# Patient Record
Sex: FEMALE | Race: BLACK OR AFRICAN AMERICAN | Employment: FULL TIME | URBAN - METROPOLITAN AREA
[De-identification: names, ages, dates, MRNs, and addresses within clinical notes are randomized per-mention and may not be internally consistent; named-entity substitution may affect disease eponyms.]

---

## 2021-08-31 ENCOUNTER — APPOINTMENT (OUTPATIENT)
Dept: CT IMAGING | Age: 66
End: 2021-08-31
Attending: EMERGENCY MEDICINE
Payer: COMMERCIAL

## 2021-08-31 ENCOUNTER — HOSPITAL ENCOUNTER (EMERGENCY)
Age: 66
Discharge: HOME | End: 2021-08-31
Payer: COMMERCIAL

## 2021-08-31 ENCOUNTER — APPOINTMENT (OUTPATIENT)
Dept: GENERAL RADIOLOGY | Age: 66
End: 2021-08-31
Attending: EMERGENCY MEDICINE
Payer: COMMERCIAL

## 2021-08-31 VITALS
SYSTOLIC BLOOD PRESSURE: 122 MMHG | WEIGHT: 183 LBS | RESPIRATION RATE: 16 BRPM | BODY MASS INDEX: 32.43 KG/M2 | TEMPERATURE: 98.4 F | HEART RATE: 69 BPM | HEIGHT: 63 IN | OXYGEN SATURATION: 98 % | DIASTOLIC BLOOD PRESSURE: 58 MMHG

## 2021-08-31 DIAGNOSIS — R11.0 NAUSEA WITHOUT VOMITING: ICD-10-CM

## 2021-08-31 DIAGNOSIS — R53.83 FATIGUE, UNSPECIFIED TYPE: Primary | ICD-10-CM

## 2021-08-31 LAB
ALBUMIN SERPL-MCNC: 3.6 G/DL (ref 3.5–5)
ALBUMIN/GLOB SERPL: 0.9 {RATIO} (ref 1.1–2.2)
ALP SERPL-CCNC: 69 U/L (ref 45–117)
ALT SERPL-CCNC: 18 U/L (ref 12–78)
ANION GAP SERPL CALC-SCNC: 7 MMOL/L (ref 5–15)
APPEARANCE UR: CLEAR
AST SERPL W P-5'-P-CCNC: 11 U/L (ref 15–37)
BACTERIA URNS QL MICRO: NEGATIVE /HPF
BASOPHILS # BLD: 0 K/UL (ref 0–0.1)
BASOPHILS NFR BLD: 0 % (ref 0–1)
BILIRUB SERPL-MCNC: 0.6 MG/DL (ref 0.2–1)
BILIRUB UR QL: NEGATIVE
BUN SERPL-MCNC: 16 MG/DL (ref 6–20)
BUN/CREAT SERPL: 18 (ref 12–20)
CA-I BLD-MCNC: 8.6 MG/DL (ref 8.5–10.1)
CHLORIDE SERPL-SCNC: 102 MMOL/L (ref 97–108)
CO2 SERPL-SCNC: 26 MMOL/L (ref 21–32)
COLOR UR: ABNORMAL
CREAT SERPL-MCNC: 0.87 MG/DL (ref 0.55–1.02)
DIFFERENTIAL METHOD BLD: ABNORMAL
EOSINOPHIL # BLD: 0 K/UL (ref 0–0.4)
EOSINOPHIL NFR BLD: 0 % (ref 0–7)
ERYTHROCYTE [DISTWIDTH] IN BLOOD BY AUTOMATED COUNT: 13.9 % (ref 11.5–14.5)
GLOBULIN SER CALC-MCNC: 4 G/DL (ref 2–4)
GLUCOSE SERPL-MCNC: 112 MG/DL (ref 65–100)
GLUCOSE UR STRIP.AUTO-MCNC: >300 MG/DL
HCT VFR BLD AUTO: 47.2 % (ref 35–47)
HGB BLD-MCNC: 15.2 G/DL (ref 11.5–16)
HGB UR QL STRIP: NEGATIVE
IMM GRANULOCYTES # BLD AUTO: 0.1 K/UL (ref 0–0.04)
IMM GRANULOCYTES NFR BLD AUTO: 0 % (ref 0–0.5)
KETONES UR QL STRIP.AUTO: 80 MG/DL
LEUKOCYTE ESTERASE UR QL STRIP.AUTO: NEGATIVE
LYMPHOCYTES # BLD: 2.3 K/UL (ref 0.8–3.5)
LYMPHOCYTES NFR BLD: 18 % (ref 12–49)
MCH RBC QN AUTO: 29.1 PG (ref 26–34)
MCHC RBC AUTO-ENTMCNC: 32.2 G/DL (ref 30–36.5)
MCV RBC AUTO: 90.2 FL (ref 80–99)
MONOCYTES # BLD: 1 K/UL (ref 0–1)
MONOCYTES NFR BLD: 7 % (ref 5–13)
MUCOUS THREADS URNS QL MICRO: ABNORMAL /LPF
NEUTS SEG # BLD: 9.5 K/UL (ref 1.8–8)
NEUTS SEG NFR BLD: 75 % (ref 32–75)
NITRITE UR QL STRIP.AUTO: NEGATIVE
NRBC # BLD: 0 K/UL (ref 0–0.01)
NRBC BLD-RTO: 0 PER 100 WBC
PH UR STRIP: 5 [PH] (ref 5–8)
PLATELET # BLD AUTO: 263 K/UL (ref 150–400)
PMV BLD AUTO: 9.7 FL (ref 8.9–12.9)
POTASSIUM SERPL-SCNC: 3.9 MMOL/L (ref 3.5–5.1)
PROT SERPL-MCNC: 7.6 G/DL (ref 6.4–8.2)
PROT UR STRIP-MCNC: NEGATIVE MG/DL
RBC # BLD AUTO: 5.23 M/UL (ref 3.8–5.2)
RBC #/AREA URNS HPF: ABNORMAL /HPF (ref 0–5)
SODIUM SERPL-SCNC: 135 MMOL/L (ref 136–145)
SP GR UR REFRACTOMETRY: >1.03 (ref 1–1.03)
TROPONIN I SERPL-MCNC: <0.05 NG/ML
UROBILINOGEN UR QL STRIP.AUTO: 0.1 EU/DL (ref 0.1–1)
WBC # BLD AUTO: 12.8 K/UL (ref 3.6–11)
WBC URNS QL MICRO: ABNORMAL /HPF (ref 0–4)

## 2021-08-31 PROCEDURE — 96374 THER/PROPH/DIAG INJ IV PUSH: CPT

## 2021-08-31 PROCEDURE — 84484 ASSAY OF TROPONIN QUANT: CPT

## 2021-08-31 PROCEDURE — 36415 COLL VENOUS BLD VENIPUNCTURE: CPT

## 2021-08-31 PROCEDURE — 71045 X-RAY EXAM CHEST 1 VIEW: CPT

## 2021-08-31 PROCEDURE — 81001 URINALYSIS AUTO W/SCOPE: CPT

## 2021-08-31 PROCEDURE — 70450 CT HEAD/BRAIN W/O DYE: CPT

## 2021-08-31 PROCEDURE — 93005 ELECTROCARDIOGRAM TRACING: CPT

## 2021-08-31 PROCEDURE — 80053 COMPREHEN METABOLIC PANEL: CPT

## 2021-08-31 PROCEDURE — 85025 COMPLETE CBC W/AUTO DIFF WBC: CPT

## 2021-08-31 PROCEDURE — 99283 EMERGENCY DEPT VISIT LOW MDM: CPT

## 2021-08-31 PROCEDURE — 74011250636 HC RX REV CODE- 250/636: Performed by: NURSE PRACTITIONER

## 2021-08-31 RX ORDER — FAMOTIDINE 20 MG/1
20 TABLET, FILM COATED ORAL 2 TIMES DAILY
Qty: 20 TABLET | Refills: 0 | Status: ON HOLD | OUTPATIENT
Start: 2021-08-31 | End: 2021-09-24

## 2021-08-31 RX ORDER — ONDANSETRON 2 MG/ML
4 INJECTION INTRAMUSCULAR; INTRAVENOUS
Status: COMPLETED | OUTPATIENT
Start: 2021-08-31 | End: 2021-08-31

## 2021-08-31 RX ADMIN — ONDANSETRON 4 MG: 2 INJECTION INTRAMUSCULAR; INTRAVENOUS at 21:26

## 2021-08-31 RX ADMIN — SODIUM CHLORIDE 1000 ML: 9 INJECTION, SOLUTION INTRAVENOUS at 21:27

## 2021-08-31 NOTE — ED TRIAGE NOTES
Patient reports being dizzy, weakness, and nauseated since Sunday. Patient reports that there are no new medications that she is taking. Patient reports that she was seen at [facility] earlier today as well with normal lab work.

## 2021-09-01 LAB
ATRIAL RATE: 67 BPM
CALCULATED P AXIS, ECG09: 47 DEGREES
CALCULATED R AXIS, ECG10: -16 DEGREES
CALCULATED T AXIS, ECG11: 16 DEGREES
DIAGNOSIS, 93000: NORMAL
P-R INTERVAL, ECG05: 136 MS
Q-T INTERVAL, ECG07: 398 MS
QRS DURATION, ECG06: 106 MS
QTC CALCULATION (BEZET), ECG08: 420 MS
VENTRICULAR RATE, ECG03: 67 BPM

## 2021-09-01 NOTE — DISCHARGE INSTRUCTIONS
Thank you! Thank you for allowing me to care for you in the emergency department. I sincerely hope that you are satisfied with your visit today. It is my goal to provide you with excellent care. Below you will find a list of your labs and imaging from your visit today. Should you have any questions regarding these results please do not hesitate to call the emergency department. Labs -     Recent Results (from the past 12 hour(s))   CBC WITH AUTOMATED DIFF    Collection Time: 08/31/21  7:08 PM   Result Value Ref Range    WBC 12.8 (H) 3.6 - 11.0 K/uL    RBC 5.23 (H) 3.80 - 5.20 M/uL    HGB 15.2 11.5 - 16.0 g/dL    HCT 47.2 (H) 35.0 - 47.0 %    MCV 90.2 80.0 - 99.0 FL    MCH 29.1 26.0 - 34.0 PG    MCHC 32.2 30.0 - 36.5 g/dL    RDW 13.9 11.5 - 14.5 %    PLATELET 147 811 - 962 K/uL    MPV 9.7 8.9 - 12.9 FL    NRBC 0.0 0.0  WBC    ABSOLUTE NRBC 0.00 0.00 - 0.01 K/uL    NEUTROPHILS 75 32 - 75 %    LYMPHOCYTES 18 12 - 49 %    MONOCYTES 7 5 - 13 %    EOSINOPHILS 0 0 - 7 %    BASOPHILS 0 0 - 1 %    IMMATURE GRANULOCYTES 0 0 - 0.5 %    ABS. NEUTROPHILS 9.5 (H) 1.8 - 8.0 K/UL    ABS. LYMPHOCYTES 2.3 0.8 - 3.5 K/UL    ABS. MONOCYTES 1.0 0.0 - 1.0 K/UL    ABS. EOSINOPHILS 0.0 0.0 - 0.4 K/UL    ABS. BASOPHILS 0.0 0.0 - 0.1 K/UL    ABS. IMM. GRANS. 0.1 (H) 0.00 - 0.04 K/UL    DF AUTOMATED     METABOLIC PANEL, COMPREHENSIVE    Collection Time: 08/31/21  7:08 PM   Result Value Ref Range    Sodium 135 (L) 136 - 145 mmol/L    Potassium 3.9 3.5 - 5.1 mmol/L    Chloride 102 97 - 108 mmol/L    CO2 26 21 - 32 mmol/L    Anion gap 7 5 - 15 mmol/L    Glucose 112 (H) 65 - 100 mg/dL    BUN 16 6 - 20 mg/dL    Creatinine 0.87 0.55 - 1.02 mg/dL    BUN/Creatinine ratio 18 12 - 20      GFR est AA >60 >60 ml/min/1.73m2    GFR est non-AA >60 >60 ml/min/1.73m2    Calcium 8.6 8.5 - 10.1 mg/dL    Bilirubin, total 0.6 0.2 - 1.0 mg/dL    AST (SGOT) 11 (L) 15 - 37 U/L    ALT (SGPT) 18 12 - 78 U/L    Alk.  phosphatase 69 45 - 117 U/L Protein, total 7.6 6.4 - 8.2 g/dL    Albumin 3.6 3.5 - 5.0 g/dL    Globulin 4.0 2.0 - 4.0 g/dL    A-G Ratio 0.9 (L) 1.1 - 2.2     TROPONIN I    Collection Time: 08/31/21  7:08 PM   Result Value Ref Range    Troponin-I, Qt. <0.05 <0.05 ng/mL   URINALYSIS W/MICROSCOPIC    Collection Time: 08/31/21  9:30 PM   Result Value Ref Range    Color Yellow/Straw      Appearance Clear Clear      Specific gravity >1.030 (H) 1.003 - 1.030    pH (UA) 5.0 5.0 - 8.0      Protein Negative Negative mg/dL    Glucose >300 (A) Negative mg/dL    Ketone 80 (A) Negative mg/dL    Bilirubin Negative Negative      Blood Negative Negative      Urobilinogen 0.1 0.1 - 1.0 EU/dL    Nitrites Negative Negative      Leukocyte Esterase Negative Negative      WBC 0-4 0 - 4 /hpf    RBC 0-5 0 - 5 /hpf    Bacteria Negative Negative /hpf    Mucus Trace /lpf       Radiologic Studies -   CT HEAD WO CONT   Final Result   Negative. XR CHEST PORT   Final Result   Negative. CT Results  (Last 48 hours)                 08/31/21 1921  CT HEAD WO CONT Final result    Impression:  Negative. Narrative:  Dose Reduction:        All CT scans at this facility are performed using dose reduction optimization   techniques as appropriate to a performed exam including the following: Automated   exposure control, adjustments of the mA and/or kV according to patient size, or   use of iterative reconstruction technique. CT of the head without contrast. Axial images of the head were obtained   unenhanced and sagittal and coronal reconstructions were created. No prior films   for comparison. Ventricles are normal in size shape and position. No shift of   the midline or mass is seen. No pathologic extra-axial fluid collection is   identified. There is no evidence of acute hemorrhage or infarction. No bony   abnormality is seen. The exam is otherwise unremarkable.                  CXR Results  (Last 48 hours)                 08/31/21 1854  XR CHEST PORT Final result    Impression:  Negative. Narrative:  Single frontal view of the chest. No prior films for comparison. Heart size is   normal. Lungs are clear of infiltrate. No pulmonary nodule or pleural effusion   is seen. No bony abnormalities are identified. If you feel that you have not received excellent quality care or timely care, please ask to speak to the nurse manager. Please choose us in the future for your continued health care needs. ------------------------------------------------------------------------------------------------------------  The exam and treatment you received in the Emergency Department were for an urgent problem and are not intended as complete care. It is important that you follow-up with a doctor, nurse practitioner, or physician assistant to:  (1) confirm your diagnosis,  (2) re-evaluation of changes in your illness and treatment, and  (3) for ongoing care. If your symptoms become worse or you do not improve as expected and you are unable to reach your usual health care provider, you should return to the Emergency Department. We are available 24 hours a day. Please take your discharge instructions with you when you go to your follow-up appointment. If you have any problem arranging a follow-up appointment, contact the Emergency Department immediately. If a prescription has been provided, please have it filled as soon as possible to prevent a delay in treatment. Read the entire medication instruction sheet provided to you by the pharmacy. If you have any questions or reservations about taking the medication due to side effects or interactions with other medications, please call your primary care physician or contact the ER to speak with the charge nurse. Make an appointment with your family doctor or the physician you were referred to for follow-up of this visit as instructed on your discharge paperwork, as this is a mandatory follow-up.  Return to the ER if you are unable to be seen or if you are unable to be seen in a timely manner. If you have any problem arranging the follow-up visit, contact the Emergency Department immediately.

## 2021-09-03 ENCOUNTER — APPOINTMENT (OUTPATIENT)
Dept: GENERAL RADIOLOGY | Age: 66
DRG: 871 | End: 2021-09-03
Attending: EMERGENCY MEDICINE
Payer: COMMERCIAL

## 2021-09-03 ENCOUNTER — HOSPITAL ENCOUNTER (INPATIENT)
Age: 66
LOS: 22 days | Discharge: NURSING, REHAB OR LONG-TERM CARE | DRG: 871 | End: 2021-09-25
Attending: FAMILY MEDICINE | Admitting: INTERNAL MEDICINE
Payer: COMMERCIAL

## 2021-09-03 ENCOUNTER — APPOINTMENT (OUTPATIENT)
Dept: CT IMAGING | Age: 66
DRG: 871 | End: 2021-09-03
Attending: FAMILY MEDICINE
Payer: COMMERCIAL

## 2021-09-03 ENCOUNTER — APPOINTMENT (OUTPATIENT)
Dept: MRI IMAGING | Age: 66
DRG: 871 | End: 2021-09-03
Attending: INTERNAL MEDICINE
Payer: COMMERCIAL

## 2021-09-03 DIAGNOSIS — R56.9 SEIZURE-LIKE ACTIVITY (HCC): ICD-10-CM

## 2021-09-03 DIAGNOSIS — A86 VIRAL ENCEPHALITIS: ICD-10-CM

## 2021-09-03 DIAGNOSIS — E44.1 MILD PROTEIN-CALORIE MALNUTRITION (HCC): ICD-10-CM

## 2021-09-03 DIAGNOSIS — C50.919 MALIGNANT NEOPLASM OF FEMALE BREAST, UNSPECIFIED ESTROGEN RECEPTOR STATUS, UNSPECIFIED LATERALITY, UNSPECIFIED SITE OF BREAST (HCC): ICD-10-CM

## 2021-09-03 DIAGNOSIS — A41.9 SEPSIS, DUE TO UNSPECIFIED ORGANISM, UNSPECIFIED WHETHER ACUTE ORGAN DYSFUNCTION PRESENT (HCC): Primary | ICD-10-CM

## 2021-09-03 DIAGNOSIS — R41.82 ALTERED MENTAL STATUS, UNSPECIFIED ALTERED MENTAL STATUS TYPE: ICD-10-CM

## 2021-09-03 DIAGNOSIS — D72.829 LEUKOCYTOSIS, UNSPECIFIED TYPE: ICD-10-CM

## 2021-09-03 DIAGNOSIS — R56.9 NEW ONSET SEIZURE (HCC): ICD-10-CM

## 2021-09-03 DIAGNOSIS — I15.9 SECONDARY HYPERTENSION: ICD-10-CM

## 2021-09-03 DIAGNOSIS — N17.9 AKI (ACUTE KIDNEY INJURY) (HCC): ICD-10-CM

## 2021-09-03 LAB
ALBUMIN SERPL-MCNC: 2.5 G/DL (ref 3.5–5)
ALBUMIN/GLOB SERPL: 1.1 {RATIO} (ref 1.1–2.2)
ALP SERPL-CCNC: 38 U/L (ref 45–117)
ALT SERPL-CCNC: 15 U/L (ref 12–78)
AMPHET UR QL SCN: NEGATIVE
ANION GAP SERPL CALC-SCNC: 8 MMOL/L (ref 5–15)
APPEARANCE UR: CLEAR
AST SERPL W P-5'-P-CCNC: 16 U/L (ref 15–37)
ATRIAL RATE: 103 BPM
BACTERIA URNS QL MICRO: NEGATIVE /HPF
BACTERIA URNS QL MICRO: NEGATIVE /HPF
BARBITURATES UR QL SCN: NEGATIVE
BASOPHILS # BLD: 0 K/UL (ref 0–0.1)
BASOPHILS NFR BLD: 0 % (ref 0–1)
BENZODIAZ UR QL: POSITIVE
BILIRUB SERPL-MCNC: 0.5 MG/DL (ref 0.2–1)
BILIRUB UR QL: NEGATIVE
BUN SERPL-MCNC: 10 MG/DL (ref 6–20)
BUN/CREAT SERPL: 14 (ref 12–20)
CA-I BLD-MCNC: 6.2 MG/DL (ref 8.5–10.1)
CALCULATED P AXIS, ECG09: 62 DEGREES
CALCULATED R AXIS, ECG10: -20 DEGREES
CALCULATED T AXIS, ECG11: 35 DEGREES
CANNABINOIDS UR QL SCN: NEGATIVE
CHLORIDE SERPL-SCNC: 108 MMOL/L (ref 97–108)
CO2 SERPL-SCNC: 22 MMOL/L (ref 21–32)
COCAINE UR QL SCN: NEGATIVE
COLOR UR: ABNORMAL
COVID-19 RAPID TEST, COVR: NOT DETECTED
CREAT SERPL-MCNC: 0.69 MG/DL (ref 0.55–1.02)
DIAGNOSIS, 93000: NORMAL
DIFFERENTIAL METHOD BLD: ABNORMAL
DRUG SCRN COMMENT,DRGCM: ABNORMAL
EOSINOPHIL # BLD: 0 K/UL (ref 0–0.4)
EOSINOPHIL NFR BLD: 0 % (ref 0–7)
ERYTHROCYTE [DISTWIDTH] IN BLOOD BY AUTOMATED COUNT: 13.7 % (ref 11.5–14.5)
GLOBULIN SER CALC-MCNC: 2.3 G/DL (ref 2–4)
GLUCOSE CSF-MCNC: 51 MG/DL (ref 40–70)
GLUCOSE SERPL-MCNC: 82 MG/DL (ref 65–100)
GLUCOSE UR STRIP.AUTO-MCNC: >300 MG/DL
HCT VFR BLD AUTO: 41.7 % (ref 35–47)
HGB BLD-MCNC: 13.6 G/DL (ref 11.5–16)
HGB UR QL STRIP: ABNORMAL
IMM GRANULOCYTES # BLD AUTO: 0.1 K/UL (ref 0–0.04)
IMM GRANULOCYTES NFR BLD AUTO: 1 % (ref 0–0.5)
KETONES UR QL STRIP.AUTO: 20 MG/DL
LACTATE SERPL-SCNC: 3.5 MMOL/L (ref 0.4–2)
LEUKOCYTE ESTERASE UR QL STRIP.AUTO: NEGATIVE
LYMPHOCYTES # BLD: 2.2 K/UL (ref 0.8–3.5)
LYMPHOCYTES NFR BLD: 18 % (ref 12–49)
MCH RBC QN AUTO: 29.2 PG (ref 26–34)
MCHC RBC AUTO-ENTMCNC: 32.6 G/DL (ref 30–36.5)
MCV RBC AUTO: 89.7 FL (ref 80–99)
METHADONE UR QL: NEGATIVE
MONOCYTES # BLD: 1.9 K/UL (ref 0–1)
MONOCYTES NFR BLD: 15 % (ref 5–13)
NEUTS SEG # BLD: 8.4 K/UL (ref 1.8–8)
NEUTS SEG NFR BLD: 66 % (ref 32–75)
NITRITE UR QL STRIP.AUTO: NEGATIVE
NRBC # BLD: 0 K/UL (ref 0–0.01)
NRBC BLD-RTO: 0 PER 100 WBC
OPIATES UR QL: NEGATIVE
P-R INTERVAL, ECG05: 152 MS
PCP UR QL: NEGATIVE
PH UR STRIP: 6 [PH] (ref 5–8)
PLATELET # BLD AUTO: 239 K/UL (ref 150–400)
PMV BLD AUTO: 11 FL (ref 8.9–12.9)
POTASSIUM SERPL-SCNC: 2.8 MMOL/L (ref 3.5–5.1)
PROT CSF-MCNC: 80 MG/DL (ref 15–45)
PROT SERPL-MCNC: 4.8 G/DL (ref 6.4–8.2)
PROT UR STRIP-MCNC: NEGATIVE MG/DL
Q-T INTERVAL, ECG07: 352 MS
QRS DURATION, ECG06: 100 MS
QTC CALCULATION (BEZET), ECG08: 461 MS
RBC # BLD AUTO: 4.65 M/UL (ref 3.8–5.2)
RBC #/AREA URNS HPF: ABNORMAL /HPF (ref 0–5)
RBC #/AREA URNS HPF: NORMAL /HPF (ref 0–5)
SARS-COV-2, COV2: NORMAL
SODIUM SERPL-SCNC: 138 MMOL/L (ref 136–145)
SP GR UR REFRACTOMETRY: 1.01 (ref 1–1.03)
SPECIMEN SOURCE: NORMAL
TROPONIN I SERPL-MCNC: <0.05 NG/ML
TUBE # CSF: 1
TUBE # CSF: 1
UROBILINOGEN UR QL STRIP.AUTO: 0.1 EU/DL (ref 0.1–1)
VENTRICULAR RATE, ECG03: 103 BPM
WBC # BLD AUTO: 12.7 K/UL (ref 3.6–11)
WBC URNS QL MICRO: ABNORMAL /HPF (ref 0–4)
WBC URNS QL MICRO: NORMAL /HPF (ref 0–4)

## 2021-09-03 PROCEDURE — 51702 INSERT TEMP BLADDER CATH: CPT

## 2021-09-03 PROCEDURE — 82945 GLUCOSE OTHER FLUID: CPT

## 2021-09-03 PROCEDURE — 99222 1ST HOSP IP/OBS MODERATE 55: CPT | Performed by: INTERNAL MEDICINE

## 2021-09-03 PROCEDURE — A9577 INJ MULTIHANCE: HCPCS | Performed by: INTERNAL MEDICINE

## 2021-09-03 PROCEDURE — 80307 DRUG TEST PRSMV CHEM ANLYZR: CPT

## 2021-09-03 PROCEDURE — 93005 ELECTROCARDIOGRAM TRACING: CPT

## 2021-09-03 PROCEDURE — 74011250636 HC RX REV CODE- 250/636: Performed by: INTERNAL MEDICINE

## 2021-09-03 PROCEDURE — 74011000258 HC RX REV CODE- 258: Performed by: FAMILY MEDICINE

## 2021-09-03 PROCEDURE — 96375 TX/PRO/DX INJ NEW DRUG ADDON: CPT

## 2021-09-03 PROCEDURE — 74011000258 HC RX REV CODE- 258: Performed by: INTERNAL MEDICINE

## 2021-09-03 PROCEDURE — 72125 CT NECK SPINE W/O DYE: CPT

## 2021-09-03 PROCEDURE — 84484 ASSAY OF TROPONIN QUANT: CPT

## 2021-09-03 PROCEDURE — 85025 COMPLETE CBC W/AUTO DIFF WBC: CPT

## 2021-09-03 PROCEDURE — 36415 COLL VENOUS BLD VENIPUNCTURE: CPT

## 2021-09-03 PROCEDURE — 81001 URINALYSIS AUTO W/SCOPE: CPT

## 2021-09-03 PROCEDURE — 83605 ASSAY OF LACTIC ACID: CPT

## 2021-09-03 PROCEDURE — 84157 ASSAY OF PROTEIN OTHER: CPT

## 2021-09-03 PROCEDURE — 009U3ZX DRAINAGE OF SPINAL CANAL, PERCUTANEOUS APPROACH, DIAGNOSTIC: ICD-10-PCS | Performed by: EMERGENCY MEDICINE

## 2021-09-03 PROCEDURE — 70450 CT HEAD/BRAIN W/O DYE: CPT

## 2021-09-03 PROCEDURE — 74011250636 HC RX REV CODE- 250/636: Performed by: FAMILY MEDICINE

## 2021-09-03 PROCEDURE — 99284 EMERGENCY DEPT VISIT MOD MDM: CPT

## 2021-09-03 PROCEDURE — 71045 X-RAY EXAM CHEST 1 VIEW: CPT

## 2021-09-03 PROCEDURE — 80053 COMPREHEN METABOLIC PANEL: CPT

## 2021-09-03 PROCEDURE — 96374 THER/PROPH/DIAG INJ IV PUSH: CPT

## 2021-09-03 PROCEDURE — 87040 BLOOD CULTURE FOR BACTERIA: CPT

## 2021-09-03 PROCEDURE — 87205 SMEAR GRAM STAIN: CPT

## 2021-09-03 PROCEDURE — 87529 HSV DNA AMP PROBE: CPT

## 2021-09-03 PROCEDURE — 65610000006 HC RM INTENSIVE CARE

## 2021-09-03 PROCEDURE — 87252 VIRUS INOCULATION TISSUE: CPT

## 2021-09-03 PROCEDURE — 74011250637 HC RX REV CODE- 250/637: Performed by: FAMILY MEDICINE

## 2021-09-03 PROCEDURE — 89050 BODY FLUID CELL COUNT: CPT

## 2021-09-03 PROCEDURE — 87635 SARS-COV-2 COVID-19 AMP PRB: CPT

## 2021-09-03 PROCEDURE — 70553 MRI BRAIN STEM W/O & W/DYE: CPT

## 2021-09-03 PROCEDURE — 74011000250 HC RX REV CODE- 250: Performed by: INTERNAL MEDICINE

## 2021-09-03 PROCEDURE — 51798 US URINE CAPACITY MEASURE: CPT

## 2021-09-03 RX ORDER — ENOXAPARIN SODIUM 100 MG/ML
30 INJECTION SUBCUTANEOUS DAILY
Status: DISCONTINUED | OUTPATIENT
Start: 2021-09-04 | End: 2021-09-09

## 2021-09-03 RX ORDER — INSULIN LISPRO 100 [IU]/ML
INJECTION, SOLUTION INTRAVENOUS; SUBCUTANEOUS EVERY 6 HOURS
Status: DISCONTINUED | OUTPATIENT
Start: 2021-09-04 | End: 2021-09-06

## 2021-09-03 RX ORDER — ACETAMINOPHEN 650 MG/1
975 SUPPOSITORY RECTAL
Status: COMPLETED | OUTPATIENT
Start: 2021-09-03 | End: 2021-09-03

## 2021-09-03 RX ORDER — AZITHROMYCIN 250 MG/1
TABLET, FILM COATED ORAL
COMMUNITY
End: 2021-09-25

## 2021-09-03 RX ORDER — MAGNESIUM SULFATE 100 %
4 CRYSTALS MISCELLANEOUS AS NEEDED
Status: DISCONTINUED | OUTPATIENT
Start: 2021-09-03 | End: 2021-09-25 | Stop reason: HOSPADM

## 2021-09-03 RX ORDER — PROMETHAZINE HYDROCHLORIDE 25 MG/1
12.5 TABLET ORAL
Status: DISCONTINUED | OUTPATIENT
Start: 2021-09-03 | End: 2021-09-25 | Stop reason: HOSPADM

## 2021-09-03 RX ORDER — ACETAMINOPHEN 160 MG/5ML
SUSPENSION, ORAL (FINAL DOSE FORM) ORAL
COMMUNITY

## 2021-09-03 RX ORDER — ACETAMINOPHEN 650 MG/1
650 SUPPOSITORY RECTAL
Status: DISCONTINUED | OUTPATIENT
Start: 2021-09-03 | End: 2021-09-25 | Stop reason: HOSPADM

## 2021-09-03 RX ORDER — ACETAMINOPHEN 120 MG/1
120 SUPPOSITORY RECTAL
Status: DISCONTINUED | OUTPATIENT
Start: 2021-09-03 | End: 2021-09-03

## 2021-09-03 RX ORDER — DEXTROSE 50 % IN WATER (D50W) INTRAVENOUS SYRINGE
25-50 AS NEEDED
Status: DISCONTINUED | OUTPATIENT
Start: 2021-09-03 | End: 2021-09-25 | Stop reason: HOSPADM

## 2021-09-03 RX ORDER — ORAL SEMAGLUTIDE 3 MG/1
TABLET ORAL
COMMUNITY
Start: 2021-08-19

## 2021-09-03 RX ORDER — ACETAMINOPHEN 325 MG/1
650 TABLET ORAL
Status: DISCONTINUED | OUTPATIENT
Start: 2021-09-03 | End: 2021-09-25 | Stop reason: HOSPADM

## 2021-09-03 RX ORDER — CITALOPRAM 40 MG/1
40 TABLET, FILM COATED ORAL EVERY EVENING
COMMUNITY
Start: 2021-08-19

## 2021-09-03 RX ORDER — TRIAMCINOLONE ACETONIDE 1 MG/G
CREAM TOPICAL
Status: ON HOLD | COMMUNITY
End: 2021-10-26

## 2021-09-03 RX ORDER — SODIUM CHLORIDE 0.9 % (FLUSH) 0.9 %
5-40 SYRINGE (ML) INJECTION EVERY 8 HOURS
Status: DISCONTINUED | OUTPATIENT
Start: 2021-09-03 | End: 2021-09-18

## 2021-09-03 RX ORDER — LEVETIRACETAM 10 MG/ML
1000 INJECTION INTRAVASCULAR EVERY 12 HOURS
Status: DISCONTINUED | OUTPATIENT
Start: 2021-09-03 | End: 2021-09-04

## 2021-09-03 RX ORDER — ASPIRIN 81 MG/1
TABLET ORAL
COMMUNITY

## 2021-09-03 RX ORDER — SODIUM CHLORIDE 9 MG/ML
75 INJECTION, SOLUTION INTRAVENOUS CONTINUOUS
Status: DISCONTINUED | OUTPATIENT
Start: 2021-09-03 | End: 2021-09-04

## 2021-09-03 RX ORDER — EMPAGLIFLOZIN 25 MG/1
TABLET, FILM COATED ORAL
COMMUNITY
Start: 2021-07-01

## 2021-09-03 RX ORDER — ACETAMINOPHEN 650 MG/1
975 SUPPOSITORY RECTAL
Status: DISCONTINUED | OUTPATIENT
Start: 2021-09-03 | End: 2021-09-03

## 2021-09-03 RX ORDER — FLUTICASONE PROPIONATE 50 MCG
SPRAY, SUSPENSION (ML) NASAL
Status: ON HOLD | COMMUNITY
End: 2021-10-26

## 2021-09-03 RX ORDER — DICLOFENAC SODIUM 10 MG/G
GEL TOPICAL
Status: ON HOLD | COMMUNITY
End: 2021-10-26

## 2021-09-03 RX ORDER — SODIUM CHLORIDE 9 MG/ML
1000 INJECTION, SOLUTION INTRAVENOUS CONTINUOUS
Status: DISCONTINUED | OUTPATIENT
Start: 2021-09-03 | End: 2021-09-07

## 2021-09-03 RX ORDER — SODIUM CHLORIDE 0.9 % (FLUSH) 0.9 %
5-40 SYRINGE (ML) INJECTION AS NEEDED
Status: DISCONTINUED | OUTPATIENT
Start: 2021-09-03 | End: 2021-09-17

## 2021-09-03 RX ORDER — ROSUVASTATIN CALCIUM 5 MG/1
5 TABLET, COATED ORAL
COMMUNITY
Start: 2021-08-19 | End: 2021-10-27

## 2021-09-03 RX ORDER — ONDANSETRON 4 MG/1
TABLET, ORALLY DISINTEGRATING ORAL
COMMUNITY
Start: 2021-08-31

## 2021-09-03 RX ORDER — ONDANSETRON 2 MG/ML
4 INJECTION INTRAMUSCULAR; INTRAVENOUS
Status: DISCONTINUED | OUTPATIENT
Start: 2021-09-03 | End: 2021-09-25 | Stop reason: HOSPADM

## 2021-09-03 RX ORDER — POTASSIUM CHLORIDE 7.45 MG/ML
10 INJECTION INTRAVENOUS
Status: COMPLETED | OUTPATIENT
Start: 2021-09-03 | End: 2021-09-03

## 2021-09-03 RX ORDER — NAPROXEN 500 MG/1
TABLET ORAL
COMMUNITY
End: 2021-09-25

## 2021-09-03 RX ORDER — AMPICILLIN SODIUM 1 G/1
1000 INJECTION, POWDER, FOR SOLUTION INTRAVENOUS EVERY 6 HOURS
Status: DISCONTINUED | OUTPATIENT
Start: 2021-09-03 | End: 2021-09-03

## 2021-09-03 RX ORDER — LORATADINE 10 MG/1
TABLET ORAL
Status: ON HOLD | COMMUNITY
End: 2021-10-26

## 2021-09-03 RX ORDER — TAMOXIFEN CITRATE 20 MG/1
TABLET ORAL
Status: ON HOLD | COMMUNITY
End: 2021-10-26

## 2021-09-03 RX ORDER — LORAZEPAM 2 MG/ML
0.25 INJECTION INTRAMUSCULAR ONCE
Status: COMPLETED | OUTPATIENT
Start: 2021-09-03 | End: 2021-09-03

## 2021-09-03 RX ORDER — LEVETIRACETAM 10 MG/ML
1000 INJECTION INTRAVASCULAR ONCE
Status: COMPLETED | OUTPATIENT
Start: 2021-09-03 | End: 2021-09-03

## 2021-09-03 RX ORDER — POLYETHYLENE GLYCOL 3350 17 G/17G
17 POWDER, FOR SOLUTION ORAL DAILY PRN
Status: DISCONTINUED | OUTPATIENT
Start: 2021-09-03 | End: 2021-09-25 | Stop reason: HOSPADM

## 2021-09-03 RX ORDER — AMOXICILLIN 500 MG/1
CAPSULE ORAL
COMMUNITY
End: 2021-09-25

## 2021-09-03 RX ORDER — LIDOCAINE 50 MG/G
PATCH TOPICAL
Status: ON HOLD | COMMUNITY
End: 2021-10-26

## 2021-09-03 RX ORDER — GLIPIZIDE 5 MG/1
TABLET ORAL
COMMUNITY
Start: 2021-06-22

## 2021-09-03 RX ORDER — DIAPER,BRIEF,INFANT-TODD,DISP
EACH MISCELLANEOUS
COMMUNITY

## 2021-09-03 RX ADMIN — SODIUM CHLORIDE 1000 ML/HR: 9 INJECTION, SOLUTION INTRAVENOUS at 06:35

## 2021-09-03 RX ADMIN — ACETAMINOPHEN 975 MG: 650 SUPPOSITORY RECTAL at 07:04

## 2021-09-03 RX ADMIN — LEVETIRACETAM 1000 MG: 10 INJECTION INTRAVENOUS at 20:53

## 2021-09-03 RX ADMIN — Medication 10 ML: at 22:58

## 2021-09-03 RX ADMIN — POTASSIUM CHLORIDE 10 MEQ: 7.46 INJECTION, SOLUTION INTRAVENOUS at 22:09

## 2021-09-03 RX ADMIN — SODIUM CHLORIDE 1000 ML/HR: 9 INJECTION, SOLUTION INTRAVENOUS at 06:39

## 2021-09-03 RX ADMIN — AMPICILLIN SODIUM 1 G: 1 INJECTION, POWDER, FOR SOLUTION INTRAMUSCULAR; INTRAVENOUS at 22:59

## 2021-09-03 RX ADMIN — ACYCLOVIR SODIUM 600 MG: 50 INJECTION, SOLUTION INTRAVENOUS at 22:59

## 2021-09-03 RX ADMIN — POTASSIUM CHLORIDE 10 MEQ: 7.46 INJECTION, SOLUTION INTRAVENOUS at 20:00

## 2021-09-03 RX ADMIN — CEFTRIAXONE SODIUM 2 G: 2 INJECTION, POWDER, FOR SOLUTION INTRAMUSCULAR; INTRAVENOUS at 20:31

## 2021-09-03 RX ADMIN — Medication 10 ML: at 10:04

## 2021-09-03 RX ADMIN — POTASSIUM CHLORIDE 10 MEQ: 7.46 INJECTION, SOLUTION INTRAVENOUS at 18:44

## 2021-09-03 RX ADMIN — GADOBENATE DIMEGLUMINE 18 ML: 529 INJECTION, SOLUTION INTRAVENOUS at 11:22

## 2021-09-03 RX ADMIN — AMPICILLIN SODIUM 1 G: 1 INJECTION, POWDER, FOR SOLUTION INTRAMUSCULAR; INTRAVENOUS at 16:24

## 2021-09-03 RX ADMIN — ACYCLOVIR SODIUM 600 MG: 50 INJECTION, SOLUTION INTRAVENOUS at 12:55

## 2021-09-03 RX ADMIN — PIPERACILLIN AND TAZOBACTAM 3.38 G: 3; .375 INJECTION, POWDER, LYOPHILIZED, FOR SOLUTION INTRAVENOUS at 06:39

## 2021-09-03 RX ADMIN — CEFTRIAXONE SODIUM 2 G: 2 INJECTION, POWDER, FOR SOLUTION INTRAMUSCULAR; INTRAVENOUS at 10:04

## 2021-09-03 RX ADMIN — Medication 10 ML: at 14:12

## 2021-09-03 RX ADMIN — LORAZEPAM 0.26 MG: 2 INJECTION INTRAMUSCULAR; INTRAVENOUS at 14:12

## 2021-09-03 RX ADMIN — SODIUM CHLORIDE 75 ML/HR: 9 INJECTION, SOLUTION INTRAVENOUS at 10:04

## 2021-09-03 RX ADMIN — POTASSIUM CHLORIDE 10 MEQ: 7.46 INJECTION, SOLUTION INTRAVENOUS at 20:29

## 2021-09-03 RX ADMIN — VANCOMYCIN HYDROCHLORIDE 1500 MG: 10 INJECTION, POWDER, LYOPHILIZED, FOR SOLUTION INTRAVENOUS at 07:33

## 2021-09-03 RX ADMIN — SODIUM CHLORIDE 500 ML: 9 INJECTION, SOLUTION INTRAVENOUS at 07:28

## 2021-09-03 RX ADMIN — LEVETIRACETAM 1000 MG: 10 INJECTION INTRAVENOUS at 07:28

## 2021-09-03 NOTE — ED NOTES
Pt rounded on and found on cardiac monitoring continued , spO2 Monitoring continued, IV reassed, call bell within reach, side rails up x2, resting comfortable but easily arousable, no signs of acute distress. , bed in lowest position, MAR reviewed, Labs reviewed, will continue to monitor     pt taken to MRI by MRI staff. No signs of acute distress.   10:23 AM

## 2021-09-03 NOTE — H&P
History & Physical    Primary Care Provider: Christianne Reardon MD  Source of Information: Patient/family     History of Presenting Illness:   Parag Steel is a 60 y.o. female who presents with like activity after being found on the floor by her family overnight. Upon arrival EMS she was having seizure activity and given Versed 5 mg which abated the convulsions. Seizure started again and she was given another 5 mg of Versed. On arrival to the ED she had a temperature of 102. CT of the head was negative. Labs were significant for a potassium of 2.8    Patient had been seen here in the ED on 8/31 with complaints of nausea and vomiting, dizziness and weakness. She was concerned she may have had food poisoning although this was not felt to be the case. When her daughter spoke with her on the phone yesterday her daughter noted she was confused and disoriented. Patient is fully vaccinated for COVID-19. Rapid Covid testing is negative       Review of Systems:  Review of systems not obtained due to patient factors. Past Medical History:   Diagnosis Date    [facility] ([facility] [address]     Depression     [facility]     High cholesterol         Past Surgical History:   Procedure Laterality Date    HX MASTECTOMY         Prior to Admission medications    Medication Sig Start Date End Date Taking? Authorizing Provider   Multivitamins with Fluoride (MULTI-VITAMIN PO) Multi Vitamin    Christianne Reardon MD   amoxicillin (AMOXIL) 500 mg capsule amoxicillin 500 mg capsule    Christianne Reardon MD   aspirin delayed-release 81 mg tablet aspirin 81 mg tablet,delayed release   Take 1 tablet every day by oral route for protection for 90 days. Christianne Reardon MD   azithromycin (ZITHROMAX) 250 mg tablet azithromycin 250 mg tablet    Christianne Reardon MD   biotin 10,000 mcg cap biotin 10,000 mcg capsule   Take 1 capsule every day by oral route as directed for 30 days.     Christianne Reardon MD   calcium carb/vitamin D3/vit K1 (VIACTIV PO) Viactiv   OTC    Other, MD Christianne   canagliflozin (Invokana) 300 mg tablet Invokana 300 mg tablet   Take 1 tablet every day by oral route for diabetes for 90 days. Other, MD Christianne   citalopram (CELEXA) 40 mg tablet  8/19/21   Christianne Reardon MD   diclofenac (Voltaren) 1 % gel Voltaren Arthritis Pain 1 % topical gel   APPLY 2 GRAMS TO THE AFFECTED AREA(S) BY TOPICAL ROUTE 4 TIMES PER DAY x 90 days    Caron, MD Christianne   Jardiance 25 mg tablet  7/1/21   Other, MD Christianne   fluticasone propionate (FLONASE) 50 mcg/actuation nasal spray fluticasone propionate 50 mcg/actuation nasal spray,suspension    Other, MD Christianne   glipiZIDE (GLUCOTROL) 5 mg tablet  6/22/21   Christianne Reardon MD   lidocaine (LIDODERM) 5 % lidocaine 5 % topical patch   APPLY 1 to 3 PATCHES BY TOPICAL ROUTE ONCE DAILY (MAY WEAR UP TO 12HOURS.)   for sciatica    Caron, MD Christianne   loratadine (CLARITIN) 10 mg tablet loratadine 10 mg tablet   Take 1 tablet every day by oral route at bedtime for allergies for 30 days. for allergies    Other, MD Christianne   naproxen (NAPROSYN) 500 mg tablet naproxen 500 mg tablet    Other, MD Christianne   ondansetron (ZOFRAN ODT) 4 mg disintegrating tablet  8/31/21   Christianne Reardon MD   rosuvastatin (CRESTOR) 5 mg tablet  8/19/21   OtherChristianne MD   Rybelsus 3 mg tablet  8/19/21   Christianne Reardon MD   tamoxifen (NOLVADEX) 20 mg tablet tamoxifen 20 mg tablet   Take 1 tablet every day by oral route. PER ONCOLOGY FOR CANCER TREATMENT    Caron, MD Christianne   triamcinolone acetonide (KENALOG) 0.1 % topical cream triamcinolone acetonide 0.1 % topical cream   APPLY A THIN LAYER TO THE AFFECTED AREA(S) BY TOPICAL ROUTE 2 TIMES PER DAY    Christianne Reardon MD   coenzyme Q-10 (CO Q-10) 200 mg capsule Co Q-10 200 mg capsule   Take 1 capsule every day by oral route as directed for 30 days. Other, MD Christianne   famotidine (Pepcid) 20 mg tablet Take 1 Tablet by mouth two (2) times a day for 10 days.  8/31/21 9/10/21  Mike Bucio, NP       No Known Allergies     History reviewed. No pertinent family history. Social History     Socioeconomic History    Marital status:      Spouse name: Not on file    Number of children: Not on file    Years of education: Not on file    Highest education level: Not on file   Tobacco Use    Smoking status: Never Smoker   Substance and Sexual Activity    Alcohol use: Not Currently   Other Topics Concern     Social Determinants of Health     Financial Resource Strain:     Difficulty of Paying Living Expenses:    Food Insecurity:     Worried About Running Out of Food in the Last Year:     [address] in the Last Year:    Transportation Needs:     Lack of Transportation (Medical):  Lack of Transportation (Non-Medical):    Physical Activity:     Days of Exercise per Week:     Minutes of Exercise per Session:    Stress:     Feeling of Stress :    Social Connections:     Frequency of Communication with Friends and Family:     Frequency of Social Gatherings with Friends and Family:     Attends [employer]oration[employer] Services:     Active Member of Clubs or Organizations:     Attends Club or Organization Meetings:     Marital Status:             CODE STATUS:  DNR    Full    Other      Objective:     Physical Exam:     Visit Vitals  /66   Pulse 79   Temp (!) 101.3 °F (38.5 °C)   Resp 28   Ht 5' 6\" (1.676 m)   Wt 83 kg (183 lb)   SpO2 99%   BMI 29.54 kg/m²    O2 Flow Rate (L/min): 3 l/min O2 Device: Nasal cannula    General:   Lethargic, does not follow commands, eyes open   Head:  Normocephalic, without obvious abnormality, atraumatic. Eyes:  Conjunctivae/corneas clear. PERRL, EOMs intact. Nose: Nares normal. Septum midline. Mucosa normal. No drainage or sinus tenderness. Throat: Lips, mucosa, and tongue normal. Teeth and gums normal.   Neck: Supple, symmetrical, trachea midline, no adenopathy, thyroid: no enlargement/tenderness/nodules, no carotid bruit and no JVD. Back:   Symmetric, no curvature. ROM normal. No CVA tenderness. Lungs:   Clear to auscultation bilaterally. Chest wall:  No tenderness or deformity. Heart:  Regular rate and rhythm, S1, S2 normal, no murmur, click, rub or gallop. Abdomen:   Soft, non-tender. Bowel sounds normal. No masses,  No organomegaly. Extremities: Extremities normal, atraumatic, no cyanosis or edema. Pulses: 2+ and symmetric all extremities. Skin: Skin color, texture, turgor normal. No rashes or lesions   Neurologic: CNII-XII intact. No motor or sensory deficits. Parkman coma scale 9       24 Hour Results:    Recent Results (from the past 24 hour(s))   CBC WITH AUTOMATED DIFF    Collection Time: 09/03/21  6:21 AM   Result Value Ref Range    WBC 12.7 (H) 3.6 - 11.0 K/uL    RBC 4.65 3.80 - 5.20 M/uL    HGB 13.6 11.5 - 16.0 g/dL    HCT 41.7 35.0 - 47.0 %    MCV 89.7 80.0 - 99.0 FL    MCH 29.2 26.0 - 34.0 PG    MCHC 32.6 30.0 - 36.5 g/dL    RDW 13.7 11.5 - 14.5 %    PLATELET 769 676 - 156 K/uL    MPV 11.0 8.9 - 12.9 FL    NRBC 0.0 0.0  WBC    ABSOLUTE NRBC 0.00 0.00 - 0.01 K/uL    NEUTROPHILS 66 32 - 75 %    LYMPHOCYTES 18 12 - 49 %    MONOCYTES 15 (H) 5 - 13 %    EOSINOPHILS 0 0 - 7 %    BASOPHILS 0 0 - 1 %    IMMATURE GRANULOCYTES 1 (H) 0 - 0.5 %    ABS. NEUTROPHILS 8.4 (H) 1.8 - 8.0 K/UL    ABS. LYMPHOCYTES 2.2 0.8 - 3.5 K/UL    ABS. MONOCYTES 1.9 (H) 0.0 - 1.0 K/UL    ABS. EOSINOPHILS 0.0 0.0 - 0.4 K/UL    ABS. BASOPHILS 0.0 0.0 - 0.1 K/UL    ABS. IMM.  GRANS. 0.1 (H) 0.00 - 0.04 K/UL    DF AUTOMATED     METABOLIC PANEL, COMPREHENSIVE    Collection Time: 09/03/21  6:21 AM   Result Value Ref Range    Sodium 138 136 - 145 mmol/L    Potassium 2.8 (L) 3.5 - 5.1 mmol/L    Chloride 108 97 - 108 mmol/L    CO2 22 21 - 32 mmol/L    Anion gap 8 5 - 15 mmol/L    Glucose 82 65 - 100 mg/dL    BUN 10 6 - 20 mg/dL    Creatinine 0.69 0.55 - 1.02 mg/dL    BUN/Creatinine ratio 14 12 - 20      GFR est AA >60 >60 ml/min/1.73m2    GFR est non-AA >60 >60 ml/min/1.73m2 Calcium 6.2 (LL) 8.5 - 10.1 mg/dL    Bilirubin, total 0.5 0.2 - 1.0 mg/dL    AST (SGOT) 16 15 - 37 U/L    ALT (SGPT) 15 12 - 78 U/L    Alk.  phosphatase 38 (L) 45 - 117 U/L    Protein, total 4.8 (L) 6.4 - 8.2 g/dL    Albumin 2.5 (L) 3.5 - 5.0 g/dL    Globulin 2.3 2.0 - 4.0 g/dL    A-G Ratio 1.1 1.1 - 2.2     LACTIC ACID    Collection Time: 09/03/21  6:21 AM   Result Value Ref Range    Lactic acid 3.5 (HH) 0.4 - 2.0 mmol/L   TROPONIN I    Collection Time: 09/03/21  6:21 AM   Result Value Ref Range    Troponin-I, Qt. <0.05 <0.05 ng/mL   SARS-COV-2    Collection Time: 09/03/21  6:36 AM   Result Value Ref Range    SARS-CoV-2 Please find results under separate order     COVID-19 RAPID TEST    Collection Time: 09/03/21  6:36 AM   Result Value Ref Range    Specimen source Nasopharyngeal      COVID-19 rapid test Not Detected Not Detected     URINALYSIS W/ RFLX MICROSCOPIC    Collection Time: 09/03/21  7:00 AM   Result Value Ref Range    Color Yellow/Straw      Appearance Clear Clear      Specific gravity 1.014 1.003 - 1.030      pH (UA) 6.0 5.0 - 8.0      Protein Negative Negative mg/dL    Glucose >300 (A) Negative mg/dL    Ketone 20 (A) Negative mg/dL    Bilirubin Negative Negative      Blood Small (A) Negative      Urobilinogen 0.1 0.1 - 1.0 EU/dL    Nitrites Negative Negative      Leukocyte Esterase Negative Negative      WBC 0-4 0 - 4 /hpf    RBC 0-5 0 - 5 /hpf    Bacteria Negative Negative /hpf   DRUG SCREEN, URINE    Collection Time: 09/03/21  7:00 AM   Result Value Ref Range    AMPHETAMINES Negative Negative      BARBITURATES Negative Negative      BENZODIAZEPINES Positive (A) Negative      COCAINE Negative Negative      METHADONE Negative Negative      OPIATES Negative Negative      PCP(PHENCYCLIDINE) Negative Negative      THC (TH-CANNABINOL) Negative Negative      Drug screen comment        This test is a screen for drugs of abuse in a medical setting only (i.e., they are unconfirmed results and as such must not be used for non-medical purposes, e.g.,employment testing, legal testing). Due to its inherent nature, false positive (FP) and false negative (FN) results may be obtained. Therefore, if necessary for medical care, recommend confirmation of positive findings by GC/MS. URINE MICROSCOPIC    Collection Time: 09/03/21  7:00 AM   Result Value Ref Range    WBC PENDING /hpf    RBC PENDING /hpf    Bacteria PENDING /hpf         Imaging:   XR CHEST PORT   Final Result      CT HEAD WO CONT   Final Result   No acute or active intracranial process. CT SPINE CERV WO CONT   Final Result   No specific acute or active process. Multilevel degenerative disc and degenerative joint disease. MRI BRAIN W WO CONT    (Results Pending)           Assessment:   New onset seizure activity with fever and altered mental status, highly concerning for CNS infection    Sepsis with fever, leukocytosis, elevated lactate and tachycardia metabolic encephalopathy, as above    Diabetes mellitus type 2    Remote history of breast cancer      Plan:   Patient will be admitted to the ICU  Discussed with neurologist.  Patient needs an emergent lumbar puncture to rule out CNS infection. Dr. Lawyer Beck recommended MRI with contrast to be done first which I have ordered  Patient has received vancomycin and Zosyn. We will give a dose of ceftriaxone 2 g now  She has been loaded with Keppra  Sliding scale insulin    Discussed with the ED physician Dr. Pippa Dolan who will attempt lumbar puncture      CODE STATUS is full    Plan of care discussed with her daughter at the bedside    Addendum: MRI reviewed with radiologist, shows classic high signal on T2 and FLAIR images right temporal lobe, essentially diagnostic for herpes encephalitis. Discussed with the ED physician again, will obtain lumbar puncture now, start acyclovir 10 mg/kg IV every 8 hours now. Updated daughter at the bedside    I personally spent  110    minutes of critical care time.   This is time spent at this critically ill patient's bedside actively involved in patient care as well as the coordination of care and discussions with the patient's family. This does not include any procedural time which has been billed separately.       Home medications were reviewed    Signed By: Marita Brar MD     September 3, 2021

## 2021-09-03 NOTE — ED NOTES
TRANSFER - OUT REPORT:    Verbal report given to Clara Veronica(name) on Christi  being transferred to ICU(unit) for routine progression of care       Report consisted of patients Situation, Background, Assessment and   Recommendations(SBAR). Information from the following report(s) SBAR, Kardex, ED Summary, Procedure Summary, Intake/Output, MAR, Accordion, Recent Results and Med Rec Status was reviewed with the receiving nurse. Lines:   Peripheral IV 09/03/21 Anterior;Left;Proximal Forearm (Active)   Site Assessment Clean, dry, & intact 09/03/21 0635   Phlebitis Assessment 0 09/03/21 0635   Infiltration Assessment 0 09/03/21 0635   Dressing Status Clean, dry, & intact 09/03/21 0635   Dressing Type Tape;Transparent 09/03/21 0635   Hub Color/Line Status Pink;Flushed;Patent 09/03/21 9902   Action Taken Blood drawn 09/03/21 0635       Peripheral IV 09/03/21 Posterior;Right Hand (Active)        Opportunity for questions and clarification was provided.       Patient transported with:   Monitor  Registered Nurse

## 2021-09-03 NOTE — PROGRESS NOTES
Spiritual Care Assessment/Progress Note  [facility]      NAME: Rosi Hernandez      MRN: [id number]  AGE: 60 y.o. SEX: female  Yarsani Affiliation: Adventist   Language: English     9/3/2021     Total Time (in minutes): 11     Spiritual Assessment begun in [address] [place] [address] DEPT through conversation with:         [x]Patient        [x] Family    [] Friend(s)        Reason for Consult: Request by staff     Spiritual beliefs: (Please include comment if needed)     [] Identifies with a shavon tradition:         [] Supported by a shavon community:            [] Claims no spiritual orientation:           [] Seeking spiritual identity:                [] Adheres to an individual form of spirituality:           [x] Not able to assess:                           Identified resources for coping:      [] Prayer                               [] Music                  [] Guided Imagery     [] Family/friends                 [] Pet visits     [] Devotional reading                         [x] Unknown     [] Other:                                               Interventions offered during this visit: (See comments for more details)    Patient Interventions: Initial visit, Other (comment) (Silent support and prayer)     Family/Friend(s):  Affirmation of emotions/emotional suffering, Coping skills reviewed/reinforced, Initial Assessment, Normalization of emotional/spiritual concerns, Prayer (assurance of)     Plan of Care:     [] Support spiritual and/or cultural needs    [] Support AMD and/or advance care planning process      [] Support grieving process   [] Coordinate Rites and/or Rituals    [] Coordination with community clergy   [] No spiritual needs identified at this time   [] Detailed Plan of Care below (See Comments)  [] Make referral to Music Therapy  [] Make referral to Pet Therapy     [] Make referral to Addiction services  [] Make referral to [facility]  [] Make referral to Spiritual Care Partner  [] No future visits requested        [x] Follow up upon further referrals     Comments:  Visited patient in the ED unit per staff request.  Patient and family members were present during the visit. Patient was under the sheet and was non-engaging. Daughter stated they are doing okay and seemed to want privacy. Provided chaplaincy education and listened with empathy and reflection while normalizing their difficulties and their decision. Assured of prayer and advised of  availability. Chaplains will follow up if further referrals are requested. Chaplain Jeffery Shetty M.Div.    can be reached by calling the  at [facility]  [phone]

## 2021-09-03 NOTE — ED TRIAGE NOTES
Pt came in unresponsive. Pt has had multiple seizures for EMS no hx of . Ems established 20 g in left ac, and 20 G in RAC and gave 10mg of versed.

## 2021-09-03 NOTE — ED PROVIDER NOTES
61-year-old with a past medical history of breast cancer who was brought to the emergency department [facility] for chief complaint of seizure-like activity. According to EMS she was found on the floor by her family having seizure-like activity. Unknown how long she was on the floor. When EMS arrived she continued having seizure-like activity and she was given 5 mg Versed which stopped the seizures. Once this started again she was given another 5 mg. Her blood sugar was 96 on route, and her temperature was 102 °F           No past medical history on file. No past surgical history on file. No family history on file. Social History     Socioeconomic History    Marital status:      Spouse name: Not on file    Number of children: Not on file    Years of education: Not on file    Highest education level: Not on file   Occupational History    Not on file   Tobacco Use    Smoking status: Not on file   Substance and Sexual Activity    Alcohol use: Not on file    Drug use: Not on file    Sexual activity: Not on file   Other Topics Concern    Not on file   Social History Narrative    Not on file     Social Determinants of Health     Financial Resource Strain:     Difficulty of Paying Living Expenses:    Food Insecurity:     Worried About Running Out of Food in the Last Year:     [address] in the Last Year:    Transportation Needs:     Lack of Transportation (Medical):      Lack of Transportation (Non-Medical):    Physical Activity:     Days of Exercise per Week:     Minutes of Exercise per Session:    Stress:     Feeling of Stress :    Social Connections:     Frequency of Communication with Friends and Family:     Frequency of Social Gatherings with Friends and Family:     Attends Buddhism Services:     Active Member of Clubs or Organizations:     Attends Club or Organization Meetings:     Marital Status:    Intimate Partner Violence:     Fear of Current or Ex-Partner:     Emotionally Abused:     Physically Abused:     Sexually Abused: ALLERGIES: Patient has no known allergies. Review of Systems   Unable to perform ROS: Mental status change       Vitals:    09/03/21 0608   BP: 125/67   Pulse: (!) 109   Resp: (!) 34   Temp: (!) 102 °F (38.9 °C)   SpO2: 96%   Weight: 83 kg (183 lb)   Height: 5' 6\" (1.676 m)            Physical Exam  Vitals and nursing note reviewed. Constitutional:       Comments: Unresponsive, sonorous respirations, left-sided gaze deviation   HENT:      Head: Normocephalic and atraumatic. Right Ear: External ear normal.      Left Ear: External ear normal.      Nose: Nose normal. No rhinorrhea. Mouth/Throat:      Mouth: Mucous membranes are moist.      Pharynx: Oropharynx is clear. Eyes:      Pupils: Pupils are equal, round, and reactive to light. Comments: Conjunctiva injected bilaterally   Cardiovascular:      Rate and Rhythm: Regular rhythm. Pulses: Normal pulses. Heart sounds: Normal heart sounds. Comments: mildly tachycardic  Pulmonary:      Comments: Sonorous respirations, lungs clear  Abdominal:      General: Abdomen is flat. Bowel sounds are normal.      Palpations: Abdomen is soft. Musculoskeletal:         General: No deformity or signs of injury. Cervical back: Normal range of motion and neck supple. Skin:     General: Skin is dry. Capillary Refill: Capillary refill takes less than 2 seconds. Comments: Hot To touch   Neurological:      Comments: Unresponsive, left-sided gaze deviation   Psychiatric:      Comments: Able to assess due to mental status      EKG 0612: Sinus tachycardia, heart rate 103, left axis, left ventricular hypertrophy, no ectopy. 0630: Code sepsis initiated. Unchanged. Signing out to oncoming physician at 07 100. ICD-10-CM ICD-9-CM   1.  Sepsis, due to unspecified organism, unspecified whether acute organ dysfunction present ([facility]  A41.9 038.9     995.91 2. Seizure-like activity ([facility]  R56.9 780.39   3. Malignant neoplasm of female breast, unspecified estrogen receptor status, unspecified laterality, unspecified site of breast ([facility]  C50.919 174.9   4.  Altered mental status, unspecified altered mental status type  R41.82 780.97   pneumonia

## 2021-09-03 NOTE — CONSULTS
Infectious Disease Consult Note    Reason for Consult: Encephalitis   Date of Consultation: September 3, 2021  Date of Admission: 9/3/2021  Referring Physician: Dr Mehnaz Huber     HPI: 64-y.o BF who presented to the ED today via EMS, after fmly found her on the floor with seizure-like activity, down time unknown. EMS administered Versed prior to presentation. Her med history is significant for breast CA, depression, DM, HTN and recurrent cold sores (per daughter), and hyperlipidemia. According to daughter pt has been feeling unwell for about a wk and she noted confusion last evening. She was seen in the ED on 8/31 with c/o dizziness, nausea and generalized weakness, work-up showed a normal NA, troponin, and elevated WBC of 12.8. She symptoms were attributed to food poisoning and she was discharged on an antiemetic. Fmly reports ongoing symptoms which culminated to today's admission. She is fully vaccinated for COVID-19, and her rapid Covid test is negative. There are no sick contacts, reports of recent travel, animal of insect bites. Pt was confused and aphasic during my assessment, obtained from ED staff and fmly at bedside    CT head was negative for acute abnormalities, however MRI head revealed high T2 signal in b/l temporal lobes concerning for herpes encephalitis. Pt has been intermittently febrile with a T-max of 102.0F, maintaining O2 sats on 3 L and hemodynamically stable off pressor support . Pt is s/p LP in the ED, fluid analysis is pending. Pt received a dose of Vanc and Zosyn in the ED and is currently on Acyclovir and Ceftriaxone. Pt was seen in the ED awaiting transfer to to the floors.      Review of Systems: Per HPI otherwise unobtainable due to confusion       Past Medical History:  Past Medical History:   Diagnosis Date    Breast cancer ([facility]     Depression     Diabetes ([facility]     High cholesterol        Past surgical history   Past Surgical History:   Procedure Laterality Date    HX MASTECTOMY Social History   Social History     Tobacco Use    Smoking status: Never Smoker   Substance Use Topics    Alcohol use: Not Currently    Drug use: Not on file        Family history   History reviewed. No pertinent family history. Allergies:  No Known Allergies      Medications:  No current facility-administered medications on file prior to encounter. Current Outpatient Medications on File Prior to Encounter   Medication Sig Dispense Refill    Multivitamins with Fluoride (MULTI-VITAMIN PO) Multi Vitamin      amoxicillin (AMOXIL) 500 mg capsule amoxicillin 500 mg capsule      aspirin delayed-release 81 mg tablet aspirin 81 mg tablet,delayed release   Take 1 tablet every day by oral route for protection for 90 days.  azithromycin (ZITHROMAX) 250 mg tablet azithromycin 250 mg tablet      biotin 10,000 mcg cap biotin 10,000 mcg capsule   Take 1 capsule every day by oral route as directed for 30 days.  calcium carb/vitamin D3/vit K1 (VIACTIV PO) Viactiv   OTC      canagliflozin (Invokana) 300 mg tablet Invokana 300 mg tablet   Take 1 tablet every day by oral route for diabetes for 90 days.  citalopram (CELEXA) 40 mg tablet       diclofenac (Voltaren) 1 % gel Voltaren Arthritis Pain 1 % topical gel   APPLY 2 GRAMS TO THE AFFECTED AREA(S) BY TOPICAL ROUTE 4 TIMES PER DAY x 90 days      Jardiance 25 mg tablet       fluticasone propionate (FLONASE) 50 mcg/actuation nasal spray fluticasone propionate 50 mcg/actuation nasal spray,suspension      glipiZIDE (GLUCOTROL) 5 mg tablet       lidocaine (LIDODERM) 5 % lidocaine 5 % topical patch   APPLY 1 to 3 PATCHES BY TOPICAL ROUTE ONCE DAILY (MAY WEAR UP TO 12HOURS.)   for sciatica      loratadine (CLARITIN) 10 mg tablet loratadine 10 mg tablet   Take 1 tablet every day by oral route at bedtime for allergies for 30 days.    for allergies      naproxen (NAPROSYN) 500 mg tablet naproxen 500 mg tablet      ondansetron (ZOFRAN ODT) 4 mg disintegrating tablet       rosuvastatin (CRESTOR) 5 mg tablet       Rybelsus 3 mg tablet       tamoxifen (NOLVADEX) 20 mg tablet tamoxifen 20 mg tablet   Take 1 tablet every day by oral route. PER ONCOLOGY FOR CANCER TREATMENT      triamcinolone acetonide (KENALOG) 0.1 % topical cream triamcinolone acetonide 0.1 % topical cream   APPLY A THIN LAYER TO THE AFFECTED AREA(S) BY TOPICAL ROUTE 2 TIMES PER DAY      coenzyme Q-10 (CO Q-10) 200 mg capsule Co Q-10 200 mg capsule   Take 1 capsule every day by oral route as directed for 30 days.  famotidine (Pepcid) 20 mg tablet Take 1 Tablet by mouth two (2) times a day for 10 days. 20 Tablet 0     Physical Exam:    Vitals:   Patient Vitals for the past 24 hrs:   Temp Pulse Resp BP SpO2   09/03/21 1300  (!) 6 26 (!) 110/48 99 %   09/03/21 1200  79 26 (!) 116/56 99 %   09/03/21 1100  80 26 129/66 99 %   09/03/21 1000  83 28 (!) 127/56 99 %   09/03/21 0900  94 28 131/64 99 %   09/03/21 0808 (!) 101.3 °F (38.5 °C)       09/03/21 0801  79 28 128/66 99 %   09/03/21 0608 (!) 102 °F (38.9 °C) (!) 109 (!) 34 125/67 96 %   ·   · GEN: NAD, confused, not following command   · HEENT: NCAT, PERRLA, hyperemic sclera  · HEART: S1, S2+, RRR, No murmur   · Lungs: CTA B/l, decreased to bases, no wheeze/rhonchi  · Abdomen: soft, ND, NT, +BS   · Genitourinary: no genital discharge, no modi  · Extremities: no edema  · Skin: no rash, ulcers      Labs:   Recent Labs     09/03/21 0621 08/31/21 1908   WBC 12.7* 12.8*   HGB 13.6 15.2   HCT 41.7 47.2*    263     Recent Labs     09/03/21 0621 08/31/21  1908   BUN 10 16   CREA 0.69 0.87       Microbiology Data:  - Blood Cx 09/03: Pending   - CSF Cx 09/03: Pending     Imaging:   CT head 09/03: No acute or active intracranial process. MRI head 09/03: Abnormal high T2 signal evident in the right temporal lobe and insular  cortex. Subtle increased T2 signal evident in the left temporal uncus.  The  appearances are concerning for herpes encephalitis. This could also represent  limbic encephalitis. Assessment/impression      1. 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 High grade fevers, moderate leukocytosis        Increased uptake in bilateral temporal lobes on MRI       Daughter reports a h/o recurrent cold sores       S/p LP in the ED, fluid analysis and Cx are pending        No rash, lesions or ulcers appreciated on exam, hyperemic sclera w/o facial swelling noted     2. New onset seizure: Due to # 1    3. Suspected sepsis: Due to # 1. High grade fevers, hemodynamically stable       UA unremarkable, no focal infiltrates on CXR, no chronic wounds     4. AMS: Due to # 1, will monitor for improvement           PLAN/Recs    1. Await CSF analysis/Cx and HSV PCR of CSF     2. [place] PCR, VDRL, lyme and VZV, added to work up     3. Agree w Acyclovir at 10 mg/kg and empiric Ceftriaxone     4. Will cover for listeria w Ampicillin until ruled out     5. Check HIV, HSV1/2 serology     6.  CBC, CRP, ESR, and Procal w AM labs       Domingo Marte MD     9/3/2021

## 2021-09-03 NOTE — PROGRESS NOTES
Daughter Veronica Noel  [phone]    -Above family member expressed concerns about patient care. Stated she wanted patient transferred to [facility]. Requested to speak to doctor immediately; she stated she has not received an update on her mother's condition since ~9 am and does not know what is going on . Spoke to nursing supervisor and suggested I call in-house hospitalist to meet with family. Spoke to Dr. Arianna Benz (sp?) and he stated he would review the chart and meet with the family member. Daughter did meet with the doctor. He reviewed VS and blood sugar. Daughter stated her mother has DM. She was provided the code number and unit phone number. She was able to see her mother from outside the window. I explained to her the precautions  protocols and that she could not have any visitors at this time. She understood.

## 2021-09-04 ENCOUNTER — APPOINTMENT (OUTPATIENT)
Dept: GENERAL RADIOLOGY | Age: 66
DRG: 871 | End: 2021-09-04
Attending: INTERNAL MEDICINE
Payer: COMMERCIAL

## 2021-09-04 LAB
ANION GAP SERPL CALC-SCNC: 6 MMOL/L (ref 5–15)
APPEARANCE CSF: CLEAR
BASOPHILS # BLD: 0.1 K/UL (ref 0–0.1)
BASOPHILS NFR BLD: 0 % (ref 0–1)
BUN SERPL-MCNC: 10 MG/DL (ref 6–20)
BUN/CREAT SERPL: 18 (ref 12–20)
CA-I BLD-MCNC: 7.7 MG/DL (ref 8.5–10.1)
CHLORIDE SERPL-SCNC: 104 MMOL/L (ref 97–108)
CO2 SERPL-SCNC: 26 MMOL/L (ref 21–32)
COLOR CSF: CLEAR
COLOR SPUN CSF: CLEAR
CREAT SERPL-MCNC: 0.55 MG/DL (ref 0.55–1.02)
CRP SERPL-MCNC: 1.05 MG/DL (ref 0–0.6)
DIFFERENTIAL METHOD BLD: ABNORMAL
EOSINOPHIL # BLD: 0 K/UL (ref 0–0.4)
EOSINOPHIL NFR BLD: 0 % (ref 0–7)
ERYTHROCYTE [DISTWIDTH] IN BLOOD BY AUTOMATED COUNT: 14 % (ref 11.5–14.5)
ERYTHROCYTE [SEDIMENTATION RATE] IN BLOOD: 4 MM/HR
GLUCOSE BLD STRIP.AUTO-MCNC: 166 MG/DL (ref 65–117)
GLUCOSE BLD STRIP.AUTO-MCNC: 58 MG/DL (ref 65–117)
GLUCOSE BLD STRIP.AUTO-MCNC: 63 MG/DL (ref 65–117)
GLUCOSE BLD STRIP.AUTO-MCNC: 80 MG/DL (ref 65–117)
GLUCOSE BLD STRIP.AUTO-MCNC: 84 MG/DL (ref 65–117)
GLUCOSE BLD STRIP.AUTO-MCNC: 86 MG/DL (ref 65–117)
GLUCOSE BLD STRIP.AUTO-MCNC: 93 MG/DL (ref 65–117)
GLUCOSE SERPL-MCNC: 67 MG/DL (ref 65–100)
HCT VFR BLD AUTO: 42.2 % (ref 35–47)
HGB BLD-MCNC: 13.7 G/DL (ref 11.5–16)
IMM GRANULOCYTES # BLD AUTO: 0.1 K/UL (ref 0–0.04)
IMM GRANULOCYTES NFR BLD AUTO: 1 % (ref 0–0.5)
LYMPHOCYTES # BLD: 2.6 K/UL (ref 0.8–3.5)
LYMPHOCYTES NFR BLD: 20 % (ref 12–49)
MCH RBC QN AUTO: 29.3 PG (ref 26–34)
MCHC RBC AUTO-ENTMCNC: 32.5 G/DL (ref 30–36.5)
MCV RBC AUTO: 90.2 FL (ref 80–99)
MONOCYTES # BLD: 1.7 K/UL (ref 0–1)
MONOCYTES NFR BLD: 14 % (ref 5–13)
NEUTS SEG # BLD: 8.2 K/UL (ref 1.8–8)
NEUTS SEG NFR BLD: 65 % (ref 32–75)
NRBC # BLD: 0 K/UL (ref 0–0.01)
NRBC BLD-RTO: 0 PER 100 WBC
PERFORMED BY, TECHID: ABNORMAL
PERFORMED BY, TECHID: NORMAL
PLATELET # BLD AUTO: 204 K/UL (ref 150–400)
PMV BLD AUTO: 9.9 FL (ref 8.9–12.9)
POTASSIUM SERPL-SCNC: 3.9 MMOL/L (ref 3.5–5.1)
RBC # BLD AUTO: 4.68 M/UL (ref 3.8–5.2)
RBC # CSF: 185 /CU MM
SODIUM SERPL-SCNC: 136 MMOL/L (ref 136–145)
TUBE # CSF: 3
WBC # BLD AUTO: 12.6 K/UL (ref 3.6–11)
WBC # CSF: 200 /CU MM (ref 0–5)

## 2021-09-04 PROCEDURE — 74011000258 HC RX REV CODE- 258: Performed by: INTERNAL MEDICINE

## 2021-09-04 PROCEDURE — 74011000258 HC RX REV CODE- 258: Performed by: HOSPITALIST

## 2021-09-04 PROCEDURE — 86140 C-REACTIVE PROTEIN: CPT

## 2021-09-04 PROCEDURE — 74011250636 HC RX REV CODE- 250/636: Performed by: INTERNAL MEDICINE

## 2021-09-04 PROCEDURE — 87389 HIV-1 AG W/HIV-1&-2 AB AG IA: CPT

## 2021-09-04 PROCEDURE — 85652 RBC SED RATE AUTOMATED: CPT

## 2021-09-04 PROCEDURE — 80048 BASIC METABOLIC PNL TOTAL CA: CPT

## 2021-09-04 PROCEDURE — 82962 GLUCOSE BLOOD TEST: CPT

## 2021-09-04 PROCEDURE — 74011000250 HC RX REV CODE- 250: Performed by: INTERNAL MEDICINE

## 2021-09-04 PROCEDURE — 85025 COMPLETE CBC W/AUTO DIFF WBC: CPT

## 2021-09-04 PROCEDURE — 71045 X-RAY EXAM CHEST 1 VIEW: CPT

## 2021-09-04 PROCEDURE — 74011250637 HC RX REV CODE- 250/637: Performed by: INTERNAL MEDICINE

## 2021-09-04 PROCEDURE — 86695 HERPES SIMPLEX TYPE 1 TEST: CPT

## 2021-09-04 PROCEDURE — 99233 SBSQ HOSP IP/OBS HIGH 50: CPT | Performed by: INTERNAL MEDICINE

## 2021-09-04 PROCEDURE — 36415 COLL VENOUS BLD VENIPUNCTURE: CPT

## 2021-09-04 PROCEDURE — 65610000006 HC RM INTENSIVE CARE

## 2021-09-04 PROCEDURE — 77010033678 HC OXYGEN DAILY

## 2021-09-04 RX ORDER — AMLODIPINE BESYLATE 5 MG/1
2.5 TABLET ORAL DAILY
Status: DISCONTINUED | OUTPATIENT
Start: 2021-09-04 | End: 2021-09-07

## 2021-09-04 RX ORDER — DEXTROSE MONOHYDRATE AND SODIUM CHLORIDE 5; .9 G/100ML; G/100ML
125 INJECTION, SOLUTION INTRAVENOUS CONTINUOUS
Status: DISCONTINUED | OUTPATIENT
Start: 2021-09-04 | End: 2021-09-09

## 2021-09-04 RX ADMIN — ACETAMINOPHEN 650 MG: 325 TABLET ORAL at 17:02

## 2021-09-04 RX ADMIN — CEFTRIAXONE SODIUM 2 G: 2 INJECTION, POWDER, FOR SOLUTION INTRAMUSCULAR; INTRAVENOUS at 09:32

## 2021-09-04 RX ADMIN — AMPICILLIN SODIUM 1 G: 1 INJECTION, POWDER, FOR SOLUTION INTRAMUSCULAR; INTRAVENOUS at 17:06

## 2021-09-04 RX ADMIN — ACYCLOVIR SODIUM 600 MG: 50 INJECTION, SOLUTION INTRAVENOUS at 22:16

## 2021-09-04 RX ADMIN — Medication 10 ML: at 15:46

## 2021-09-04 RX ADMIN — Medication 10 ML: at 06:04

## 2021-09-04 RX ADMIN — Medication 10 ML: at 21:19

## 2021-09-04 RX ADMIN — LEVETIRACETAM 1000 MG: 10 INJECTION INTRAVENOUS at 12:20

## 2021-09-04 RX ADMIN — ACYCLOVIR SODIUM 600 MG: 50 INJECTION, SOLUTION INTRAVENOUS at 15:46

## 2021-09-04 RX ADMIN — ACYCLOVIR SODIUM 600 MG: 50 INJECTION, SOLUTION INTRAVENOUS at 06:05

## 2021-09-04 RX ADMIN — DEXTROSE AND SODIUM CHLORIDE 75 ML/HR: 5; 900 INJECTION, SOLUTION INTRAVENOUS at 19:54

## 2021-09-04 RX ADMIN — AMPICILLIN SODIUM 1 G: 1 INJECTION, POWDER, FOR SOLUTION INTRAMUSCULAR; INTRAVENOUS at 05:49

## 2021-09-04 RX ADMIN — AMPICILLIN SODIUM 1 G: 1 INJECTION, POWDER, FOR SOLUTION INTRAMUSCULAR; INTRAVENOUS at 09:32

## 2021-09-04 RX ADMIN — AMLODIPINE BESYLATE 2.5 MG: 5 TABLET ORAL at 17:02

## 2021-09-04 RX ADMIN — DEXTROSE MONOHYDRATE 25 G: 25 INJECTION, SOLUTION INTRAVENOUS at 04:09

## 2021-09-04 RX ADMIN — ENOXAPARIN SODIUM 30 MG: 40 INJECTION SUBCUTANEOUS at 09:32

## 2021-09-04 RX ADMIN — DEXTROSE MONOHYDRATE 25 G: 25 INJECTION, SOLUTION INTRAVENOUS at 00:26

## 2021-09-04 RX ADMIN — DEXTROSE AND SODIUM CHLORIDE 75 ML/HR: 5; 900 INJECTION, SOLUTION INTRAVENOUS at 06:04

## 2021-09-04 RX ADMIN — CEFTRIAXONE SODIUM 2 G: 2 INJECTION, POWDER, FOR SOLUTION INTRAMUSCULAR; INTRAVENOUS at 21:01

## 2021-09-04 RX ADMIN — AMPICILLIN SODIUM 2 G: 2 INJECTION, POWDER, FOR SOLUTION INTRAMUSCULAR; INTRAVENOUS at 21:01

## 2021-09-04 RX ADMIN — ACETAMINOPHEN 650 MG: 325 TABLET ORAL at 11:04

## 2021-09-04 NOTE — MED STUDENT NOTES
Hospitalist Progress Note               Daily Progress Note: 9/4/2021      Subjective: The patient is seen for follow up. Carolyn Aguila is a 60 y.o. female who presents with seizure-like activity after being found on the floor by her family overnight. Upon arrival EMS she was having seizure activity and given Versed 5 mg which abated the convulsions. Seizure started again and she was given another 5 mg of Versed. On arrival to the ED she had a temperature of 102. CT of the head was negative. Labs were significant for a potassium of 2.8     Patient had been seen here in the ED on 8/31 with complaints of nausea and vomiting, dizziness and weakness. She was concerned she may have had food poisoning although this was not felt to be the case. When her daughter spoke with her on the phone on 9/2/21 her daughter noted she was confused and disoriented.     Patient is fully vaccinated for COVID-19. Rapid Covid testing is negative.    -------  The patient is resting in the bed today. MRI reviewed with radiologist, shows classic high signal on T2 and FLAIR images of the right temporal lobe, essentially diagnostic for herpes encephalitis. The patient was started on IV acyclovir. CSF studies revealed an increase in CSF protein with a normal glucose, cell count is pending.      She remains nonverbal, left gaze deviation, does not respond in any meaningful way although eyes are open    Problem List:  Problem List as of 9/4/2021 Never Reviewed        Codes Class Noted - Resolved    Sepsis [facility]) ICD-10-CM: A41.9  ICD-9-CM: 038.9, 995.91  9/3/2021 - Present        New onset seizure [facility]) ICD-10-CM: R56.9  ICD-9-CM: 780.39  9/3/2021 - Present              Medications reviewed  Current Facility-Administered Medications   Medication Dose Route Frequency    dextrose 5% and 0.9% NaCl infusion  75 mL/hr IntraVENous CONTINUOUS    0.9% sodium chloride infusion  1,000 mL/hr IntraVENous CONTINUOUS    0.9% sodium chloride infusion  1,000 mL/hr IntraVENous CONTINUOUS    cefTRIAXone (ROCEPHIN) 2 g in sterile water (preservative free) 20 mL IV syringe  2 g IntraVENous Q12H    sodium chloride (NS) flush 5-40 mL  5-40 mL IntraVENous Q8H    sodium chloride (NS) flush 5-40 mL  5-40 mL IntraVENous PRN    acetaminophen (TYLENOL) tablet 650 mg  650 mg Oral Q6H PRN    Or    acetaminophen (TYLENOL) suppository 650 mg  650 mg Rectal Q6H PRN    polyethylene glycol (MIRALAX) packet 17 g  17 g Oral DAILY PRN    promethazine (PHENERGAN) tablet 12.5 mg  12.5 mg Oral Q6H PRN    Or    ondansetron (ZOFRAN) injection 4 mg  4 mg IntraVENous Q6H PRN    enoxaparin (LOVENOX) injection 30 mg  30 mg SubCUTAneous DAILY    levETIRAcetam in saline (iso-os) (KEPPRA) infusion 1,000 mg  1,000 mg IntraVENous Q12H    acyclovir (ZOVIRAX) 600 mg in 0.9% sodium chloride 100 mL IVPB  10 mg/kg (Ideal) IntraVENous Q8H    ampicillin (OMNIPEN) 1 g in 0.9% sodium chloride (MBP/ADV) 50 mL MBP  1 g IntraVENous Q6H    insulin lispro (HUMALOG) injection   SubCUTAneous Q6H    glucose chewable tablet 16 g  4 Tablet Oral PRN    dextrose (D50W) injection syrg 12.5-25 g  25-50 mL IntraVENous PRN    glucagon (GLUCAGEN) injection 1 mg  1 mg IntraMUSCular PRN       Review of Systems:   Review of systems not obtained due to patient factors. Objective:   Physical Exam:     Visit Vitals  BP (!) 149/74   Pulse 80   Temp 99.1 °F (37.3 °C)   Resp 23   Ht 5' 6\" (1.676 m)   Wt 83 kg (183 lb)   SpO2 97%   BMI 29.54 kg/m²    O2 Flow Rate (L/min): 2 l/min O2 Device: Nasal cannula    Temp (24hrs), Av.2 °F (37.9 °C), Min:99.1 °F (37.3 °C), Max:102.3 °F (39.1 °C)    No intake/output data recorded.  1901 -  0700  In: 670 [I.V.:670]  Out: 1650 [Urine:1650]    General:   Awake and altered, patient does not respond to verbal cues, but does respond to pain   Lungs:   Clear to auscultation bilaterally. Chest wall:  No tenderness or deformity.    Heart:  Regular rate and rhythm, S1, S2 normal, no murmur, click, rub or gallop. Abdomen:   Soft, non-tender. Bowel sounds normal. No masses,  No organomegaly. Extremities: Extremities normal, atraumatic, no cyanosis or edema. Pulses: 2+ and symmetric all extremities. Skin: Skin color, texture, turgor normal. No rashes or lesions   Neurologic: Limited due to altered mental status. Fixed gaze to the left. Data Review:       Recent Days:  Recent Labs     09/04/21 0320 09/03/21 0621   WBC 12.6* 12.7*   HGB 13.7 13.6   HCT 42.2 41.7    239     Recent Labs     09/04/21 0320 09/03/21 0621    138   K 3.9 2.8*    108   CO2 26 22   GLU 67 82   BUN 10 10   CREA 0.55 0.69   CA 7.7* 6.2*   ALB  --  2.5*   TBILI  --  0.5   ALT  --  15     No results for input(s): PH, PCO2, PO2, HCO3, FIO2 in the last 72 hours.     24 Hour Results:  Recent Results (from the past 24 hour(s))   CULTURE, CSF W GRAM STAIN    Collection Time: 09/03/21 12:00 PM    Specimen: Cerebrospinal Fluid   Result Value Ref Range    Special Requests: No Special Requests      Culture result: GRAM STAIN NO ORGANISMS SEEN     PROTEIN, CSF    Collection Time: 09/03/21  1:00 PM   Result Value Ref Range    Tube No. 1      Protein,CSF 80.0 (H) 15 - 45 mg/dL   GLUCOSE, CSF    Collection Time: 09/03/21  1:00 PM   Result Value Ref Range    Tube No. 1      Glucose,CSF 51.0 40 - 70 mg/dL   GLUCOSE, POC    Collection Time: 09/04/21 12:20 AM   Result Value Ref Range    Glucose (POC) 58 (L) 65 - 117 mg/dL    Performed by [address] [place] [address], POC    Collection Time: 09/04/21 12:50 AM   Result Value Ref Range    Glucose (POC) 166 (H) 65 - 117 mg/dL    Performed by London Lopez    GLUCOSE, POC    Collection Time: 09/04/21  2:46 AM   Result Value Ref Range    Glucose (POC) 80 65 - 117 mg/dL    Performed by Balaji Riggs, BASIC    Collection Time: 09/04/21  3:20 AM   Result Value Ref Range    Sodium 136 136 - 145 mmol/L    Potassium 3.9 3.5 - 5.1 mmol/L    Chloride 104 97 - 108 mmol/L    CO2 26 21 - 32 mmol/L    Anion gap 6 5 - 15 mmol/L    Glucose 67 65 - 100 mg/dL    BUN 10 6 - 20 mg/dL    Creatinine 0.55 0.55 - 1.02 mg/dL    BUN/Creatinine ratio 18 12 - 20      GFR est AA >60 >60 ml/min/1.73m2    GFR est non-AA >60 >60 ml/min/1.73m2    Calcium 7.7 (L) 8.5 - 10.1 mg/dL   CBC WITH AUTOMATED DIFF    Collection Time: 09/04/21  3:20 AM   Result Value Ref Range    WBC 12.6 (H) 3.6 - 11.0 K/uL    RBC 4.68 3.80 - 5.20 M/uL    HGB 13.7 11.5 - 16.0 g/dL    HCT 42.2 35.0 - 47.0 %    MCV 90.2 80.0 - 99.0 FL    MCH 29.3 26.0 - 34.0 PG    MCHC 32.5 30.0 - 36.5 g/dL    RDW 14.0 11.5 - 14.5 %    PLATELET 620 991 - 263 K/uL    MPV 9.9 8.9 - 12.9 FL    NRBC 0.0 0.0  WBC    ABSOLUTE NRBC 0.00 0.00 - 0.01 K/uL    NEUTROPHILS 65 32 - 75 %    LYMPHOCYTES 20 12 - 49 %    MONOCYTES 14 (H) 5 - 13 %    EOSINOPHILS 0 0 - 7 %    BASOPHILS 0 0 - 1 %    IMMATURE GRANULOCYTES 1 (H) 0 - 0.5 %    ABS. NEUTROPHILS 8.2 (H) 1.8 - 8.0 K/UL    ABS. LYMPHOCYTES 2.6 0.8 - 3.5 K/UL    ABS. MONOCYTES 1.7 (H) 0.0 - 1.0 K/UL    ABS. EOSINOPHILS 0.0 0.0 - 0.4 K/UL    ABS. BASOPHILS 0.1 0.0 - 0.1 K/UL    ABS. IMM. GRANS. 0.1 (H) 0.00 - 0.04 K/UL    DF AUTOMATED     C REACTIVE PROTEIN, QT    Collection Time: 09/04/21  3:20 AM   Result Value Ref Range    C-Reactive protein 1.05 (H) 0.00 - 0.60 mg/dL   SED RATE (ESR)    Collection Time: 09/04/21  3:20 AM   Result Value Ref Range    Sed rate, automated 4 mm/hr   GLUCOSE, POC    Collection Time: 09/04/21  4:04 AM   Result Value Ref Range    Glucose (POC) 63 (L) 65 - 117 mg/dL    Performed by Estrellita Lanza    GLUCOSE, POC    Collection Time: 09/04/21  5:46 AM   Result Value Ref Range    Glucose (POC) 86 65 - 117 mg/dL    Performed by Estrellita Lanza        MRI BRAIN W WO CONT   Final Result   1. Abnormal high T2 signal evident in the right temporal lobe and insular   cortex. Subtle increased T2 signal evident in the left temporal uncus.  The appearances are concerning for herpes encephalitis. This could also represent   limbic encephalitis. A report was call to Dr. Virgen Yen at 9/3/2021 11:49 AM      XR CHEST PORT   Final Result      CT HEAD WO CONT   Final Result   No acute or active intracranial process. CT SPINE CERV WO CONT   Final Result   No specific acute or active process. Multilevel degenerative disc and degenerative joint disease. 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, due to temporal lobe flare imaging on MRI imaging, along with clinical presentation    New onset seizure activity with fever and altered mental status, highly concerning for CNS infection     Sepsis with fever, leukocytosis, elevated lactate and tachycardia metabolic encephalopathy, as above     Diabetes mellitus type 2     Remote history of breast cancer      Plan:  Lumbar puncture CSF cell count pending  [place], VDRL, lyme, HSV and VZV PCR from CSF are pending  HSV and HIV serology pending  Repeat CBC, ESR, Procalcitonin are scheduled for today  Patient admitted to the ICU  Patient is receiving IV acyclovir  Patient is receiving ampicillin for listeria coverage  Patient has received  ceftriaxone for bacterial coverage  She has been loaded with Keppra  Sliding scale insulin      Care Plan discussed with: Patient/Family    Disposition: Continue ICU care due to encephalitis    Total time spent with patient: 30 minutes.     Rene Ann MD

## 2021-09-04 NOTE — PROGRESS NOTES
Reason for Admission:  Sepsis                     RUR Score:          13%           Plan for utilizing home health:      Agreeable if needed    PCP: First and Last name:  Other, MD Christianne     Name of Practice:    Are you a current patient: Yes/No:    Approximate date of last visit: August 2021   Can you participate in a virtual visit with your PCP:                     Current Advanced Directive/Advance Care Plan: Full Code      Healthcare Decision Maker:   Click here to complete [address] including selection of the Healthcare Decision Maker Relationship (ie \"Primary\")    DaughterMagaly, [phone]                         Transition of Care Plan:                    Patient currently lives at home alone in [place], [place]. Patient was in the [place] area visiting her daughter and became sick. Daughter did not feel she was safe to travel home and brought her to the hospital.  Patient has other family in the [facility]. Patient's family will be her ride home at discharge. Patient normally drives herself to follow-up appointments when at home in [place]. Patient currently has a CPAP at home for sleep apnea. Patient's daughter states that she is agreeable to [facility] or Rehab for patient based on recommendations from therapy and her mother's wishes at time of discharge. Currently patient is unresponsive and cannot voice wishes. Daughter states that if placement is needed they would most likely want the patient to be in the [facility], but would also like to talk to the patient's primary physician in [place] before making this decision.   Current Dispo: Home/Self

## 2021-09-04 NOTE — PROGRESS NOTES
Progress Note    Patient: Elvia Lindsay MRN: [id number]  SSN: [id number]    YOB: 1955  Age: 60 y.o. Sex: female      Admit Date: 9/3/2021    LOS: 1 day     Subjective:   Patient followed by Dr. 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 CSF showed neutrophilic pleocytosis. Protein was elevated but glucose normal. CRP mildly elevated. CSF culture and HSV PCR pending. Patient was started on Acyclovir, Ampicillin and Ceftriaxone. She is somnolent and unresponsive at this time. Objective:     Vitals:    09/04/21 0800 09/04/21 0857 09/04/21 0900 09/04/21 1000   BP: 132/65  (!) 142/70 138/61   Pulse: 79  81 83   Resp: (!) 32  28 23   Temp:       SpO2: 100% 96% 100% 100%   Weight:       Height:            Intake and Output:  Current Shift: No intake/output data recorded. Last three shifts: 09/02 1901 - 09/04 0700  In: 670 [I.V.:670]  Out: 1650 [Urine:1650]    Physical Exam:   General: Seriously ill-appearing, unresponsive  HEENT:  Eyes covered with cloth  Neck: supple  Lungs: clear bilaterally  Heart: RRR, Gr 1/6 REANNA  Abdomen: obese, soft, nontender, +BS  Ext:  No edema; mitts on both hands  : Indwelling Vzáquez  Skin: no rashes or petechiae  Neuro: somnolent    Lab/Data Review:     WBC 12,600 with monocytosis  Lactic acid 3.5    CRP 1.05    Blood cultures (9/3) No growth 1 day  CSF culture (9/3) Pending  ? Viral culture (9/3) Pending  Assessment:     Active Problems:    Sepsis ([address] (9/3/2021)      New onset seizure ([address] (9/3/2021)      1. Rule out Encephalitis with seizure activity and CSF neutrophilic pleocytosis  2. Altered mental status  3. SIRS/Sepsis with fever, leukocytosis, elevated lactic acid and CRP  4. New onset seizure    Comment: CSF neutrophilic pleocytosis favors bacterial infection but unusual for meningitis to present as altered mental status and seizures. That being said early in viral CNS infections, neutrophil predominance can be seen. Plan:   1.  Continue IV Acyclovir  2. Continue IV Ceftriaxone  3. Continue IV Ampicillin but increase to 2 gm q4 hours  4. Follow-up blood cultures, CSF cultures, HSV CSF PCR and HSV serology, check RPR  5.  In am, repeat CBC and CRP    Signed By: Dawood Murillo MD     September 4, 2021

## 2021-09-04 NOTE — CONSULTS
Consult Date: 9/4/2021    IP CONSULT TO NEUROLOGY  Consult performed by: Sonali Amaral MD  Consult ordered by: Milena Britton MD        Ms. Fady Kong is a 60year old woman with history of breast cancer who was brought in 09/03 because she was found down on the floor unresposnive. Per EMS she was having seizures. She was given a total of 10 mg versed. She has fevers of 102. MRI showed bitemporal hyper intensities suspicious for hsv encephalitis. She was started on acyclovir 10 mg /kg q8h and LP done/ LP results consistent with viral encephalitis. She is also on keppra 1000 mg q12hrs. Subjective     Past Medical History:   Diagnosis Date    [facility] ([facility]     Depression     Diabetes ([facility]     High cholesterol       Past Surgical History:   Procedure Laterality Date    HX MASTECTOMY       History reviewed. No pertinent family history.    Social History     Tobacco Use    Smoking status: Never Smoker   Substance Use Topics    Alcohol use: Not Currently       Current Facility-Administered Medications   Medication Dose Route Frequency Provider Last Rate Last Admin    dextrose 5% and 0.9% NaCl infusion  75 mL/hr IntraVENous CONTINUOUS Cindra Holstein, MD 75 mL/hr at 09/04/21 0604 75 mL/hr at 09/04/21 0604    0.9% sodium chloride infusion  1,000 mL/hr IntraVENous CONTINUOUS Domi Pisano MD 1,000 mL/hr at 09/03/21 0635 1,000 mL/hr at 09/03/21 0635    0.9% sodium chloride infusion  1,000 mL/hr IntraVENous CONTINUOUS Donnie Pisano MD 1,000 mL/hr at 09/03/21 0639 1,000 mL/hr at 09/03/21 0639    cefTRIAXone (ROCEPHIN) 2 g in sterile water (preservative free) 20 mL IV syringe  2 g IntraVENous Q12H Milena Britton MD   2 g at 09/04/21 0932    sodium chloride (NS) flush 5-40 mL  5-40 mL IntraVENous Q8H Milena Britton MD   10 mL at 09/04/21 0604    sodium chloride (NS) flush 5-40 mL  5-40 mL IntraVENous PRN Milena Britton MD        acetaminophen (TYLENOL) tablet 650 mg  650 mg Oral Q6H PRN Odis Bumpers, MD   650 mg at 09/04/21 1104    Or    acetaminophen (TYLENOL) suppository 650 mg  650 mg Rectal Q6H PRN Odis Bumpers, MD        polyethylene glycol (MIRALAX) packet 17 g  17 g Oral DAILY PRN Odis Bumpers, MD        promethazine (PHENERGAN) tablet 12.5 mg  12.5 mg Oral Q6H PRN Odis Bumpers, MD        Or    [facility]ron [facility]) injection 4 mg  4 mg IntraVENous Q6H PRN Odis Bumpers, MD        enoxaparin (LOVENOX) injection 30 mg  30 mg SubCUTAneous DAILY Odis Bumpers, MD   30 mg at 09/04/21 0932    levETIRAcetam in saline (iso-os) (KEPPRA) infusion 1,000 mg  1,000 mg IntraVENous Q12H Odis Bumpers, MD   1,000 mg at 09/04/21 1220    acyclovir (ZOVIRAX) 600 mg in 0.9% sodium chloride 100 mL IVPB  10 mg/kg (Ideal) IntraVENous Q8H Odis Bumpers,  mL/hr at 09/04/21 0605 600 mg at 09/04/21 0605    ampicillin (OMNIPEN) 1 g in 0.9% sodium chloride (MBP/ADV) 50 mL MBP  1 g IntraVENous Q6H Debra Serrato  mL/hr at 09/04/21 0932 1 g at 09/04/21 0932    insulin lispro (HUMALOG) injection   SubCUTAneous Q6H Kana Baker MD        glucose chewable tablet 16 g  4 Tablet Oral PRN Nelson Alston MD        dextrose (D50W) injection syrg 12.5-25 g  25-50 mL IntraVENous PRN Nelson Alston MD   25 g at 09/04/21 0409    glucagon (GLUCAGEN) injection 1 mg  1 mg IntraMUSCular PRN Nelson Alston MD            Review of Systems   Unable to perform ROS: Patient nonverbal       Objective     Vital signs for last 24 hours:  Visit Vitals  BP (!) 162/74   Pulse 82   Temp (!) 102.2 °F (39 °C) Comment: ice packs applied, tylenol givwnn   Resp 30   Ht 5' 6\" (1.676 m)   Wt 83 kg (183 lb)   SpO2 98%   BMI 29.54 kg/m²       Intake/Output this shift:  Current Shift: No intake/output data recorded.   Last 3 Shifts: 09/02 1901 - 09/04 0700  In: 670 [I.V.:670]  Out: 1650 [Urine:1650]    Data Review:   Recent Results (from the past 24 hour(s))   CELL COUNT, CSF Collection Time: 09/03/21  3:00 PM   Result Value Ref Range    CSF TUBE NO. 3      CSF COLOR Clear (A) Colorless      SPUN COLOR Clear (A) Colorless      CSF APPEARANCE Clear Clear      CSF RBCs 185 (H) 0 /cu mm    CSF WBCs 200 (H) 0 - 5 /cu mm   GLUCOSE, POC    Collection Time: 09/04/21 12:20 AM   Result Value Ref Range    Glucose (POC) 58 (L) 65 - 117 mg/dL    Performed by [facility], POC    Collection Time: 09/04/21 12:50 AM   Result Value Ref Range    Glucose (POC) 166 (H) 65 - 117 mg/dL    Performed by [facility], POC    Collection Time: 09/04/21  2:46 AM   Result Value Ref Range    Glucose (POC) 80 65 - 117 mg/dL    Performed by [facility] Teressa Riggs, BASIC    Collection Time: 09/04/21  3:20 AM   Result Value Ref Range    Sodium 136 136 - 145 mmol/L    Potassium 3.9 3.5 - 5.1 mmol/L    Chloride 104 97 - 108 mmol/L    CO2 26 21 - 32 mmol/L    Anion gap 6 5 - 15 mmol/L    Glucose 67 65 - 100 mg/dL    BUN 10 6 - 20 mg/dL    Creatinine 0.55 0.55 - 1.02 mg/dL    BUN/Creatinine ratio 18 12 - 20      GFR est AA >60 >60 ml/min/1.73m2    GFR est non-AA >60 >60 ml/min/1.73m2    Calcium 7.7 (L) 8.5 - 10.1 mg/dL   CBC WITH AUTOMATED DIFF    Collection Time: 09/04/21  3:20 AM   Result Value Ref Range    WBC 12.6 (H) 3.6 - 11.0 K/uL    RBC 4.68 3.80 - 5.20 M/uL    HGB 13.7 11.5 - 16.0 g/dL    HCT 42.2 35.0 - 47.0 %    MCV 90.2 80.0 - 99.0 FL    MCH 29.3 26.0 - 34.0 PG    MCHC 32.5 30.0 - 36.5 g/dL    RDW 14.0 11.5 - 14.5 %    PLATELET 056 751 - 142 K/uL    MPV 9.9 8.9 - 12.9 FL    NRBC 0.0 0.0  WBC    ABSOLUTE NRBC 0.00 0.00 - 0.01 K/uL    NEUTROPHILS 65 32 - 75 %    LYMPHOCYTES 20 12 - 49 %    MONOCYTES 14 (H) 5 - 13 %    EOSINOPHILS 0 0 - 7 %    BASOPHILS 0 0 - 1 %    IMMATURE GRANULOCYTES 1 (H) 0 - 0.5 %    ABS. NEUTROPHILS 8.2 (H) 1.8 - 8.0 K/UL    ABS. LYMPHOCYTES 2.6 0.8 - 3.5 K/UL    ABS. MONOCYTES 1.7 (H) 0.0 - 1.0 K/UL    ABS. EOSINOPHILS 0.0 0.0 - 0.4 K/UL    ABS. BASOPHILS 0.1 0.0 - 0.1 K/UL    ABS. IMM. GRANS. 0.1 (H) 0.00 - 0.04 K/UL    DF AUTOMATED     C REACTIVE PROTEIN, QT    Collection Time: 09/04/21  3:20 AM   Result Value Ref Range    C-Reactive protein 1.05 (H) 0.00 - 0.60 mg/dL   SED RATE (ESR)    Collection Time: 09/04/21  3:20 AM   Result Value Ref Range    Sed rate, automated 4 mm/hr   GLUCOSE, POC    Collection Time: 09/04/21  4:04 AM   Result Value Ref Range    Glucose (POC) 63 (L) 65 - 117 mg/dL    Performed by Jacob Champagne    GLUCOSE, POC    Collection Time: 09/04/21  5:46 AM   Result Value Ref Range    Glucose (POC) 86 65 - 117 mg/dL    Performed by [address] [place] [address] Street, POC    Collection Time: 09/04/21 12:18 PM   Result Value Ref Range    Glucose (POC) 93 65 - 117 mg/dL    Performed by ZAKIA RIOS        Physical Exam    Neuro Physical Exam      General: Well developed, well nourished. Patient in no apparent distress. Neurological Exam:  Mental Status: Eyes open, not talking and not following commands    Cranial Nerves:   Intact visual fields. PERRL, EOMI, no nystagmus, no ptosis. Facial sensation is normal. Mild left facial droop. Palate is midline. Neck turned to left  Tongue is midline. Hearing is intact bilaterally. Motor:  decreased tone on right arm and increased on left, wriggles toes spontaneously    Reflexes:   Deep tendon reflexes 2+/4 and symmetrical.   Sensory:   Cannot assess    Gait:  Cannot assess   Tremor:   No tremor noted. Cerebellar:  No cerebellar signs present. Babinski:       Down b/l     Assessment and Plan: Ms. 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 - cont acyclovir 10mg/kg q8h  -awaiting hsv pcr.  Csf differential consistent with viral encephalitis  - increase keppra to 2 g bid given patient's mental status  - EEG on Tue (long weekend)

## 2021-09-04 NOTE — PROGRESS NOTES
Arrival to  at 1700. Patient nonverbal with fixed gaze to the left, not following commands, favoring left side. See flowsheets for further assessment. VSS, hooked up to bedside monitor. Patient placed on airborne precautions for rule out varicella zoster.

## 2021-09-04 NOTE — MED STUDENT NOTES
Hospitalist Progress Note               Daily Progress Note: 9/4/2021      Subjective: The patient is seen for follow up. Christina Correa is a 60 y.o. female who presents with seizure-like activity after being found on the floor by her family overnight. Upon arrival EMS she was having seizure activity and given Versed 5 mg which abated the convulsions. Seizure started again and she was given another 5 mg of Versed. On arrival to the ED she had a temperature of 102. CT of the head was negative. Labs were significant for a potassium of 2.8     Patient had been seen here in the ED on 8/31 with complaints of nausea and vomiting, dizziness and weakness. She was concerned she may have had food poisoning although this was not felt to be the case. When her daughter spoke with her on the phone on 9/2/21 her daughter noted she was confused and disoriented.     Patient is fully vaccinated for COVID-19. Rapid Covid testing is negative.    -------  The patient is resting in the bed today. MRI reviewed with radiologist, shows classic high signal on T2 and FLAIR images of the right temporal lobe, essentially diagnostic for herpes encephalitis. The patient was started on IV acyclovir. CSF studies revealed an increase in CSF protein with a normal glucose, cell count is pending.    Problem List:  Problem List as of 9/4/2021 Never Reviewed        Codes Class Noted - Resolved    Sepsis [facility]) ICD-10-CM: A41.9  ICD-9-CM: 038.9, 995.91  9/3/2021 - Present        New onset seizure [facility]) ICD-10-CM: R56.9  ICD-9-CM: 780.39  9/3/2021 - Present              Medications reviewed  Current Facility-Administered Medications   Medication Dose Route Frequency    dextrose 5% and 0.9% NaCl infusion  75 mL/hr IntraVENous CONTINUOUS    0.9% sodium chloride infusion  1,000 mL/hr IntraVENous CONTINUOUS    0.9% sodium chloride infusion  1,000 mL/hr IntraVENous CONTINUOUS    cefTRIAXone (ROCEPHIN) 2 g in sterile water (preservative free) 20 mL IV syringe  2 g IntraVENous Q12H    sodium chloride (NS) flush 5-40 mL  5-40 mL IntraVENous Q8H    sodium chloride (NS) flush 5-40 mL  5-40 mL IntraVENous PRN    acetaminophen (TYLENOL) tablet 650 mg  650 mg Oral Q6H PRN    Or    acetaminophen (TYLENOL) suppository 650 mg  650 mg Rectal Q6H PRN    polyethylene glycol (MIRALAX) packet 17 g  17 g Oral DAILY PRN    promethazine (PHENERGAN) tablet 12.5 mg  12.5 mg Oral Q6H PRN    Or    ondansetron (ZOFRAN) injection 4 mg  4 mg IntraVENous Q6H PRN    enoxaparin (LOVENOX) injection 30 mg  30 mg SubCUTAneous DAILY    levETIRAcetam in saline (iso-os) (KEPPRA) infusion 1,000 mg  1,000 mg IntraVENous Q12H    acyclovir (ZOVIRAX) 600 mg in 0.9% sodium chloride 100 mL IVPB  10 mg/kg (Ideal) IntraVENous Q8H    ampicillin (OMNIPEN) 1 g in 0.9% sodium chloride (MBP/ADV) 50 mL MBP  1 g IntraVENous Q6H    insulin lispro (HUMALOG) injection   SubCUTAneous Q6H    glucose chewable tablet 16 g  4 Tablet Oral PRN    dextrose (D50W) injection syrg 12.5-25 g  25-50 mL IntraVENous PRN    glucagon (GLUCAGEN) injection 1 mg  1 mg IntraMUSCular PRN       Review of Systems:   Review of systems not obtained due to patient factors. Objective:   Physical Exam:     Visit Vitals  BP (!) 149/74   Pulse 80   Temp 99.1 °F (37.3 °C)   Resp 23   Ht 5' 6\" (1.676 m)   Wt 183 lb (83 kg)   SpO2 97%   BMI 29.54 kg/m²    O2 Flow Rate (L/min): 2 l/min O2 Device: Nasal cannula    Temp (24hrs), Av.4 °F (38 °C), Min:99.1 °F (37.3 °C), Max:102.3 °F (39.1 °C)    No intake/output data recorded.  1901 -  0700  In: 670 [I.V.:670]  Out: 1650 [Urine:1650]    General:   Awake and altered, patient does not respond to verbal cues, but does respond to pain   Lungs:   Clear to auscultation bilaterally. Chest wall:  No tenderness or deformity. Heart:  Regular rate and rhythm, S1, S2 normal, no murmur, click, rub or gallop. Abdomen:   Soft, non-tender.  Bowel sounds normal. No masses,  No organomegaly. Extremities: Extremities normal, atraumatic, no cyanosis or edema. Pulses: 2+ and symmetric all extremities. Skin: Skin color, texture, turgor normal. No rashes or lesions   Neurologic: Limited due to altered mental status. Fixed gaze to the left. Data Review:       Recent Days:  Recent Labs     09/04/21 0320 09/03/21 0621   WBC 12.6* 12.7*   HGB 13.7 13.6   HCT 42.2 41.7    239     Recent Labs     09/04/21 0320 09/03/21 0621    138   K 3.9 2.8*    108   CO2 26 22   GLU 67 82   BUN 10 10   CREA 0.55 0.69   CA 7.7* 6.2*   ALB  --  2.5*   TBILI  --  0.5   ALT  --  15     No results for input(s): PH, PCO2, PO2, HCO3, FIO2 in the last 72 hours.     24 Hour Results:  Recent Results (from the past 24 hour(s))   CULTURE, CSF W GRAM STAIN    Collection Time: 09/03/21 12:00 PM    Specimen: Cerebrospinal Fluid   Result Value Ref Range    Special Requests: No Special Requests      Culture result: GRAM STAIN NO ORGANISMS SEEN     PROTEIN, CSF    Collection Time: 09/03/21  1:00 PM   Result Value Ref Range    Tube No. 1      Protein,CSF 80.0 (H) 15 - 45 mg/dL   GLUCOSE, CSF    Collection Time: 09/03/21  1:00 PM   Result Value Ref Range    Tube No. 1      Glucose,CSF 51.0 40 - 70 mg/dL   GLUCOSE, POC    Collection Time: 09/04/21 12:20 AM   Result Value Ref Range    Glucose (POC) 58 (L) 65 - 117 mg/dL    Performed by Dioni Favre    GLUCOSE, POC    Collection Time: 09/04/21 12:50 AM   Result Value Ref Range    Glucose (POC) 166 (H) 65 - 117 mg/dL    Performed by Dioni Favre    GLUCOSE, POC    Collection Time: 09/04/21  2:46 AM   Result Value Ref Range    Glucose (POC) 80 65 - 117 mg/dL    Performed by Balaji Riggs, BASIC    Collection Time: 09/04/21  3:20 AM   Result Value Ref Range    Sodium 136 136 - 145 mmol/L    Potassium 3.9 3.5 - 5.1 mmol/L    Chloride 104 97 - 108 mmol/L    CO2 26 21 - 32 mmol/L    Anion gap 6 5 - 15 mmol/L    Glucose 67 65 - 100 mg/dL    BUN 10 6 - 20 mg/dL    Creatinine 0.55 0.55 - 1.02 mg/dL    BUN/Creatinine ratio 18 12 - 20      GFR est AA >60 >60 ml/min/1.73m2    GFR est non-AA >60 >60 ml/min/1.73m2    Calcium 7.7 (L) 8.5 - 10.1 mg/dL   CBC WITH AUTOMATED DIFF    Collection Time: 09/04/21  3:20 AM   Result Value Ref Range    WBC 12.6 (H) 3.6 - 11.0 K/uL    RBC 4.68 3.80 - 5.20 M/uL    HGB 13.7 11.5 - 16.0 g/dL    HCT 42.2 35.0 - 47.0 %    MCV 90.2 80.0 - 99.0 FL    MCH 29.3 26.0 - 34.0 PG    MCHC 32.5 30.0 - 36.5 g/dL    RDW 14.0 11.5 - 14.5 %    PLATELET 008 986 - 293 K/uL    MPV 9.9 8.9 - 12.9 FL    NRBC 0.0 0.0  WBC    ABSOLUTE NRBC 0.00 0.00 - 0.01 K/uL    NEUTROPHILS 65 32 - 75 %    LYMPHOCYTES 20 12 - 49 %    MONOCYTES 14 (H) 5 - 13 %    EOSINOPHILS 0 0 - 7 %    BASOPHILS 0 0 - 1 %    IMMATURE GRANULOCYTES 1 (H) 0 - 0.5 %    ABS. NEUTROPHILS 8.2 (H) 1.8 - 8.0 K/UL    ABS. LYMPHOCYTES 2.6 0.8 - 3.5 K/UL    ABS. MONOCYTES 1.7 (H) 0.0 - 1.0 K/UL    ABS. EOSINOPHILS 0.0 0.0 - 0.4 K/UL    ABS. BASOPHILS 0.1 0.0 - 0.1 K/UL    ABS. IMM. GRANS. 0.1 (H) 0.00 - 0.04 K/UL    DF AUTOMATED     C REACTIVE PROTEIN, QT    Collection Time: 09/04/21  3:20 AM   Result Value Ref Range    C-Reactive protein 1.05 (H) 0.00 - 0.60 mg/dL   SED RATE (ESR)    Collection Time: 09/04/21  3:20 AM   Result Value Ref Range    Sed rate, automated 4 mm/hr   GLUCOSE, POC    Collection Time: 09/04/21  4:04 AM   Result Value Ref Range    Glucose (POC) 63 (L) 65 - 117 mg/dL    Performed by Eunice Bui    GLUCOSE, POC    Collection Time: 09/04/21  5:46 AM   Result Value Ref Range    Glucose (POC) 86 65 - 117 mg/dL    Performed by Eunice Bui        MRI BRAIN W WO CONT   Final Result   1. Abnormal high T2 signal evident in the right temporal lobe and insular   cortex. Subtle increased T2 signal evident in the left temporal uncus. The   appearances are concerning for herpes encephalitis. This could also represent   limbic encephalitis.       A report was call to Dr. Cat Méndez at 9/3/2021 11:49 AM      XR CHEST PORT   Final Result      CT HEAD WO CONT   Final Result   No acute or active intracranial process. CT SPINE CERV WO CONT   Final Result   No specific acute or active process. Multilevel degenerative disc and degenerative joint disease. Assessment:  Possible herpes encephalitis, due to temporal lobe flare imaging on MRI imaging    New onset seizure activity with fever and altered mental status, highly concerning for CNS infection     Sepsis with fever, leukocytosis, elevated lactate and tachycardia metabolic encephalopathy, as above     Diabetes mellitus type 2     Remote history of breast cancer      Plan:  Lumbar puncture CSF cell count pending  West Nile, VDRL, lyme, HSV and VZV PCR from CSF are pending  HSV and HIV serology pending  Repeat CBC, ESR, Procalcitonin are scheduled for today  Patient admitted to the ICU  Patient is receiving IV acyclovir  Patient is receiving ampicillin for listeria coverage  Patient has received vancomycin, Zosyn and ceftriaxone for bacterial coverage  She has been loaded with Keppra  Sliding scale insulin      Care Plan discussed with: Patient/Family    Disposition: Continue ICU care due to encephalitis    Total time spent with patient: 30 minutes. Irina Quesada  *ATTENTION:  This note has been created by a medical student for educational purposes only. Please do not refer to the content of this note for clinical decision-making, billing, or other purposes. Please see attending physicians note to obtain clinical information on this patient. *

## 2021-09-05 LAB
ALBUMIN SERPL-MCNC: 2.5 G/DL (ref 3.5–5)
ANION GAP SERPL CALC-SCNC: 6 MMOL/L (ref 5–15)
BASOPHILS # BLD: 0 K/UL (ref 0–0.1)
BASOPHILS NFR BLD: 1 % (ref 0–1)
BUN SERPL-MCNC: 7 MG/DL (ref 6–20)
BUN/CREAT SERPL: 20 (ref 12–20)
CA-I BLD-MCNC: 7.7 MG/DL (ref 8.5–10.1)
CHLORIDE SERPL-SCNC: 106 MMOL/L (ref 97–108)
CO2 SERPL-SCNC: 26 MMOL/L (ref 21–32)
CREAT SERPL-MCNC: 0.35 MG/DL (ref 0.55–1.02)
CRP SERPL-MCNC: 1.52 MG/DL (ref 0–0.6)
DIFFERENTIAL METHOD BLD: NORMAL
EOSINOPHIL # BLD: 0.1 K/UL (ref 0–0.4)
EOSINOPHIL NFR BLD: 1 % (ref 0–7)
ERYTHROCYTE [DISTWIDTH] IN BLOOD BY AUTOMATED COUNT: 13.9 % (ref 11.5–14.5)
GLUCOSE BLD STRIP.AUTO-MCNC: 104 MG/DL (ref 65–117)
GLUCOSE BLD STRIP.AUTO-MCNC: 131 MG/DL (ref 65–117)
GLUCOSE BLD STRIP.AUTO-MCNC: 164 MG/DL (ref 65–117)
GLUCOSE BLD STRIP.AUTO-MCNC: 93 MG/DL (ref 65–117)
GLUCOSE SERPL-MCNC: 130 MG/DL (ref 65–100)
HCT VFR BLD AUTO: 39.9 % (ref 35–47)
HGB BLD-MCNC: 13.5 G/DL (ref 11.5–16)
HIV 1+2 AB+HIV1 P24 AG SERPL QL IA: NONREACTIVE
HIV12 RESULT COMMENT, HHIVC: NORMAL
IMM GRANULOCYTES # BLD AUTO: 0 K/UL (ref 0–0.04)
IMM GRANULOCYTES NFR BLD AUTO: 0 % (ref 0–0.5)
LYMPHOCYTES # BLD: 1.9 K/UL (ref 0.8–3.5)
LYMPHOCYTES NFR BLD: 25 % (ref 12–49)
MCH RBC QN AUTO: 29.9 PG (ref 26–34)
MCHC RBC AUTO-ENTMCNC: 33.8 G/DL (ref 30–36.5)
MCV RBC AUTO: 88.3 FL (ref 80–99)
MONOCYTES # BLD: 0.8 K/UL (ref 0–1)
MONOCYTES NFR BLD: 10 % (ref 5–13)
NEUTS SEG # BLD: 5 K/UL (ref 1.8–8)
NEUTS SEG NFR BLD: 63 % (ref 32–75)
NRBC # BLD: 0 K/UL (ref 0–0.01)
NRBC BLD-RTO: 0 PER 100 WBC
PERFORMED BY, TECHID: ABNORMAL
PERFORMED BY, TECHID: ABNORMAL
PERFORMED BY, TECHID: NORMAL
PERFORMED BY, TECHID: NORMAL
PHOSPHATE SERPL-MCNC: 2.2 MG/DL (ref 2.6–4.7)
PLATELET # BLD AUTO: 203 K/UL (ref 150–400)
PMV BLD AUTO: 10.8 FL (ref 8.9–12.9)
POTASSIUM SERPL-SCNC: 3 MMOL/L (ref 3.5–5.1)
PROCALCITONIN SERPL-MCNC: <0.05 NG/ML
RBC # BLD AUTO: 4.52 M/UL (ref 3.8–5.2)
RPR SER QL: NONREACTIVE
SODIUM SERPL-SCNC: 138 MMOL/L (ref 136–145)
WBC # BLD AUTO: 7.9 K/UL (ref 3.6–11)

## 2021-09-05 PROCEDURE — 74011250636 HC RX REV CODE- 250/636: Performed by: PSYCHIATRY & NEUROLOGY

## 2021-09-05 PROCEDURE — 74011250637 HC RX REV CODE- 250/637: Performed by: INTERNAL MEDICINE

## 2021-09-05 PROCEDURE — 82962 GLUCOSE BLOOD TEST: CPT

## 2021-09-05 PROCEDURE — 74011250636 HC RX REV CODE- 250/636: Performed by: INTERNAL MEDICINE

## 2021-09-05 PROCEDURE — 74011000258 HC RX REV CODE- 258: Performed by: HOSPITALIST

## 2021-09-05 PROCEDURE — 74011000250 HC RX REV CODE- 250: Performed by: INTERNAL MEDICINE

## 2021-09-05 PROCEDURE — 84145 PROCALCITONIN (PCT): CPT

## 2021-09-05 PROCEDURE — 36415 COLL VENOUS BLD VENIPUNCTURE: CPT

## 2021-09-05 PROCEDURE — 36569 INSJ PICC 5 YR+ W/O IMAGING: CPT

## 2021-09-05 PROCEDURE — 74011636637 HC RX REV CODE- 636/637: Performed by: INTERNAL MEDICINE

## 2021-09-05 PROCEDURE — 86592 SYPHILIS TEST NON-TREP QUAL: CPT

## 2021-09-05 PROCEDURE — 36573 INSJ PICC RS&I 5 YR+: CPT | Performed by: INTERNAL MEDICINE

## 2021-09-05 PROCEDURE — 74011000258 HC RX REV CODE- 258: Performed by: INTERNAL MEDICINE

## 2021-09-05 PROCEDURE — 80069 RENAL FUNCTION PANEL: CPT

## 2021-09-05 PROCEDURE — 99233 SBSQ HOSP IP/OBS HIGH 50: CPT | Performed by: INTERNAL MEDICINE

## 2021-09-05 PROCEDURE — 85025 COMPLETE CBC W/AUTO DIFF WBC: CPT

## 2021-09-05 PROCEDURE — 65610000006 HC RM INTENSIVE CARE

## 2021-09-05 PROCEDURE — 86140 C-REACTIVE PROTEIN: CPT

## 2021-09-05 RX ORDER — POTASSIUM CHLORIDE 1.5 G/1.77G
20 POWDER, FOR SOLUTION ORAL 2 TIMES DAILY WITH MEALS
Status: DISCONTINUED | OUTPATIENT
Start: 2021-09-05 | End: 2021-09-20

## 2021-09-05 RX ADMIN — DEXTROSE AND SODIUM CHLORIDE 75 ML/HR: 5; 900 INJECTION, SOLUTION INTRAVENOUS at 22:56

## 2021-09-05 RX ADMIN — AMLODIPINE BESYLATE 2.5 MG: 5 TABLET ORAL at 08:11

## 2021-09-05 RX ADMIN — ENOXAPARIN SODIUM 30 MG: 40 INJECTION SUBCUTANEOUS at 08:12

## 2021-09-05 RX ADMIN — ACETAMINOPHEN 650 MG: 325 TABLET ORAL at 11:01

## 2021-09-05 RX ADMIN — POTASSIUM CHLORIDE 20 MEQ: 1.5 POWDER, FOR SOLUTION ORAL at 17:05

## 2021-09-05 RX ADMIN — ACYCLOVIR SODIUM 600 MG: 50 INJECTION, SOLUTION INTRAVENOUS at 13:57

## 2021-09-05 RX ADMIN — AMPICILLIN SODIUM 2 G: 2 INJECTION, POWDER, FOR SOLUTION INTRAMUSCULAR; INTRAVENOUS at 17:05

## 2021-09-05 RX ADMIN — AMPICILLIN SODIUM 2 G: 2 INJECTION, POWDER, FOR SOLUTION INTRAMUSCULAR; INTRAVENOUS at 22:55

## 2021-09-05 RX ADMIN — CEFTRIAXONE SODIUM 2 G: 2 INJECTION, POWDER, FOR SOLUTION INTRAMUSCULAR; INTRAVENOUS at 08:12

## 2021-09-05 RX ADMIN — CEFTRIAXONE SODIUM 2 G: 2 INJECTION, POWDER, FOR SOLUTION INTRAMUSCULAR; INTRAVENOUS at 20:19

## 2021-09-05 RX ADMIN — AMPICILLIN SODIUM 2 G: 2 INJECTION, POWDER, FOR SOLUTION INTRAMUSCULAR; INTRAVENOUS at 05:37

## 2021-09-05 RX ADMIN — LEVETIRACETAM 2000 MG: 100 INJECTION, SOLUTION INTRAVENOUS at 11:01

## 2021-09-05 RX ADMIN — DEXTROSE AND SODIUM CHLORIDE 75 ML/HR: 5; 900 INJECTION, SOLUTION INTRAVENOUS at 09:38

## 2021-09-05 RX ADMIN — AMPICILLIN SODIUM 2 G: 2 INJECTION, POWDER, FOR SOLUTION INTRAMUSCULAR; INTRAVENOUS at 13:57

## 2021-09-05 RX ADMIN — ACETAMINOPHEN 650 MG: 325 TABLET ORAL at 18:33

## 2021-09-05 RX ADMIN — Medication 10 ML: at 13:57

## 2021-09-05 RX ADMIN — ACYCLOVIR SODIUM 600 MG: 50 INJECTION, SOLUTION INTRAVENOUS at 22:55

## 2021-09-05 RX ADMIN — INSULIN LISPRO 2 UNITS: 100 INJECTION, SOLUTION INTRAVENOUS; SUBCUTANEOUS at 17:15

## 2021-09-05 RX ADMIN — AMPICILLIN SODIUM 2 G: 2 INJECTION, POWDER, FOR SOLUTION INTRAMUSCULAR; INTRAVENOUS at 01:37

## 2021-09-05 RX ADMIN — ACETAMINOPHEN 650 MG: 325 TABLET ORAL at 02:40

## 2021-09-05 RX ADMIN — AMPICILLIN SODIUM 2 G: 2 INJECTION, POWDER, FOR SOLUTION INTRAMUSCULAR; INTRAVENOUS at 09:38

## 2021-09-05 RX ADMIN — ACYCLOVIR SODIUM 600 MG: 50 INJECTION, SOLUTION INTRAVENOUS at 06:17

## 2021-09-05 RX ADMIN — LEVETIRACETAM 2000 MG: 100 INJECTION, SOLUTION INTRAVENOUS at 01:37

## 2021-09-05 NOTE — MED STUDENT NOTES
Hospitalist Progress Note               Daily Progress Note: 9/5/2021      Subjective: The patient is seen for follow up. Neha Oreilly is a 60 y.o. female who presents with seizure-like activity after being found on the floor by her family overnight. Upon arrival EMS she was having seizure activity and given Versed 5 mg which abated the convulsions. Seizure started again and she was given another 5 mg of Versed. On arrival to the ED she had a temperature of 102. CT of the head was negative. Labs were significant for a potassium of 2.8     Patient had been seen here in the ED on 8/31 with complaints of nausea and vomiting, dizziness and weakness. She was concerned she may have had food poisoning although this was not felt to be the case. When her daughter spoke with her on the phone on 9/2/21 her daughter noted she was confused and disoriented.     Patient is fully vaccinated for COVID-19. Rapid Covid testing is negative. The patient is resting in the bed today. MRI reviewed with radiologist, shows classic high signal on T2 and FLAIR images of the right temporal lobe, essentially diagnostic for herpes encephalitis. The patient was started on IV acyclovir. CSF studies revealed an increase in CSF protein with a normal glucose, cell count is pending.     -----    She remains nonverbal, left gaze deviation with preference for laying on the left side of her body. She does not respond in any meaningful way, although eyes are open. CSF differential revealed neutrophils pleocytosis, elevated protein and normal glucose levels. This can be consistent with an early viral encephalitis, per Dr. Ruby Loo, Infectious Disease.     Problem List:  Problem List as of 9/5/2021 Never Reviewed        Codes Class Noted - Resolved    Sepsis [facility]) ICD-10-CM: A41.9  ICD-9-CM: 038.9, 995.91  9/3/2021 - Present        New onset seizure [facility]) ICD-10-CM: R56.9  ICD-9-CM: 806.59  9/3/2021 - Present Medications reviewed  Current Facility-Administered Medications   Medication Dose Route Frequency    dextrose 5% and 0.9% NaCl infusion  75 mL/hr IntraVENous CONTINUOUS    amLODIPine (NORVASC) tablet 2.5 mg  2.5 mg Oral DAILY    levETIRAcetam (KEPPRA) 2,000 mg in 0.9% sodium chloride 250 mL IVPB  2,000 mg IntraVENous Q12H    ampicillin (OMNIPEN) 2 g in 0.9% sodium chloride (MBP/ADV) 100 mL MBP  2 g IntraVENous Q4H    0.9% sodium chloride infusion  1,000 mL/hr IntraVENous CONTINUOUS    0.9% sodium chloride infusion  1,000 mL/hr IntraVENous CONTINUOUS    cefTRIAXone (ROCEPHIN) 2 g in sterile water (preservative free) 20 mL IV syringe  2 g IntraVENous Q12H    sodium chloride (NS) flush 5-40 mL  5-40 mL IntraVENous Q8H    sodium chloride (NS) flush 5-40 mL  5-40 mL IntraVENous PRN    acetaminophen (TYLENOL) tablet 650 mg  650 mg Oral Q6H PRN    Or    acetaminophen (TYLENOL) suppository 650 mg  650 mg Rectal Q6H PRN    polyethylene glycol (MIRALAX) packet 17 g  17 g Oral DAILY PRN    promethazine (PHENERGAN) tablet 12.5 mg  12.5 mg Oral Q6H PRN    Or    ondansetron (ZOFRAN) injection 4 mg  4 mg IntraVENous Q6H PRN    enoxaparin (LOVENOX) injection 30 mg  30 mg SubCUTAneous DAILY    acyclovir (ZOVIRAX) 600 mg in 0.9% sodium chloride 100 mL IVPB  10 mg/kg (Ideal) IntraVENous Q8H    insulin lispro (HUMALOG) injection   SubCUTAneous Q6H    glucose chewable tablet 16 g  4 Tablet Oral PRN    dextrose (D50W) injection syrg 12.5-25 g  25-50 mL IntraVENous PRN    glucagon (GLUCAGEN) injection 1 mg  1 mg IntraMUSCular PRN       Review of Systems:   Review of systems not obtained due to patient factors.     Objective:   Physical Exam:     Visit Vitals  BP (!) 168/65   Pulse (!) 57   Temp 99.1 °F (37.3 °C)   Resp 19   Ht 5' 6\" (1.676 m)   Wt 183 lb (83 kg)   SpO2 97%   BMI 29.54 kg/m²    O2 Flow Rate (L/min): 2 l/min O2 Device: None (Room air)    Temp (24hrs), Av.3 °F (37.9 °C), Min:99.1 °F (37.3 °C), Max:102.2 °F (39 °C)    No intake/output data recorded. 09/03 1901 - 09/05 0700  In: 820 [I.V.:670]  Out: 3475 [Urine:3475]    General:   Awake and altered, patient does not respond to verbal cues, but does respond to pain   Lungs:   Clear to auscultation bilaterally. Chest wall:  No tenderness or deformity. Heart:  Regular rate and rhythm, S1, S2 normal, no murmur, click, rub or gallop. Abdomen:   Soft, non-tender. Bowel sounds normal. No masses,  No organomegaly. Extremities: Extremities normal, atraumatic, no cyanosis or edema. Pulses: 2+ and symmetric all extremities. Skin: Skin color, texture, turgor normal. No rashes or lesions   Neurologic: Limited due to altered mental status. Fixed gaze to the left. Data Review:       Recent Days:  Recent Labs     09/05/21  0500 09/04/21  0320 09/03/21  0621   WBC 7.9 12.6* 12.7*   HGB 13.5 13.7 13.6   HCT 39.9 42.2 41.7    204 239     Recent Labs     09/05/21  0500 09/04/21  0320 09/03/21  0621    136 138   K 3.0* 3.9 2.8*    104 108   CO2 26 26 22   * 67 82   BUN 7 10 10   CREA 0.35* 0.55 0.69   CA 7.7* 7.7* 6.2*   PHOS 2.2*  --   --    ALB 2.5*  --  2.5*   TBILI  --   --  0.5   ALT  --   --  15     No results for input(s): PH, PCO2, PO2, HCO3, FIO2 in the last 72 hours.     24 Hour Results:  Recent Results (from the past 24 hour(s))   GLUCOSE, POC    Collection Time: 09/04/21 12:18 PM   Result Value Ref Range    Glucose (POC) 93 65 - 117 mg/dL    Performed by ZAKIA RIOS    GLUCOSE, POC    Collection Time: 09/04/21  6:04 PM   Result Value Ref Range    Glucose (POC) 84 65 - 117 mg/dL    Performed by [id number] [place] [address] Avenue, POC    Collection Time: 09/05/21 12:35 AM   Result Value Ref Range    Glucose (POC) 93 65 - 117 mg/dL    Performed by Neeru Jorgensen    CBC WITH AUTOMATED DIFF    Collection Time: 09/05/21  5:00 AM   Result Value Ref Range    WBC 7.9 3.6 - 11.0 K/uL    RBC 4.52 3.80 - 5.20 M/uL    HGB 13.5 11.5 - 16.0 g/dL    HCT 39.9 35.0 - 47.0 %    MCV 88.3 80.0 - 99.0 FL    MCH 29.9 26.0 - 34.0 PG    MCHC 33.8 30.0 - 36.5 g/dL    RDW 13.9 11.5 - 14.5 %    PLATELET 095 184 - 091 K/uL    MPV 10.8 8.9 - 12.9 FL    NRBC 0.0 0.0  WBC    ABSOLUTE NRBC 0.00 0.00 - 0.01 K/uL    NEUTROPHILS 63 32 - 75 %    LYMPHOCYTES 25 12 - 49 %    MONOCYTES 10 5 - 13 %    EOSINOPHILS 1 0 - 7 %    BASOPHILS 1 0 - 1 %    IMMATURE GRANULOCYTES 0 0 - 0.5 %    ABS. NEUTROPHILS 5.0 1.8 - 8.0 K/UL    ABS. LYMPHOCYTES 1.9 0.8 - 3.5 K/UL    ABS. MONOCYTES 0.8 0.0 - 1.0 K/UL    ABS. EOSINOPHILS 0.1 0.0 - 0.4 K/UL    ABS. BASOPHILS 0.0 0.0 - 0.1 K/UL    ABS. IMM. GRANS. 0.0 0.00 - 0.04 K/UL    DF AUTOMATED     RENAL FUNCTION PANEL    Collection Time: 09/05/21  5:00 AM   Result Value Ref Range    Sodium 138 136 - 145 mmol/L    Potassium 3.0 (L) 3.5 - 5.1 mmol/L    Chloride 106 97 - 108 mmol/L    CO2 26 21 - 32 mmol/L    Anion gap 6 5 - 15 mmol/L    Glucose 130 (H) 65 - 100 mg/dL    BUN 7 6 - 20 mg/dL    Creatinine 0.35 (L) 0.55 - 1.02 mg/dL    BUN/Creatinine ratio 20 12 - 20      GFR est AA >60 >60 ml/min/1.73m2    GFR est non-AA >60 >60 ml/min/1.73m2    Calcium 7.7 (L) 8.5 - 10.1 mg/dL    Phosphorus 2.2 (L) 2.6 - 4.7 mg/dL    Albumin 2.5 (L) 3.5 - 5.0 g/dL   C REACTIVE PROTEIN, QT    Collection Time: 09/05/21  5:00 AM   Result Value Ref Range    C-Reactive protein 1.52 (H) 0.00 - 0.60 mg/dL   PROCALCITONIN    Collection Time: 09/05/21  5:00 AM   Result Value Ref Range    Procalcitonin <0.05 (H) 0 ng/mL   GLUCOSE, POC    Collection Time: 09/05/21  6:15 AM   Result Value Ref Range    Glucose (POC) 131 (H) 65 - 117 mg/dL    Performed by Eladio Jorgensen        XR CHEST PORT   Final Result   Stable mild elevation of right hemidiaphragm. Right lateral chest   wall clips. Stable enlargement of cardiopericardial silhouette. Improved   aeration of the lungs bilaterally. No evidence of focal airspace consolidation.    Gastric tube placed with tip overlying the right upper abdomen at the level of   the distal stomach or proximal duodenum. MRI BRAIN W WO CONT   Final Result   1. Abnormal high T2 signal evident in the right temporal lobe and insular   cortex. Subtle increased T2 signal evident in the left temporal uncus. The   appearances are concerning for herpes encephalitis. This could also represent   limbic encephalitis. A report was call to Dr. Mary Ann Stout at 9/3/2021 11:49 AM      XR CHEST PORT   Final Result      CT HEAD WO CONT   Final Result   No acute or active intracranial process. CT SPINE CERV WO CONT   Final Result   No specific acute or active process. Multilevel degenerative disc and degenerative joint disease. 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 Can be consistent with early viral encephalitis  NMDA auto-antibody titer ordered for detection of autoimmune encephalitis  West Nile, VDRL, lyme, HSV and VZV PCR from CSF are pending  HSV and HIV serology pending  Repeat CBC, ESR, Procalcitonin are scheduled for today  Patient admitted to the ICU  Patient is receiving IV acyclovir  Patient is receiving ampicillin for listeria coverage  Patient has received  ceftriaxone for bacterial coverage  She has been loaded with Keppra  Sliding scale insulin      Care Plan discussed with: Patient/Family    Disposition: Continue ICU care due to encephalitis    Total time spent with patient: 30 minutes.     Luz Nagy

## 2021-09-05 NOTE — PROGRESS NOTES
Problem: Falls - Risk of  Goal: *Absence of Falls  Description: Document [place] Fall Risk and appropriate interventions in the flowsheet.   Outcome: Progressing Towards Goal  Note: Fall Risk Interventions:       Mentation Interventions: Adequate sleep, hydration, pain control, Bed/chair exit alarm    Medication Interventions: Bed/chair exit alarm    Elimination Interventions: Bed/chair exit alarm, Call light in reach

## 2021-09-05 NOTE — CONSULTS
Consult Date: 9/5/2021    Consults  Ms. Nigel Horton is a 60year old woman with history of breast cancer who was brought in 09/03 because she was found down on the floor unresposnive. Per EMS she was having seizures. She was given a total of 10 mg versed. She has fevers of 102. MRI showed bitemporal hyper intensities suspicious for hsv encephalitis. She was started on acyclovir 10 mg /kg q8h and LP done/ LP results consistent with viral encephalitis. She is also on keppra 1000 mg q12hrs. Subjective  opens eyes, tells me her name    Past Medical History:   Diagnosis Date    [facility] ([facility] [address]     Depression     Diabetes ([facility] [address]     High cholesterol       Past Surgical History:   Procedure Laterality Date    HX MASTECTOMY       History reviewed. No pertinent family history.    Social History     Tobacco Use    Smoking status: Never Smoker   Substance Use Topics    Alcohol use: Not Currently       Current Facility-Administered Medications   Medication Dose Route Frequency Provider Last Rate Last Admin    potassium chloride (KLOR-CON) packet for solution 20 mEq  20 mEq Oral BID WITH MEALS Uma Mendoza MD        dextrose 5% and 0.9% NaCl infusion  75 mL/hr IntraVENous CONTINUOUS Felix Springer MD 75 mL/hr at 09/05/21 0938 75 mL/hr at 09/05/21 0938    amLODIPine (NORVASC) tablet 2.5 mg  2.5 mg Oral DAILY Lamar Ortega MD   2.5 mg at 09/05/21 0811    levETIRAcetam (KEPPRA) 2,000 mg in 0.9% sodium chloride 250 mL IVPB  2,000 mg IntraVENous Q12H Elvi Lynn MD   2,000 mg at 09/05/21 1101    ampicillin (OMNIPEN) 2 g in 0.9% sodium chloride (MBP/ADV) 100 mL MBP  2 g IntraVENous Q4H Mahnaz Espinoza  mL/hr at 09/05/21 0938 2 g at 09/05/21 1625    0.9% sodium chloride infusion  1,000 mL/hr IntraVENous CONTINUOUS Elizabeth Pisano MD 1,000 mL/hr at 09/03/21 0635 1,000 mL/hr at 09/03/21 0635    0.9% sodium chloride infusion  1,000 mL/hr IntraVENous CONTINUOUS Elizabeth Pisano MD 1,000 mL/hr at 09/03/21 0639 1,000 mL/hr at 09/03/21 8658    cefTRIAXone (ROCEPHIN) 2 g in sterile water (preservative free) 20 mL IV syringe  2 g IntraVENous Q12H Denisse Cardozo MD   2 g at 09/05/21 1710    sodium chloride (NS) flush 5-40 mL  5-40 mL IntraVENous Q8H Denisse Cardozo MD   10 mL at 09/04/21 2119    sodium chloride (NS) flush 5-40 mL  5-40 mL IntraVENous PRN Denisse Cardozo MD        acetaminophen (TYLENOL) tablet 650 mg  650 mg Oral Q6H PRN Denisse Cardozo MD   650 mg at 09/05/21 1101    Or    acetaminophen (TYLENOL) suppository 650 mg  650 mg Rectal Q6H PRN Denisse Cardozo MD        polyethylene glycol (MIRALAX) packet 17 g  17 g Oral DAILY PRN Denisse Cardozo MD        promethazine (PHENERGAN) tablet 12.5 mg  12.5 mg Oral Q6H PRN Denisse Cardozo MD        Or    ondansetron [facility]) injection 4 mg  4 mg IntraVENous Q6H PRN Denisse Cardozo MD        enoxaparin (LOVENOX) injection 30 mg  30 mg SubCUTAneous DAILY Denisse Cardozo MD   30 mg at 09/05/21 8433    acyclovir (ZOVIRAX) 600 mg in 0.9% sodium chloride 100 mL IVPB  10 mg/kg (Ideal) IntraVENous Q8H Denisse Cardozo  mL/hr at 09/05/21 0617 600 mg at 09/05/21 0617    insulin lispro (HUMALOG) injection   SubCUTAneous Q6H Kana Baker MD        glucose chewable tablet 16 g  4 Tablet Oral PRN Mariposa Angulo MD        dextrose (D50W) injection syrg 12.5-25 g  25-50 mL IntraVENous PRN Mariposa Angulo MD   25 g at 09/04/21 0409    glucagon (GLUCAGEN) injection 1 mg  1 mg IntraMUSCular PRN Mariposa Angulo MD            Review of Systems   Unable to perform ROS: Patient nonverbal       Objective     Vital signs for last 24 hours:  Visit Vitals  /73 (BP 1 Location: Right upper arm, BP Patient Position: At rest)   Pulse 78   Temp (!) 100.6 °F (38.1 °C)   Resp 26   Ht 5' 6\" (1.676 m)   Wt 83 kg (183 lb)   SpO2 95%   BMI 29.54 kg/m²       Intake/Output this shift:  Current Shift: 09/05 0701 - 09/05 1900  In: - Out: 1000 [Urine:1000]  Last 3 Shifts: 09/03 1901 - 09/05 0700  In: 820 [I.V.:670]  Out: 8670 [Urine:3475]    Data Review:   Recent Results (from the past 24 hour(s))   GLUCOSE, POC    Collection Time: 09/04/21  6:04 PM   Result Value Ref Range    Glucose (POC) 84 65 - 117 mg/dL    Performed by Daisy Gomez, POC    Collection Time: 09/05/21 12:35 AM   Result Value Ref Range    Glucose (POC) 93 65 - 117 mg/dL    Performed by Odalis Jorgensen    CBC WITH AUTOMATED DIFF    Collection Time: 09/05/21  5:00 AM   Result Value Ref Range    WBC 7.9 3.6 - 11.0 K/uL    RBC 4.52 3.80 - 5.20 M/uL    HGB 13.5 11.5 - 16.0 g/dL    HCT 39.9 35.0 - 47.0 %    MCV 88.3 80.0 - 99.0 FL    MCH 29.9 26.0 - 34.0 PG    MCHC 33.8 30.0 - 36.5 g/dL    RDW 13.9 11.5 - 14.5 %    PLATELET 826 832 - 359 K/uL    MPV 10.8 8.9 - 12.9 FL    NRBC 0.0 0.0  WBC    ABSOLUTE NRBC 0.00 0.00 - 0.01 K/uL    NEUTROPHILS 63 32 - 75 %    LYMPHOCYTES 25 12 - 49 %    MONOCYTES 10 5 - 13 %    EOSINOPHILS 1 0 - 7 %    BASOPHILS 1 0 - 1 %    IMMATURE GRANULOCYTES 0 0 - 0.5 %    ABS. NEUTROPHILS 5.0 1.8 - 8.0 K/UL    ABS. LYMPHOCYTES 1.9 0.8 - 3.5 K/UL    ABS. MONOCYTES 0.8 0.0 - 1.0 K/UL    ABS. EOSINOPHILS 0.1 0.0 - 0.4 K/UL    ABS. BASOPHILS 0.0 0.0 - 0.1 K/UL    ABS. IMM.  GRANS. 0.0 0.00 - 0.04 K/UL    DF AUTOMATED     RENAL FUNCTION PANEL    Collection Time: 09/05/21  5:00 AM   Result Value Ref Range    Sodium 138 136 - 145 mmol/L    Potassium 3.0 (L) 3.5 - 5.1 mmol/L    Chloride 106 97 - 108 mmol/L    CO2 26 21 - 32 mmol/L    Anion gap 6 5 - 15 mmol/L    Glucose 130 (H) 65 - 100 mg/dL    BUN 7 6 - 20 mg/dL    Creatinine 0.35 (L) 0.55 - 1.02 mg/dL    BUN/Creatinine ratio 20 12 - 20      GFR est AA >60 >60 ml/min/1.73m2    GFR est non-AA >60 >60 ml/min/1.73m2    Calcium 7.7 (L) 8.5 - 10.1 mg/dL    Phosphorus 2.2 (L) 2.6 - 4.7 mg/dL    Albumin 2.5 (L) 3.5 - 5.0 g/dL   C REACTIVE PROTEIN, QT    Collection Time: 09/05/21  5:00 AM   Result Value Ref Range    C-Reactive protein 1.52 (H) 0.00 - 0.60 mg/dL   PROCALCITONIN    Collection Time: 09/05/21  5:00 AM   Result Value Ref Range    Procalcitonin <0.05 (H) 0 ng/mL   GLUCOSE, POC    Collection Time: 09/05/21  6:15 AM   Result Value Ref Range    Glucose (POC) 131 (H) 65 - 117 mg/dL    Performed by Marina. Jey Trin[address], POC    Collection Time: 09/05/21 11:03 AM   Result Value Ref Range    Glucose (POC) 104 65 - 117 mg/dL    Performed by Hanh Spivey        Physical Exam    Neuro Physical Exam      General: Well developed, well nourished. Patient in no apparent distress. Neurological Exam:  Mental Status: Eyes open, tells me her full name, not following commands, turns her head to look at daughter when she called her name   Cranial Nerves:   Intact visual fields. PERRL, EOMI, no nystagmus, no ptosis. Facial sensation is normal. Mild left facial droop. Palate is midline. Neck turned to left  Tongue is midline. Hearing is intact bilaterally. Motor:  decreased tone on right arm and increased on left, wriggles toes spontaneously    Reflexes:   Deep tendon reflexes 2+/4 and symmetrical.   Sensory:   Cannot assess    Gait:  Cannot assess   Tremor:   No tremor noted. Cerebellar:  No cerebellar signs present. Babinski:       Down b/l     Assessment and Plan: Ms. Lisa Heller is a 60year old woman with likely HSV encephalitis. - cont acyclovir 10mg/kg q8h  -awaiting hsv pcr.  Csf differential consistent with viral encephalitis  - increase keppra to 2 g bid given patient's mental status  - EEG on Tue (long weekend)

## 2021-09-05 NOTE — MED STUDENT NOTES
Hospitalist Progress Note               Daily Progress Note: 9/5/2021      Subjective: The patient is seen for follow up. João Carmona is a 60 y.o. female who presents with seizure-like activity after being found on the floor by her family overnight. Upon arrival EMS she was having seizure activity and given Versed 5 mg which abated the convulsions. Seizure started again and she was given another 5 mg of Versed. On arrival to the ED she had a temperature of 102. CT of the head was negative. Labs were significant for a potassium of 2.8     Patient had been seen here in the ED on 8/31 with complaints of nausea and vomiting, dizziness and weakness. She was concerned she may have had food poisoning although this was not felt to be the case. When her daughter spoke with her on the phone on 9/2/21 her daughter noted she was confused and disoriented.     MRI showed classic high signal on T2 and flair images right temporal lobe. Patient was started on IV acyclovir as well as IV antibiotics    CSF white count was 200 with 65% PMNs and 35% lymphocytes    -------  The patient is resting in the bed today. She is slightly more awake today, has started speaking but not really answering questions and repeating the same phrase over and over. Still has a left gaze preference.   Tube feeds were started yesterday    Problem List:  Problem List as of 9/5/2021 Never Reviewed        Codes Class Noted - Resolved    Sepsis [facility]) ICD-10-CM: A41.9  ICD-9-CM: 038.9, 995.91  9/3/2021 - Present        New onset seizure [facility]) ICD-10-CM: R56.9  ICD-9-CM: 780.39  9/3/2021 - Present              Medications reviewed  Current Facility-Administered Medications   Medication Dose Route Frequency    dextrose 5% and 0.9% NaCl infusion  75 mL/hr IntraVENous CONTINUOUS    amLODIPine (NORVASC) tablet 2.5 mg  2.5 mg Oral DAILY    levETIRAcetam (KEPPRA) 2,000 mg in 0.9% sodium chloride 250 mL IVPB  2,000 mg IntraVENous Q12H    ampicillin (OMNIPEN) 2 g in 0.9% sodium chloride (MBP/ADV) 100 mL MBP  2 g IntraVENous Q4H    0.9% sodium chloride infusion  1,000 mL/hr IntraVENous CONTINUOUS    0.9% sodium chloride infusion  1,000 mL/hr IntraVENous CONTINUOUS    cefTRIAXone (ROCEPHIN) 2 g in sterile water (preservative free) 20 mL IV syringe  2 g IntraVENous Q12H    sodium chloride (NS) flush 5-40 mL  5-40 mL IntraVENous Q8H    sodium chloride (NS) flush 5-40 mL  5-40 mL IntraVENous PRN    acetaminophen (TYLENOL) tablet 650 mg  650 mg Oral Q6H PRN    Or    acetaminophen (TYLENOL) suppository 650 mg  650 mg Rectal Q6H PRN    polyethylene glycol (MIRALAX) packet 17 g  17 g Oral DAILY PRN    promethazine (PHENERGAN) tablet 12.5 mg  12.5 mg Oral Q6H PRN    Or    ondansetron (ZOFRAN) injection 4 mg  4 mg IntraVENous Q6H PRN    enoxaparin (LOVENOX) injection 30 mg  30 mg SubCUTAneous DAILY    acyclovir (ZOVIRAX) 600 mg in 0.9% sodium chloride 100 mL IVPB  10 mg/kg (Ideal) IntraVENous Q8H    insulin lispro (HUMALOG) injection   SubCUTAneous Q6H    glucose chewable tablet 16 g  4 Tablet Oral PRN    dextrose (D50W) injection syrg 12.5-25 g  25-50 mL IntraVENous PRN    glucagon (GLUCAGEN) injection 1 mg  1 mg IntraMUSCular PRN       Review of Systems:   Review of systems not obtained due to patient factors. Objective:   Physical Exam:     Visit Vitals  BP (!) 152/59 (BP 1 Location: Right upper arm, BP Patient Position: At rest)   Pulse 67   Temp 99.1 °F (37.3 °C)   Resp 29   Ht 5' 6\" (1.676 m)   Wt 83 kg (183 lb)   SpO2 96%   BMI 29.54 kg/m²    O2 Flow Rate (L/min): 2 l/min O2 Device: None (Room air)    Temp (24hrs), Av.2 °F (37.9 °C), Min:99.1 °F (37.3 °C), Max:102.2 °F (39 °C)    No intake/output data recorded.  1901 -  0700  In: 820 [I.V.:670]  Out: 3475 [Urine:3475]    General:   Awake and altered, patient does not respond to verbal cues, but does respond to pain   Lungs:   Clear to auscultation bilaterally.    Chest wall:  No tenderness or deformity. Heart:  Regular rate and rhythm, S1, S2 normal, no murmur, click, rub or gallop. Abdomen:   Soft, non-tender. Bowel sounds normal. No masses,  No organomegaly. Extremities: Extremities normal, atraumatic, no cyanosis or edema. Pulses: 2+ and symmetric all extremities. Skin: Skin color, texture, turgor normal. No rashes or lesions   Neurologic: Limited due to altered mental status. Fixed gaze to the left. Data Review:       Recent Days:  Recent Labs     09/05/21 0500 09/04/21 0320 09/03/21 0621   WBC 7.9 12.6* 12.7*   HGB 13.5 13.7 13.6   HCT 39.9 42.2 41.7    204 239     Recent Labs     09/05/21 0500 09/04/21 0320 09/03/21 0621    136 138   K 3.0* 3.9 2.8*    104 108   CO2 26 26 22   * 67 82   BUN 7 10 10   CREA 0.35* 0.55 0.69   CA 7.7* 7.7* 6.2*   PHOS 2.2*  --   --    ALB 2.5*  --  2.5*   TBILI  --   --  0.5   ALT  --   --  15     No results for input(s): PH, PCO2, PO2, HCO3, FIO2 in the last 72 hours.     24 Hour Results:  Recent Results (from the past 24 hour(s))   GLUCOSE, POC    Collection Time: 09/04/21 12:18 PM   Result Value Ref Range    Glucose (POC) 93 65 - 117 mg/dL    Performed by ZAKIA RIOS    GLUCOSE, POC    Collection Time: 09/04/21  6:04 PM   Result Value Ref Range    Glucose (POC) 84 65 - 117 mg/dL    Performed by [id number] [place] [address] Avenue, POC    Collection Time: 09/05/21 12:35 AM   Result Value Ref Range    Glucose (POC) 93 65 - 117 mg/dL    Performed by Kathrine Jorgensen    CBC WITH AUTOMATED DIFF    Collection Time: 09/05/21  5:00 AM   Result Value Ref Range    WBC 7.9 3.6 - 11.0 K/uL    RBC 4.52 3.80 - 5.20 M/uL    HGB 13.5 11.5 - 16.0 g/dL    HCT 39.9 35.0 - 47.0 %    MCV 88.3 80.0 - 99.0 FL    MCH 29.9 26.0 - 34.0 PG    MCHC 33.8 30.0 - 36.5 g/dL    RDW 13.9 11.5 - 14.5 %    PLATELET 574 341 - 235 K/uL    MPV 10.8 8.9 - 12.9 FL    NRBC 0.0 0.0  WBC    ABSOLUTE NRBC 0.00 0.00 - 0.01 K/uL    NEUTROPHILS 63 32 - 75 %    LYMPHOCYTES 25 12 - 49 %    MONOCYTES 10 5 - 13 %    EOSINOPHILS 1 0 - 7 %    BASOPHILS 1 0 - 1 %    IMMATURE GRANULOCYTES 0 0 - 0.5 %    ABS. NEUTROPHILS 5.0 1.8 - 8.0 K/UL    ABS. LYMPHOCYTES 1.9 0.8 - 3.5 K/UL    ABS. MONOCYTES 0.8 0.0 - 1.0 K/UL    ABS. EOSINOPHILS 0.1 0.0 - 0.4 K/UL    ABS. BASOPHILS 0.0 0.0 - 0.1 K/UL    ABS. IMM. GRANS. 0.0 0.00 - 0.04 K/UL    DF AUTOMATED     RENAL FUNCTION PANEL    Collection Time: 09/05/21  5:00 AM   Result Value Ref Range    Sodium 138 136 - 145 mmol/L    Potassium 3.0 (L) 3.5 - 5.1 mmol/L    Chloride 106 97 - 108 mmol/L    CO2 26 21 - 32 mmol/L    Anion gap 6 5 - 15 mmol/L    Glucose 130 (H) 65 - 100 mg/dL    BUN 7 6 - 20 mg/dL    Creatinine 0.35 (L) 0.55 - 1.02 mg/dL    BUN/Creatinine ratio 20 12 - 20      GFR est AA >60 >60 ml/min/1.73m2    GFR est non-AA >60 >60 ml/min/1.73m2    Calcium 7.7 (L) 8.5 - 10.1 mg/dL    Phosphorus 2.2 (L) 2.6 - 4.7 mg/dL    Albumin 2.5 (L) 3.5 - 5.0 g/dL   C REACTIVE PROTEIN, QT    Collection Time: 09/05/21  5:00 AM   Result Value Ref Range    C-Reactive protein 1.52 (H) 0.00 - 0.60 mg/dL   PROCALCITONIN    Collection Time: 09/05/21  5:00 AM   Result Value Ref Range    Procalcitonin <0.05 (H) 0 ng/mL   GLUCOSE, POC    Collection Time: 09/05/21  6:15 AM   Result Value Ref Range    Glucose (POC) 131 (H) 65 - 117 mg/dL    Performed by Lindsey Jorgensen        XR CHEST PORT   Final Result   Stable mild elevation of right hemidiaphragm. Right lateral chest   wall clips. Stable enlargement of cardiopericardial silhouette. Improved   aeration of the lungs bilaterally. No evidence of focal airspace consolidation. Gastric tube placed with tip overlying the right upper abdomen at the level of   the distal stomach or proximal duodenum. MRI BRAIN W WO CONT   Final Result   1. Abnormal high T2 signal evident in the right temporal lobe and insular   cortex. Subtle increased T2 signal evident in the left temporal uncus.  The appearances are concerning for herpes encephalitis. This could also represent   limbic encephalitis. A report was call to Dr. Aure Ortiz at 9/3/2021 11:49 AM      XR CHEST PORT   Final Result      CT HEAD WO CONT   Final Result   No acute or active intracranial process. CT SPINE CERV WO CONT   Final Result   No specific acute or active process. Multilevel degenerative disc and degenerative joint disease. 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     Gordon Maynard MD

## 2021-09-05 NOTE — PROGRESS NOTES
Progress Note    Patient: Leslie Ward MRN: [id number]  SSN: [id number]    YOB: 1955  Age: 60 y.o. Sex: female      Admit Date: 9/3/2021    LOS: 2 days     Subjective:   Patient followed by Dr. Jose Martin Hudson for suspected HSV encephalitis (MRI). CSF showed neutrophilic pleocytosis. CSF culture and HSV PCR pending. Patient remains on Acyclovir, Ampicillin and Ceftriaxone. She is somnolent and unresponsive at this time. Objective:     Vitals:    09/05/21 1050 09/05/21 1200 09/05/21 1300 09/05/21 1400   BP: (!) 179/78 (!) 140/64 129/73 126/69   Pulse: 65 76 78 74   Resp: 24 24 26 22   Temp: (!) 100.6 °F (38.1 °C) 100 °F (37.8 °C) 99.9 °F (37.7 °C) 99.9 °F (37.7 °C)   SpO2: 95% 96% 95% 96%   Weight:       Height:            Intake and Output:  Current Shift: 09/05 0701 - 09/05 1900  In: -   Out: 1000 [Urine:1000]  Last three shifts: 09/03 1901 - 09/05 0700  In: 820 [I.V.:670]  Out: 3475 [Urine:3475]    Physical Exam:   General: Seriously ill-appearing, unresponsive  HEENT:  Eyes covered with cloth  Neck: supple  Lungs: clear bilaterally  Heart: RRR, Gr 1/6 REANNA  Abdomen: obese, soft, nontender, +BS  Ext:  No edema; mitts on both hands  : Indwelling Vázquez  Skin: no rashes or petechiae  Neuro: somnolent    Lab/Data Review:     WBC 7,900  Lactic acid 3.5    CRP 1.52 <1.05  Procal <0.05    HIV 1/2 AG/AB Nonreactive    Blood cultures (9/3) No growth 2 days  CSF culture (9/3) No growth thus far   ? Viral culture (9/3) Pending  Assessment:     Active Problems:    Sepsis ([address] (9/3/2021)      New onset seizure ([address] (9/3/2021)      1. Rule out Encephalitis with seizure activity and CSF neutrophilic pleocytosis  2. Altered mental status  3. SIRS/Sepsis with fever, leukocytosis, elevated lactic acid and CRP  4. New onset seizure    Comment:  CSF studies still pending. WBC normal but CRP increased today. Plan:   1. Continue IV Acyclovir  2. Continue IV Ceftriaxone  3.  Continue IV Ampicillin but increase to 2 gm q4 hours  4. Follow-up blood cultures, CSF cultures, HSV CSF PCR and HSV serology, RPR  5.  In am, repeat CBC and CRP    Signed By: Lissette Retana MD     September 5, 2021

## 2021-09-05 NOTE — PROGRESS NOTES
PICC Placement Note    PRE-PROCEDURE VERIFICATION  Correct Procedure: yes  Correct Site:  yes  Temperature: Temp: 100.2 °F (37.9 °C), Temperature Source: Temp Source: Bladder  Recent Labs     09/05/21  0500   BUN 7   CREA 0.35*      WBC 7.9     Allergies: Patient has no known allergies. Education materials for PICC Care given to family: yes. See Patient Education activity for further details. PICC Booklet placed on chart: yes    PROCEDURE DETAIL  A double lumen PICC line was started for vascular access. The following documentation is in addition to the PICC properties in the lines/airways flowsheet :  Lot #: [id number]  xylocaine used: yes  Mid-Arm Circumference: 36 (cm)  External Catheter Length: 0.5 (cm)  Internal Catheter Total Length: 42 (cm)  Vein Selection for PICC:left basilic  Central Line Bundle followed yes  Complication Related to Insertion: none    The placement was verified by ECG: yes. The ECG indicates the tip location is on the right side and the tip overlies the lower superior vena cava.  CAJ    Line is okay to use: yes    Eugenio Jansen RN

## 2021-09-06 LAB
ALBUMIN SERPL-MCNC: 2.4 G/DL (ref 3.5–5)
ANION GAP SERPL CALC-SCNC: 4 MMOL/L (ref 5–15)
BASOPHILS # BLD: 0 K/UL (ref 0–0.1)
BASOPHILS NFR BLD: 1 % (ref 0–1)
BUN SERPL-MCNC: 6 MG/DL (ref 6–20)
BUN/CREAT SERPL: 13 (ref 12–20)
CA-I BLD-MCNC: 8.2 MG/DL (ref 8.5–10.1)
CHLORIDE SERPL-SCNC: 110 MMOL/L (ref 97–108)
CO2 SERPL-SCNC: 29 MMOL/L (ref 21–32)
CREAT SERPL-MCNC: 0.47 MG/DL (ref 0.55–1.02)
DIFFERENTIAL METHOD BLD: NORMAL
EOSINOPHIL # BLD: 0.3 K/UL (ref 0–0.4)
EOSINOPHIL NFR BLD: 3 % (ref 0–7)
ERYTHROCYTE [DISTWIDTH] IN BLOOD BY AUTOMATED COUNT: 14.2 % (ref 11.5–14.5)
GLUCOSE BLD STRIP.AUTO-MCNC: 117 MG/DL (ref 65–117)
GLUCOSE BLD STRIP.AUTO-MCNC: 133 MG/DL (ref 65–117)
GLUCOSE BLD STRIP.AUTO-MCNC: 136 MG/DL (ref 65–117)
GLUCOSE BLD STRIP.AUTO-MCNC: 144 MG/DL (ref 65–117)
GLUCOSE BLD STRIP.AUTO-MCNC: 152 MG/DL (ref 65–117)
GLUCOSE SERPL-MCNC: 146 MG/DL (ref 65–100)
HCT VFR BLD AUTO: 40.9 % (ref 35–47)
HGB BLD-MCNC: 13.3 G/DL (ref 11.5–16)
IMM GRANULOCYTES # BLD AUTO: 0 K/UL (ref 0–0.04)
IMM GRANULOCYTES NFR BLD AUTO: 0 % (ref 0–0.5)
LYMPHOCYTES # BLD: 2.2 K/UL (ref 0.8–3.5)
LYMPHOCYTES NFR BLD: 27 % (ref 12–49)
MAGNESIUM SERPL-MCNC: 2.4 MG/DL (ref 1.6–2.4)
MCH RBC QN AUTO: 29.2 PG (ref 26–34)
MCHC RBC AUTO-ENTMCNC: 32.5 G/DL (ref 30–36.5)
MCV RBC AUTO: 89.9 FL (ref 80–99)
MONOCYTES # BLD: 0.7 K/UL (ref 0–1)
MONOCYTES NFR BLD: 8 % (ref 5–13)
NEUTS SEG # BLD: 5 K/UL (ref 1.8–8)
NEUTS SEG NFR BLD: 61 % (ref 32–75)
NRBC # BLD: 0 K/UL (ref 0–0.01)
NRBC BLD-RTO: 0 PER 100 WBC
PERFORMED BY, TECHID: ABNORMAL
PERFORMED BY, TECHID: NORMAL
PHOSPHATE SERPL-MCNC: 2.7 MG/DL (ref 2.6–4.7)
PLATELET # BLD AUTO: 182 K/UL (ref 150–400)
PMV BLD AUTO: 10.6 FL (ref 8.9–12.9)
POTASSIUM SERPL-SCNC: 3.7 MMOL/L (ref 3.5–5.1)
RBC # BLD AUTO: 4.55 M/UL (ref 3.8–5.2)
SODIUM SERPL-SCNC: 143 MMOL/L (ref 136–145)
WBC # BLD AUTO: 8.1 K/UL (ref 3.6–11)

## 2021-09-06 PROCEDURE — 92526 ORAL FUNCTION THERAPY: CPT

## 2021-09-06 PROCEDURE — 82962 GLUCOSE BLOOD TEST: CPT

## 2021-09-06 PROCEDURE — 74011636637 HC RX REV CODE- 636/637: Performed by: INTERNAL MEDICINE

## 2021-09-06 PROCEDURE — 74011250636 HC RX REV CODE- 250/636: Performed by: INTERNAL MEDICINE

## 2021-09-06 PROCEDURE — 74011250636 HC RX REV CODE- 250/636: Performed by: PSYCHIATRY & NEUROLOGY

## 2021-09-06 PROCEDURE — 65610000006 HC RM INTENSIVE CARE

## 2021-09-06 PROCEDURE — 92610 EVALUATE SWALLOWING FUNCTION: CPT

## 2021-09-06 PROCEDURE — 80069 RENAL FUNCTION PANEL: CPT

## 2021-09-06 PROCEDURE — 99232 SBSQ HOSP IP/OBS MODERATE 35: CPT | Performed by: INTERNAL MEDICINE

## 2021-09-06 PROCEDURE — 74011000258 HC RX REV CODE- 258: Performed by: INTERNAL MEDICINE

## 2021-09-06 PROCEDURE — 36415 COLL VENOUS BLD VENIPUNCTURE: CPT

## 2021-09-06 PROCEDURE — 74011250637 HC RX REV CODE- 250/637: Performed by: INTERNAL MEDICINE

## 2021-09-06 PROCEDURE — 85025 COMPLETE CBC W/AUTO DIFF WBC: CPT

## 2021-09-06 PROCEDURE — 83735 ASSAY OF MAGNESIUM: CPT

## 2021-09-06 PROCEDURE — 74011000258 HC RX REV CODE- 258: Performed by: HOSPITALIST

## 2021-09-06 PROCEDURE — 74011000250 HC RX REV CODE- 250: Performed by: INTERNAL MEDICINE

## 2021-09-06 RX ORDER — POTASSIUM CHLORIDE 20 MEQ/1
40 TABLET, EXTENDED RELEASE ORAL
Status: DISCONTINUED | OUTPATIENT
Start: 2021-09-06 | End: 2021-09-06 | Stop reason: ALTCHOICE

## 2021-09-06 RX ORDER — POTASSIUM CHLORIDE 1.5 G/1.77G
40 POWDER, FOR SOLUTION ORAL
Status: COMPLETED | OUTPATIENT
Start: 2021-09-06 | End: 2021-09-06

## 2021-09-06 RX ORDER — INSULIN LISPRO 100 [IU]/ML
INJECTION, SOLUTION INTRAVENOUS; SUBCUTANEOUS
Status: DISCONTINUED | OUTPATIENT
Start: 2021-09-06 | End: 2021-09-25 | Stop reason: HOSPADM

## 2021-09-06 RX ADMIN — CEFTRIAXONE SODIUM 2 G: 2 INJECTION, POWDER, FOR SOLUTION INTRAMUSCULAR; INTRAVENOUS at 08:22

## 2021-09-06 RX ADMIN — AMLODIPINE BESYLATE 2.5 MG: 5 TABLET ORAL at 08:23

## 2021-09-06 RX ADMIN — ACYCLOVIR SODIUM 600 MG: 50 INJECTION, SOLUTION INTRAVENOUS at 21:20

## 2021-09-06 RX ADMIN — LEVETIRACETAM 2000 MG: 10 INJECTION INTRAVENOUS at 12:43

## 2021-09-06 RX ADMIN — POTASSIUM CHLORIDE 20 MEQ: 1.5 POWDER, FOR SOLUTION ORAL at 16:31

## 2021-09-06 RX ADMIN — LEVETIRACETAM 2000 MG: 10 INJECTION INTRAVENOUS at 23:31

## 2021-09-06 RX ADMIN — Medication 10 ML: at 00:56

## 2021-09-06 RX ADMIN — ACYCLOVIR SODIUM 600 MG: 50 INJECTION, SOLUTION INTRAVENOUS at 13:50

## 2021-09-06 RX ADMIN — ENOXAPARIN SODIUM 30 MG: 40 INJECTION SUBCUTANEOUS at 08:22

## 2021-09-06 RX ADMIN — POTASSIUM CHLORIDE 40 MEQ: 1.5 FOR SOLUTION ORAL at 11:15

## 2021-09-06 RX ADMIN — LEVETIRACETAM 2000 MG: 10 INJECTION INTRAVENOUS at 00:00

## 2021-09-06 RX ADMIN — DEXTROSE AND SODIUM CHLORIDE 75 ML/HR: 5; 900 INJECTION, SOLUTION INTRAVENOUS at 12:36

## 2021-09-06 RX ADMIN — INSULIN LISPRO 2 UNITS: 100 INJECTION, SOLUTION INTRAVENOUS; SUBCUTANEOUS at 06:29

## 2021-09-06 RX ADMIN — ACETAMINOPHEN 650 MG: 325 TABLET ORAL at 12:36

## 2021-09-06 RX ADMIN — ACYCLOVIR SODIUM 600 MG: 50 INJECTION, SOLUTION INTRAVENOUS at 06:10

## 2021-09-06 RX ADMIN — Medication 10 ML: at 21:20

## 2021-09-06 RX ADMIN — Medication 10 ML: at 06:15

## 2021-09-06 RX ADMIN — POTASSIUM CHLORIDE 20 MEQ: 1.5 POWDER, FOR SOLUTION ORAL at 08:23

## 2021-09-06 RX ADMIN — AMPICILLIN SODIUM 2 G: 2 INJECTION, POWDER, FOR SOLUTION INTRAMUSCULAR; INTRAVENOUS at 06:13

## 2021-09-06 RX ADMIN — AMPICILLIN SODIUM 2 G: 2 INJECTION, POWDER, FOR SOLUTION INTRAMUSCULAR; INTRAVENOUS at 09:06

## 2021-09-06 RX ADMIN — ACETAMINOPHEN 650 MG: 325 TABLET ORAL at 04:13

## 2021-09-06 RX ADMIN — AMPICILLIN SODIUM 2 G: 2 INJECTION, POWDER, FOR SOLUTION INTRAMUSCULAR; INTRAVENOUS at 04:12

## 2021-09-06 RX ADMIN — INSULIN LISPRO 2 UNITS: 100 INJECTION, SOLUTION INTRAVENOUS; SUBCUTANEOUS at 00:54

## 2021-09-06 RX ADMIN — Medication 10 ML: at 13:50

## 2021-09-06 NOTE — PROGRESS NOTES
Infectious Disease Progress Note         Subjective:   Pt seen and examined at bedside. More alert and following commands today. Intermittently febrile but hemodynamically stable   No acute events since last seen.  Passed speech and is moving all 4 exts voluntarily    Objective:   Physical Exam:     Visit Vitals  /84 (BP 1 Location: Left leg, BP Patient Position: At rest)   Pulse 79   Temp 100 °F (37.8 °C)   Resp 28   Ht 5' 6\" (1.676 m)   Wt 189 lb 6 oz (85.9 kg)   SpO2 98%   BMI 30.57 kg/m²    O2 Flow Rate (L/min): 2 l/min O2 Device: None (Room air)    Temp (24hrs), Av.1 °F (37.8 °C), Min:99 °F (37.2 °C), Max:101.1 °F (38.4 °C)    701 - 1900  In: 20 [I.V.:20]  Out: -    1901 -  0700  In: 1185 [I.V.:825]  Out: 5925 [Urine:5925]    General: NAD, alert, AAO x 4  HEENT: LAQUITA, Moist mucosa, NGT in place   Lungs: CTA b/l, no wheeze/rhonchi   Heart: S1S2+, RRR, no murmur  Abdo: Soft, NT, ND, +BS   : + modi cath   Exts: No edema, + pulses b/l   Skin: No wounds, No rashes or lesions    Data Review:       Recent Days:  Recent Labs     21  0835 21  0500 21  0320   WBC 8.1 7.9 12.6*   HGB 13.3 13.5 13.7   HCT 40.9 39.9 42.2    203 204     Recent Labs     21  0835 21  0500 21  0320   BUN 6 7 10   CREA 0.47* 0.35* 0.55       Lab Results   Component Value Date/Time    C-Reactive protein 1.52 (H) 2021 05:00 AM        Microbiology     Results     Procedure Component Value Units Date/Time    HSV 1 AND 2 BY PCR [[id number]] Collected: 21 1315    Order Status: Canceled Specimen: Other from Miscellaneous sample     HSV 1 AND 2 BY PCR [[id number]] Collected: 21 1300    Order Status: Completed Specimen: Other from Miscellaneous sample Updated: 21 1306    CULTURE, VIRAL [[id number]] Collected: 21 1300    Order Status: Completed Specimen: Other from Miscellaneous sample Updated: 21 1935     Source Cerebrospinal fluid        Viral Culture, General Comment        Comment: Preliminary Report:  No virus isolated at 24 hours. Next report to follow after 4 days. Performed At: [address] [id number]  Isaac Crain MD QU:[id number]         CULTURE, BODY Ian Merlin [[id number]] Collected: 09/03/21 1300    Order Status: Canceled Specimen: Body Fluid     CULTURE, CSF Campbell Schmidt [[id number]] Collected: 09/03/21 1200    Order Status: Completed Specimen: Cerebrospinal Fluid Updated: 09/05/21 1038     Special Requests: No Special Requests        GRAM STAIN Occasional WBCs seen         No organisms seen        Culture result:       No growth thus far holding 7 days          CULTURE, BLOOD, PAIRED [[id number]] Collected: 09/03/21 0700    Order Status: Completed Specimen: Blood Updated: 09/06/21 1140     Special Requests: No Special Requests        Culture result: No growth 3 days       COVID-19 RAPID TEST [[id number]] Collected: 09/03/21 0636    Order Status: Completed Specimen: Nasopharyngeal Updated: 09/03/21 0741     Specimen source Nasopharyngeal        COVID-19 rapid test Not Detected        Comment: Rapid Abbott ID Now   Rapid NAAT:  The specimen is NEGATIVE for SARS-CoV-2, the novel coronavirus associated with COVID-19. Negative results should be treated as presumptive and, if inconsistent with clinical signs and symptoms or necessary for patient management, should be tested with an alternative molecular assay. Negative results do not preclude SARS-CoV-2 infection and should not be used as the sole basis for patient management decisions. This test has been authorized by the FDA under   an Emergency Use Authorization (EUA) for use by authorized laboratories.  Fact sheet for Healthcare Providers: ConventionUpdate.co.nz Fact sheet for Patients: ConventionUpdate.co.nz   Methodology: Isothermal Nucleic Acid Amplification Diagnostics   CXR Results  (Last 48 hours)    None             Assessment/Plan     1. 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 High grade fevers, moderate leukocytosis        Remains intermittently febrile, wbc normalized on routine labs. CRP 1.52 and procal <0.05       CSF analysis showed 200 WBCs, diff not done and 80 Protein and 51 glucose        No organisms noted on CSF Gram stain, Cx neg x 3 days        Will d/c Ceftriaxone and Ampicillin, currenty a low suspicion for pyogenic meningitis w only mildly elevated inflammatory markers        Continue on Acyclovir pending CSF HSV PCR and other other work up       No obvious reason to explain fevers, ? From antibiotics     2. New onset seizure: Due to # 1 on Keppra, no witnessed seizures since admission      3. Suspected sepsis: No other source of infection except for # 1     4. AMS: Due to # 1, Improved mentation, following commands appropriately.        Passed swallowing eval, NGT to be discontinued, PT/OT to seen     Fay Cross MD    9/6/2021

## 2021-09-06 NOTE — MED STUDENT NOTES
Hospitalist Progress Note               Daily Progress Note: 9/6/2021      Subjective: The patient is seen for follow up. Everette Cowden is a 60 y.o. female who presents with seizure-like activity after being found on the floor by her family overnight. Upon arrival EMS she was having seizure activity and given Versed 5 mg which abated the convulsions. Seizure started again and she was given another 5 mg of Versed. On arrival to the ED she had a temperature of 102. CT of the head was negative. Labs were significant for a potassium of 2.8     Patient had been seen here in the ED on 8/31 with complaints of nausea and vomiting, dizziness and weakness. She was concerned she may have had food poisoning although this was not felt to be the case. When her daughter spoke with her on the phone on 9/2/21 her daughter noted she was confused and disoriented.     MRI showed classic high signal on T2 and flair images right temporal lobe. Patient was started on IV acyclovir as well as IV antibiotics    CSF white count was 200 with 65% PMNs and 35% lymphocytes    CSF culture negative to date. HIV was nonreactive.    -------  Patient is markedly improved over the past 24 hours. She is oriented to place, answering questions appropriately. Wants the NG tube out     Patient had PICC line placement yesterday for vascular access.     HSV PCR serology is still pending    Problem List:  Problem List as of 9/6/2021 Never Reviewed        Codes Class Noted - Resolved    Sepsis [facility]) ICD-10-CM: A41.9  ICD-9-CM: 038.9, 995.91  9/3/2021 - Present        New onset seizure [facility]) ICD-10-CM: R56.9  ICD-9-CM: 780.39  9/3/2021 - Present              Medications reviewed  Current Facility-Administered Medications   Medication Dose Route Frequency    potassium chloride (KLOR-CON) packet for solution 20 mEq  20 mEq Oral BID WITH MEALS    levETIRAcetam in saline (iso-os) (KEPPRA) IVPB (10 mg/mL) 2,000 mg  2,000 mg IntraVENous Q12H  dextrose 5% and 0.9% NaCl infusion  75 mL/hr IntraVENous CONTINUOUS    amLODIPine (NORVASC) tablet 2.5 mg  2.5 mg Oral DAILY    ampicillin (OMNIPEN) 2 g in 0.9% sodium chloride (MBP/ADV) 100 mL MBP  2 g IntraVENous Q4H    0.9% sodium chloride infusion  1,000 mL/hr IntraVENous CONTINUOUS    0.9% sodium chloride infusion  1,000 mL/hr IntraVENous CONTINUOUS    cefTRIAXone (ROCEPHIN) 2 g in sterile water (preservative free) 20 mL IV syringe  2 g IntraVENous Q12H    sodium chloride (NS) flush 5-40 mL  5-40 mL IntraVENous Q8H    sodium chloride (NS) flush 5-40 mL  5-40 mL IntraVENous PRN    acetaminophen (TYLENOL) tablet 650 mg  650 mg Oral Q6H PRN    Or    acetaminophen (TYLENOL) suppository 650 mg  650 mg Rectal Q6H PRN    polyethylene glycol (MIRALAX) packet 17 g  17 g Oral DAILY PRN    promethazine (PHENERGAN) tablet 12.5 mg  12.5 mg Oral Q6H PRN    Or    ondansetron (ZOFRAN) injection 4 mg  4 mg IntraVENous Q6H PRN    enoxaparin (LOVENOX) injection 30 mg  30 mg SubCUTAneous DAILY    acyclovir (ZOVIRAX) 600 mg in 0.9% sodium chloride 100 mL IVPB  10 mg/kg (Ideal) IntraVENous Q8H    insulin lispro (HUMALOG) injection   SubCUTAneous Q6H    glucose chewable tablet 16 g  4 Tablet Oral PRN    dextrose (D50W) injection syrg 12.5-25 g  25-50 mL IntraVENous PRN    glucagon (GLUCAGEN) injection 1 mg  1 mg IntraMUSCular PRN       Review of Systems:   Review of systems not obtained due to patient factors. Objective:   Physical Exam:     Visit Vitals  BP (!) 151/70   Pulse 77   Temp 99.9 °F (37.7 °C)   Resp 27   Ht 5' 6\" (1.676 m)   Wt 85.9 kg (189 lb 6 oz)   SpO2 97%   BMI 30.57 kg/m²    O2 Flow Rate (L/min): 2 l/min O2 Device: None (Room air)    Temp (24hrs), Av.2 °F (37.9 °C), Min:99 °F (37.2 °C), Max:101.1 °F (38.4 °C)    No intake/output data recorded.     1901 -  0700  In: 150   Out: 5925 [Urine:5925]    General:   Awake and alert, answering questions   Lungs:   Clear to auscultation bilaterally. Chest wall:  No tenderness or deformity. Heart:  Regular rate and rhythm, S1, S2 normal, no murmur, click, rub or gallop. Abdomen:   Soft, non-tender. Bowel sounds normal. No masses,  No organomegaly. Extremities: Extremities normal, atraumatic, no cyanosis or edema. PICC line present on the left arm. The site is without erythema and discharge. Pulses: 2+ and symmetric all extremities. Skin: Skin color, texture, turgor normal. No rashes or lesions   Neurologic: Limited due to altered mental status. Fixed gaze to the left. Data Review:       Recent Days:  Recent Labs     09/05/21  0500 09/04/21  0320   WBC 7.9 12.6*   HGB 13.5 13.7   HCT 39.9 42.2    204     Recent Labs     09/05/21  0500 09/04/21  0320    136   K 3.0* 3.9    104   CO2 26 26   * 67   BUN 7 10   CREA 0.35* 0.55   CA 7.7* 7.7*   PHOS 2.2*  --    ALB 2.5*  --      No results for input(s): PH, PCO2, PO2, HCO3, FIO2 in the last 72 hours. 24 Hour Results:  Recent Results (from the past 24 hour(s))   GLUCOSE, POC    Collection Time: 09/05/21 11:03 AM   Result Value Ref Range    Glucose (POC) 104 65 - 117 mg/dL    Performed by [address], POC    Collection Time: 09/05/21  5:06 PM   Result Value Ref Range    Glucose (POC) 164 (H) 65 - 117 mg/dL    Performed by [address], POC    Collection Time: 09/06/21 12:38 AM   Result Value Ref Range    Glucose (POC) 144 (H) 65 - 117 mg/dL    Performed by Latisha Aguiar, POC    Collection Time: 09/06/21  6:04 AM   Result Value Ref Range    Glucose (POC) 152 (H) 65 - 117 mg/dL    Performed by Tomer Florian        XR CHEST PORT   Final Result   Stable mild elevation of right hemidiaphragm. Right lateral chest   wall clips. Stable enlargement of cardiopericardial silhouette. Improved   aeration of the lungs bilaterally. No evidence of focal airspace consolidation.    Gastric tube placed with tip overlying the right upper abdomen at the level of   the distal stomach or proximal duodenum. MRI BRAIN W WO CONT   Final Result   1. Abnormal high T2 signal evident in the right temporal lobe and insular   cortex. Subtle increased T2 signal evident in the left temporal uncus. The   appearances are concerning for herpes encephalitis. This could also represent   limbic encephalitis. A report was call to Dr. Oswaldo Veliz at 9/3/2021 11:49 AM      XR CHEST PORT   Final Result      CT HEAD WO CONT   Final Result   No acute or active intracranial process. CT SPINE CERV WO CONT   Final Result   No specific acute or active process. Multilevel degenerative disc and degenerative joint disease. 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 Stable, no further seizures     Sepsis with fever, leukocytosis, elevated lactate and tachycardia metabolic encephalopathy, improved     Diabetes mellitus type 2     Remote history of breast cancer    Hypokalemia      Plan:  Continue IV acyclovir, ampicillin and ceftriaxone. Can probably DC the latter two I will defer to ID  Continue Keppra  Speech evaluation for initiation of diet and removal of NG tube  Await HSV PCR  Replete potassium    Transfer to floor later today if still doing well    PT/OT    I updated her daughter Clinton Whyte. We will discontinue airborne precautions and let her visit      Care Plan discussed with: Patient/Family    Disposition: Likely transfer later today    Total time spent with patient: 30 minutes.     Jennifer Kemp MD

## 2021-09-06 NOTE — MED STUDENT NOTES
Hospitalist Progress Note               Daily Progress Note: 9/6/2021      Subjective: The patient is seen for follow up. Ezequiel Zheng is a 60 y.o. female who presents with seizure-like activity after being found on the floor by her family overnight. Upon arrival EMS she was having seizure activity and given Versed 5 mg which abated the convulsions. Seizure started again and she was given another 5 mg of Versed. On arrival to the ED she had a temperature of 102. CT of the head was negative. Labs were significant for a potassium of 2.8     Patient had been seen here in the ED on 8/31 with complaints of nausea and vomiting, dizziness and weakness. She was concerned she may have had food poisoning although this was not felt to be the case. When her daughter spoke with her on the phone on 9/2/21 her daughter noted she was confused and disoriented.     MRI showed classic high signal on T2 and flair images right temporal lobe. Patient was started on IV acyclovir as well as IV antibiotics    CSF white count was 200 with 65% PMNs and 35% lymphocytes    -------  Patient is speaking, but repeating phrases and not answering questions. Patient had PICC line placement yesterday for vascular access.     Problem List:  Problem List as of 9/6/2021 Never Reviewed        Codes Class Noted - Resolved    Sepsis [facility]) ICD-10-CM: A41.9  ICD-9-CM: 038.9, 995.91  9/3/2021 - Present        New onset seizure [facility]) ICD-10-CM: R56.9  ICD-9-CM: 780.39  9/3/2021 - Present              Medications reviewed  Current Facility-Administered Medications   Medication Dose Route Frequency    potassium chloride (KLOR-CON) packet for solution 20 mEq  20 mEq Oral BID WITH MEALS    levETIRAcetam in saline (iso-os) (KEPPRA) IVPB (10 mg/mL) 2,000 mg  2,000 mg IntraVENous Q12H    dextrose 5% and 0.9% NaCl infusion  75 mL/hr IntraVENous CONTINUOUS    amLODIPine (NORVASC) tablet 2.5 mg  2.5 mg Oral DAILY    ampicillin (OMNIPEN) 2 g in 0.9% sodium chloride (MBP/ADV) 100 mL MBP  2 g IntraVENous Q4H    0.9% sodium chloride infusion  1,000 mL/hr IntraVENous CONTINUOUS    0.9% sodium chloride infusion  1,000 mL/hr IntraVENous CONTINUOUS    cefTRIAXone (ROCEPHIN) 2 g in sterile water (preservative free) 20 mL IV syringe  2 g IntraVENous Q12H    sodium chloride (NS) flush 5-40 mL  5-40 mL IntraVENous Q8H    sodium chloride (NS) flush 5-40 mL  5-40 mL IntraVENous PRN    acetaminophen (TYLENOL) tablet 650 mg  650 mg Oral Q6H PRN    Or    acetaminophen (TYLENOL) suppository 650 mg  650 mg Rectal Q6H PRN    polyethylene glycol (MIRALAX) packet 17 g  17 g Oral DAILY PRN    promethazine (PHENERGAN) tablet 12.5 mg  12.5 mg Oral Q6H PRN    Or    ondansetron (ZOFRAN) injection 4 mg  4 mg IntraVENous Q6H PRN    enoxaparin (LOVENOX) injection 30 mg  30 mg SubCUTAneous DAILY    acyclovir (ZOVIRAX) 600 mg in 0.9% sodium chloride 100 mL IVPB  10 mg/kg (Ideal) IntraVENous Q8H    insulin lispro (HUMALOG) injection   SubCUTAneous Q6H    glucose chewable tablet 16 g  4 Tablet Oral PRN    dextrose (D50W) injection syrg 12.5-25 g  25-50 mL IntraVENous PRN    glucagon (GLUCAGEN) injection 1 mg  1 mg IntraMUSCular PRN       Review of Systems:   Review of systems not obtained due to patient factors. Objective:   Physical Exam:     Visit Vitals  BP (!) 151/70   Pulse 77   Temp 99.9 °F (37.7 °C)   Resp 27   Ht 5' 6\" (1.676 m)   Wt 189 lb 6 oz (85.9 kg)   SpO2 97%   BMI 30.57 kg/m²    O2 Flow Rate (L/min): 2 l/min O2 Device: None (Room air)    Temp (24hrs), Av.2 °F (37.9 °C), Min:99 °F (37.2 °C), Max:101.1 °F (38.4 °C)    No intake/output data recorded.  1901 -  0700  In: 150   Out: 5925 [Urine:5925]    General:   Awake and altered, patient does not respond to verbal cues, but does respond to pain   Lungs:   Clear to auscultation bilaterally. Chest wall:  No tenderness or deformity.    Heart:  Regular rate and rhythm, S1, S2 normal, no murmur, click, rub or gallop. Abdomen:   Soft, non-tender. Bowel sounds normal. No masses,  No organomegaly. Extremities: Extremities normal, atraumatic, no cyanosis or edema. PICC line present on the left arm. The site is without erythema and discharge. Pulses: 2+ and symmetric all extremities. Skin: Skin color, texture, turgor normal. No rashes or lesions   Neurologic: Limited due to altered mental status. Fixed gaze to the left. Data Review:       Recent Days:  Recent Labs     09/05/21  0500 09/04/21  0320   WBC 7.9 12.6*   HGB 13.5 13.7   HCT 39.9 42.2    204     Recent Labs     09/05/21  0500 09/04/21  0320    136   K 3.0* 3.9    104   CO2 26 26   * 67   BUN 7 10   CREA 0.35* 0.55   CA 7.7* 7.7*   PHOS 2.2*  --    ALB 2.5*  --      No results for input(s): PH, PCO2, PO2, HCO3, FIO2 in the last 72 hours. 24 Hour Results:  Recent Results (from the past 24 hour(s))   GLUCOSE, POC    Collection Time: 09/05/21 11:03 AM   Result Value Ref Range    Glucose (POC) 104 65 - 117 mg/dL    Performed by [address], POC    Collection Time: 09/05/21  5:06 PM   Result Value Ref Range    Glucose (POC) 164 (H) 65 - 117 mg/dL    Performed by [address], POC    Collection Time: 09/06/21 12:38 AM   Result Value Ref Range    Glucose (POC) 144 (H) 65 - 117 mg/dL    Performed by Josh Duarte, POC    Collection Time: 09/06/21  6:04 AM   Result Value Ref Range    Glucose (POC) 152 (H) 65 - 117 mg/dL    Performed by [facility] CHEST PORT   Final Result   Stable mild elevation of right hemidiaphragm. Right lateral chest   wall clips. Stable enlargement of cardiopericardial silhouette. Improved   aeration of the lungs bilaterally. No evidence of focal airspace consolidation. Gastric tube placed with tip overlying the right upper abdomen at the level of   the distal stomach or proximal duodenum.       MRI BRAIN W WO CONT   Final Result   1. Abnormal high T2 signal evident in the right temporal lobe and insular   cortex. Subtle increased T2 signal evident in the left temporal uncus. The   appearances are concerning for herpes encephalitis. This could also represent   limbic encephalitis. A report was call to Dr. Richar Morataya at 9/3/2021 11:49 AM      XR CHEST PORT   Final Result      CT HEAD WO CONT   Final Result   No acute or active intracranial process. CT SPINE CERV WO CONT   Final Result   No specific acute or active process. Multilevel degenerative disc and degenerative joint disease. 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     New onset seizure activity with fever and altered mental status, due to above     Sepsis with fever, leukocytosis, elevated lactate and tachycardia metabolic encephalopathy, as above     Diabetes mellitus type 2     Remote history of breast cancer    Hypokalemia      Plan:  Continue IV acyclovir, ampicillin and ceftriaxone  Keppra increased to 2g BID per Neurology  Neurology has a planned EEG, Darlene Jain 9/7/21  Await HSV PCR studies and CSF culture  Continue potassium replacement with 20mEq in solution BID  Continue tube feeds and supportive care    I will update her daughter this morning      Care Plan discussed with: Patient/Family    Disposition: Continue ICU care due to encephalitis    Total time spent with patient: 30 minutes.     Jana Salgado

## 2021-09-06 NOTE — PROGRESS NOTES
Problem: Dysphagia (Adult)  Goal: *Speech Goal: (INSERT TEXT)  Description: Speech Therapy Goals  Initiated 9/6/2021  -Patient will tolerate easy to chew diet with thin liquids without signs/symptoms of aspiration given no cues within 5 day(s). [X] Not met  [ ]  MET   [ ] Progressing  [ ] Discontinue  -Patient will demonstrate understanding of swallow safety precautions and aspiration precautions, diet recs with no cues within 5 day(s). [X] Not met  [ ]  MET   [ ] Zbigniew Bush  [ ] Citlalia Alar  9/6/2021 1536 by Derrick Camacho 20  Outcome: Progressing Towards Goal    SPEECH [address] TREATMENT  Patient: Cash Anthony (19 y.o. female)  Date: 9/6/2021  Diagnosis: New onset seizure ([address] [R56.9]  Sepsis ([address] [A41.9] <principal problem not specified>       Precautions: Aspiration      ASSESSMENT:  Patient asleep but easily aroused. SLP saw lunch tray at bedside and offered meal to the patient. Patient positioned upright, alert, and agreeable to eating lunch. SLP set-up her tray and assisted with the first few bites. Afterwards the client was able to feed herself. Noted slow mastication, but overall functional oral phase. Good HLE via digital palpation and dry vocal quality throughout session. No overt s/sx aspiration. SLP informed nurse that patient was eating lunch and to check on her for any assistance. PLAN:  Recommendations and Planned Interventions:  Continue easy to chew diet with thin liquids. Aspiration precautions and staff to provide meal set-up and assistance with meals as needed. Patient continues to benefit from skilled intervention to address the above impairments. Continue treatment per established plan of care. Discharge Recommendations: To Be Determined     SUBJECTIVE:   Patient stated I don't need sugar.     OBJECTIVE:   Cognitive and Communication Status:  Neurologic State: Lethargic, Eyes open to voice  Orientation Level: Oriented X4  Cognition: Follows commands               Pain:  Pain Scale 1: Numeric (0 - 10)  Pain Intensity 1: 0       After treatment:   Patient left in no apparent distress in bed, Call bell within reach, and Nursing notified    COMMUNICATION/EDUCATION:   Patient was educated regarding her deficit(s) of dysphagia. She demonstrated Good understanding as evidenced by verbal responses and head nods. The patient's plan of care including recommendations, planned interventions, and recommended diet changes were discussed with: Registered nurse.      Luana Sutton  Time Calculation: 25 mins

## 2021-09-06 NOTE — CONSULTS
Consult Date: 9/6/2021    Consults  Ms. Carmela Graham is a 60year old woman with history of breast cancer who was brought in 09/03 because she was found down on the floor unresposnive. Per EMS she was having seizures. She was given a total of 10 mg versed. She has fevers of 102. MRI showed bitemporal hyper intensities suspicious for hsv encephalitis. She was started on acyclovir 10 mg /kg q8h and LP done/ LP results consistent with viral encephalitis. She is also on keppra 1000 mg q12hrs. Subjective  opens eyes, tells me her name    Past Medical History:   Diagnosis Date    [facility]     Depression     Diabetes [facility]     High cholesterol       Past Surgical History:   Procedure Laterality Date    HX MASTECTOMY       History reviewed. No pertinent family history.    Social History     Tobacco Use    Smoking status: Never Smoker   Substance Use Topics    Alcohol use: Not Currently       Current Facility-Administered Medications   Medication Dose Route Frequency Provider Last Rate Last Admin    potassium chloride (KLOR-CON) packet for solution 20 mEq  20 mEq Oral BID WITH MEALS Elvis Galindo MD   20 mEq at 09/06/21 0823    levETIRAcetam in saline (iso-os) (KEPPRA) IVPB (10 mg/mL) 2,000 mg  2,000 mg IntraVENous Q12H Debbie Fontaine MD   2,000 mg at 09/06/21 1243    dextrose 5% and 0.9% NaCl infusion  75 mL/hr IntraVENous CONTINUOUS Deangelo Schmidt MD 75 mL/hr at 09/06/21 1236 75 mL/hr at 09/06/21 1236    amLODIPine (NORVASC) tablet 2.5 mg  2.5 mg Oral DAILY Elvis Galindo MD   2.5 mg at 09/06/21 8036    0.9% sodium chloride infusion  1,000 mL/hr IntraVENous CONTINUOUS Yvonne Pisano MD 1,000 mL/hr at 09/03/21 0635 1,000 mL/hr at 09/03/21 0635    0.9% sodium chloride infusion  1,000 mL/hr IntraVENous CONTINUOUS Yvonne Pisano MD 1,000 mL/hr at 09/03/21 0639 1,000 mL/hr at 09/03/21 0639    sodium chloride (NS) flush 5-40 mL  5-40 mL IntraVENous Q8H Elvis Galindo MD   10 mL at 09/06/21 1350    sodium chloride (NS) flush 5-40 mL  5-40 mL IntraVENous PRN Hugo Nunez MD        acetaminophen (TYLENOL) tablet 650 mg  650 mg Oral Q6H PRN Hugo Nunez MD   650 mg at 09/06/21 1236    Or    acetaminophen (TYLENOL) suppository 650 mg  650 mg Rectal Q6H PRN Hugo Nunez MD        polyethylene glycol (MIRALAX) packet 17 g  17 g Oral DAILY PRN Hugo Nunez MD        promethazine (PHENERGAN) tablet 12.5 mg  12.5 mg Oral Q6H PRN Hugo Nunez MD        Or    [facility]dansetron [facility]) injection 4 mg  4 mg IntraVENous Q6H PRN Hugo Nunez MD        enoxaparin (LOVENOX) injection 30 mg  30 mg SubCUTAneous DAILY Hugo Nunez MD   30 mg at 09/06/21 7563    acyclovir (ZOVIRAX) 600 mg in 0.9% sodium chloride 100 mL IVPB  10 mg/kg (Ideal) IntraVENous Q8H Hugo Nunez  mL/hr at 09/06/21 1350 600 mg at 09/06/21 1350    insulin lispro (HUMALOG) injection   SubCUTAneous Q6H Kana Baker MD   2 Units at 09/06/21 2251    glucose chewable tablet 16 g  4 Tablet Oral PRN Rosita Mott MD        dextrose (D50W) injection syrg 12.5-25 g  25-50 mL IntraVENous PRN Rosita Mott MD   25 g at 09/04/21 0409    glucagon (GLUCAGEN) injection 1 mg  1 mg IntraMUSCular PRN Rosita Mott MD            Review of Systems   Unable to perform ROS: Other   Neurological: Positive for weakness. All other systems reviewed and are negative.       Objective     Vital signs for last 24 hours:  Visit Vitals  /84 (BP 1 Location: Left leg, BP Patient Position: At rest)   Pulse 79   Temp 100 °F (37.8 °C)   Resp 28   Ht 5' 6\" (1.676 m)   Wt 85.9 kg (189 lb 6 oz)   SpO2 98%   BMI 30.57 kg/m²       Intake/Output this shift:  Current Shift: 09/06 0701 - 09/06 1900  In: 20 [I.V.:20]  Out: -   Last 3 Shifts: 09/04 1901 - 09/06 0700  In: 0245 [I.V.:825]  Out: 5925 [Urine:5925]    Data Review:   Recent Results (from the past 24 hour(s))   GLUCOSE, POC    Collection Time: 09/05/21 5:06 PM   Result Value Ref Range    Glucose (POC) 164 (H) 65 - 117 mg/dL    Performed by [address], POC    Collection Time: 09/06/21 12:38 AM   Result Value Ref Range    Glucose (POC) 144 (H) 65 - 117 mg/dL    Performed by Marvin Zhao, POC    Collection Time: 09/06/21  6:04 AM   Result Value Ref Range    Glucose (POC) 152 (H) 65 - 117 mg/dL    Performed by [employer]    CBC WITH AUTOMATED DIFF    Collection Time: 09/06/21  8:35 AM   Result Value Ref Range    WBC 8.1 3.6 - 11.0 K/uL    RBC 4.55 3.80 - 5.20 M/uL    HGB 13.3 11.5 - 16.0 g/dL    HCT 40.9 35.0 - 47.0 %    MCV 89.9 80.0 - 99.0 FL    MCH 29.2 26.0 - 34.0 PG    MCHC 32.5 30.0 - 36.5 g/dL    RDW 14.2 11.5 - 14.5 %    PLATELET 614 433 - 693 K/uL    MPV 10.6 8.9 - 12.9 FL    NRBC 0.0 0.0  WBC    ABSOLUTE NRBC 0.00 0.00 - 0.01 K/uL    NEUTROPHILS 61 32 - 75 %    LYMPHOCYTES 27 12 - 49 %    MONOCYTES 8 5 - 13 %    EOSINOPHILS 3 0 - 7 %    BASOPHILS 1 0 - 1 %    IMMATURE GRANULOCYTES 0 0 - 0.5 %    ABS. NEUTROPHILS 5.0 1.8 - 8.0 K/UL    ABS. LYMPHOCYTES 2.2 0.8 - 3.5 K/UL    ABS. MONOCYTES 0.7 0.0 - 1.0 K/UL    ABS. EOSINOPHILS 0.3 0.0 - 0.4 K/UL    ABS. BASOPHILS 0.0 0.0 - 0.1 K/UL    ABS. IMM.  GRANS. 0.0 0.00 - 0.04 K/UL    DF AUTOMATED     RENAL FUNCTION PANEL    Collection Time: 09/06/21  8:35 AM   Result Value Ref Range    Sodium 143 136 - 145 mmol/L    Potassium 3.7 3.5 - 5.1 mmol/L    Chloride 110 (H) 97 - 108 mmol/L    CO2 29 21 - 32 mmol/L    Anion gap 4 (L) 5 - 15 mmol/L    Glucose 146 (H) 65 - 100 mg/dL    BUN 6 6 - 20 mg/dL    Creatinine 0.47 (L) 0.55 - 1.02 mg/dL    BUN/Creatinine ratio 13 12 - 20      GFR est AA >60 >60 ml/min/1.73m2    GFR est non-AA >60 >60 ml/min/1.73m2    Calcium 8.2 (L) 8.5 - 10.1 mg/dL    Phosphorus 2.7 2.6 - 4.7 mg/dL    Albumin 2.4 (L) 3.5 - 5.0 g/dL   MAGNESIUM    Collection Time: 09/06/21  8:35 AM   Result Value Ref Range    Magnesium 2.4 1.6 - 2.4 mg/dL   GLUCOSE, POC Collection Time: 09/06/21 12:25 PM   Result Value Ref Range    Glucose (POC) 136 (H) 65 - 117 mg/dL    Performed by Ish Elias        Physical Exam    Neuro Physical Exam      General: Well developed, well nourished. Patient in no apparent distress. Neurological Exam:  Mental Status: Eyes open, oriented x3, normal speech   Cranial Nerves:   Intact visual fields. PERRL, EOMI, no nystagmus, no ptosis. Facial sensation is normal. Mild left facial droop. Palate is midline. Neck turned to left  Tongue is midline. Hearing is intact bilaterally. Motor:  Moves all 4 extremities spontaneously    Reflexes:   Deep tendon reflexes 2+/4 and symmetrical.   Sensory:   Normal to light touch in all 4 ext    Gait:  Cannot assess   Tremor:   No tremor noted. Cerebellar:  No cerebellar signs present. Babinski:       Down b/l     Assessment and Plan: Ms. Dario Garza is a 60year old woman with likely HSV encephalitis. - cont acyclovir 10mg/kg q8h  -awaiting hsv pcr. Csf differential consistent with viral encephalitis  - cont keppra to 2 g bid   - patient awake and alert. No concern for subclinical seizures. Cancel EEG.

## 2021-09-06 NOTE — PROGRESS NOTES
Problem: Dysphagia (Adult)  Goal: *Speech Goal: (INSERT TEXT)  Description: Speech Therapy Goals  Initiated 9/6/2021  -Patient will tolerate easy to chew diet with thin liquids without signs/symptoms of aspiration given no cues within 5 day(s). [X] Not met  [ ]  MET   [ ] Progressing  [ ] Discontinue  -Patient will demonstrate understanding of swallow safety precautions and aspiration precautions, diet recs with no cues within 5 day(s). [X] Not met  [ ]  MET   [ ] Progressing  [ ] Discontinue  Outcome: Not Progressing Towards Goal     SPEECH [address] EVALUATION  Patient: Paulina Griffin (62 y.o. female)  Date: 9/6/2021  Primary Diagnosis: New onset seizure ([facility] [address] [R56.9]  Sepsis ([facility] [address] [A41.9]        Precautions: Aspiration       ASSESSMENT :  Based on the objective data described below, the patient presents with mild oral dysphagia. Patient was slow to respond to questions, but was able to answer then with accuracy. She agreed to a swallow evaluation. Evaluating clinician positioned her upright, she was alert, and cooperative. She accepted ice chips, ice water via straw, and a cookie. She tolerated all without overt s/sx aspiration. Slow mastication of bolus was noted, but she was able to clear without oral residue. Timely swallows, good HLE via digital palpation, and maintained dry vocal quality. Patient will benefit from skilled intervention to address the above impairments. Patients rehabilitation potential is considered to be Good     PLAN :  Recommendations and Planned Interventions:  Easy to chew diet with thin liquids. Staff to provide meal set-up and may need to provide patient assistance during meals. Frequency/Duration: Patient will be followed by speech-language pathology 5 times a week to address goals. Discharge Recommendations: To Be Determined     SUBJECTIVE:   Patient Mabel tejeda.     OBJECTIVE:     CXR Results  (Last 48 hours)      None           CT Results  (Last 48 hours)      None             Past Medical History:   Diagnosis Date    Breast cancer ([facility] [address]     Depression     [facility]     High cholesterol      Past Surgical History:   Procedure Laterality Date    HX MASTECTOMY       Prior Level of Function/Home Situation: Unknown     Diet prior to admission: Regular  Current Diet:  Easy to chew   Cognitive and Communication Status:  Neurologic State: Lethargic, Eyes open to voice  Orientation Level: Oriented X4  Cognition: Follows commands              Pain:  Pain Scale 1: Numeric (0 - 10)  Pain Intensity 1: 0       After treatment:   Patient left in no apparent distress in bed, Call bell within reach, and Nursing notified    COMMUNICATION/EDUCATION:   Patient was educated regarding purpose of SLP assessment, POC, diet recs and sw safety precautions. Patient demonstrated Good understanding as evidenced by verbal responses and head nods. The patient's plan of care including recommendations, planned interventions, and recommended diet changes were discussed with: Registered nurse. Patient/family agree to work toward stated goals and plan of care.     Thank you for this referral.  Alva Padgett M.S. CCC-SLP

## 2021-09-07 LAB
GLUCOSE BLD STRIP.AUTO-MCNC: 123 MG/DL (ref 65–117)
GLUCOSE BLD STRIP.AUTO-MCNC: 124 MG/DL (ref 65–117)
GLUCOSE BLD STRIP.AUTO-MCNC: 124 MG/DL (ref 65–117)
GLUCOSE BLD STRIP.AUTO-MCNC: 129 MG/DL (ref 65–117)
GLUCOSE BLD STRIP.AUTO-MCNC: 147 MG/DL (ref 65–117)
HSV1 IGG SER IA-ACNC: 41.4 INDEX (ref 0–0.9)
HSV2 IGG SER IA-ACNC: <0.91 INDEX (ref 0–0.9)
PERFORMED BY, TECHID: ABNORMAL

## 2021-09-07 PROCEDURE — 82962 GLUCOSE BLOOD TEST: CPT

## 2021-09-07 PROCEDURE — 74011250636 HC RX REV CODE- 250/636: Performed by: INTERNAL MEDICINE

## 2021-09-07 PROCEDURE — 74011250636 HC RX REV CODE- 250/636: Performed by: PSYCHIATRY & NEUROLOGY

## 2021-09-07 PROCEDURE — 74011250637 HC RX REV CODE- 250/637: Performed by: INTERNAL MEDICINE

## 2021-09-07 PROCEDURE — 99232 SBSQ HOSP IP/OBS MODERATE 35: CPT | Performed by: INTERNAL MEDICINE

## 2021-09-07 PROCEDURE — 74011000258 HC RX REV CODE- 258: Performed by: HOSPITALIST

## 2021-09-07 PROCEDURE — 65270000029 HC RM PRIVATE

## 2021-09-07 PROCEDURE — 74011000258 HC RX REV CODE- 258: Performed by: INTERNAL MEDICINE

## 2021-09-07 PROCEDURE — 94660 CPAP INITIATION&MGMT: CPT

## 2021-09-07 RX ORDER — AMLODIPINE BESYLATE 5 MG/1
5 TABLET ORAL DAILY
Status: DISCONTINUED | OUTPATIENT
Start: 2021-09-08 | End: 2021-09-25 | Stop reason: HOSPADM

## 2021-09-07 RX ORDER — HYDRALAZINE HYDROCHLORIDE 20 MG/ML
10 INJECTION INTRAMUSCULAR; INTRAVENOUS
Status: DISCONTINUED | OUTPATIENT
Start: 2021-09-07 | End: 2021-09-25 | Stop reason: HOSPADM

## 2021-09-07 RX ADMIN — LEVETIRACETAM 2000 MG: 10 INJECTION INTRAVENOUS at 15:41

## 2021-09-07 RX ADMIN — POTASSIUM CHLORIDE 20 MEQ: 1.5 POWDER, FOR SOLUTION ORAL at 08:02

## 2021-09-07 RX ADMIN — ENOXAPARIN SODIUM 30 MG: 40 INJECTION SUBCUTANEOUS at 08:02

## 2021-09-07 RX ADMIN — ACYCLOVIR SODIUM 600 MG: 50 INJECTION, SOLUTION INTRAVENOUS at 22:17

## 2021-09-07 RX ADMIN — AMLODIPINE BESYLATE 2.5 MG: 5 TABLET ORAL at 08:02

## 2021-09-07 RX ADMIN — Medication 10 ML: at 06:04

## 2021-09-07 RX ADMIN — DEXTROSE AND SODIUM CHLORIDE 75 ML/HR: 5; 900 INJECTION, SOLUTION INTRAVENOUS at 00:38

## 2021-09-07 RX ADMIN — ACYCLOVIR SODIUM 600 MG: 50 INJECTION, SOLUTION INTRAVENOUS at 15:53

## 2021-09-07 RX ADMIN — Medication 10 ML: at 15:53

## 2021-09-07 RX ADMIN — Medication 10 ML: at 20:12

## 2021-09-07 RX ADMIN — ACYCLOVIR SODIUM 600 MG: 50 INJECTION, SOLUTION INTRAVENOUS at 06:03

## 2021-09-07 RX ADMIN — LEVETIRACETAM 2000 MG: 10 INJECTION INTRAVENOUS at 23:32

## 2021-09-07 NOTE — PROGRESS NOTES
Infectious Disease Progress Note         Subjective:   Doing well, following commands appropriately, transferred out of the ICU to [facility] floor  Objective:   Physical Exam:     Visit Vitals  BP (!) 141/65 (BP 1 Location: Left leg, BP Patient Position: At rest)   Pulse 79   Temp 98.5 °F (36.9 °C)   Resp 28   Ht 5' 6\" (1.676 m)   Wt 176 lb 5.9 oz (80 kg)   SpO2 98%   BMI 28.47 kg/m²    O2 Flow Rate (L/min): 2 l/min O2 Device: None (Room air)    Temp (24hrs), Av.3 °F (37.4 °C), Min:98.5 °F (36.9 °C), Max:100 °F (37.8 °C)    No intake/output data recorded.  1901 -  0700  In: 3423.8 [I.V.:3213.8]  Out: 1756 [Urine:8750]    General: NAD, alert, AAO x 4  HEENT: LAQUITA, Moist mucosa, NGT in place   Lungs: CTA b/l, no wheeze/rhonchi   Heart: S1S2+, RRR, no murmur  Abdo: Soft, NT, ND, +BS   : + modi cath   Exts: No edema, + pulses b/l   Skin: No wounds, No rashes or lesions    Data Review:       Recent Days:  Recent Labs     21  0835 21  0500   WBC 8.1 7.9   HGB 13.3 13.5   HCT 40.9 39.9    203     Recent Labs     21  0835 21  0500   BUN 6 7   CREA 0.47* 0.35*       Lab Results   Component Value Date/Time    C-Reactive protein 1.52 (H) 2021 05:00 AM        Microbiology     Results     Procedure Component Value Units Date/Time    HSV 1 AND 2 BY PCR [[id number]] Collected: 21 1315    Order Status: Canceled Specimen: Other from Miscellaneous sample     HSV 1 AND 2 BY PCR [[id number]]  (Abnormal) Collected: 21 1300    Order Status: Completed Specimen: Other from Miscellaneous sample Updated: 21 1735     Source Blood        HSV-1 DNA by PCR Positive        HSV-2 DNA by PCR Negative        Comment: This test was developed and its performance characteristics  determined by [employer]. It has not been cleared or  approved by the U.S. Food and Drug Administration.  The FDA has  determined that such clearance or approval is not necessary. This  test is used for clinical purposes. It should not be regarded as  investigational or research. Performed At: [facility]  [address] [id number]  Kajal Singh MD [id number]         Domingo Joseph [[id number]] Collected: 09/03/21 1300    Order Status: Completed Specimen: Other from Miscellaneous sample Updated: 09/05/21 1935     Source Cerebrospinal fluid        Viral Culture, General Comment        Comment: Preliminary Report:  No virus isolated at 24 hours. Next report to follow after 4 days. Performed At: [facility]  [address], [address] [facility] [id number]  Kajal Singh MD QS:[id number]         CULTURE, BODY Jaret Prime [[id number]] Collected: 09/03/21 1300    Order Status: Canceled Specimen: Body Fluid     CULTURE, CSF Debbie Clay [[id number]] Collected: 09/03/21 1200    Order Status: Completed Specimen: Cerebrospinal Fluid Updated: 09/05/21 1038     Special Requests: No Special Requests        GRAM STAIN Occasional WBCs seen         No organisms seen        Culture result:       No growth thus far holding 7 days          CULTURE, BLOOD, PAIRED [[id number]] Collected: 09/03/21 0700    Order Status: Completed Specimen: Blood Updated: 09/07/21 0722     Special Requests: No Special Requests        Culture result: No growth 4 days       COVID-19 RAPID TEST [[id number]] Collected: 09/03/21 0636    Order Status: Completed Specimen: Nasopharyngeal Updated: 09/03/21 0741     Specimen source Nasopharyngeal        COVID-19 rapid test Not Detected        Comment: Rapid [employer] ID Now   Rapid NAAT:  The specimen is NEGATIVE for SARS-CoV-2, the novel coronavirus associated with COVID-19. Negative results should be treated as presumptive and, if inconsistent with clinical signs and symptoms or necessary for patient management, should be tested with an alternative molecular assay.  Negative results do not preclude SARS-CoV-2 infection and should not be used as the sole basis for patient management decisions. This test has been authorized by the FDA under   an Emergency Use Authorization (EUA) for use by authorized laboratories. Fact sheet for Healthcare Providers: ConventionUpdate.co.nz Fact sheet for Patients: ConventionUpdate.co.nz   Methodology: Isothermal Nucleic Acid Amplification                  Diagnostics   CXR Results  (Last 48 hours)    None             Assessment/Plan     1. 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 Ceftriaxone and ampicillin discontinued on 9/06       Discussed w Dr Aure Ortiz and she plans on repeating MRI in the ensuing days     2. New onset seizure: Due to # 1 controlled on Keppra      3. AMS: Due to # 1, Improved mentation, following commands appropriately    4. HTN, uncontrolled, mgt per primary, Norvasc dose increased     Daughter up dated on care from ID stand point.  Her questions and concerns were addressed         Ann Gallegos MD    9/7/2021

## 2021-09-07 NOTE — PROGRESS NOTES
Hospitalist Progress Note         Kelin Dotson MD          Daily Progress Note: 9/7/2021      Subjective: The patient is seen for follow  up. The patient is seen for follow up. Ana Villalobos is a 60 y. o. female who presents with seizure-like activity after being found on the floor by her family overnight. [address] arrival EMS she was having seizure activity and given Versed 5 mg which abated the convulsions. Kelin Mauri started again and she was given another 5 mg of Versed.  On arrival to the ED she had a temperature of 102.  CT of the head was negative.  Labs were significant for a potassium of 2.8     Patient had been seen here in the ED on 8/31 with complaints of nausea and vomiting, dizziness and weakness. Clair Ramirez was concerned she may have had food poisoning although this was not felt to be the case. Benny Avilez her daughter spoke with her on the phone on 9/2/21 her daughter noted she was confused and disoriented.     MRI showed classic high signal on T2 and flair images right temporal lobe. Patient was started on IV acyclovir as well as IV antibiotics     CSF white count was 200 with 65% PMNs and 35% lymphocytes     CSF culture negative to date. HIV was nonreactive. Patient seen in follow-up. Awake alert and oriented x4. Denies headaches, visual disturbance chest pain or shortness of breath.      Problem List:  Problem List as of 9/7/2021 Never Reviewed        Codes Class Noted - Resolved    Sepsis [facility]) ICD-10-CM: A41.9  ICD-9-CM: 038.9, 995.91  9/3/2021 - Present        New onset seizure ([facility] [address] ICD-10-CM: R56.9  ICD-9-CM: 780.39  9/3/2021 - Present              Medications reviewed  Current Facility-Administered Medications   Medication Dose Route Frequency    [START ON 9/8/2021] amLODIPine (NORVASC) tablet 5 mg  5 mg Oral DAILY    hydrALAZINE (APRESOLINE) 20 mg/mL injection 10 mg  10 mg IntraVENous Q6H PRN    insulin lispro (HUMALOG) injection   SubCUTAneous AC&HS    potassium chloride (KLOR-CON) packet for solution 20 mEq  20 mEq Oral BID WITH MEALS    levETIRAcetam in saline (iso-os) (KEPPRA) IVPB (10 mg/mL) 2,000 mg  2,000 mg IntraVENous Q12H    dextrose 5% and 0.9% NaCl infusion  75 mL/hr IntraVENous CONTINUOUS    0.9% sodium chloride infusion  1,000 mL/hr IntraVENous CONTINUOUS    0.9% sodium chloride infusion  1,000 mL/hr IntraVENous CONTINUOUS    sodium chloride (NS) flush 5-40 mL  5-40 mL IntraVENous Q8H    sodium chloride (NS) flush 5-40 mL  5-40 mL IntraVENous PRN    acetaminophen (TYLENOL) tablet 650 mg  650 mg Oral Q6H PRN    Or    acetaminophen (TYLENOL) suppository 650 mg  650 mg Rectal Q6H PRN    polyethylene glycol (MIRALAX) packet 17 g  17 g Oral DAILY PRN    promethazine (PHENERGAN) tablet 12.5 mg  12.5 mg Oral Q6H PRN    Or    ondansetron (ZOFRAN) injection 4 mg  4 mg IntraVENous Q6H PRN    enoxaparin (LOVENOX) injection 30 mg  30 mg SubCUTAneous DAILY    acyclovir (ZOVIRAX) 600 mg in 0.9% sodium chloride 100 mL IVPB  10 mg/kg (Ideal) IntraVENous Q8H    glucose chewable tablet 16 g  4 Tablet Oral PRN    dextrose (D50W) injection syrg 12.5-25 g  25-50 mL IntraVENous PRN    glucagon (GLUCAGEN) injection 1 mg  1 mg IntraMUSCular PRN       Review of Systems:   A comprehensive review of systems was negative except for that written in the HPI. Objective:   Physical Exam:     Visit Vitals  BP (!) 169/69 (BP 1 Location: Left leg, BP Patient Position: At rest)   Pulse 75   Temp 98.5 °F (36.9 °C)   Resp 24   Ht 5' 6\" (1.676 m)   Wt 80 kg (176 lb 5.9 oz)   SpO2 98%   BMI 28.47 kg/m²    O2 Flow Rate (L/min): 2 l/min O2 Device: None (Room air)    Temp (24hrs), Av.4 °F (37.4 °C), Min:98.5 °F (36.9 °C), Max:100 °F (37.8 °C)    No intake/output data recorded.  1901 -  0700  In: 3423.8 [I.V.:3213.8]  Out: 2262 [Urine:8750]    General:  Alert, cooperative, no distress, appears stated age. Lungs:   Clear to auscultation bilaterally.    Chest wall:  No tenderness or deformity. Heart:  Regular rate and rhythm, S1, S2 normal, no murmur, click, rub or gallop. Abdomen:   Soft, non-tender. Bowel sounds normal. No masses,  No organomegaly. Extremities: Extremities normal, atraumatic, no cyanosis or edema. Pulses: 2+ and symmetric all extremities. Skin: Skin color, texture, turgor normal. No rashes or lesions   Neurologic: CNII-XII intact. No gross sensory or motor deficits     Data Review:       Recent Days:  Recent Labs     09/06/21  0835 09/05/21  0500   WBC 8.1 7.9   HGB 13.3 13.5   HCT 40.9 39.9    203     Recent Labs     09/06/21  0835 09/05/21  0500    138   K 3.7 3.0*   * 106   CO2 29 26   * 130*   BUN 6 7   CREA 0.47* 0.35*   CA 8.2* 7.7*   MG 2.4  --    PHOS 2.7 2.2*   ALB 2.4* 2.5*     No results for input(s): PH, PCO2, PO2, HCO3, FIO2 in the last 72 hours. 24 Hour Results:  Recent Results (from the past 24 hour(s))   GLUCOSE, POC    Collection Time: 09/06/21 12:25 PM   Result Value Ref Range    Glucose (POC) 136 (H) 65 - 117 mg/dL    Performed by Cydcor Real    GLUCOSE, POC    Collection Time: 09/06/21  4:54 PM   Result Value Ref Range    Glucose (POC) 117 65 - 117 mg/dL    Performed by Cydcor Real    GLUCOSE, POC    Collection Time: 09/06/21  9:09 PM   Result Value Ref Range    Glucose (POC) 133 (H) 65 - 117 mg/dL    Performed by [address], POC    Collection Time: 09/07/21 12:21 AM   Result Value Ref Range    Glucose (POC) 147 (H) 65 - 117 mg/dL    Performed by [address], POC    Collection Time: 09/07/21  7:47 AM   Result Value Ref Range    Glucose (POC) 124 (H) 65 - 117 mg/dL    Performed by Nayeli Chu            Assessment/     Probably herpes encephalitis, clinically improving. HSV-1 PCR positive     New onset seizure activity with fever and altered mental status, due to above.   Stable.     Sepsis with fever, leukocytosis, elevated lactate and tachycardia metabolic encephalopathy, improved     Diabetes mellitus type 2     Remote history of breast cancer     Hypokalemia    Benign essential hypertension      Plan:  Continue supportive care including IV acyclovir  Continue IV Keppra  Increase amlodipine to 5 mg oral daily  PT OT evaluation  Transfer to medical telemetry floor    530Pm: Updated daughter,Angy at bedside. All questions answered. Disposition: Probably home with home health in 24 to 48 hours  Care Plan discussed with: Nurse    Total time spent with patient: 30 minutes.     Og Mathur MD

## 2021-09-07 NOTE — PROGRESS NOTES
Patient daughter requested cpap at night. Respiratory notified. Daughter also request doctor phone call each day. Patient moved closer to nurse's station.

## 2021-09-08 ENCOUNTER — APPOINTMENT (OUTPATIENT)
Dept: MRI IMAGING | Age: 66
DRG: 871 | End: 2021-09-08
Attending: INTERNAL MEDICINE
Payer: COMMERCIAL

## 2021-09-08 ENCOUNTER — APPOINTMENT (OUTPATIENT)
Dept: MRI IMAGING | Age: 66
DRG: 871 | End: 2021-09-08
Attending: HOSPITALIST
Payer: COMMERCIAL

## 2021-09-08 ENCOUNTER — APPOINTMENT (OUTPATIENT)
Dept: CT IMAGING | Age: 66
DRG: 871 | End: 2021-09-08
Attending: INTERNAL MEDICINE
Payer: COMMERCIAL

## 2021-09-08 LAB
GLUCOSE BLD STRIP.AUTO-MCNC: 123 MG/DL (ref 65–117)
GLUCOSE BLD STRIP.AUTO-MCNC: 130 MG/DL (ref 65–117)
GLUCOSE BLD STRIP.AUTO-MCNC: 141 MG/DL (ref 65–117)
GLUCOSE BLD STRIP.AUTO-MCNC: 156 MG/DL (ref 65–117)
HSV1 DNA SPEC QL NAA+PROBE: POSITIVE
HSV2 DNA SPEC QL NAA+PROBE: NEGATIVE
PERFORMED BY, TECHID: ABNORMAL
SPECIMEN SOURCE: ABNORMAL

## 2021-09-08 PROCEDURE — 74011250637 HC RX REV CODE- 250/637: Performed by: PSYCHIATRY & NEUROLOGY

## 2021-09-08 PROCEDURE — 74011250637 HC RX REV CODE- 250/637: Performed by: INTERNAL MEDICINE

## 2021-09-08 PROCEDURE — 65610000006 HC RM INTENSIVE CARE

## 2021-09-08 PROCEDURE — 70450 CT HEAD/BRAIN W/O DYE: CPT

## 2021-09-08 PROCEDURE — A9577 INJ MULTIHANCE: HCPCS | Performed by: HOSPITALIST

## 2021-09-08 PROCEDURE — 74011250636 HC RX REV CODE- 250/636: Performed by: HOSPITALIST

## 2021-09-08 PROCEDURE — 70553 MRI BRAIN STEM W/O & W/DYE: CPT

## 2021-09-08 PROCEDURE — 74011000258 HC RX REV CODE- 258: Performed by: INTERNAL MEDICINE

## 2021-09-08 PROCEDURE — 74011000258 HC RX REV CODE- 258: Performed by: HOSPITALIST

## 2021-09-08 PROCEDURE — 82962 GLUCOSE BLOOD TEST: CPT

## 2021-09-08 PROCEDURE — 74011636637 HC RX REV CODE- 636/637: Performed by: INTERNAL MEDICINE

## 2021-09-08 PROCEDURE — 74011250636 HC RX REV CODE- 250/636: Performed by: INTERNAL MEDICINE

## 2021-09-08 PROCEDURE — 36573 INSJ PICC RS&I 5 YR+: CPT | Performed by: INTERNAL MEDICINE

## 2021-09-08 PROCEDURE — 74011250636 HC RX REV CODE- 250/636: Performed by: PSYCHIATRY & NEUROLOGY

## 2021-09-08 PROCEDURE — 95816 EEG AWAKE AND DROWSY: CPT | Performed by: PSYCHIATRY & NEUROLOGY

## 2021-09-08 RX ORDER — DEXAMETHASONE 4 MG/1
10 TABLET ORAL ONCE
Status: COMPLETED | OUTPATIENT
Start: 2021-09-08 | End: 2021-09-08

## 2021-09-08 RX ORDER — VALPROIC ACID 250 MG/5ML
250 SOLUTION ORAL EVERY 8 HOURS
Status: DISCONTINUED | OUTPATIENT
Start: 2021-09-08 | End: 2021-09-08 | Stop reason: ALTCHOICE

## 2021-09-08 RX ORDER — LEVETIRACETAM 500 MG/1
2000 TABLET ORAL 2 TIMES DAILY
Status: DISCONTINUED | OUTPATIENT
Start: 2021-09-08 | End: 2021-09-08 | Stop reason: ALTCHOICE

## 2021-09-08 RX ADMIN — DEXAMETHASONE 10 MG: 4 TABLET ORAL at 18:32

## 2021-09-08 RX ADMIN — LEVETIRACETAM 2000 MG: 500 TABLET ORAL at 11:16

## 2021-09-08 RX ADMIN — ENOXAPARIN SODIUM 30 MG: 40 INJECTION SUBCUTANEOUS at 09:29

## 2021-09-08 RX ADMIN — SODIUM CHLORIDE 900 MG: 9 INJECTION, SOLUTION INTRAVENOUS at 21:27

## 2021-09-08 RX ADMIN — INSULIN LISPRO 2 UNITS: 100 INJECTION, SOLUTION INTRAVENOUS; SUBCUTANEOUS at 11:30

## 2021-09-08 RX ADMIN — DEXTROSE AND SODIUM CHLORIDE 75 ML/HR: 5; 900 INJECTION, SOLUTION INTRAVENOUS at 18:19

## 2021-09-08 RX ADMIN — POTASSIUM CHLORIDE 20 MEQ: 1.5 POWDER, FOR SOLUTION ORAL at 09:29

## 2021-09-08 RX ADMIN — GADOBENATE DIMEGLUMINE 15 ML: 529 INJECTION, SOLUTION INTRAVENOUS at 17:31

## 2021-09-08 RX ADMIN — AMLODIPINE BESYLATE 5 MG: 5 TABLET ORAL at 09:29

## 2021-09-08 RX ADMIN — Medication 10 ML: at 05:27

## 2021-09-08 RX ADMIN — VALPROIC ACID 250 MG: 250 SOLUTION ORAL at 18:31

## 2021-09-08 RX ADMIN — ACYCLOVIR SODIUM 600 MG: 50 INJECTION, SOLUTION INTRAVENOUS at 05:26

## 2021-09-08 RX ADMIN — Medication 10 ML: at 07:36

## 2021-09-08 RX ADMIN — Medication 10 ML: at 21:27

## 2021-09-08 RX ADMIN — ACYCLOVIR SODIUM 600 MG: 50 INJECTION, SOLUTION INTRAVENOUS at 12:41

## 2021-09-08 RX ADMIN — LEVETIRACETAM 2000 MG: 100 INJECTION, SOLUTION INTRAVENOUS at 22:47

## 2021-09-08 NOTE — PROGRESS NOTES
TRANSFER - IN REPORT:    Verbal report received from [place], RN on [employer]  being received from [facility](unit) for change in patient condition(encephalopathy)      Report consisted of patients Situation, Background, Assessment and   Recommendations(SBAR). Information from the following report(s) SBAR, Kardex, ED Summary and MAR was reviewed with the receiving nurse. Opportunity for questions and clarification was provided. Assessment completed upon patients arrival to unit and care assumed.

## 2021-09-08 NOTE — CONSULTS
Patient was code neuro today for worsening lethargy. MRI brain reviewed by me showed right temporal hyperintensity on T2 FLAIR worsened with mild shift. I put in for decadron 10 mg iv once and added valproic acid 250 mg tid for suspicion of subclinical seizures.  EEG christopher am.

## 2021-09-08 NOTE — PROGRESS NOTES
While performing Infection prevention rounds, Rounded on [employer], Daughter Malinda at bedside as well as Dr. Army Aguayo . As I was speaking to patient, daughter voiced concern over left sided facial drooping. At this point I completed a [facility] stroke assessment and noted the patient had left sided facial drooping as well as weakness to left upper extremity as well as lethargy and slurred speech, according to the daughter this was a new finding. STROKE alert called at this time. Report of findings above given to Primary nurse Ivory Terry.

## 2021-09-08 NOTE — PROGRESS NOTES
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 Unfortunately, CSF cx cannot be processed due to inadequate sample    Changed keppra to PO, on Day 6 of acyclovir. Will need MRI in 3 months. Plan for PT OT today and dispo as per PT recommendations and ID input. Having a conversation, but drifts apart and stops talking in the middle. Discussed with daughter about PT OT and plan for dc Friday or so. 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 3:45 PM stroke alert. Daughter on nursing station screaming, and wanting her to go to ICU. She stated at first place she didn't want to transfer her to floor and it happened without her informing - she stated that I must document this. The pICC line fell off and no one informed the daughter were her major complaints. The daughter has shown pure disrespect towards staff and providers      I did explained her the sequelae of viral encephilitis that's includes seizure, stroke and recovery does not take a linear progression. We are getting CT head. After discussion we are getting MRI STAT and transfering her to ICU. Quality nursing DYLON carlson and cata jeronimo are aware     CT and MRI showed right temporal hypersensitivity on T2 flair. Received decadron once  Neuro started valproic acid    [facility] tried [facility] and [facility], but they are full and waiting might take atleast 4-5 days. I will try [facility] Wilfredo is a 60 y. o. female who presents with seizure-like activity after being found on the floor by her family overnight. [address] arrival EMS she was having seizure activity and given Versed 5 mg which abated the convulsions. Og Grippe started again and she was given another 5 mg of Versed.  On arrival to the ED she had a temperature of 102.  CT of the head was negative.  Labs were significant for a potassium of 2.8     Patient had been seen here in the ED on 8/31 with complaints of nausea and vomiting, dizziness and weakness. Lucille Hi was concerned she may have had food poisoning although this was not felt to be the case. Rachell Marcial her daughter spoke with her on the phone on 9/2/21 her daughter noted she was confused and disoriented.     MRI showed classic high signal on T2 and flair images right temporal lobe. Patient was started on IV acyclovir as well as IV antibiotics     CSF white count was 200 with 65% PMNs and 35% lymphocytes     CSF culture negative to date. HIV was nonreactive. Patient seen in follow-up. Awake alert and oriented x4. Denies headaches, visual disturbance chest pain or shortness of breath.      Problem List:  Problem List as of 9/8/2021 Never Reviewed        Codes Class Noted - Resolved    Sepsis [facility]) ICD-10-CM: A41.9  ICD-9-CM: 038.9, 995.91  9/3/2021 - Present        New onset seizure ([facility] [address] ICD-10-CM: R56.9  ICD-9-CM: 780.39  9/3/2021 - Present              Medications reviewed  Current Facility-Administered Medications   Medication Dose Route Frequency    levETIRAcetam (KEPPRA) tablet 2,000 mg  2,000 mg Oral BID    amLODIPine (NORVASC) tablet 5 mg  5 mg Oral DAILY    hydrALAZINE (APRESOLINE) 20 mg/mL injection 10 mg  10 mg IntraVENous Q6H PRN    insulin lispro (HUMALOG) injection   SubCUTAneous AC&HS    potassium chloride (KLOR-CON) packet for solution 20 mEq  20 mEq Oral BID WITH MEALS    dextrose 5% and 0.9% NaCl infusion  75 mL/hr IntraVENous CONTINUOUS    sodium chloride (NS) flush 5-40 mL  5-40 mL IntraVENous Q8H    sodium chloride (NS) flush 5-40 mL  5-40 mL IntraVENous PRN    acetaminophen (TYLENOL) tablet 650 mg  650 mg Oral Q6H PRN    Or    acetaminophen (TYLENOL) suppository 650 mg  650 mg Rectal Q6H PRN    polyethylene glycol (MIRALAX) packet 17 g  17 g Oral DAILY PRN    promethazine (PHENERGAN) tablet 12.5 mg  12.5 mg Oral Q6H PRN    Or    ondansetron (ZOFRAN) injection 4 mg  4 mg IntraVENous Q6H PRN    enoxaparin (LOVENOX) injection 30 mg  30 mg SubCUTAneous DAILY    acyclovir (ZOVIRAX) 600 mg in 0.9% sodium chloride 100 mL IVPB  10 mg/kg (Ideal) IntraVENous Q8H    glucose chewable tablet 16 g  4 Tablet Oral PRN    dextrose (D50W) injection syrg 12.5-25 g  25-50 mL IntraVENous PRN    glucagon (GLUCAGEN) injection 1 mg  1 mg IntraMUSCular PRN       Review of Systems:   A comprehensive review of systems was negative except for that written in the HPI. Objective:   Physical Exam:     Visit Vitals  /62 (BP 1 Location: Right upper arm, BP Patient Position: At rest)   Pulse 88   Temp 98.2 °F (36.8 °C)   Resp 20   Ht 5' 6\" (1.676 m)   Wt 80 kg (176 lb 5.9 oz)   SpO2 98%   BMI 28.47 kg/m²    O2 Flow Rate (L/min): 2 l/min O2 Device: None (Room air)    Temp (24hrs), Av.9 °F (36.6 °C), Min:97.4 °F (36.3 °C), Max:98.2 °F (36.8 °C)    No intake/output data recorded.  1901 -  0700  In: 2368.8 [I.V.:2368.8]  Out: 5300 [Urine:5300]    General:  Alert, cooperative, no distress, appears stated age. Lungs:   Clear to auscultation bilaterally. Chest wall:  No tenderness or deformity. Heart:  Regular rate and rhythm, S1, S2 normal, no murmur, click, rub or gallop. Abdomen:   Soft, non-tender. Bowel sounds normal. No masses,  No organomegaly. Extremities: Extremities normal, atraumatic, no cyanosis or edema. Pulses: 2+ and symmetric all extremities. Skin: Skin color, texture, turgor normal. No rashes or lesions   Neurologic: CNII-XII intact.  No gross sensory or motor deficits     Data Review:       Recent Days:  Recent Labs     21  0835   WBC 8.1   HGB 13.3   HCT 40.9        Recent Labs     21  0835      K 3.7   *   CO2 29   GLU 146*   BUN 6   CREA 0.47*   CA 8.2*   MG 2.4   PHOS 2.7   ALB 2.4*     No results for input(s): PH, PCO2, PO2, HCO3, FIO2 in the last 72 hours. 24 Hour Results:  Recent Results (from the past 24 hour(s))   GLUCOSE, POC    Collection Time: 09/07/21 12:17 PM   Result Value Ref Range    Glucose (POC) 124 (H) 65 - 117 mg/dL    Performed by Ulisses Ayala, POC    Collection Time: 09/07/21  3:54 PM   Result Value Ref Range    Glucose (POC) 123 (H) 65 - 117 mg/dL    Performed by Lili Underwood    GLUCOSE, POC    Collection Time: 09/07/21  8:04 PM   Result Value Ref Range    Glucose (POC) 129 (H) 65 - 117 mg/dL    Performed by Giovany Byoer, POC    Collection Time: 09/08/21  7:33 AM   Result Value Ref Range    Glucose (POC) 123 (H) 65 - 117 mg/dL    Performed by Rogelio [address], POC    Collection Time: 09/08/21 11:03 AM   Result Value Ref Range    Glucose (POC) 156 (H) 65 - 117 mg/dL    Performed by Sudhir Hobson            Assessment/     (1) 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 ID for duration.     (2) seixure. S/t #1. PO keppra. reeat MRI in 3 months.     (3)Sepsis: s/t #1. Resolved. DC other abx.     (4) Diabetes mellitus type 2: SSI    Remote history of breast cancer     Hypokalemia    Benign essential hypertension      Plan:  keppra PO  IV acaylovir D6  MRI brain in 3 months  PT today and plan for dispo Friday or so    Discussed with daughter about PT OT and plan for dc Friday or so. 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 DISPO: PT for dispo, likely Friday    ADDENDUM  3:45 PM stroke alert. Daughter on nursing station screaming, and wanting her to go to ICU. She stated at first place she didn't want to transfer her to floor and it happened without her informing - she stated that I must document this. The pICC line fell off and no one informed the daughter were her major complaints.      The daughter has shown pure disrespect towards staff and providers      I did explained her the sequelae of viral encephilitis that's includes seizure, stroke and recovery does not take a linear progression. We are getting CT head. After discussion we are getting MRI STAT and transfering her to ICU. Quality nursing DYLON carlson and cata neuro are aware     CT and MRI showed right temporal hypersensitivity on T2 flair. Received decadron once  Neuro started valproic acid    [facility] and [facility], but they are full and waiting might take atleast 4-5 days. I will try [facility],      Care Plan discussed with: Nurse    Total time spent with patient: 30 minutes.     Tavon Priest MD

## 2021-09-08 NOTE — CONSULTS
Consult Date: 9/8/2021    Consults  Ms. Ezekiel Fox is a 60year old woman with history of breast cancer who was brought in 09/03 because she was found down on the floor unresposnive. Per EMS she was having seizures. She was given a total of 10 mg versed. She has fevers of 102. MRI showed bitemporal hyper intensities suspicious for hsv encephalitis. She was started on acyclovir 10 mg /kg q8h and LP done/ LP results consistent with viral encephalitis. She is also on keppra 1000 mg q12hrs. Subjective  opens eyes, tells me her name    Past Medical History:   Diagnosis Date    [facility] [address]     Depression     Diabetes ([facility] [address]     High cholesterol       Past Surgical History:   Procedure Laterality Date    HX MASTECTOMY       History reviewed. No pertinent family history.    Social History     Tobacco Use    Smoking status: Never Smoker   Substance Use Topics    Alcohol use: Not Currently       Current Facility-Administered Medications   Medication Dose Route Frequency Provider Last Rate Last Admin    levETIRAcetam (KEPPRA) tablet 2,000 mg  2,000 mg Oral BID Rosa Soler MD        amLODIPine (NORVASC) tablet 5 mg  5 mg Oral DAILY Sammie Nicole MD   5 mg at 09/08/21 3001    hydrALAZINE (APRESOLINE) 20 mg/mL injection 10 mg  10 mg IntraVENous Q6H PRN Sammie Nicole MD        insulin lispro (HUMALOG) injection   SubCUTAneous AC&HS Tiffany Steward MD        potassium chloride (KLOR-CON) packet for solution 20 mEq  20 mEq Oral BID WITH MEALS Tiffany Steward MD   20 mEq at 09/08/21 0929    dextrose 5% and 0.9% NaCl infusion  75 mL/hr IntraVENous CONTINUOUS Gracia Gonzalez MD 75 mL/hr at 09/07/21 0038 75 mL/hr at 09/07/21 0038    sodium chloride (NS) flush 5-40 mL  5-40 mL IntraVENous Q8H Tiffany Steward MD   10 mL at 09/08/21 0736    sodium chloride (NS) flush 5-40 mL  5-40 mL IntraVENous PRN Tiffany Steward MD        acetaminophen (TYLENOL) tablet 650 mg  650 mg Oral Q6H PRN Maud Harada, MD   650 mg at 09/06/21 1236    Or    acetaminophen (TYLENOL) suppository 650 mg  650 mg Rectal Q6H PRN Maud Harada, MD        polyethylene glycol (MIRALAX) packet 17 g  17 g Oral DAILY PRN Maud Harada, MD        promethazine (PHENERGAN) tablet 12.5 mg  12.5 mg Oral Q6H PRN Maud Harada, MD        Or    ondansetron [facility]) injection 4 mg  4 mg IntraVENous Q6H PRN Maud Harada, MD        enoxaparin (LOVENOX) injection 30 mg  30 mg SubCUTAneous DAILY Maud Harada, MD   30 mg at 09/08/21 0929    acyclovir (ZOVIRAX) 600 mg in 0.9% sodium chloride 100 mL IVPB  10 mg/kg (Ideal) IntraVENous Q8H Maud Harada,  mL/hr at 09/08/21 0526 600 mg at 09/08/21 0526    glucose chewable tablet 16 g  4 Tablet Oral PRN Dioni Manrique MD        dextrose (D50W) injection syrg 12.5-25 g  25-50 mL IntraVENous PRN Dioni Manrique MD   25 g at 09/04/21 0409    glucagon (GLUCAGEN) injection 1 mg  1 mg IntraMUSCular PRN Dioni Manrique MD            Review of Systems   Unable to perform ROS: Other   Neurological: Positive for weakness. All other systems reviewed and are negative. Objective     Vital signs for last 24 hours:  Visit Vitals  /62 (BP 1 Location: Right upper arm, BP Patient Position: At rest)   Pulse 88   Temp 98.2 °F (36.8 °C)   Resp 20   Ht 5' 6\" (1.676 m)   Wt 80 kg (176 lb 5.9 oz)   SpO2 98%   BMI 28.47 kg/m²       Intake/Output this shift:  Current Shift: No intake/output data recorded.   Last 3 Shifts: 09/06 1901 - 09/08 0700  In: 2368.8 [I.V.:2368.8]  Out: 5300 [Urine:5300]    Data Review:   Recent Results (from the past 24 hour(s))   GLUCOSE, POC    Collection Time: 09/07/21 12:17 PM   Result Value Ref Range    Glucose (POC) 124 (H) 65 - 117 mg/dL    Performed by Jarocho Porter POC    Collection Time: 09/07/21  3:54 PM   Result Value Ref Range    Glucose (POC) 123 (H) 65 - 117 mg/dL    Performed by Angie [facility], POC Collection Time: 09/07/21  8:04 PM   Result Value Ref Range    Glucose (POC) 129 (H) 65 - 117 mg/dL    Performed by RAYMOND Tucker    Collection Time: 09/08/21  7:33 AM   Result Value Ref Range    Glucose (POC) 123 (H) 65 - 117 mg/dL    Performed by Aly Moore        Physical Exam    Neuro Physical Exam      General: Well developed, well nourished. Patient in no apparent distress. Neurological Exam:  Mental Status: Eyes open, oriented x2, normal speech, lethargic    Cranial Nerves:   Intact visual fields. PERRL, EOMI, no nystagmus, no ptosis. Facial sensation is normal. Mild left facial droop. Palate is midline. Neck turned to left  Tongue is midline. Hearing is intact bilaterally. Motor:  Moves all 4 extremities spontaneously    Reflexes:   Deep tendon reflexes 2+/4 and symmetrical.   Sensory:   Normal to light touch in all 4 ext    Gait:  Cannot assess   Tremor:   No tremor noted. Cerebellar:  No cerebellar signs present. Babinski:       Down b/l     Assessment and Plan: Ms. Fady Kong is a 60year old woman with likely HSV encephalitis. - cont acyclovir 10mg/kg q8h. Follow up ID recs. Unfortunately HSV PCR on CSF was NOT done because of not enough csf   - cont keppra to 2 g bid. Switched to PO  - follow up in clinic in 3 weeks.  Plan to repeat mri brain in 3 months

## 2021-09-08 NOTE — PROGRESS NOTES
Infectious Disease Progress Note         Subjective:   Pt seen and examined at bedside, new left sided weakness noted, CT revealed right temporal lose edema. Moved back to the ICU   Objective:   Physical Exam:     Visit Vitals  BP (!) 148/63 (BP 1 Location: Right upper arm, BP Patient Position: At rest)   Pulse 74   Temp (!) 100.7 °F (38.2 °C)   Resp 23   Ht 5' 6\" (1.676 m)   Wt 176 lb 5.9 oz (80 kg)   SpO2 96%   BMI 28.47 kg/m²    O2 Flow Rate (L/min): 2 l/min O2 Device: None (Room air)    Temp (24hrs), Av.5 °F (36.9 °C), Min:97.4 °F (36.3 °C), Max:100.7 °F (38.2 °C)    No intake/output data recorded. No intake/output data recorded. General: NAD, decreased responsiveness, not following commands   HEENT: LAQUITA, Moist mucosa  Lungs: CTA b/l, no wheeze/rhonchi   Heart: S1S2+, RRR, no murmur  Abdo: Soft, NT, ND, +BS   Exts: left side weakness   Skin: No chronic wounds or ulcers     Data Review:       Recent Days:  Recent Labs     21  0835   WBC 8.1   HGB 13.3   HCT 40.9        Recent Labs     21  0835   BUN 6   CREA 0.47*       Lab Results   Component Value Date/Time    C-Reactive protein 1.52 (H) 2021 05:00 AM        Microbiology     Results     Procedure Component Value Units Date/Time    HSV 1 AND 2 BY PCR [[id number]] Collected: 21 1315    Order Status: Canceled Specimen: Other from Miscellaneous sample     HSV 1 AND 2 BY PCR [[id number]]  (Abnormal) Collected: 21 1300    Order Status: Completed Specimen: Other from Miscellaneous sample Updated: 21 1037     Source Cerebrospinal fluid        Comment: Corrected on  AT 1037: Previously reported as Blood        HSV-1 DNA by PCR Positive        HSV-2 DNA by PCR Negative        Comment: This test was developed and its performance characteristics  determined by [employer]. It has not been cleared or  approved by the U.S. Food and Drug Administration.  The FDA has  determined that such clearance or approval is not necessary. This  test is used for clinical purposes. It should not be regarded as  investigational or research. Performed At: [address] [id number]  Kira Sung MD L         Rashawn Blanchard [[id number]] Collected: 21 1300    Order Status: Completed Specimen: Other from Miscellaneous sample Updated: 21 1338     Source Cerebrospinal fluid        Viral Culture, General Comment        Comment: Preliminary Report:  No virus isolated at 4 days. Next report to follow after 7 days. Performed At: [address] [id number]  Kira Sung MD          CULTURE, BODY [facility] [[id number]] Collected: 21 1300    Order Status: Canceled Specimen: Body Fluid     CULTURE, CSF Mardene Hole [[id number]] Collected: 21 1200    Order Status: Completed Specimen: Cerebrospinal Fluid Updated: 21 1038     Special Requests: No Special Requests        GRAM STAIN Occasional WBCs seen         No organisms seen        Culture result:       No growth thus far holding 7 days          CULTURE, BLOOD, PAIRED [[id number]] Collected: 21 0700    Order Status: Completed Specimen: Blood Updated: 21 0931     Special Requests: No Special Requests        Culture result: No growth 5 days       COVID-19 RAPID TEST [[id number]] Collected: 21 0636    Order Status: Completed Specimen: Nasopharyngeal Updated: 21 0741     Specimen source Nasopharyngeal        COVID-19 rapid test Not Detected        Comment: Rapid [employer] ID Now   Rapid NAAT:  The specimen is NEGATIVE for SARS-CoV-2, the novel coronavirus associated with COVID-19. Negative results should be treated as presumptive and, if inconsistent with clinical signs and symptoms or necessary for patient management, should be tested with an alternative molecular assay.  Negative results do not preclude SARS-CoV-2 infection and should not be used as the sole basis for patient management decisions. This test has been authorized by the FDA under   an Emergency Use Authorization (EUA) for use by authorized laboratories. Fact sheet for Healthcare Providers: ConventionUpdate.co.nz Fact sheet for Patients: ConventionUpdate[employer].nz   Methodology: Isothermal Nucleic Acid Amplification                  Diagnostics   CXR Results  (Last 48 hours)    None         Assessment/Plan     1. 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 PICC line to be replaced in AM     2. New onset seizure: Due to # 1. On Keppra, Valproic added      3. AMS: Due to # 1, worsening mentation today     4. New onset left sided weakness: Progressive changes on MRI     Dispo: May need a higher level of care, discussed w attending     Above plan discussed in details w daughter at bedside.           Sivan Scott MD    9/8/2021

## 2021-09-09 ENCOUNTER — APPOINTMENT (OUTPATIENT)
Dept: CT IMAGING | Age: 66
DRG: 871 | End: 2021-09-09
Attending: PSYCHIATRY & NEUROLOGY
Payer: COMMERCIAL

## 2021-09-09 ENCOUNTER — APPOINTMENT (OUTPATIENT)
Dept: INTERVENTIONAL RADIOLOGY/VASCULAR | Age: 66
DRG: 871 | End: 2021-09-09
Attending: INTERNAL MEDICINE
Payer: COMMERCIAL

## 2021-09-09 LAB
ALBUMIN SERPL-MCNC: 2.7 G/DL (ref 3.5–5)
ANION GAP SERPL CALC-SCNC: 7 MMOL/L (ref 5–15)
BACTERIA SPEC CULT: NORMAL
BASOPHILS # BLD: 0 K/UL (ref 0–0.1)
BASOPHILS NFR BLD: 0 % (ref 0–1)
BUN SERPL-MCNC: 17 MG/DL (ref 6–20)
BUN/CREAT SERPL: 11 (ref 12–20)
CA-I BLD-MCNC: 8.8 MG/DL (ref 8.5–10.1)
CHLORIDE SERPL-SCNC: 114 MMOL/L (ref 97–108)
CO2 SERPL-SCNC: 22 MMOL/L (ref 21–32)
CREAT SERPL-MCNC: 1.6 MG/DL (ref 0.55–1.02)
CRP SERPL-MCNC: 4.7 MG/DL (ref 0–0.6)
DIFFERENTIAL METHOD BLD: ABNORMAL
EOSINOPHIL # BLD: 0 K/UL (ref 0–0.4)
EOSINOPHIL NFR BLD: 0 % (ref 0–7)
ERYTHROCYTE [DISTWIDTH] IN BLOOD BY AUTOMATED COUNT: 14.6 % (ref 11.5–14.5)
ERYTHROCYTE [SEDIMENTATION RATE] IN BLOOD: 11 MM/HR
GLUCOSE BLD STRIP.AUTO-MCNC: 101 MG/DL (ref 65–117)
GLUCOSE BLD STRIP.AUTO-MCNC: 137 MG/DL (ref 65–117)
GLUCOSE BLD STRIP.AUTO-MCNC: 152 MG/DL (ref 65–117)
GLUCOSE BLD STRIP.AUTO-MCNC: 158 MG/DL (ref 65–117)
GLUCOSE SERPL-MCNC: 217 MG/DL (ref 65–100)
HCT VFR BLD AUTO: 44.5 % (ref 35–47)
HGB BLD-MCNC: 14.7 G/DL (ref 11.5–16)
IMM GRANULOCYTES # BLD AUTO: 0 K/UL (ref 0–0.04)
IMM GRANULOCYTES NFR BLD AUTO: 1 % (ref 0–0.5)
LYMPHOCYTES # BLD: 0.9 K/UL (ref 0.8–3.5)
LYMPHOCYTES NFR BLD: 12 % (ref 12–49)
MCH RBC QN AUTO: 29.7 PG (ref 26–34)
MCHC RBC AUTO-ENTMCNC: 33 G/DL (ref 30–36.5)
MCV RBC AUTO: 89.9 FL (ref 80–99)
MONOCYTES # BLD: 0.2 K/UL (ref 0–1)
MONOCYTES NFR BLD: 3 % (ref 5–13)
NEUTS SEG # BLD: 6.4 K/UL (ref 1.8–8)
NEUTS SEG NFR BLD: 84 % (ref 32–75)
NRBC # BLD: 0 K/UL (ref 0–0.01)
NRBC BLD-RTO: 0 PER 100 WBC
PERFORMED BY, TECHID: ABNORMAL
PERFORMED BY, TECHID: NORMAL
PHOSPHATE SERPL-MCNC: 4.3 MG/DL (ref 2.6–4.7)
PLATELET # BLD AUTO: 224 K/UL (ref 150–400)
PMV BLD AUTO: 10.6 FL (ref 8.9–12.9)
POTASSIUM SERPL-SCNC: 4.2 MMOL/L (ref 3.5–5.1)
RBC # BLD AUTO: 4.95 M/UL (ref 3.8–5.2)
SODIUM SERPL-SCNC: 143 MMOL/L (ref 136–145)
SPECIAL REQUESTS,SREQ: NORMAL
WBC # BLD AUTO: 7.6 K/UL (ref 3.6–11)

## 2021-09-09 PROCEDURE — 0JH63XZ INSERTION OF TUNNELED VASCULAR ACCESS DEVICE INTO CHEST SUBCUTANEOUS TISSUE AND FASCIA, PERCUTANEOUS APPROACH: ICD-10-PCS | Performed by: RADIOLOGY

## 2021-09-09 PROCEDURE — C1769 GUIDE WIRE: HCPCS

## 2021-09-09 PROCEDURE — 74011250636 HC RX REV CODE- 250/636: Performed by: INTERNAL MEDICINE

## 2021-09-09 PROCEDURE — 80069 RENAL FUNCTION PANEL: CPT

## 2021-09-09 PROCEDURE — 70450 CT HEAD/BRAIN W/O DYE: CPT

## 2021-09-09 PROCEDURE — 74011250636 HC RX REV CODE- 250/636: Performed by: PSYCHIATRY & NEUROLOGY

## 2021-09-09 PROCEDURE — 74011250637 HC RX REV CODE- 250/637: Performed by: INTERNAL MEDICINE

## 2021-09-09 PROCEDURE — 99232 SBSQ HOSP IP/OBS MODERATE 35: CPT | Performed by: INTERNAL MEDICINE

## 2021-09-09 PROCEDURE — 76937 US GUIDE VASCULAR ACCESS: CPT

## 2021-09-09 PROCEDURE — 82962 GLUCOSE BLOOD TEST: CPT

## 2021-09-09 PROCEDURE — 74011000250 HC RX REV CODE- 250: Performed by: HOSPITALIST

## 2021-09-09 PROCEDURE — 86140 C-REACTIVE PROTEIN: CPT

## 2021-09-09 PROCEDURE — 65610000006 HC RM INTENSIVE CARE

## 2021-09-09 PROCEDURE — 36415 COLL VENOUS BLD VENIPUNCTURE: CPT

## 2021-09-09 PROCEDURE — 02HV33Z INSERTION OF INFUSION DEVICE INTO SUPERIOR VENA CAVA, PERCUTANEOUS APPROACH: ICD-10-PCS | Performed by: RADIOLOGY

## 2021-09-09 PROCEDURE — 74011250636 HC RX REV CODE- 250/636: Performed by: HOSPITALIST

## 2021-09-09 PROCEDURE — 85025 COMPLETE CBC W/AUTO DIFF WBC: CPT

## 2021-09-09 PROCEDURE — 93971 EXTREMITY STUDY: CPT

## 2021-09-09 PROCEDURE — 77001 FLUOROGUIDE FOR VEIN DEVICE: CPT

## 2021-09-09 PROCEDURE — 85652 RBC SED RATE AUTOMATED: CPT

## 2021-09-09 PROCEDURE — 74011000258 HC RX REV CODE- 258: Performed by: HOSPITALIST

## 2021-09-09 RX ORDER — DEXAMETHASONE SODIUM PHOSPHATE 4 MG/ML
4 INJECTION, SOLUTION INTRA-ARTICULAR; INTRALESIONAL; INTRAMUSCULAR; INTRAVENOUS; SOFT TISSUE EVERY 6 HOURS
Status: DISCONTINUED | OUTPATIENT
Start: 2021-09-09 | End: 2021-09-12

## 2021-09-09 RX ORDER — ENOXAPARIN SODIUM 100 MG/ML
30 INJECTION SUBCUTANEOUS EVERY 24 HOURS
Status: DISCONTINUED | OUTPATIENT
Start: 2021-09-09 | End: 2021-09-09

## 2021-09-09 RX ORDER — NYSTATIN 100000 [USP'U]/ML
500000 SUSPENSION ORAL 4 TIMES DAILY
Status: DISCONTINUED | OUTPATIENT
Start: 2021-09-09 | End: 2021-09-25 | Stop reason: HOSPADM

## 2021-09-09 RX ORDER — SODIUM CHLORIDE 9 MG/ML
125 INJECTION, SOLUTION INTRAVENOUS CONTINUOUS
Status: DISCONTINUED | OUTPATIENT
Start: 2021-09-09 | End: 2021-09-10

## 2021-09-09 RX ORDER — CITALOPRAM 20 MG/1
20 TABLET, FILM COATED ORAL DAILY
Status: DISCONTINUED | OUTPATIENT
Start: 2021-09-09 | End: 2021-09-25 | Stop reason: HOSPADM

## 2021-09-09 RX ADMIN — Medication 10 ML: at 21:31

## 2021-09-09 RX ADMIN — DEXAMETHASONE SODIUM PHOSPHATE 4 MG: 4 INJECTION, SOLUTION INTRA-ARTICULAR; INTRALESIONAL; INTRAMUSCULAR; INTRAVENOUS; SOFT TISSUE at 17:32

## 2021-09-09 RX ADMIN — VALPROATE SODIUM 200 MG: 100 INJECTION, SOLUTION INTRAVENOUS at 19:33

## 2021-09-09 RX ADMIN — SODIUM CHLORIDE 900 MG: 9 INJECTION, SOLUTION INTRAVENOUS at 05:19

## 2021-09-09 RX ADMIN — APIXABAN 5 MG: 5 TABLET, FILM COATED ORAL at 20:32

## 2021-09-09 RX ADMIN — LEVETIRACETAM 2000 MG: 100 INJECTION, SOLUTION INTRAVENOUS at 13:16

## 2021-09-09 RX ADMIN — DEXAMETHASONE SODIUM PHOSPHATE 4 MG: 4 INJECTION, SOLUTION INTRA-ARTICULAR; INTRALESIONAL; INTRAMUSCULAR; INTRAVENOUS; SOFT TISSUE at 13:16

## 2021-09-09 RX ADMIN — LEVETIRACETAM 2000 MG: 100 INJECTION, SOLUTION INTRAVENOUS at 21:31

## 2021-09-09 RX ADMIN — VALPROATE SODIUM 200 MG: 100 INJECTION, SOLUTION INTRAVENOUS at 15:07

## 2021-09-09 RX ADMIN — NYSTATIN 500000 UNITS: 100000 SUSPENSION ORAL at 21:30

## 2021-09-09 RX ADMIN — VALPROATE SODIUM 200 MG: 100 INJECTION, SOLUTION INTRAVENOUS at 09:31

## 2021-09-09 RX ADMIN — Medication 10 ML: at 15:07

## 2021-09-09 RX ADMIN — NYSTATIN 500000 UNITS: 100000 SUSPENSION ORAL at 09:31

## 2021-09-09 RX ADMIN — Medication 10 ML: at 05:22

## 2021-09-09 RX ADMIN — SODIUM CHLORIDE 125 ML/HR: 9 INJECTION, SOLUTION INTRAVENOUS at 20:50

## 2021-09-09 RX ADMIN — SODIUM CHLORIDE 125 ML/HR: 9 INJECTION, SOLUTION INTRAVENOUS at 09:29

## 2021-09-09 RX ADMIN — SODIUM CHLORIDE 900 MG: 9 INJECTION, SOLUTION INTRAVENOUS at 21:31

## 2021-09-09 RX ADMIN — NYSTATIN 500000 UNITS: 100000 SUSPENSION ORAL at 15:07

## 2021-09-09 RX ADMIN — SODIUM CHLORIDE 900 MG: 9 INJECTION, SOLUTION INTRAVENOUS at 13:16

## 2021-09-09 RX ADMIN — NYSTATIN 500000 UNITS: 100000 SUSPENSION ORAL at 17:12

## 2021-09-09 NOTE — PROGRESS NOTES
Pt transferred to ICU due to decline in status, new facial droop is reported. MRI/CT head results noted. Attempted to see pt x 2, pt off the floor initially, then getting EEG. Will attempt follow-up later this date as time allows or am of 9-10-21. If unable to follow-up with pt this date, recommend nsg complete new bedside screen.

## 2021-09-09 NOTE — PROGRESS NOTES
PT eval order received and acknowledged, however, patient transferred to ICU from [facility] due to medical decline prior to completion of evaluation. PT eval orders will be discontinued and will need new PT eval order once pt medically stable for evaluation. Thank you.

## 2021-09-09 NOTE — PROGRESS NOTES
Hospitalist Progress Note               Daily Progress Note: 9/9/2021      Subjective:   Hospital course to date: Cisco Jorgensen a 60 y. o. female who presents with seizure-like activity after being found on the floor by her family overnight. Dayan Olivera arrival EMS she was having seizure activity and given Versed 5 mg which abated the convulsions. Aurelia Augustine started again and she was given another 5 mg of Versed.  On arrival to the ED she had a temperature of 102.  CT of the head was negative.  Labs were significant for a potassium of 2.8     Patient had been seen here in the ED on 8/31 with complaints of nausea and vomiting, dizziness and weakness. Alexa Fleischer was concerned she may have had food poisoning although this was not felt to be the case. Farrah Cox her daughter spoke with her on the phone on 9/2/21 her daughter noted she was confused and disoriented.     MRI showed classic high signal on T2 and flair images right temporal lobe. Patient was started on IV acyclovir as well as IV antibiotics     CSF white count was 200 with 65% PMNs and 35% lymphocytes     CSF culture negative to date. HIV was nonreactive. HSV PCR did come back positive, although it was initially erroneously reported as blood not CSF  --------    Patient seen today for follow-up. Stroke alert was called yesterday evening after patient became more lethargic and exhibited left-sided weakness and facial droop. CT and MRI were repeated and showed evidence of worsening encephalitis as seen on T2/FLAIR images, extending into the right hippocampus, medial thalamus, right operculum, insula and inferior bilateral frontal lobes. Patient was reevaluated by neurology and ID.   Acyclovir was increased to 15 mg/kg and Depakote was added to her regimen as well    Attempts were made to transfer to neuro ICU  [place], [facility] and [facility] but   all are on diversion    Patient's PICC line was lost yesterday which had her daughter very upset    Today patient is quite less responsive than when I last saw her 48 hours ago. She answers simple questions but is clearly confused.   No clear left facial droop    Her creatinine today has bumped up to 1.6, CRP up to 4.7      Problem List:  Problem List as of 9/9/2021 Never Reviewed        Codes Class Noted - Resolved    Sepsis [facility]) ICD-10-CM: A41.9  ICD-9-CM: 038.9, 995.91  9/3/2021 - Present        New onset seizure ([facility] [address] ICD-10-CM: R56.9  ICD-9-CM: 780.39  9/3/2021 - Present              Medications reviewed  Current Facility-Administered Medications   Medication Dose Route Frequency    acyclovir (ZOVIRAX) 900 mg in 0.9% sodium chloride 250 mL IVPB  15 mg/kg (Ideal) IntraVENous Q8H    valproate (DEPACON) 200 mg in 0.9% sodium chloride 50 mL IVPB  200 mg IntraVENous Q6H    levETIRAcetam (KEPPRA) 2,000 mg in 0.9% sodium chloride 250 mL IVPB  2,000 mg IntraVENous Q12H    amLODIPine (NORVASC) tablet 5 mg  5 mg Oral DAILY    hydrALAZINE (APRESOLINE) 20 mg/mL injection 10 mg  10 mg IntraVENous Q6H PRN    insulin lispro (HUMALOG) injection   SubCUTAneous AC&HS    potassium chloride (KLOR-CON) packet for solution 20 mEq  20 mEq Oral BID WITH MEALS    dextrose 5% and 0.9% NaCl infusion  75 mL/hr IntraVENous CONTINUOUS    sodium chloride (NS) flush 5-40 mL  5-40 mL IntraVENous Q8H    sodium chloride (NS) flush 5-40 mL  5-40 mL IntraVENous PRN    acetaminophen (TYLENOL) tablet 650 mg  650 mg Oral Q6H PRN    Or    acetaminophen (TYLENOL) suppository 650 mg  650 mg Rectal Q6H PRN    polyethylene glycol (MIRALAX) packet 17 g  17 g Oral DAILY PRN    promethazine (PHENERGAN) tablet 12.5 mg  12.5 mg Oral Q6H PRN    Or    ondansetron (ZOFRAN) injection 4 mg  4 mg IntraVENous Q6H PRN    enoxaparin (LOVENOX) injection 30 mg  30 mg SubCUTAneous DAILY    glucose chewable tablet 16 g  4 Tablet Oral PRN    dextrose (D50W) injection syrg 12.5-25 g  25-50 mL IntraVENous PRN    glucagon (GLUCAGEN) injection 1 mg  1 mg IntraMUSCular PRN       Review of Systems:   A comprehensive review of systems was negative except for that written in the HPI. Objective:   Physical Exam:     Visit Vitals  BP (!) 154/72   Pulse 78   Temp 97.8 °F (36.6 °C)   Resp 30   Ht 5' 6\" (1.676 m)   Wt 80 kg (176 lb 5.9 oz)   SpO2 93%   BMI 28.47 kg/m²    O2 Flow Rate (L/min): 2 l/min O2 Device: None (Room air)    Temp (24hrs), Av.3 °F (37.4 °C), Min:97.8 °F (36.6 °C), Max:101 °F (38.3 °C)    No intake/output data recorded. 1901 -  07  In: 1900 [I.V.:1900]  Out: 600 [Urine:600]    General:   Awake and alert, slow to answer simple questions, confused   Lungs:   Clear to auscultation bilaterally. Chest wall:  No tenderness or deformity. Heart:  Regular rate and rhythm, S1, S2 normal, no murmur, click, rub or gallop. Abdomen:   Soft, non-tender. Bowel sounds normal. No masses,  No organomegaly. Extremities: Extremities normal, atraumatic, no cyanosis or edema. Pulses: 2+ and symmetric all extremities. Skin: Skin color, texture, turgor normal. No rashes or lesions   Neurologic: CNII-XII intact. Left-sided strength appears grossly intact. No facial droop noted         Data Review:       Recent Days:  Recent Labs     21  0330 21  0835   WBC 7.6 8.1   HGB 14.7 13.3   HCT 44.5 40.9    182     Recent Labs     21  0330 21  0835    143   K 4.2 3.7   * 110*   CO2 22 29   * 146*   BUN 17 6   CREA 1.60* 0.47*   CA 8.8 8.2*   MG  --  2.4   PHOS 4.3 2.7   ALB 2.7* 2.4*     No results for input(s): PH, PCO2, PO2, HCO3, FIO2 in the last 72 hours.     24 Hour Results:  Recent Results (from the past 24 hour(s))   GLUCOSE, POC    Collection Time: 21 11:03 AM   Result Value Ref Range    Glucose (POC) 156 (H) 65 - 117 mg/dL    Performed by [address], POC    Collection Time: 21  3:30 PM   Result Value Ref Range    Glucose (POC) 130 (H) 65 - 117 mg/dL    Performed by HALEY GARCÍA    GLUCOSE, POC    Collection Time: 09/08/21  9:57 PM   Result Value Ref Range    Glucose (POC) 141 (H) 65 - 117 mg/dL    Performed by Tiera Ahumada    RENAL FUNCTION PANEL    Collection Time: 09/09/21  3:30 AM   Result Value Ref Range    Sodium 143 136 - 145 mmol/L    Potassium 4.2 3.5 - 5.1 mmol/L    Chloride 114 (H) 97 - 108 mmol/L    CO2 22 21 - 32 mmol/L    Anion gap 7 5 - 15 mmol/L    Glucose 217 (H) 65 - 100 mg/dL    BUN 17 6 - 20 mg/dL    Creatinine 1.60 (H) 0.55 - 1.02 mg/dL    BUN/Creatinine ratio 11 (L) 12 - 20      GFR est AA 39 (L) >60 ml/min/1.73m2    GFR est non-AA 32 (L) >60 ml/min/1.73m2    Calcium 8.8 8.5 - 10.1 mg/dL    Phosphorus 4.3 2.6 - 4.7 mg/dL    Albumin 2.7 (L) 3.5 - 5.0 g/dL   CBC WITH AUTOMATED DIFF    Collection Time: 09/09/21  3:30 AM   Result Value Ref Range    WBC 7.6 3.6 - 11.0 K/uL    RBC 4.95 3.80 - 5.20 M/uL    HGB 14.7 11.5 - 16.0 g/dL    HCT 44.5 35.0 - 47.0 %    MCV 89.9 80.0 - 99.0 FL    MCH 29.7 26.0 - 34.0 PG    MCHC 33.0 30.0 - 36.5 g/dL    RDW 14.6 (H) 11.5 - 14.5 %    PLATELET 525 337 - 618 K/uL    MPV 10.6 8.9 - 12.9 FL    NRBC 0.0 0.0  WBC    ABSOLUTE NRBC 0.00 0.00 - 0.01 K/uL    NEUTROPHILS 84 (H) 32 - 75 %    LYMPHOCYTES 12 12 - 49 %    MONOCYTES 3 (L) 5 - 13 %    EOSINOPHILS 0 0 - 7 %    BASOPHILS 0 0 - 1 %    IMMATURE GRANULOCYTES 1 (H) 0 - 0.5 %    ABS. NEUTROPHILS 6.4 1.8 - 8.0 K/UL    ABS. LYMPHOCYTES 0.9 0.8 - 3.5 K/UL    ABS. MONOCYTES 0.2 0.0 - 1.0 K/UL    ABS. EOSINOPHILS 0.0 0.0 - 0.4 K/UL    ABS. BASOPHILS 0.0 0.0 - 0.1 K/UL    ABS. IMM. GRANS. 0.0 0.00 - 0.04 K/UL    DF AUTOMATED     C REACTIVE PROTEIN, QT    Collection Time: 09/09/21  3:30 AM   Result Value Ref Range    C-Reactive protein 4.70 (H) 0.00 - 0.60 mg/dL       MRI BRAIN W WO CONT   Final Result   Interval progression of findings in the right cerebral hemisphere, extending   into the inferior left frontal lobe as above.  Imaging features are most   consistent with worsening encephalitis (with involvement characteristic of   herpes encephalitis as stated on original exam from 9/3/2021). Limited   encephalitis can appear similar. Given rapid progression, neoplasm is felt   unlikely. No evidence of acute infarction. CT CODE NEURO HEAD WO CONTRAST   Final Result   1. Expanding edema in the right temporal lobe and along the sylvian fissure   without hemorrhage, with 2-3 mm of right-to-left midline shift. Called report to   Lory Kyle on 5 W.  at 3:58 PM 9/8/2021. XR CHEST PORT   Final Result   Stable mild elevation of right hemidiaphragm. Right lateral chest   wall clips. Stable enlargement of cardiopericardial silhouette. Improved   aeration of the lungs bilaterally. No evidence of focal airspace consolidation. Gastric tube placed with tip overlying the right upper abdomen at the level of   the distal stomach or proximal duodenum. MRI BRAIN W WO CONT   Final Result   1. Abnormal high T2 signal evident in the right temporal lobe and insular   cortex. Subtle increased T2 signal evident in the left temporal uncus. The   appearances are concerning for herpes encephalitis. This could also represent   limbic encephalitis. A report was call to Dr. Leona Patten at 9/3/2021 11:49 AM      XR CHEST PORT   Final Result      CT HEAD WO CONT   Final Result   No acute or active intracranial process. CT SPINE CERV WO CONT   Final Result   No specific acute or active process. Multilevel degenerative disc and degenerative joint disease. Assessment:  Presumptive herpes encephalitis, clinically and radiographically worsening    New onset seizure activity with fever and altered mental status, due to above.   Stable, no further seizures     Sepsis with fever, leukocytosis, elevated lactate and tachycardia metabolic encephalopathy, improved    Acute kidney injury, probably prerenal due to poor oral intake although may also be related to acyclovir     Diabetes mellitus type 2     Remote history of breast cancer    Hypokalemia, repleted        Plan:  Continue acyclovir 15 mg/kg every 8 hours  Continue Keppra and Depakote  Continue supportive care. We will   have speech reevaluate regarding diet, may need NG tube back in  Increase IV fluids  EEG is planned for today  Follow renal function closely    Addendum: Patient was sent down for PICC line but was noted to have completely thrombosed left axillary vein which is likely related to recent IV. Power line was placed by IR. We will start on low-dose Eliquis for DVT of axillary vein; discussed with neurologist, no contraindications to this from their standpoint      Care Plan discussed with: Patient/Family. I updated her daughter at the bedside    Disposition: Continued ICU monitoring    Total time spent with patient: 30 minutes.     Gonzales Yu MD

## 2021-09-09 NOTE — PROGRESS NOTES
OT eval order received and acknowledged, however, patient transferred to ICU from [address] [place] [address] due to medical decline prior to completion of evaluation. OT eval orders will be discontinued and will need new OT eval order once pt medically stable for evaluation. Thank you.

## 2021-09-09 NOTE — CONSULTS
Consult Date: 9/9/2021    Consults  Ms. Sheryl Alicea is a 60year old woman with history of breast cancer who was brought in 09/03 because she was found down on the floor unresposnive. Per EMS she was having seizures. She was given a total of 10 mg versed. She has fevers of 102. MRI showed bitemporal hyper intensities suspicious for hsv encephalitis. She was started on acyclovir 10 mg /kg q8h and LP done/ LP results consistent with viral encephalitis. She is also on keppra 1000 mg q12hrs. Subjective  opens eyes, tells me her name. Yesterday had worsening mental status. Received 10 mg stat dose od IV decadron because of worsening edema of the right temporal love. Valproic acid 250 gm tid added. EEG ordered. ID increased acyclovir to 15mg/kg from 10mg/kg. Past Medical History:   Diagnosis Date    Breast cancer ([facility] [address]     Depression     Diabetes ([facility] [address]     High cholesterol       Past Surgical History:   Procedure Laterality Date    HX MASTECTOMY       History reviewed. No pertinent family history.    Social History     Tobacco Use    Smoking status: Never Smoker   Substance Use Topics    Alcohol use: Not Currently       Current Facility-Administered Medications   Medication Dose Route Frequency Provider Last Rate Last Admin    citalopram (CELEXA) tablet 20 mg  20 mg Oral DAILY Gerson Mendoza MD        nystatin (MYCOSTATIN) 100,000 unit/mL oral suspension 500,000 Units  500,000 Units Oral QID Noemy Shelley MD        dexamethasone (DECADRON) 4 mg/mL injection 4 mg  4 mg IntraVENous Q6H Ashlie Bales MD        acyclovir (ZOVIRAX) 900 mg in 0.9% sodium chloride 250 mL IVPB  15 mg/kg (Ideal) IntraVENous Q8H Debra Serrato  mL/hr at 09/09/21 0519 900 mg at 09/09/21 0519    valproate (DEPACON) 200 mg in 0.9% sodium chloride 50 mL IVPB  200 mg IntraVENous Q6H Dory Kramer MD        levETIRAcetam (KEPPRA) 2,000 mg in 0.9% sodium chloride 250 mL IVPB  2,000 mg IntraVENous Q12H Kendra Villela MD   2,000 mg at 09/08/21 2247    amLODIPine (NORVASC) tablet 5 mg  5 mg Oral DAILY Vickie Noyola MD   5 mg at 09/08/21 0966    hydrALAZINE (APRESOLINE) 20 mg/mL injection 10 mg  10 mg IntraVENous Q6H PRN Vickie Noyola MD        insulin lispro (HUMALOG) injection   SubCUTAneous AC&HS Hugo Nunez MD   2 Units at 09/08/21 1130    potassium chloride (KLOR-CON) packet for solution 20 mEq  20 mEq Oral BID WITH MEALS Hugo Nunez MD   20 mEq at 09/08/21 0929    dextrose 5% and 0.9% NaCl infusion  125 mL/hr IntraVENous CONTINUOUS Hugo Nunez MD 75 mL/hr at 09/08/21 1819 75 mL/hr at 09/08/21 1819    sodium chloride (NS) flush 5-40 mL  5-40 mL IntraVENous Q8H Hugo Nunez MD   10 mL at 09/09/21 0522    sodium chloride (NS) flush 5-40 mL  5-40 mL IntraVENous PRN Hugo Nunez MD        acetaminophen (TYLENOL) tablet 650 mg  650 mg Oral Q6H PRN Hugo Nunez MD   650 mg at 09/06/21 1236    Or    acetaminophen (TYLENOL) suppository 650 mg  650 mg Rectal Q6H PRN Hugo Nunez MD        polyethylene glycol (MIRALAX) packet 17 g  17 g Oral DAILY PRN Hugo Nunez MD        promethazine (PHENERGAN) tablet 12.5 mg  12.5 mg Oral Q6H PRN Hugo Nunez MD        Or    ondansetron [facility] injection 4 mg  4 mg IntraVENous Q6H PRN Hugo Nunez MD        enoxaparin (LOVENOX) injection 30 mg  30 mg SubCUTAneous DAILY Hugo Nunez MD   30 mg at 09/08/21 3093    glucose chewable tablet 16 g  4 Tablet Oral PRN Rosita Mott MD        dextrose (D50W) injection syrg 12.5-25 g  25-50 mL IntraVENous PRN Rosita Mott MD   25 g at 09/04/21 0409    glucagon (GLUCAGEN) injection 1 mg  1 mg IntraMUSCular PRN Rosita Mott MD            Review of Systems   Unable to perform ROS: Mental status change   All other systems reviewed and are negative.       Objective     Vital signs for last 24 hours:  Visit Vitals  BP (!) 154/72   Pulse 61   Temp William Segura ) 96.7 °F (35.9 °C)   Resp 30   Ht 5' 6\" (1.676 m)   Wt 80 kg (176 lb 5.9 oz)   SpO2 93%   BMI 28.47 kg/m²       Intake/Output this shift:  Current Shift: No intake/output data recorded.   Last 3 Shifts: 09/07 1901 - 09/09 0700  In: 1900 [I.V.:1900]  Out: 600 [Urine:600]    Data Review:   Recent Results (from the past 24 hour(s))   GLUCOSE, POC    Collection Time: 09/08/21 11:03 AM   Result Value Ref Range    Glucose (POC) 156 (H) 65 - 117 mg/dL    Performed by [address], POC    Collection Time: 09/08/21  3:30 PM   Result Value Ref Range    Glucose (POC) 130 (H) 65 - 117 mg/dL    Performed by [address], POC    Collection Time: 09/08/21  9:57 PM   Result Value Ref Range    Glucose (POC) 141 (H) 65 - 117 mg/dL    Performed by Rossla     RENAL FUNCTION PANEL    Collection Time: 09/09/21  3:30 AM   Result Value Ref Range    Sodium 143 136 - 145 mmol/L    Potassium 4.2 3.5 - 5.1 mmol/L    Chloride 114 (H) 97 - 108 mmol/L    CO2 22 21 - 32 mmol/L    Anion gap 7 5 - 15 mmol/L    Glucose 217 (H) 65 - 100 mg/dL    BUN 17 6 - 20 mg/dL    Creatinine 1.60 (H) 0.55 - 1.02 mg/dL    BUN/Creatinine ratio 11 (L) 12 - 20      GFR est AA 39 (L) >60 ml/min/1.73m2    GFR est non-AA 32 (L) >60 ml/min/1.73m2    Calcium 8.8 8.5 - 10.1 mg/dL    Phosphorus 4.3 2.6 - 4.7 mg/dL    Albumin 2.7 (L) 3.5 - 5.0 g/dL   CBC WITH AUTOMATED DIFF    Collection Time: 09/09/21  3:30 AM   Result Value Ref Range    WBC 7.6 3.6 - 11.0 K/uL    RBC 4.95 3.80 - 5.20 M/uL    HGB 14.7 11.5 - 16.0 g/dL    HCT 44.5 35.0 - 47.0 %    MCV 89.9 80.0 - 99.0 FL    MCH 29.7 26.0 - 34.0 PG    MCHC 33.0 30.0 - 36.5 g/dL    RDW 14.6 (H) 11.5 - 14.5 %    PLATELET 525 194 - 715 K/uL    MPV 10.6 8.9 - 12.9 FL    NRBC 0.0 0.0  WBC    ABSOLUTE NRBC 0.00 0.00 - 0.01 K/uL    NEUTROPHILS 84 (H) 32 - 75 %    LYMPHOCYTES 12 12 - 49 %    MONOCYTES 3 (L) 5 - 13 %    EOSINOPHILS 0 0 - 7 %    BASOPHILS 0 0 - 1 %    IMMATURE GRANULOCYTES 1 (H) 0 - 0.5 %    ABS. NEUTROPHILS 6.4 1.8 - 8.0 K/UL    ABS. LYMPHOCYTES 0.9 0.8 - 3.5 K/UL    ABS. MONOCYTES 0.2 0.0 - 1.0 K/UL    ABS. EOSINOPHILS 0.0 0.0 - 0.4 K/UL    ABS. BASOPHILS 0.0 0.0 - 0.1 K/UL    ABS. IMM. GRANS. 0.0 0.00 - 0.04 K/UL    DF AUTOMATED     C REACTIVE PROTEIN, QT    Collection Time: 09/09/21  3:30 AM   Result Value Ref Range    C-Reactive protein 4.70 (H) 0.00 - 0.60 mg/dL   GLUCOSE, POC    Collection Time: 09/09/21  8:22 AM   Result Value Ref Range    Glucose (POC) 158 (H) 65 - 117 mg/dL    Performed by Rush Francois        Physical Exam    Neuro Physical Exam      General: Well developed, well nourished. Patient in no apparent distress. Neurological Exam:  Mental Status: Eyes open, oriented x2, normal speech, lethargic   Cranial Nerves:   Intact visual fields. PERRL, EOMI, no nystagmus, no ptosis. Facial sensation is normal. Mild left facial droop. Palate is midline. Neck turned to left  Tongue is midline. Hearing is intact bilaterally. Motor:  Moves all 4 extremities spontaneously    Reflexes:   Deep tendon reflexes 2+/4 and symmetrical.   Sensory:   Normal to light touch in all 4 ext    Gait:  Cannot assess   Tremor:   No tremor noted. Cerebellar:  No cerebellar signs present. Babinski:       Down b/l     Assessment and Plan: Ms. Hemant Vo is a 60year old woman with likely HSV encephalitis. - cont acyclovir 15mg/kg q8h. Follow up ID recs. Correction made to reported hsv pcr result. It was infact from CSF. - Will keep keppra at 2g bid IV and cont valrpoic acid 250 mg q8h iv  - will check valproic acid levels  - cont iv decadron 4 mg q6h given worsening right temporal lobe edema  - repeat Ct head today to monitor for any worsening of edema.   - EEG today. Discussed plan with daughter Jj Farias at bedside.  It was also discussed that HSV encephalitis even with treatment can result in death and those who survive usually have neurological sequelae with behavioral and cognitive issues.

## 2021-09-10 ENCOUNTER — APPOINTMENT (OUTPATIENT)
Dept: GENERAL RADIOLOGY | Age: 66
DRG: 871 | End: 2021-09-10
Attending: INTERNAL MEDICINE
Payer: COMMERCIAL

## 2021-09-10 ENCOUNTER — APPOINTMENT (OUTPATIENT)
Dept: CT IMAGING | Age: 66
DRG: 871 | End: 2021-09-10
Attending: PSYCHIATRY & NEUROLOGY
Payer: COMMERCIAL

## 2021-09-10 PROBLEM — E44.1 MILD PROTEIN-CALORIE MALNUTRITION (HCC): Status: ACTIVE | Noted: 2021-09-10

## 2021-09-10 LAB
ALBUMIN SERPL-MCNC: 2.3 G/DL (ref 3.5–5)
ANION GAP SERPL CALC-SCNC: 8 MMOL/L (ref 5–15)
BUN SERPL-MCNC: 16 MG/DL (ref 6–20)
BUN/CREAT SERPL: 18 (ref 12–20)
CA-I BLD-MCNC: 7.7 MG/DL (ref 8.5–10.1)
CHLORIDE SERPL-SCNC: 120 MMOL/L (ref 97–108)
CO2 SERPL-SCNC: 22 MMOL/L (ref 21–32)
CREAT SERPL-MCNC: 0.91 MG/DL (ref 0.55–1.02)
GLUCOSE BLD STRIP.AUTO-MCNC: 124 MG/DL (ref 65–117)
GLUCOSE BLD STRIP.AUTO-MCNC: 131 MG/DL (ref 65–117)
GLUCOSE BLD STRIP.AUTO-MCNC: 131 MG/DL (ref 65–117)
GLUCOSE SERPL-MCNC: 152 MG/DL (ref 65–100)
PERFORMED BY, TECHID: ABNORMAL
PHOSPHATE SERPL-MCNC: 2.9 MG/DL (ref 2.6–4.7)
POTASSIUM SERPL-SCNC: 3.6 MMOL/L (ref 3.5–5.1)
SODIUM SERPL-SCNC: 150 MMOL/L (ref 136–145)

## 2021-09-10 PROCEDURE — 36415 COLL VENOUS BLD VENIPUNCTURE: CPT

## 2021-09-10 PROCEDURE — 92526 ORAL FUNCTION THERAPY: CPT

## 2021-09-10 PROCEDURE — 74011250636 HC RX REV CODE- 250/636: Performed by: HOSPITALIST

## 2021-09-10 PROCEDURE — 71045 X-RAY EXAM CHEST 1 VIEW: CPT

## 2021-09-10 PROCEDURE — 74011000258 HC RX REV CODE- 258: Performed by: HOSPITALIST

## 2021-09-10 PROCEDURE — 82962 GLUCOSE BLOOD TEST: CPT

## 2021-09-10 PROCEDURE — 74011000250 HC RX REV CODE- 250: Performed by: HOSPITALIST

## 2021-09-10 PROCEDURE — 80069 RENAL FUNCTION PANEL: CPT

## 2021-09-10 PROCEDURE — 65610000006 HC RM INTENSIVE CARE

## 2021-09-10 PROCEDURE — 74011250636 HC RX REV CODE- 250/636: Performed by: PSYCHIATRY & NEUROLOGY

## 2021-09-10 PROCEDURE — 99232 SBSQ HOSP IP/OBS MODERATE 35: CPT | Performed by: INTERNAL MEDICINE

## 2021-09-10 PROCEDURE — 74011250637 HC RX REV CODE- 250/637: Performed by: INTERNAL MEDICINE

## 2021-09-10 PROCEDURE — 74011250636 HC RX REV CODE- 250/636: Performed by: INTERNAL MEDICINE

## 2021-09-10 PROCEDURE — 74011636637 HC RX REV CODE- 636/637: Performed by: INTERNAL MEDICINE

## 2021-09-10 RX ORDER — SODIUM CHLORIDE 450 MG/100ML
75 INJECTION, SOLUTION INTRAVENOUS CONTINUOUS
Status: DISCONTINUED | OUTPATIENT
Start: 2021-09-10 | End: 2021-09-25

## 2021-09-10 RX ADMIN — Medication 10 ML: at 13:11

## 2021-09-10 RX ADMIN — LEVETIRACETAM 2000 MG: 100 INJECTION, SOLUTION INTRAVENOUS at 15:17

## 2021-09-10 RX ADMIN — DEXAMETHASONE SODIUM PHOSPHATE 4 MG: 4 INJECTION, SOLUTION INTRA-ARTICULAR; INTRALESIONAL; INTRAMUSCULAR; INTRAVENOUS; SOFT TISSUE at 23:43

## 2021-09-10 RX ADMIN — VALPROATE SODIUM 200 MG: 100 INJECTION, SOLUTION INTRAVENOUS at 20:30

## 2021-09-10 RX ADMIN — NYSTATIN 500000 UNITS: 100000 SUSPENSION ORAL at 23:19

## 2021-09-10 RX ADMIN — POTASSIUM CHLORIDE 20 MEQ: 1.5 POWDER, FOR SOLUTION ORAL at 09:07

## 2021-09-10 RX ADMIN — DEXAMETHASONE SODIUM PHOSPHATE 4 MG: 4 INJECTION, SOLUTION INTRA-ARTICULAR; INTRALESIONAL; INTRAMUSCULAR; INTRAVENOUS; SOFT TISSUE at 05:40

## 2021-09-10 RX ADMIN — Medication 10 ML: at 05:41

## 2021-09-10 RX ADMIN — SODIUM CHLORIDE 900 MG: 9 INJECTION, SOLUTION INTRAVENOUS at 05:40

## 2021-09-10 RX ADMIN — APIXABAN 5 MG: 5 TABLET, FILM COATED ORAL at 09:07

## 2021-09-10 RX ADMIN — VALPROATE SODIUM 200 MG: 100 INJECTION, SOLUTION INTRAVENOUS at 13:11

## 2021-09-10 RX ADMIN — APIXABAN 5 MG: 5 TABLET, FILM COATED ORAL at 23:22

## 2021-09-10 RX ADMIN — SODIUM CHLORIDE 75 ML/HR: 4.5 INJECTION, SOLUTION INTRAVENOUS at 11:54

## 2021-09-10 RX ADMIN — SODIUM CHLORIDE 900 MG: 9 INJECTION, SOLUTION INTRAVENOUS at 23:20

## 2021-09-10 RX ADMIN — DEXAMETHASONE SODIUM PHOSPHATE 4 MG: 4 INJECTION, SOLUTION INTRA-ARTICULAR; INTRALESIONAL; INTRAMUSCULAR; INTRAVENOUS; SOFT TISSUE at 11:54

## 2021-09-10 RX ADMIN — VALPROATE SODIUM 200 MG: 100 INJECTION, SOLUTION INTRAVENOUS at 09:08

## 2021-09-10 RX ADMIN — NYSTATIN 500000 UNITS: 100000 SUSPENSION ORAL at 09:07

## 2021-09-10 RX ADMIN — DEXAMETHASONE SODIUM PHOSPHATE 4 MG: 4 INJECTION, SOLUTION INTRA-ARTICULAR; INTRALESIONAL; INTRAMUSCULAR; INTRAVENOUS; SOFT TISSUE at 17:00

## 2021-09-10 RX ADMIN — NYSTATIN 500000 UNITS: 100000 SUSPENSION ORAL at 17:00

## 2021-09-10 RX ADMIN — INSULIN LISPRO 2 UNITS: 100 INJECTION, SOLUTION INTRAVENOUS; SUBCUTANEOUS at 09:07

## 2021-09-10 RX ADMIN — POTASSIUM CHLORIDE 20 MEQ: 1.5 POWDER, FOR SOLUTION ORAL at 16:56

## 2021-09-10 RX ADMIN — NYSTATIN 500000 UNITS: 100000 SUSPENSION ORAL at 13:12

## 2021-09-10 RX ADMIN — AMLODIPINE BESYLATE 5 MG: 5 TABLET ORAL at 09:18

## 2021-09-10 RX ADMIN — LEVETIRACETAM 2000 MG: 100 INJECTION, SOLUTION INTRAVENOUS at 23:23

## 2021-09-10 RX ADMIN — VALPROATE SODIUM 200 MG: 100 INJECTION, SOLUTION INTRAVENOUS at 01:10

## 2021-09-10 RX ADMIN — CITALOPRAM HYDROBROMIDE 20 MG: 20 TABLET ORAL at 09:07

## 2021-09-10 RX ADMIN — HYDRALAZINE HYDROCHLORIDE 10 MG: 20 INJECTION INTRAMUSCULAR; INTRAVENOUS at 13:11

## 2021-09-10 RX ADMIN — SODIUM CHLORIDE 900 MG: 9 INJECTION, SOLUTION INTRAVENOUS at 13:11

## 2021-09-10 RX ADMIN — Medication 10 ML: at 23:26

## 2021-09-10 RX ADMIN — DEXAMETHASONE SODIUM PHOSPHATE 4 MG: 4 INJECTION, SOLUTION INTRA-ARTICULAR; INTRALESIONAL; INTRAMUSCULAR; INTRAVENOUS; SOFT TISSUE at 00:03

## 2021-09-10 NOTE — PROGRESS NOTES
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 Unfortunately, CSF cx cannot be processed due to inadequate sample    During the course of her stay on 9/8 developed stroke-like-symptoms, MRI showed edema with 2cm left shift and increased attenuation. She was started on decadron and valproate, in addition to keppra. She had UE DVT on eliquis    Currently her GCS is still 13, not at baseline s/t encephalitis and delirium. Grace Villalobos is a 60 y. o. female who presents with seizure-like activity after being found on the floor by her family overnight. [address] arrival EMS she was having seizure activity and given Versed 5 mg which abated the convulsions. [place] Lisseth started again and she was given another 5 mg of Versed.  On arrival to the ED she had a temperature of 102.  CT of the head was negative.  Labs were significant for a potassium of 2.8     Patient had been seen here in the ED on 8/31 with complaints of nausea and vomiting, dizziness and weakness. Nadine Welch was concerned she may have had food poisoning although this was not felt to be the case. Sherita Mederos her daughter spoke with her on the phone on 9/2/21 her daughter noted she was confused and disoriented.     MRI showed classic high signal on T2 and flair images right temporal lobe. Patient was started on IV acyclovir as well as IV antibiotics     CSF white count was 200 with 65% PMNs and 35% lymphocytes     CSF culture negative to date. HIV was nonreactive. Patient seen in follow-up. Awake alert and oriented x4. Denies headaches, visual disturbance chest pain or shortness of breath.      Problem List:  Problem List as of 9/10/2021 Never Reviewed        Codes Class Noted - Resolved    Mild protein-calorie malnutrition ([facility] ICD-10-CM: E44.1  ICD-9-CM: 263.1  9/10/2021 - Present        Sepsis ([facility] ICD-10-CM: A41.9  ICD-9-CM: 038.9, 995.91  9/3/2021 - Present        New onset seizure ([facility] ICD-10-CM: R56.9  ICD-9-CM: 780.39  9/3/2021 - Present              Medications reviewed  Current Facility-Administered Medications   Medication Dose Route Frequency    0.45% sodium chloride infusion  75 mL/hr IntraVENous CONTINUOUS    citalopram (CELEXA) tablet 20 mg  20 mg Oral DAILY    nystatin (MYCOSTATIN) 100,000 unit/mL oral suspension 500,000 Units  500,000 Units Oral QID    dexamethasone (DECADRON) 4 mg/mL injection 4 mg  4 mg IntraVENous Q6H    apixaban (ELIQUIS) tablet 5 mg  5 mg Oral BID    acyclovir (ZOVIRAX) 900 mg in 0.9% sodium chloride 250 mL IVPB  15 mg/kg (Ideal) IntraVENous Q8H    valproate (DEPACON) 200 mg in 0.9% sodium chloride 50 mL IVPB  200 mg IntraVENous Q6H    levETIRAcetam (KEPPRA) 2,000 mg in 0.9% sodium chloride 250 mL IVPB  2,000 mg IntraVENous Q12H    amLODIPine (NORVASC) tablet 5 mg  5 mg Oral DAILY    hydrALAZINE (APRESOLINE) 20 mg/mL injection 10 mg  10 mg IntraVENous Q6H PRN    insulin lispro (HUMALOG) injection   SubCUTAneous AC&HS    potassium chloride (KLOR-CON) packet for solution 20 mEq  20 mEq Oral BID WITH MEALS    sodium chloride (NS) flush 5-40 mL  5-40 mL IntraVENous Q8H    sodium chloride (NS) flush 5-40 mL  5-40 mL IntraVENous PRN    acetaminophen (TYLENOL) tablet 650 mg  650 mg Oral Q6H PRN    Or    acetaminophen (TYLENOL) suppository 650 mg  650 mg Rectal Q6H PRN    polyethylene glycol (MIRALAX) packet 17 g  17 g Oral DAILY PRN    promethazine (PHENERGAN) tablet 12.5 mg  12.5 mg Oral Q6H PRN    Or    ondansetron (ZOFRAN) injection 4 mg  4 mg IntraVENous Q6H PRN    glucose chewable tablet 16 g  4 Tablet Oral PRN    dextrose (D50W) injection syrg 12.5-25 g  25-50 mL IntraVENous PRN    glucagon (GLUCAGEN) injection 1 mg  1 mg IntraMUSCular PRN       Review of Systems:   A comprehensive review of systems was negative except for that written in the HPI. Objective:   Physical Exam:     Visit Vitals  /63 (BP 1 Location: Right upper arm, BP Patient Position: At rest)   Pulse 97   Temp 98.5 °F (36.9 °C)   Resp 25   Ht 5' 6\" (1.676 m)   Wt 80 kg (176 lb 5.9 oz)   SpO2 97%   BMI 28.47 kg/m²    O2 Flow Rate (L/min): 2 l/min O2 Device: None (Room air)    Temp (24hrs), Av.8 °F (36.6 °C), Min:97 °F (36.1 °C), Max:98.5 °F (36.9 °C)    09/10 0701 - 09/10 1900  In: -   Out: 400 [Urine:400]   1901 - 09/10 0700  In: 2400 [P.O.:500; I.V.:1900]  Out: 3600 [Urine:3600]    General:  Alert, cooperative, no distress, appears stated age. Lungs:   Clear to auscultation bilaterally. Chest wall:  No tenderness or deformity. Heart:  Regular rate and rhythm, S1, S2 normal, no murmur, click, rub or gallop. Abdomen:   Soft, non-tender. Bowel sounds normal. No masses,  No organomegaly. Extremities: Extremities normal, atraumatic, no cyanosis or edema. Pulses: 2+ and symmetric all extremities. Skin: Skin color, texture, turgor normal. No rashes or lesions   Neurologic: CNII-XII intact. No gross sensory or motor deficits     Data Review:       Recent Days:  Recent Labs     21  0330   WBC 7.6   HGB 14.7   HCT 44.5        Recent Labs     09/10/21  0500 21  0330   * 143   K 3.6 4.2   * 114*   CO2 22 22   * 217*   BUN 16 17   CREA 0.91 1.60*   CA 7.7* 8.8   PHOS 2.9 4.3   ALB 2.3* 2.7*     No results for input(s): PH, PCO2, PO2, HCO3, FIO2 in the last 72 hours.     24 Hour Results:  Recent Results (from the past 24 hour(s))   GLUCOSE, POC    Collection Time: 21  6:26 PM   Result Value Ref Range    Glucose (POC) 152 (H) 65 - 117 mg/dL    Performed by [address] [place] [address] [facility]    Collection Time: 21 10:14 PM   Result Value Ref Range    Glucose (POC) 137 (H) 65 - 117 mg/dL    Performed by [address]    RENAL FUNCTION PANEL    Collection Time: 09/10/21  5:00 AM   Result Value Ref Range    Sodium 150 (H) 136 - 145 mmol/L    Potassium 3.6 3.5 - 5.1 mmol/L    Chloride 120 (H) 97 - 108 mmol/L    CO2 22 21 - 32 mmol/L    Anion gap 8 5 - 15 mmol/L    Glucose 152 (H) 65 - 100 mg/dL    BUN 16 6 - 20 mg/dL    Creatinine 0.91 0.55 - 1.02 mg/dL    BUN/Creatinine ratio 18 12 - 20      GFR est AA >60 >60 ml/min/1.73m2    GFR est non-AA >60 >60 ml/min/1.73m2    Calcium 7.7 (L) 8.5 - 10.1 mg/dL    Phosphorus 2.9 2.6 - 4.7 mg/dL    Albumin 2.3 (L) 3.5 - 5.0 g/dL   GLUCOSE, POC    Collection Time: 09/10/21 11:19 AM   Result Value Ref Range    Glucose (POC) 131 (H) 65 - 117 mg/dL    Performed by Lashawn Rowley            Assessment/     (1) 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 ID for duration.     (2) seixure. S/t #1. PO keppra and valproic acid     (3)Sepsis: s/t #1. Resolved. DC other abx.     (4) Diabetes mellitus type 2: SSI    (5) Remote history of breast cancer     (6) Hypokalemia    (7) Benign essential hypertension    (8) DVT in upper extremity: eliquis 5 mg BID    (9) hypernatremia: o.45 with speech, likely valproic acid    (10) acute encephalopathy : encephalopathy + delerium        Plan:  0.45% saline  Conservative delirium treatment   Continue acyclovir, decadron , valproate and           DISPO: ICU for until daughter comfortable 2-3 days, likely PT after that. Discussed with daughter        Care Plan discussed with: Nurse    Total time spent with patient: 30 minutes.     Tish Mujica MD

## 2021-09-10 NOTE — PROGRESS NOTES
Problem: Dysphagia (Adult)  Goal: *Acute Goals and Plan of Care (Insert Text)  Description: Speech Therapy Goals  Initiated 9/10/2021  -Patient will participate in modified barium swallow study within 7 day(s), pending pt's progress and tolerance. [ ] Not met  [ ]  MET   [ ] Progressing  [ ] Flakito Jorgensen  -Patient will tolerate SBS diet with nectar/mildly thick liquids without signs/symptoms of aspiration given no cues within 7 day(s). [ ] Not met  [ ]  MET   [ ] Progressing  [ ] Flakito Lighter  -Patient will demonstrate understanding of swallow safety precautions and aspiration precautions, diet recs with no cues within 7 day(s). [ ] Not met  [ ]  MET   [ ] Progressing  [ ] Discontinue  Outcome: Melissa Lock TREATMENT  Patient: Sukumar Chambers (50 y.o. female)  Date: 9/10/2021  Diagnosis: New onset seizure ([facility] [R56.9]  Sepsis ([address] [A41.9] <principal problem not specified>       Precautions: aspiration      ASSESSMENT:  Pt seen for follow-up, alert, agreeable. Positioned upright in bed. Pt transferred to ICU since last SLP visit due to worsening in neuro symptoms, facial droop. CT  head and MRI results noted. HSV encephalitis per MD note. No overt facial droop present at this time. Nsg reports pt's daughter was previously at bedside, not present during follow-up. Pt is oriented to self only, in bilateral mitts and wrist restraints. Pt given trials of thin via straw, mildly thick via straw, puree, soft solids and moistened soft solids. Oral phase with missing teeth noted, overall WFL for solids. Mild oropharyngeal discoordination noted with reduced a-p bolus control with thins via straw, improves with mildly thick liquids. Pharyngeal phase appears WNL once initiated. Strong cough response with thins via straw. Otherwise, no overt s/s aspiration. PLAN:  Recommendations and Planned Interventions:   At this time, recommend diet downgrade to SBS/mildly thick liquids with 1:1 assistance with ALL PO intake, STRICT aspiration and GERD precautions, monitor pt closely for s/s aspiration, meds crushed if able in puree vs mildly thick liquids, FEED ONLY IF AWAKE AND ALERT. Will cont to monitor for changes in sw function, will request MBS if/as indicated. Patient continues to benefit from skilled intervention to address the above impairments. Continue treatment per established plan of care. Frequency/Duration: Patient will be followed by speech-language pathology daily to address goals. Discharge Recommendations:  pt will need continued SLP intervention at this time. SUBJECTIVE:   Patient alert, agreeable, AMS noted. After completion of session, clinician was requested to follow-up and speak with pt's daughter, Anibal Adams at bedside (1212p). Reviewed with Anibal Adams re: swallow safety precautions, 1:1 assistance with feeding, diet recs and need for SLP follow-up. Questions addressed, understanding expressed. OBJECTIVE:     CXR Results  (Last 48 hours)                 09/10/21 0440  XR CHEST PORT Final result    Impression:  The cardiomediastinal silhouette is appropriate for age, technique,   and lung expansion. Pulmonary vasculature is not congested. The lungs are   essentially clear. No effusion or pneumothorax is seen. Narrative:  1 view comparison the fourth       NG tube is out. Left IJ line with tip in mid SVC                 CT Results  (Last 48 hours)                 09/09/21 1226  CT HEAD WO CONT Final result    Impression:  1. Edema still evident in the right temporal lobe and insular cortex. No   significant change from previous CT examination. No evidence for hemorrhage or   increased mass effect. Narrative:   All CT scans at this facility are performed using dose reduction optimization   techniques as appropriate to the performed exam, including the following:   Automated exposure control, adjustment of the MA and/or KVP according to the   patient size, and the use of iterative reconstruction technique. Technique:  5 mm axial images were obtained through the entire calvarium. Sagittal and coronal reformations were performed. Comparisons: MRI examination of 9/8/2021 and 9/3/2021. Head CT examination of   9/8/2021. Compared with the previous head CT examination, there is no significant change. Edema is again observed in the right temporal lobe and right insular cortex. This remains stable. No evidence for hemorrhage. Ventricles remain stable in   size and configuration. No fractures are present. The paranasal sinuses and   mastoid air cells are clear. 09/08/21 1547  CT CODE NEURO HEAD WO CONTRAST Final result    Impression:  1. Expanding edema in the right temporal lobe and along the sylvian fissure   without hemorrhage, with 2-3 mm of right-to-left midline shift. Called report to   Blair Pruitt on 5 W.  at 3:58 PM 9/8/2021. Narrative:  CVA. Comparison head CT and brain MRI 9/3/2021. Technique: Axial images head without IV contrast, with multiplanar reformatting. Multiplanar reformatting. Dose reduction: All CT scans at this facility are performed using dose reduction   optimization techniques as appropriate to a performed exam including the   following: Automated exposure control, adjustments of the mA and/or kV according   to patient's size, or use of iterative reconstruction technique. Findings: Enlarging distribution of hypoattenuation in the right temporal lobe   and along the sylvian fissure, with mass effect on the right lateral ventricle   and 2-3 mm of right to left midline shift. No associated hemorrhage. Normal   position craniocervical junction. Atrophy. Included facial sinuses and mastoid   air cells are unopacified. Calvarium intact.                   Cognitive and Communication Status:  Neurologic State: Alert, Confused  Orientation Level: Disoriented to place, Disoriented to situation, Disoriented to time, Oriented to person  Cognition: Decreased attention/concentration, Poor safety awareness, Impaired decision making, Impulsive, Decreased command following  Pain:  Pain Scale 1: FLACC          After treatment:   Patient left in no apparent distress in bed, Call bell within reach, and Nursing notified    COMMUNICATION/EDUCATION:   Patient was educated regarding her deficit(s) of dysphagia, swallow safety precautions, diet recs and POC. She demonstrated Guarded understanding as evidenced by AMS, poor awareness. The patient's plan of care including recommendations, planned interventions, and recommended diet changes were discussed with: Registered nurse.      Patience Belle M.S., CCC-SLP  Time Calculation: 14 mins

## 2021-09-10 NOTE — PROGRESS NOTES
14: 10PM Outbound call to patient's daughter Mukesh Franz, @ [phone]. Patient identifier ( & code) verified per HIPAA policy. Identified self, role, and nature of the call. Daughter acknowledged understanding. Informed daughter mother will need to be evaluated by PT/OT for d/c recommendation; and when medically stable may possibly transfer out to a medical floor. Daughter interjected and voiced \"I was assured by the doctors' my mother wasn't moving out of ICU. I'm speaking w/risk management and Henrietta Gonzalez, about my mother's PICC line coming out and no one reporting it. \"      Informed daughter when patient's become medically stable, and no longer require to stay in ICU; are able to transfer to another floor in the hospital the attending will put in an order to move the patient. Also, informed daughter Rocío Bearden is unaware of the PICC line incident; and reiterated role and nature of the call. Inquired if mother will be staying in [place], or returning home ([place]) at time of discharge \"I think she'll stay here in [place]; however I'm unsure at this point. \"      Daughter reiterated multiple times \"I want to be notified before my mother is moved to another floor; b/c the last time she was moved she regressed and had to come back to ICU. \"  Acknowledged understanding. Voiced will document request to be notified, speak w/attending and assigned RN. Evaluation by PT/OT still needed for discharge recommendation. Patient lives alone in [place], and is here visiting daughter/family. Will continue to monitor and follow re: discharge disposition.          POLO Zafar

## 2021-09-10 NOTE — PROGRESS NOTES
Comprehensive Nutrition Assessment    Type and Reason for Visit: Initial (poor PO)    Nutrition Recommendations/Plan:   Continue Soft + Bite Sized with Mildly thickened lq    Add Ensure Pdg TID  Add Magic Cup daily    Consider addition of MVI    Document all PO and ONS intakes in EMR    Nutrition Assessment:  Worsening AMS, d/t HSV encephalitis. Neuro + ID following, mentation improving with tx. Initially NPO with NG in place x3 days. TF of Jevity 1.5 initiated late 9/5 at 25mL/hr, advanced to goal rate briefly before d/c next morning (9/6). Diet then advanced to Easy to Chew (9/6). SLP eval today, rec'd SBS6/Mildly Thickened lq. Pt mentation improving. PO intakes noted as 50% at D 9/9, and 25% at B today. RD to add ONS, will monitor intakes. Labs: Na 150, , Ca 7.7, lytes wnl. Meds: acyclovir, eliquis, celexa, decadron, insulin, keppra, KCl, miralax prn. Malnutrition Assessment:  Malnutrition Status:  Mild malnutrition    Context:  Acute illness     Findings of the 6 clinical characteristics of malnutrition:   Energy Intake:  7 - 50% or less of est energy requirements for 5 or more days  Weight Loss:  No significant weight loss     Body Fat Loss:  No significant body fat loss,     Muscle Mass Loss:  No significant muscle mass loss,    Fluid Accumulation:  No significant fluid accumulation,      Nutrition Related Findings:  NFPE not performed, pt appears well nourished on visual assessment. No documented n/v or c/d. Last BM 9/7, +BS. No previous hx dysphagia, SLP now following. No edema. Wounds:    None       Current Nutrition Therapies:  ADULT DIET Dysphagia - Soft & Bite Sized; Mildly Thick (Nectar)    Anthropometric Measures:  · Height:  5' 6\" (167.6 cm)  · Current Body Wt:  80 kg (176 lb 5.9 oz) (9/7)   · Ideal Body Wt:  130 lbs:  135.7 %   · BMI Category: Overweight (BMI 25.0-29. 9)     Wt Readings from Last 3 Encounters:   09/07/21 80 kg (176 lb 5.9 oz)   08/31/21 83 kg (183 lb)   Limited wt hx based on stated BW; unable to assess wt changes. Nutrition Diagnosis:   · Inadequate oral intake related to cognitive or neurological impairment, swallowing difficulty as evidenced by intake 0-25%, swallowing study results    Nutrition Interventions:   Food and/or Nutrient Delivery: Continue current diet, Start oral nutrition supplement  Nutrition Education and Counseling: No recommendations at this time  Coordination of Nutrition Care: Continue to monitor while inpatient    Goals:  Meet >50% EENs in 5 days, Lytes wnl, Wt maintenance +/- 0.5kg per week       Nutrition Monitoring and Evaluation:   Behavioral-Environmental Outcomes: None identified  Food/Nutrient Intake Outcomes: Food and nutrient intake, Diet advancement/tolerance, Supplement intake  Physical Signs/Symptoms Outcomes: Chewing or swallowing, Weight, Biochemical data, Meal time behavior    Discharge Planning:     Too soon to determine     Electronically signed by Marica Galeazzi on 9/10/2021 at 10:47 AM    Contact:

## 2021-09-10 NOTE — PROGRESS NOTES
Infectious Disease Progress Note         Subjective:   Pt seen and examined at bedside. More alert, following commands. Left sided weakness has improved   Objective:   Physical Exam:     Visit Vitals  BP (!) 161/66 (BP 1 Location: Right upper arm, BP Patient Position: At rest)   Pulse 66   Temp 97 °F (36.1 °C)   Resp 22   Ht 5' 6\" (1.676 m)   Wt 176 lb 5.9 oz (80 kg)   SpO2 96%   BMI 28.47 kg/m²    O2 Flow Rate (L/min): 2 l/min O2 Device: None (Room air)    Temp (24hrs), Av.5 °F (36.4 °C), Min:97 °F (36.1 °C), Max:98.2 °F (36.8 °C)    09/10 0701 - 09/10 1900  In: -   Out: 400 [Urine:400]   1901 - 09/10 0700  In: 2400 [P.O.:500; I.V.:1900]  Out: 3600 [Urine:3600]    General: NAD, more alert, responds to name   HEENT: LAQUITA, Moist mucosa  Lungs: CTA b/l, no wheeze/rhonchi   Heart: S1S2+, RRR, no murmur  Abdo: Soft, NT, ND, +BS   Exts: left side weakness, normal strength on right   Skin: No chronic wounds or ulcers     Data Review:       Recent Days:  Recent Labs     21  0330   WBC 7.6   HGB 14.7   HCT 44.5        Recent Labs     09/10/21  0500 21  0330   BUN 16 17   CREA 0.91 1.60*       Lab Results   Component Value Date/Time    C-Reactive protein 4.70 (H) 2021 03:30 AM        Microbiology     Results     Procedure Component Value Units Date/Time    HSV 1 AND 2 BY PCR [[id number]] Collected: 21 1315    Order Status: Canceled Specimen: Other from Miscellaneous sample     HSV 1 AND 2 BY PCR [[id number]]  (Abnormal) Collected: 21 1300    Order Status: Completed Specimen: Other from Miscellaneous sample Updated: 21 1037     Source Cerebrospinal fluid        Comment: Corrected on  AT 1037: Previously reported as Blood        HSV-1 DNA by PCR Positive        HSV-2 DNA by PCR Negative        Comment: This test was developed and its performance characteristics  determined by [facility]. It has not been cleared or  approved by the [place]  Food and Drug Administration. The FDA has  determined that such clearance or approval is not necessary. This  test is used for clinical purposes. It should not be regarded as  investigational or research. Performed At: [address] [id number]  Verito Delvalle MD CV:[id number]         Smitha Albert [[id number]] Collected: 09/03/21 1300    Order Status: Completed Specimen: Other from Miscellaneous sample Updated: 09/08/21 1338     Source Cerebrospinal fluid        Viral Culture, General Comment        Comment: Preliminary Report:  No virus isolated at 4 days. Next report to follow after 7 days. Performed At: [address] [id number]  Verito Delvalle MD QB:[id number]         CULTURE, BODY French Hanks [[id number]] Collected: 09/03/21 1300    Order Status: Canceled Specimen: Body Fluid     CULTURE, CSF Wilfredo Land [[id number]] Collected: 09/03/21 1200    Order Status: Completed Specimen: Cerebrospinal Fluid Updated: 09/05/21 1038     Special Requests: No Special Requests        GRAM STAIN Occasional WBCs seen         No organisms seen        Culture result:       No growth thus far holding 7 days          CULTURE, BLOOD, PAIRED [[id number]] Collected: 09/03/21 0700    Order Status: Completed Specimen: Blood Updated: 09/09/21 0740     Special Requests: No Special Requests        Culture result: No growth 6 days       COVID-19 RAPID TEST [[id number]] Collected: 09/03/21 0636    Order Status: Completed Specimen: Nasopharyngeal Updated: 09/03/21 0741     Specimen source Nasopharyngeal        COVID-19 rapid test Not Detected        Comment: Rapid Abbott ID Now   Rapid NAAT:  The specimen is NEGATIVE for SARS-CoV-2, the novel coronavirus associated with COVID-19.    Negative results should be treated as presumptive and, if inconsistent with clinical signs and symptoms or necessary for patient management, should be tested with an alternative molecular assay. Negative results do not preclude SARS-CoV-2 infection and should not be used as the sole basis for patient management decisions. This test has been authorized by the FDA under   an Emergency Use Authorization (EUA) for use by authorized laboratories. Fact sheet for Healthcare Providers: ConventionUpdate.co.nz Fact sheet for Patients: ConventionUpdate.co.nz   Methodology: Isothermal Nucleic Acid Amplification                  Diagnostics   CXR Results  (Last 48 hours)               09/10/21 0440  XR CHEST PORT Final result    Impression:  The cardiomediastinal silhouette is appropriate for age, technique,   and lung expansion. Pulmonary vasculature is not congested. The lungs are   essentially clear. No effusion or pneumothorax is seen. Narrative:  1 view comparison the fourth       NG tube is out. Left IJ line with tip in mid SVC                Assessment/Plan     1. 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 Will need a repeat LP some time next wk        Planning on 21 days of antiviral therapy from 09/03    2. [place]: Resolved w fluids, will continue to monitor while on Acyclovir     3. Seizure on admission: Due to # 1. On Keppra, and Valproic      Generalized slowing w no seizure on EEG  (09/09)     4. Hypernatremia: Na of 150, to be corrected, discussed w attending      5. AMS: Due to # 1, More alert and following commands     6. New onset left sided weakness: Due to # 1, no evidence of acute CVA on MRI or CT       Stable right temporal lobe edema on repeat CT (09/09)      Remains on decadron, will follow repeat CT head      7.  DVT of axillary vein/Thrombosis of left basilic vein: Anticoagulated on low dose Eliquis Dispo: Daughter up dated at bedside on clinical status     Fay Cross MD    9/10/2021

## 2021-09-10 NOTE — ED PROVIDER NOTES
EMERGENCY DEPARTMENT HISTORY AND PHYSICAL EXAM      Date: 8/31/2021  Patient Name: João Carmona      History of Presenting Illness     Chief Complaint   Patient presents with    Dizziness    Nausea       History Provided By: Patient    HPI: João Carmona, 60 y.o. female with a past medical history as noted below presents to the ED with cc of  fatigue and nausea x 2 days. She reports feeling \"overwhelmed and confused \"at times. She denies any associated fever/chills, dizziness,  chest pain, shortness of breath, palpitations, dizziness or any other associated symptoms. She denies any URI symptoms,  COVID concerns or any recent illness. She states she was seen at [facility] today for the same with \"normal labs\". There are no other complaints, changes, or physical findings at this time.     PCP: Christianne Reardon MD    Facility-Administered Medications Ordered in Other Encounters   Medication Dose Route Frequency Provider Last Rate Last Admin    citalopram (CELEXA) tablet 20 mg  20 mg Oral DAILY Noemy Shelley MD   20 mg at 09/10/21 0617    nystatin (MYCOSTATIN) 100,000 unit/mL oral suspension 500,000 Units  500,000 Units Oral QID Noemy Shelley MD   500,000 Units at 09/10/21 0907    dexamethasone (DECADRON) 4 mg/mL injection 4 mg  4 mg IntraVENous Q6H Ashlie Bales MD   4 mg at 09/10/21 0540    0.9% sodium chloride infusion  125 mL/hr IntraVENous CONTINUOUS Noemy Shelley  mL/hr at 09/09/21 2050 125 mL/hr at 09/09/21 2050    apixaban (ELIQUIS) tablet 5 mg  5 mg Oral BID Noemy Shelley MD   5 mg at 09/10/21 0907    acyclovir (ZOVIRAX) 900 mg in 0.9% sodium chloride 250 mL IVPB  15 mg/kg (Ideal) IntraVENous Q8H Debra Serrato  mL/hr at 09/10/21 0540 900 mg at 09/10/21 0540    valproate (DEPACON) 200 mg in 0.9% sodium chloride 50 mL IVPB  200 mg IntraVENous Q6H Dory Kramer MD 50 mL/hr at 09/10/21 0908 200 mg at 09/10/21 0908    levETIRAcetam (KEPPRA) 2,000 mg in 0.9% sodium chloride 250 mL IVPB  2,000 mg IntraVENous Q12H Jose Vaz MD   2,000 mg at 09/09/21 2131    amLODIPine (NORVASC) tablet 5 mg  5 mg Oral DAILY Nicole Pascual MD   5 mg at 09/10/21 9309    hydrALAZINE (APRESOLINE) 20 mg/mL injection 10 mg  10 mg IntraVENous Q6H PRN Nicole Pascual MD        insulin lispro (HUMALOG) injection   SubCUTAneous AC&HS Alexa Wade MD   2 Units at 09/10/21 0690    potassium chloride (KLOR-CON) packet for solution 20 mEq  20 mEq Oral BID WITH MEALS Alexa Wade MD   20 mEq at 09/10/21 2086    sodium chloride (NS) flush 5-40 mL  5-40 mL IntraVENous Q8H Alexa Wade MD   10 mL at 09/10/21 0541    sodium chloride (NS) flush 5-40 mL  5-40 mL IntraVENous PRN Alexa Wade MD        acetaminophen (TYLENOL) tablet 650 mg  650 mg Oral Q6H PRN Alexa Wade MD   650 mg at 09/06/21 1236    Or    acetaminophen (TYLENOL) suppository 650 mg  650 mg Rectal Q6H PRN Alexa Wade MD        polyethylene glycol (MIRALAX) packet 17 g  17 g Oral DAILY PRN Alexa Wade MD        promethazine (PHENERGAN) tablet 12.5 mg  12.5 mg Oral Q6H PRN Alexa Wade MD        Or    [facility]ron [facility]) injection 4 mg  4 mg IntraVENous Q6H PRN Alexa Wade MD        glucose chewable tablet 16 g  4 Tablet Oral PRN Lucas Thakkar MD        dextrose (D50W) injection syrg 12.5-25 g  25-50 mL IntraVENous PRN Lucas Thakkar MD   25 g at 09/04/21 0409    glucagon (GLUCAGEN) injection 1 mg  1 mg IntraMUSCular PRN Lucas Thakkar MD           Past History     Past Medical History:  Past Medical History:   Diagnosis Date    [facility] [place] [address]     Depression     Diabetes ([facility] [place] [address]     High cholesterol        Past Surgical History:  Past Surgical History:   Procedure Laterality Date    HX MASTECTOMY      IR FLUORO GUIDE [facility] CVAD  9/9/2021       Family History:  No family history on file.     Social History:  Social History     Tobacco Use    Smoking status: Never Smoker   Substance Use Topics    Alcohol use: Not Currently    Drug use: Not on file       Allergies:  No Known Allergies      Review of Systems     Review of Systems   Constitutional: Positive for fatigue. Negative for chills and fever. Respiratory: Negative. Negative for shortness of breath. Cardiovascular: Negative. Negative for chest pain and palpitations. Gastrointestinal: Positive for nausea. Negative for abdominal pain, diarrhea and vomiting. Neurological: Negative. Negative for dizziness. All other systems reviewed and are negative. Physical Exam     Physical Exam  Vitals and nursing note reviewed. Constitutional:       General: She is not in acute distress. Appearance: Normal appearance. HENT:      Head: Normocephalic and atraumatic. Right Ear: Tympanic membrane normal.      Left Ear: Tympanic membrane normal.      Nose: Nose normal.      Mouth/Throat:      Lips: Pink. Mouth: Mucous membranes are moist.      Pharynx: Oropharynx is clear. Eyes:      Extraocular Movements: Extraocular movements intact. Conjunctiva/sclera: Conjunctivae normal.      Pupils: Pupils are equal, round, and reactive to light. Cardiovascular:      Rate and Rhythm: Normal rate and regular rhythm. Heart sounds: Normal heart sounds. Pulmonary:      Effort: Pulmonary effort is normal.      Breath sounds: Normal breath sounds. No wheezing or rales. Abdominal:      General: Bowel sounds are normal.      Palpations: Abdomen is soft. Tenderness: There is no abdominal tenderness. There is no right CVA tenderness or left CVA tenderness. Musculoskeletal:         General: Normal range of motion. Skin:     General: Skin is warm and dry. Neurological:      General: No focal deficit present. Mental Status: She is alert. GCS: GCS eye subscore is 4. GCS verbal subscore is 5. GCS motor subscore is 6. Cranial Nerves: Cranial nerves are intact.       Sensory: Sensation is intact. Motor: Motor function is intact. Coordination: Coordination is intact. Gait: Gait is intact. Lab and Diagnostic Study Results     Labs -       Radiologic Studies -   CT HEAD:  Narrative & Impression   Dose Reduction:      All CT scans at this facility are performed using dose reduction optimization  techniques as appropriate to a performed exam including the following: Automated  exposure control, adjustments of the mA and/or kV according to patient size, or  use of iterative reconstruction technique.     CT of the head without contrast. Axial images of the head were obtained  unenhanced and sagittal and coronal reconstructions were created. No prior films  for comparison. Ventricles are normal in size shape and position. No shift of  the midline or mass is seen. No pathologic extra-axial fluid collection is  identified. There is no evidence of acute hemorrhage or infarction. No bony  abnormality is seen. The exam is otherwise unremarkable.     IMPRESSION  Negative. CXR:  Study Result    Narrative & Impression   Single frontal view of the chest. No prior films for comparison. Heart size is  normal. Lungs are clear of infiltrate. No pulmonary nodule or pleural effusion  is seen. No bony abnormalities are identified.     IMPRESSION  Negative. Medical Decision Making and ED Course   - I am the first and primary provider for this patient AND AM THE PRIMARY PROVIDER OF RECORD. - I reviewed the vital signs, available nursing notes, past medical history, past surgical history, family history and social history. - Initial assessment performed. The patients presenting problems have been discussed, and the staff are in agreement with the care plan formulated and outlined with them. I have encouraged them to ask questions as they arise throughout their visit. Vital Signs-Reviewed the patient's vital signs.     See chart    EKG interpretation: (Preliminary): Performed at 1825  Rhythm: normal sinus rhythm; and regular . Rate (approx.): 67; Other findings: abnormal ekg. Records Reviewed: Nursing Notes and Old Medical Records    ED Course:   Pt presents with nausea and fatigue x 2 days. Denies dizziness. Neuro/GI exam benign. Seen at [facility] for same today, negative COVID. Head CT negative. Labs: wbc 12.8, all other labs unremarkable. EKG NSR, no ST changes. VS normal.  Patient treated with IVF and Pepcid with improvement of symptoms, tolerating PO intake. Will d/c with PCP f/u in 2 days. Discussed worrisome reasons to return to dept including worsening symptoms     ED Course as of Sep 10 0950   Tue Aug 31, 2021   2345 Discussed results and plan of care with patient, she denies any further nausea, tolerating p.o. intake    [LP]      ED Course User Index  [LP] Job Arch, NP         Provider Notes (Medical Decision Making):     MDM  Number of Diagnoses or Management Options  Fatigue, unspecified type: new, needed workup  Nausea without vomiting: new, needed workup     Amount and/or Complexity of Data Reviewed  Clinical lab tests: ordered and reviewed  Tests in the radiology section of CPT®: ordered and reviewed  Independent visualization of images, tracings, or specimens: yes    Risk of Complications, Morbidity, and/or Mortality  Presenting problems: moderate  Diagnostic procedures: moderate  Management options: moderate    Patient Progress  Patient progress: stable         Disposition     Disposition: Condition stable  DC-The patient was given verbal follow-up instructions    Discharged    Remove if not discharged  DISCHARGE PLAN:  1. This patient is currently admitted, however, discharge medications can only be displayed within the current admission encounter. 2.   Follow-up Information     Follow up With Specialties Details Why Contact Info    Your PCP  Schedule an appointment as soon as possible for a visit in 2 days for ER follow up         3.   Return to ED if worse   4. Discharge Medication List as of 8/31/2021 11:50 PM      pepcid 20mg    Diagnosis     Clinical Impression:   1. Fatigue, unspecified type    2. Nausea without vomiting        Attestations:    Jonathan Lemos NP    Please note that this dictation was completed with [employer], the computer voice recognition software. Quite often unanticipated grammatical, syntax, homophones, and other interpretive errors are inadvertently transcribed by the computer software. Please disregard these errors. Please excuse any errors that have escaped final proofreading. Thank you.

## 2021-09-10 NOTE — PROGRESS NOTES
Recd report for offgoing RNLandy. Patient currently in restraints per order. Recd order from Dr. Syed Hare to d/c restraints. At approx. 200, ANEESH Ocampo and [address] [facility], [facility] witnessed patient trying to pull at IV double lumen line. Was able to redirect patient. Patient is witnessed by this Rn and Vishal Garland RN trying to pull lead wires. Notified Dr. Syed Hare of attempts of removing access and pulling on wires for hemodynamics, a safety concern for self. Recd order to replace restraint order. Informed Daughter, Worthy Hock of restraint order, Patient refusing to eat after this RN unsuccessful attempts as well as patient's sister. Stated she will try when she comes back to visit today. Also informed that patient will be going to CT when transport is available per CT. Stated appreciated all updates. Continuing with POC. Stable at this time. Sister is at bedside.

## 2021-09-10 NOTE — CONSULTS
Consult Date: 9/10/2021    Consults  Ms. Ezekiel Fox is a 60year old woman with history of breast cancer who was brought in 09/03 because she was found down on the floor unresposnive. Per EMS she was having seizures. She was given a total of 10 mg versed. She has fevers of 102. MRI showed bitemporal hyper intensities suspicious for hsv encephalitis. She was started on acyclovir 10 mg /kg q8h and LP done/ LP results consistent with viral encephalitis. She is also on keppra 1000 mg q12hrs. Subjective  opens eyes, tells me her name. 2 days ago had worsening mental status. Received 10 mg stat dose od IV decadron because of worsening edema of the right temporal love. Valproic acid 250 gm tid added. EEG ordered. ID increased acyclovir to 15mg/kg from 10mg/kg. Past Medical History:   Diagnosis Date    Breast [facility] ([facility] [place] [address]     Depression     Diabetes ([facility] [place] [address]     High cholesterol       Past Surgical History:   Procedure Laterality Date    HX MASTECTOMY      IR FLUORO [facility]IDE [facility] CVAD  9/9/2021     History reviewed. No pertinent family history.    Social History     Tobacco Use    Smoking status: Never Smoker   Substance Use Topics    Alcohol use: Not Currently       Current Facility-Administered Medications   Medication Dose Route Frequency Provider Last Rate Last Admin    citalopram (CELEXA) tablet 20 mg  20 mg Oral DAILY Tiffany Steward MD   20 mg at 09/10/21 9314    nystatin (MYCOSTATIN) 100,000 unit/mL oral suspension 500,000 Units  500,000 Units Oral QID Tiffany Steward MD   500,000 Units at 09/10/21 0907    dexamethasone (DECADRON) 4 mg/mL injection 4 mg  4 mg IntraVENous Q6H Rosa Soler MD   4 mg at 09/10/21 0540    0.9% sodium chloride infusion  125 mL/hr IntraVENous CONTINUOUS Tiffany Steward  mL/hr at 09/09/21 2050 125 mL/hr at 09/09/21 2050    apixaban (ELIQUIS) tablet 5 mg  5 mg Oral BID Tiffany Steward MD   5 mg at 09/10/21 0907    acyclovir (ZOVIRAX) 900 mg in 0.9% sodium chloride 250 mL IVPB  15 mg/kg (Ideal) IntraVENous Q8H Debra Serrato  mL/hr at 09/10/21 0540 900 mg at 09/10/21 0540    valproate (DEPACON) 200 mg in 0.9% sodium chloride 50 mL IVPB  200 mg IntraVENous Q6H Gregorio Mckeon MD 50 mL/hr at 09/10/21 0908 200 mg at 09/10/21 0908    levETIRAcetam (KEPPRA) 2,000 mg in 0.9% sodium chloride 250 mL IVPB  2,000 mg IntraVENous Q12H Gregorio Mckeno MD   2,000 mg at 09/09/21 2131    amLODIPine (NORVASC) tablet 5 mg  5 mg Oral DAILY Aishwarya Vines MD   5 mg at 09/10/21 8486    hydrALAZINE (APRESOLINE) 20 mg/mL injection 10 mg  10 mg IntraVENous Q6H PRN Aishwarya Vines MD        insulin lispro (HUMALOG) injection   SubCUTAneous AC&HS Laura Blanco MD   2 Units at 09/10/21 1843    potassium chloride (KLOR-CON) packet for solution 20 mEq  20 mEq Oral BID WITH MEALS Laura Blanco MD   20 mEq at 09/10/21 0907    sodium chloride (NS) flush 5-40 mL  5-40 mL IntraVENous Q8H Laura Blanco MD   10 mL at 09/10/21 0541    sodium chloride (NS) flush 5-40 mL  5-40 mL IntraVENous PRN Laura Blanco MD        acetaminophen (TYLENOL) tablet 650 mg  650 mg Oral Q6H PRN Laura Blanco MD   650 mg at 09/06/21 1236    Or    acetaminophen (TYLENOL) suppository 650 mg  650 mg Rectal Q6H PRN Laura Blanco MD        polyethylene glycol (MIRALAX) packet 17 g  17 g Oral DAILY PRN Laura Blanco MD        promethazine (PHENERGAN) tablet 12.5 mg  12.5 mg Oral Q6H PRN Laura Blanco MD        Or    ondansetron [facility]) injection 4 mg  4 mg IntraVENous Q6H PRN Anastacio Mendoza MD        glucose chewable tablet 16 g  4 Tablet Oral PRN Kana Baker MD        dextrose (D50W) injection syrg 12.5-25 g  25-50 mL IntraVENous PRN Mel Kang MD   25 g at 09/04/21 0404    glucagon (GLUCAGEN) injection 1 mg  1 mg IntraMUSCular PRN Mel Kang MD            Review of Systems   Unable to perform ROS: Mental status change   All other systems reviewed and are negative. Objective     Vital signs for last 24 hours:  Visit Vitals  BP (!) 161/66 (BP 1 Location: Right upper arm, BP Patient Position: At rest)   Pulse 66   Temp 98.2 °F (36.8 °C)   Resp 22   Ht 5' 6\" (1.676 m)   Wt 80 kg (176 lb 5.9 oz)   SpO2 96%   BMI 28.47 kg/m²       Intake/Output this shift:  Current Shift: No intake/output data recorded. Last 3 Shifts: 09/08 1901 - 09/10 0700  In: 2400 [P.O.:500; I.V.:1900]  Out: 3600 [Urine:3600]    Data Review:   Recent Results (from the past 24 hour(s))   GLUCOSE, POC    Collection Time: 09/09/21  1:45 PM   Result Value Ref Range    Glucose (POC) 101 65 - 117 mg/dL    Performed by Niurka Crews    GLUCOSE, POC    Collection Time: 09/09/21  6:26 PM   Result Value Ref Range    Glucose (POC) 152 (H) 65 - 117 mg/dL    Performed by Keeley Segundo, POC    Collection Time: 09/09/21 10:14 PM   Result Value Ref Range    Glucose (POC) 137 (H) 65 - 117 mg/dL    Performed by Edinson Solis    RENAL FUNCTION PANEL    Collection Time: 09/10/21  5:00 AM   Result Value Ref Range    Sodium 150 (H) 136 - 145 mmol/L    Potassium 3.6 3.5 - 5.1 mmol/L    Chloride 120 (H) 97 - 108 mmol/L    CO2 22 21 - 32 mmol/L    Anion gap 8 5 - 15 mmol/L    Glucose 152 (H) 65 - 100 mg/dL    BUN 16 6 - 20 mg/dL    Creatinine 0.91 0.55 - 1.02 mg/dL    BUN/Creatinine ratio 18 12 - 20      GFR est AA >60 >60 ml/min/1.73m2    GFR est non-AA >60 >60 ml/min/1.73m2    Calcium 7.7 (L) 8.5 - 10.1 mg/dL    Phosphorus 2.9 2.6 - 4.7 mg/dL    Albumin 2.3 (L) 3.5 - 5.0 g/dL       Physical Exam    Neuro Physical Exam      General: Well developed, well nourished. Patient in no apparent distress. Neurological Exam:  Mental Status: Eyes open, oriented x2, does not know month or year. Knows daughters name. Mental status better this am.    Cranial Nerves:   Intact visual fields. PERRL, EOMI, no nystagmus, no ptosis. Facial sensation is normal. Mild left facial droop. Palate is midline. Neck turned to left  Tongue is midline. Hearing is intact bilaterally. Motor:  Moves all 4 extremities spontaneously    Reflexes:   Deep tendon reflexes 2+/4 and symmetrical.   Sensory:   Normal to light touch in all 4 ext    Gait:  Cannot assess   Tremor:   No tremor noted. Cerebellar:  No cerebellar signs present. Babinski:       Down b/l     Assessment and Plan: Ms. Nasrin Ashraf is a 60year old woman with likely HSV encephalitis. - cont acyclovir 15mg/kg q8h. Follow up ID recs. Correction made to reported hsv pcr result. It was infact from CSF. - Will keep keppra at 2g bid IV and cont valrpoic acid 250 mg q8h iv  - will check valproic acid levels  - cont iv decadron 4 mg q6h given worsening right temporal lobe edema  - repeat Ct head daily for next few days to monitor for temporal lobe edema  - EEG : no seizures, generalized slowing  - basilic vein thrombus seen yesterday: on eliquis 5 mg bid     Discussed plan with daughter Nikki Abbott at bedside. It was also discussed that HSV encephalitis even with treatment can result in death and those who survive usually have neurological sequelae with behavioral and cognitive issues.

## 2021-09-10 NOTE — PROCEDURES
[facility]  EEG    Name:  Faiza Graham  MR#:  [id number]  :  1955  ACCOUNT #:  [de-identified]  DATE OF SERVICE:  2021    HISTORY OF PRESENT ILLNESS:  This is a 58-year-old woman with admission for HSV encephalitis and seizures. She had a change in mental status prior to having this EEG. ANTIEPILEPTIC MEDICATIONS:  Valproic acid and Keppra. METHOD:  This is a routine digital EEG performed using the standard 10-20 international recording system on an ICU patient, 19 scalp electrodes and two cardiac leads were placed. Photic stimulation was used. DESCRIPTION:  The EEG shows a background rhythm of generalized 4 Hz delta and 5 Hz theta wave activity throughout the record. There were frequent sharp waves noted over the right frontal region which correlate with edema on the MRI in this region. No clinical seizures, and no electrographic seizures were seen. Photic stimulation did not provoke a pathologic response. IMPRESSION:  This is an abnormal EEG owing to the generalized slowing that indicates global cerebral injury versus encephalopathy from herpes simplex virus infection. Sharp waves over the right frontal region correlate with the edema seen on her MRI.       Kenzie Sterling MD      [id number]  D:  09/10/2021 7:53  T:  09/10/2021 13:24  JOB #:  [id number]

## 2021-09-11 ENCOUNTER — APPOINTMENT (OUTPATIENT)
Dept: CT IMAGING | Age: 66
DRG: 871 | End: 2021-09-11
Attending: PSYCHIATRY & NEUROLOGY
Payer: COMMERCIAL

## 2021-09-11 LAB
ALBUMIN SERPL-MCNC: 2.6 G/DL (ref 3.5–5)
ANION GAP SERPL CALC-SCNC: 8 MMOL/L (ref 5–15)
BACTERIA SPEC CULT: NORMAL
BACTERIA SPEC CULT: NORMAL
BASOPHILS # BLD: 0 K/UL (ref 0–0.1)
BASOPHILS NFR BLD: 0 % (ref 0–1)
BUN SERPL-MCNC: 18 MG/DL (ref 6–20)
BUN/CREAT SERPL: 24 (ref 12–20)
CA-I BLD-MCNC: 8.8 MG/DL (ref 8.5–10.1)
CHLORIDE SERPL-SCNC: 114 MMOL/L (ref 97–108)
CO2 SERPL-SCNC: 24 MMOL/L (ref 21–32)
CREAT SERPL-MCNC: 0.74 MG/DL (ref 0.55–1.02)
DIFFERENTIAL METHOD BLD: ABNORMAL
EOSINOPHIL # BLD: 0 K/UL (ref 0–0.4)
EOSINOPHIL NFR BLD: 0 % (ref 0–7)
ERYTHROCYTE [DISTWIDTH] IN BLOOD BY AUTOMATED COUNT: 14.7 % (ref 11.5–14.5)
GLUCOSE BLD STRIP.AUTO-MCNC: 135 MG/DL (ref 65–117)
GLUCOSE BLD STRIP.AUTO-MCNC: 140 MG/DL (ref 65–117)
GLUCOSE BLD STRIP.AUTO-MCNC: 157 MG/DL (ref 65–117)
GLUCOSE SERPL-MCNC: 150 MG/DL (ref 65–100)
GRAM STN SPEC: NORMAL
GRAM STN SPEC: NORMAL
HCT VFR BLD AUTO: 41.5 % (ref 35–47)
HGB BLD-MCNC: 13.5 G/DL (ref 11.5–16)
IMM GRANULOCYTES # BLD AUTO: 0.1 K/UL (ref 0–0.04)
IMM GRANULOCYTES NFR BLD AUTO: 1 % (ref 0–0.5)
LYMPHOCYTES # BLD: 1.5 K/UL (ref 0.8–3.5)
LYMPHOCYTES NFR BLD: 12 % (ref 12–49)
MCH RBC QN AUTO: 29.2 PG (ref 26–34)
MCHC RBC AUTO-ENTMCNC: 32.5 G/DL (ref 30–36.5)
MCV RBC AUTO: 89.8 FL (ref 80–99)
MONOCYTES # BLD: 0.7 K/UL (ref 0–1)
MONOCYTES NFR BLD: 6 % (ref 5–13)
NEUTS SEG # BLD: 10.9 K/UL (ref 1.8–8)
NEUTS SEG NFR BLD: 81 % (ref 32–75)
NRBC # BLD: 0 K/UL (ref 0–0.01)
NRBC BLD-RTO: 0 PER 100 WBC
PERFORMED BY, TECHID: ABNORMAL
PHOSPHATE SERPL-MCNC: 3.4 MG/DL (ref 2.6–4.7)
PLATELET # BLD AUTO: 253 K/UL (ref 150–400)
PMV BLD AUTO: 10.6 FL (ref 8.9–12.9)
POTASSIUM SERPL-SCNC: 3.6 MMOL/L (ref 3.5–5.1)
RBC # BLD AUTO: 4.62 M/UL (ref 3.8–5.2)
SODIUM SERPL-SCNC: 146 MMOL/L (ref 136–145)
SPECIAL REQUESTS,SREQ: NORMAL
WBC # BLD AUTO: 13.3 K/UL (ref 3.6–11)

## 2021-09-11 PROCEDURE — 85025 COMPLETE CBC W/AUTO DIFF WBC: CPT

## 2021-09-11 PROCEDURE — 70450 CT HEAD/BRAIN W/O DYE: CPT

## 2021-09-11 PROCEDURE — 74011250636 HC RX REV CODE- 250/636: Performed by: INTERNAL MEDICINE

## 2021-09-11 PROCEDURE — 74011000250 HC RX REV CODE- 250: Performed by: HOSPITALIST

## 2021-09-11 PROCEDURE — 80069 RENAL FUNCTION PANEL: CPT

## 2021-09-11 PROCEDURE — 74011250636 HC RX REV CODE- 250/636: Performed by: HOSPITALIST

## 2021-09-11 PROCEDURE — 74011636637 HC RX REV CODE- 636/637: Performed by: INTERNAL MEDICINE

## 2021-09-11 PROCEDURE — 36415 COLL VENOUS BLD VENIPUNCTURE: CPT

## 2021-09-11 PROCEDURE — 74011000258 HC RX REV CODE- 258: Performed by: HOSPITALIST

## 2021-09-11 PROCEDURE — 65610000006 HC RM INTENSIVE CARE

## 2021-09-11 PROCEDURE — 74011250637 HC RX REV CODE- 250/637: Performed by: INTERNAL MEDICINE

## 2021-09-11 PROCEDURE — 82962 GLUCOSE BLOOD TEST: CPT

## 2021-09-11 PROCEDURE — 74011250636 HC RX REV CODE- 250/636: Performed by: PSYCHIATRY & NEUROLOGY

## 2021-09-11 PROCEDURE — 99232 SBSQ HOSP IP/OBS MODERATE 35: CPT | Performed by: INTERNAL MEDICINE

## 2021-09-11 RX ADMIN — NYSTATIN 500000 UNITS: 100000 SUSPENSION ORAL at 13:55

## 2021-09-11 RX ADMIN — SODIUM CHLORIDE 75 ML/HR: 4.5 INJECTION, SOLUTION INTRAVENOUS at 02:16

## 2021-09-11 RX ADMIN — POTASSIUM CHLORIDE 20 MEQ: 1.5 POWDER, FOR SOLUTION ORAL at 17:14

## 2021-09-11 RX ADMIN — INSULIN LISPRO 2 UNITS: 100 INJECTION, SOLUTION INTRAVENOUS; SUBCUTANEOUS at 17:13

## 2021-09-11 RX ADMIN — DEXAMETHASONE SODIUM PHOSPHATE 4 MG: 4 INJECTION, SOLUTION INTRA-ARTICULAR; INTRALESIONAL; INTRAMUSCULAR; INTRAVENOUS; SOFT TISSUE at 06:01

## 2021-09-11 RX ADMIN — Medication 10 ML: at 14:44

## 2021-09-11 RX ADMIN — Medication 10 ML: at 22:49

## 2021-09-11 RX ADMIN — APIXABAN 5 MG: 5 TABLET, FILM COATED ORAL at 09:26

## 2021-09-11 RX ADMIN — POTASSIUM CHLORIDE 20 MEQ: 1.5 POWDER, FOR SOLUTION ORAL at 07:38

## 2021-09-11 RX ADMIN — SODIUM CHLORIDE 900 MG: 9 INJECTION, SOLUTION INTRAVENOUS at 14:44

## 2021-09-11 RX ADMIN — NYSTATIN 500000 UNITS: 100000 SUSPENSION ORAL at 17:13

## 2021-09-11 RX ADMIN — VALPROATE SODIUM 200 MG: 100 INJECTION, SOLUTION INTRAVENOUS at 13:56

## 2021-09-11 RX ADMIN — LEVETIRACETAM 2000 MG: 100 INJECTION, SOLUTION INTRAVENOUS at 10:52

## 2021-09-11 RX ADMIN — LEVETIRACETAM 2000 MG: 100 INJECTION, SOLUTION INTRAVENOUS at 22:48

## 2021-09-11 RX ADMIN — NYSTATIN 500000 UNITS: 100000 SUSPENSION ORAL at 09:25

## 2021-09-11 RX ADMIN — VALPROATE SODIUM 200 MG: 100 INJECTION, SOLUTION INTRAVENOUS at 06:08

## 2021-09-11 RX ADMIN — VALPROATE SODIUM 200 MG: 100 INJECTION, SOLUTION INTRAVENOUS at 02:17

## 2021-09-11 RX ADMIN — SODIUM CHLORIDE 900 MG: 9 INJECTION, SOLUTION INTRAVENOUS at 06:02

## 2021-09-11 RX ADMIN — AMLODIPINE BESYLATE 5 MG: 5 TABLET ORAL at 09:26

## 2021-09-11 RX ADMIN — DEXAMETHASONE SODIUM PHOSPHATE 4 MG: 4 INJECTION, SOLUTION INTRA-ARTICULAR; INTRALESIONAL; INTRAMUSCULAR; INTRAVENOUS; SOFT TISSUE at 11:56

## 2021-09-11 RX ADMIN — CITALOPRAM HYDROBROMIDE 20 MG: 20 TABLET ORAL at 09:26

## 2021-09-11 RX ADMIN — NYSTATIN 500000 UNITS: 100000 SUSPENSION ORAL at 22:56

## 2021-09-11 RX ADMIN — APIXABAN 5 MG: 5 TABLET, FILM COATED ORAL at 22:56

## 2021-09-11 RX ADMIN — DEXAMETHASONE SODIUM PHOSPHATE 4 MG: 4 INJECTION, SOLUTION INTRA-ARTICULAR; INTRALESIONAL; INTRAMUSCULAR; INTRAVENOUS; SOFT TISSUE at 23:17

## 2021-09-11 RX ADMIN — SODIUM CHLORIDE 900 MG: 9 INJECTION, SOLUTION INTRAVENOUS at 22:48

## 2021-09-11 RX ADMIN — VALPROATE SODIUM 200 MG: 100 INJECTION, SOLUTION INTRAVENOUS at 18:10

## 2021-09-11 RX ADMIN — Medication 10 ML: at 06:00

## 2021-09-11 RX ADMIN — DEXAMETHASONE SODIUM PHOSPHATE 4 MG: 4 INJECTION, SOLUTION INTRA-ARTICULAR; INTRALESIONAL; INTRAMUSCULAR; INTRAVENOUS; SOFT TISSUE at 17:14

## 2021-09-11 NOTE — PROGRESS NOTES
Infectious Disease Progress Note         Subjective:   More alert and following commands today, but appeared to be resting during my assessment  Objective:   Physical Exam:     Visit Vitals  /61 (BP 1 Location: Right upper arm, BP Patient Position: At rest)   Pulse (!) 51   Temp 97.3 °F (36.3 °C)   Resp 16   Ht 5' 6\" (1.676 m)   Wt 176 lb 5.9 oz (80 kg)   SpO2 99%   BMI 28.47 kg/m²    O2 Flow Rate (L/min): 2 l/min O2 Device: None (Room air)    Temp (24hrs), Av.9 °F (36.6 °C), Min:97.3 °F (36.3 °C), Max:98.5 °F (36.9 °C)    No intake/output data recorded.  1901 -  0700  In: -   Out: 5300 [Urine:5300]    General: NAD, more alert, and responding to questions   HEENT: LAQUITA, Moist mucosa  Lungs: CTA b/l, no wheeze/rhonchi   Heart: S1S2+, RRR, no murmur  Abdo: Soft, NT, ND, +BS   Exts: improved strength on LUE, normal strength on right   Skin: No chronic wounds or ulcers     Data Review:       Recent Days:  Recent Labs     21  0555 21  0330   WBC 13.3* 7.6   HGB 13.5 14.7   HCT 41.5 44.5    224     Recent Labs     21  0555 09/10/21  0500 21  0330   BUN 18 16 17   CREA 0.74 0.91 1.60*       Lab Results   Component Value Date/Time    C-Reactive protein 4.70 (H) 2021 03:30 AM        Microbiology     Results     Procedure Component Value Units Date/Time    HSV 1 AND 2 BY PCR [[id number]] Collected: 21 1315    Order Status: Canceled Specimen: Other from Miscellaneous sample     HSV 1 AND 2 BY PCR [[id number]]  (Abnormal) Collected: 21 1300    Order Status: Completed Specimen: Other from Miscellaneous sample Updated: 21 1037     Source Cerebrospinal fluid        Comment: Corrected on  AT 1037: Previously reported as Blood        HSV-1 DNA by PCR Positive        HSV-2 DNA by PCR Negative        Comment: This test was developed and its performance characteristics  determined by [facility].  It has not been cleared or  approved by the U.S. Food and Drug Administration. The FDA has  determined that such clearance or approval is not necessary. This  test is used for clinical purposes. It should not be regarded as  investigational or research. Performed At: [address] [id number]  Bienvenido Moreland MD MB:[id number]         Devora Hardy [[id number]] Collected: 09/03/21 1300    Order Status: Completed Specimen: Other from Miscellaneous sample Updated: 09/08/21 1338     Source Cerebrospinal fluid        Viral Culture, General Comment        Comment: Preliminary Report:  No virus isolated at 4 days. Next report to follow after 7 days. Performed At: [address] [id number]  Bienvenido Moreland MD RR:[id number]         CULTURE, BODY Kip Tam [[id number]] Collected: 09/03/21 1300    Order Status: Canceled Specimen: Body Fluid     CULTURE, CSF Pippa Wilkinson [[id number]] Collected: 09/03/21 1200    Order Status: Completed Specimen: Cerebrospinal Fluid Updated: 09/05/21 1038     Special Requests: No Special Requests        GRAM STAIN Occasional WBCs seen         No organisms seen        Culture result:       No growth thus far holding 7 days          CULTURE, BLOOD, PAIRED [[id number]] Collected: 09/03/21 0700    Order Status: Completed Specimen: Blood Updated: 09/09/21 0740     Special Requests: No Special Requests        Culture result: No growth 6 days       COVID-19 RAPID TEST [[id number]] Collected: 09/03/21 0636    Order Status: Completed Specimen: Nasopharyngeal Updated: 09/03/21 0741     Specimen source Nasopharyngeal        COVID-19 rapid test Not Detected        Comment: Rapid Abbott ID Now   Rapid NAAT:  The specimen is NEGATIVE for SARS-CoV-2, the novel coronavirus associated with COVID-19.    Negative results should be treated as presumptive and, if inconsistent with clinical signs and symptoms or necessary for patient management, should be tested with an alternative molecular assay. Negative results do not preclude SARS-CoV-2 infection and should not be used as the sole basis for patient management decisions. This test has been authorized by the FDA under   an Emergency Use Authorization (EUA) for use by authorized laboratories. Fact sheet for Healthcare Providers: ConventionUpdate.co.nz Fact sheet for Patients: ConventionUpdate.co.nz   Methodology: Isothermal Nucleic Acid Amplification                  Diagnostics   CXR Results  (Last 48 hours)               09/10/21 0440  XR CHEST PORT Final result    Impression:  The cardiomediastinal silhouette is appropriate for age, technique,   and lung expansion. Pulmonary vasculature is not congested. The lungs are   essentially clear. No effusion or pneumothorax is seen. Narrative:  1 view comparison the fourth       NG tube is out. Left IJ line with tip in mid SVC                Assessment/Plan     1. 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 Routine labs in the morning     2. INDIA: Cr WNL, will continue to check renal function daily  while on Acyclovir     3. Seizure on admission: Due to # 1. On Keppra, and Valproic      Generalized slowing w no seizure on EEG  (09/09)     4. Hypernatremia: Na 150 on 09/10, trended down to  146      5. AMS: Due to # 1, More alert and following commands 146    6. 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 No new weakness on exam     7.  DVT of axillary vein/Thrombosis of left basilic vein: Anticoagulated on low dose Hussain Thomas MD    9/11/2021

## 2021-09-11 NOTE — PROGRESS NOTES
Paged neurologist regarding the results of the STAT CT. No orders received. Findings of the CT is under Imaging in chart review.

## 2021-09-11 NOTE — PROGRESS NOTES
Hospitalist Progress Note         Mishel Boyd MD          Daily Progress Note: 9/11/2021      Subjective:     65F, h/o DMII on oral meds, with acute encephalopathy s/y suspected HSV encephalitis complicated by seizure like activity    MRI , physical findings and Bcx positive for HSV1. Unfortunately, CSF cx cannot be processed due to inadequate sample    During the course of her stay on 9/8 developed stroke-like-symptoms, MRI showed edema with 2cm left shift and increased attenuation. She was started on decadron and valproate, in addition to keppra. She had UE DVT on eliquis    Currently her GCS is still 13, not at baseline s/t encephalitis and delirium. Hubert Schlatter Hilton is a 60 y. o. female who presents with seizure-like activity after being found on the floor by her family overnight. [place] arrival EMS she was having seizure activity and given Versed 5 mg which abated the convulsions. Luis Felipe Share started again and she was given another 5 mg of Versed.  On arrival to the ED she had a temperature of 102.  CT of the head was negative.  Labs were significant for a potassium of 2.8     Patient had been seen here in the ED on 8/31 with complaints of nausea and vomiting, dizziness and weakness. Wu Infante was concerned she may have had food poisoning although this was not felt to be the case. Bryce Banegas her daughter spoke with her on the phone on 9/2/21 her daughter noted she was confused and disoriented.     MRI showed classic high signal on T2 and flair images right temporal lobe. Patient was started on IV acyclovir as well as IV antibiotics     CSF white count was 200 with 65% PMNs and 35% lymphocytes     CSF culture negative to date. HIV was nonreactive. Patient seen in follow-up. Awake alert and oriented x4. Denies headaches, visual disturbance chest pain or shortness of breath.      Problem List:  Problem List as of 9/11/2021 Never Reviewed        Codes Class Noted - Resolved    Mild protein-calorie malnutrition ([facility] ICD-10-CM: E44.1  ICD-9-CM: 263.1  9/10/2021 - Present        Sepsis ([facility] ICD-10-CM: A41.9  ICD-9-CM: 038.9, 995.91  9/3/2021 - Present        New onset seizure ([facility] ICD-10-CM: R56.9  ICD-9-CM: 780.39  9/3/2021 - Present              Medications reviewed  Current Facility-Administered Medications   Medication Dose Route Frequency    0.45% sodium chloride infusion  75 mL/hr IntraVENous CONTINUOUS    citalopram (CELEXA) tablet 20 mg  20 mg Oral DAILY    nystatin (MYCOSTATIN) 100,000 unit/mL oral suspension 500,000 Units  500,000 Units Oral QID    dexamethasone (DECADRON) 4 mg/mL injection 4 mg  4 mg IntraVENous Q6H    apixaban (ELIQUIS) tablet 5 mg  5 mg Oral BID    acyclovir (ZOVIRAX) 900 mg in 0.9% sodium chloride 250 mL IVPB  15 mg/kg (Ideal) IntraVENous Q8H    valproate (DEPACON) 200 mg in 0.9% sodium chloride 50 mL IVPB  200 mg IntraVENous Q6H    levETIRAcetam (KEPPRA) 2,000 mg in 0.9% sodium chloride 250 mL IVPB  2,000 mg IntraVENous Q12H    amLODIPine (NORVASC) tablet 5 mg  5 mg Oral DAILY    hydrALAZINE (APRESOLINE) 20 mg/mL injection 10 mg  10 mg IntraVENous Q6H PRN    insulin lispro (HUMALOG) injection   SubCUTAneous AC&HS    potassium chloride (KLOR-CON) packet for solution 20 mEq  20 mEq Oral BID WITH MEALS    sodium chloride (NS) flush 5-40 mL  5-40 mL IntraVENous Q8H    sodium chloride (NS) flush 5-40 mL  5-40 mL IntraVENous PRN    acetaminophen (TYLENOL) tablet 650 mg  650 mg Oral Q6H PRN    Or    acetaminophen (TYLENOL) suppository 650 mg  650 mg Rectal Q6H PRN    polyethylene glycol (MIRALAX) packet 17 g  17 g Oral DAILY PRN    promethazine (PHENERGAN) tablet 12.5 mg  12.5 mg Oral Q6H PRN    Or    ondansetron (ZOFRAN) injection 4 mg  4 mg IntraVENous Q6H PRN    glucose chewable tablet 16 g  4 Tablet Oral PRN    dextrose (D50W) injection syrg 12.5-25 g  25-50 mL IntraVENous PRN    glucagon (GLUCAGEN) injection 1 mg  1 mg IntraMUSCular PRN       Review of Systems:   A comprehensive review of systems was negative except for that written in the HPI. Objective:   Physical Exam:     Visit Vitals  BP (!) 148/54 (BP 1 Location: Right upper arm, BP Patient Position: At rest)   Pulse (!) 49   Temp 97.6 °F (36.4 °C)   Resp 15   Ht 5' 6\" (1.676 m)   Wt 80 kg (176 lb 5.9 oz)   SpO2 98%   BMI 28.47 kg/m²    O2 Flow Rate (L/min): 2 l/min O2 Device: None (Room air)    Temp (24hrs), Av.8 °F (36.6 °C), Min:97.3 °F (36.3 °C), Max:98.1 °F (36.7 °C)    No intake/output data recorded. 09/10 0701 -  1900  In: -   Out: 4300 [Urine:4300]    General:  Alert, cooperative, no distress, appears stated age. Lungs:   Clear to auscultation bilaterally. Chest wall:  No tenderness or deformity. Heart:  Regular rate and rhythm, S1, S2 normal, no murmur, click, rub or gallop. Abdomen:   Soft, non-tender. Bowel sounds normal. No masses,  No organomegaly. Extremities: Extremities normal, atraumatic, no cyanosis or edema. Pulses: 2+ and symmetric all extremities. Skin: Skin color, texture, turgor normal. No rashes or lesions   Neurologic: CNII-XII intact. No gross sensory or motor deficits     Data Review:       Recent Days:  Recent Labs     21  0555 21  0330   WBC 13.3* 7.6   HGB 13.5 14.7   HCT 41.5 44.5    224     Recent Labs     21  0555 09/10/21  0500 21  0330   * 150* 143   K 3.6 3.6 4.2   * 120* 114*   CO2 24 22 22   * 152* 217*   BUN 18 16 17   CREA 0.74 0.91 1.60*   CA 8.8 7.7* 8.8   PHOS 3.4 2.9 4.3   ALB 2.6* 2.3* 2.7*     No results for input(s): PH, PCO2, PO2, HCO3, FIO2 in the last 72 hours.     24 Hour Results:  Recent Results (from the past 24 hour(s))   GLUCOSE, POC    Collection Time: 09/10/21 11:04 PM   Result Value Ref Range    Glucose (POC) 131 (H) 65 - 117 mg/dL    Performed by Joe Hussein    RENAL FUNCTION PANEL    Collection Time: 21  5:55 AM   Result Value Ref Range    Sodium 146 (H) 136 - 145 mmol/L    Potassium 3.6 3.5 - 5.1 mmol/L    Chloride 114 (H) 97 - 108 mmol/L    CO2 24 21 - 32 mmol/L    Anion gap 8 5 - 15 mmol/L    Glucose 150 (H) 65 - 100 mg/dL    BUN 18 6 - 20 mg/dL    Creatinine 0.74 0.55 - 1.02 mg/dL    BUN/Creatinine ratio 24 (H) 12 - 20      GFR est AA >60 >60 ml/min/1.73m2    GFR est non-AA >60 >60 ml/min/1.73m2    Calcium 8.8 8.5 - 10.1 mg/dL    Phosphorus 3.4 2.6 - 4.7 mg/dL    Albumin 2.6 (L) 3.5 - 5.0 g/dL   CBC WITH AUTOMATED DIFF    Collection Time: 09/11/21  5:55 AM   Result Value Ref Range    WBC 13.3 (H) 3.6 - 11.0 K/uL    RBC 4.62 3.80 - 5.20 M/uL    HGB 13.5 11.5 - 16.0 g/dL    HCT 41.5 35.0 - 47.0 %    MCV 89.8 80.0 - 99.0 FL    MCH 29.2 26.0 - 34.0 PG    MCHC 32.5 30.0 - 36.5 g/dL    RDW 14.7 (H) 11.5 - 14.5 %    PLATELET 511 232 - 869 K/uL    MPV 10.6 8.9 - 12.9 FL    NRBC 0.0 0.0  WBC    ABSOLUTE NRBC 0.00 0.00 - 0.01 K/uL    NEUTROPHILS 81 (H) 32 - 75 %    LYMPHOCYTES 12 12 - 49 %    MONOCYTES 6 5 - 13 %    EOSINOPHILS 0 0 - 7 %    BASOPHILS 0 0 - 1 %    IMMATURE GRANULOCYTES 1 (H) 0 - 0.5 %    ABS. NEUTROPHILS 10.9 (H) 1.8 - 8.0 K/UL    ABS. LYMPHOCYTES 1.5 0.8 - 3.5 K/UL    ABS. MONOCYTES 0.7 0.0 - 1.0 K/UL    ABS. EOSINOPHILS 0.0 0.0 - 0.4 K/UL    ABS. BASOPHILS 0.0 0.0 - 0.1 K/UL    ABS. IMM. GRANS. 0.1 (H) 0.00 - 0.04 K/UL    DF AUTOMATED     GLUCOSE, POC    Collection Time: 09/11/21  7:23 AM   Result Value Ref Range    Glucose (POC) 135 (H) 65 - 117 mg/dL    Performed by Sade Screen    GLUCOSE, POC    Collection Time: 09/11/21 12:28 PM   Result Value Ref Range    Glucose (POC) 140 (H) 65 - 117 mg/dL    Performed by Sade Screen    GLUCOSE, POC    Collection Time: 09/11/21  4:48 PM   Result Value Ref Range    Glucose (POC) 157 (H) 65 - 117 mg/dL    Performed by CECE KAUFFMAN            Assessment/     (1) 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 ID for duration.     (2) seixure. S/t #1.  PO keppra and valproic acid     (3)Sepsis: s/t #1. Resolved. DC other abx.     (4) Diabetes mellitus type 2: SSI    (5) Remote history of breast cancer     (6) Hypokalemia    (7) Benign essential hypertension    (8) DVT in upper extremity: eliquis 5 mg BID    (9) hypernatremia: o.45 with speech, likely valproic acid    (10) acute encephalopathy : encephalopathy + delerium        Plan:  0.45% saline  Conservative delirium treatment   Continue acyclovir, decadron , valproate and           DISPO: ICU for until daughter comfortable 2-3 days, likely PT after that. Discussed with daughter        Care Plan discussed with: Nurse    Total time spent with patient: 30 minutes.     Efra Segura MD

## 2021-09-11 NOTE — PROGRESS NOTES
Patient declines po trials and sister present and reports that patient is not eating. Rn aware as she is in the room. ST to follow.

## 2021-09-12 LAB
ALBUMIN SERPL-MCNC: 2.6 G/DL (ref 3.5–5)
ANION GAP SERPL CALC-SCNC: 6 MMOL/L (ref 5–15)
BASOPHILS # BLD: 0 K/UL (ref 0–0.1)
BASOPHILS NFR BLD: 0 % (ref 0–1)
BUN SERPL-MCNC: 20 MG/DL (ref 6–20)
BUN/CREAT SERPL: 29 (ref 12–20)
CA-I BLD-MCNC: 8.5 MG/DL (ref 8.5–10.1)
CHLORIDE SERPL-SCNC: 113 MMOL/L (ref 97–108)
CO2 SERPL-SCNC: 25 MMOL/L (ref 21–32)
CREAT SERPL-MCNC: 0.68 MG/DL (ref 0.55–1.02)
DIFFERENTIAL METHOD BLD: ABNORMAL
EOSINOPHIL # BLD: 0 K/UL (ref 0–0.4)
EOSINOPHIL NFR BLD: 0 % (ref 0–7)
ERYTHROCYTE [DISTWIDTH] IN BLOOD BY AUTOMATED COUNT: 14.2 % (ref 11.5–14.5)
GLUCOSE BLD STRIP.AUTO-MCNC: 106 MG/DL (ref 65–117)
GLUCOSE BLD STRIP.AUTO-MCNC: 128 MG/DL (ref 65–117)
GLUCOSE BLD STRIP.AUTO-MCNC: 153 MG/DL (ref 65–117)
GLUCOSE BLD STRIP.AUTO-MCNC: 165 MG/DL (ref 65–117)
GLUCOSE SERPL-MCNC: 193 MG/DL (ref 65–100)
HCT VFR BLD AUTO: 42.3 % (ref 35–47)
HGB BLD-MCNC: 13.9 G/DL (ref 11.5–16)
IMM GRANULOCYTES # BLD AUTO: 0.1 K/UL (ref 0–0.04)
IMM GRANULOCYTES NFR BLD AUTO: 1 % (ref 0–0.5)
LYMPHOCYTES # BLD: 1.9 K/UL (ref 0.8–3.5)
LYMPHOCYTES NFR BLD: 15 % (ref 12–49)
MCH RBC QN AUTO: 29.3 PG (ref 26–34)
MCHC RBC AUTO-ENTMCNC: 32.9 G/DL (ref 30–36.5)
MCV RBC AUTO: 89.2 FL (ref 80–99)
MONOCYTES # BLD: 0.8 K/UL (ref 0–1)
MONOCYTES NFR BLD: 7 % (ref 5–13)
NEUTS SEG # BLD: 9.7 K/UL (ref 1.8–8)
NEUTS SEG NFR BLD: 77 % (ref 32–75)
NRBC # BLD: 0 K/UL (ref 0–0.01)
NRBC BLD-RTO: 0 PER 100 WBC
PERFORMED BY, TECHID: ABNORMAL
PERFORMED BY, TECHID: NORMAL
PHOSPHATE SERPL-MCNC: 3.6 MG/DL (ref 2.6–4.7)
PLATELET # BLD AUTO: 265 K/UL (ref 150–400)
PMV BLD AUTO: 10.5 FL (ref 8.9–12.9)
POTASSIUM SERPL-SCNC: 4 MMOL/L (ref 3.5–5.1)
RBC # BLD AUTO: 4.74 M/UL (ref 3.8–5.2)
SODIUM SERPL-SCNC: 144 MMOL/L (ref 136–145)
VALPROATE SERPL-MCNC: 44 UG/ML (ref 50–100)
WBC # BLD AUTO: 12.5 K/UL (ref 3.6–11)

## 2021-09-12 PROCEDURE — 74011250636 HC RX REV CODE- 250/636: Performed by: PSYCHIATRY & NEUROLOGY

## 2021-09-12 PROCEDURE — 74011000250 HC RX REV CODE- 250: Performed by: HOSPITALIST

## 2021-09-12 PROCEDURE — 74011250636 HC RX REV CODE- 250/636: Performed by: INTERNAL MEDICINE

## 2021-09-12 PROCEDURE — 80164 ASSAY DIPROPYLACETIC ACD TOT: CPT

## 2021-09-12 PROCEDURE — 74011250637 HC RX REV CODE- 250/637: Performed by: INTERNAL MEDICINE

## 2021-09-12 PROCEDURE — 65610000006 HC RM INTENSIVE CARE

## 2021-09-12 PROCEDURE — 85025 COMPLETE CBC W/AUTO DIFF WBC: CPT

## 2021-09-12 PROCEDURE — 80069 RENAL FUNCTION PANEL: CPT

## 2021-09-12 PROCEDURE — 74011250636 HC RX REV CODE- 250/636: Performed by: HOSPITALIST

## 2021-09-12 PROCEDURE — 36415 COLL VENOUS BLD VENIPUNCTURE: CPT

## 2021-09-12 PROCEDURE — 99232 SBSQ HOSP IP/OBS MODERATE 35: CPT | Performed by: INTERNAL MEDICINE

## 2021-09-12 PROCEDURE — 74011000258 HC RX REV CODE- 258: Performed by: HOSPITALIST

## 2021-09-12 RX ORDER — DEXAMETHASONE SODIUM PHOSPHATE 4 MG/ML
4 INJECTION, SOLUTION INTRA-ARTICULAR; INTRALESIONAL; INTRAMUSCULAR; INTRAVENOUS; SOFT TISSUE EVERY 8 HOURS
Status: DISCONTINUED | OUTPATIENT
Start: 2021-09-12 | End: 2021-09-17

## 2021-09-12 RX ADMIN — APIXABAN 5 MG: 5 TABLET, FILM COATED ORAL at 09:02

## 2021-09-12 RX ADMIN — NYSTATIN 500000 UNITS: 100000 SUSPENSION ORAL at 21:33

## 2021-09-12 RX ADMIN — SODIUM CHLORIDE 75 ML/HR: 4.5 INJECTION, SOLUTION INTRAVENOUS at 09:02

## 2021-09-12 RX ADMIN — SODIUM CHLORIDE 900 MG: 9 INJECTION, SOLUTION INTRAVENOUS at 06:47

## 2021-09-12 RX ADMIN — Medication 10 ML: at 21:36

## 2021-09-12 RX ADMIN — LEVETIRACETAM 2000 MG: 100 INJECTION, SOLUTION INTRAVENOUS at 09:05

## 2021-09-12 RX ADMIN — DEXAMETHASONE SODIUM PHOSPHATE 4 MG: 4 INJECTION, SOLUTION INTRA-ARTICULAR; INTRALESIONAL; INTRAMUSCULAR; INTRAVENOUS; SOFT TISSUE at 06:47

## 2021-09-12 RX ADMIN — LEVETIRACETAM 2000 MG: 100 INJECTION, SOLUTION INTRAVENOUS at 21:34

## 2021-09-12 RX ADMIN — SODIUM CHLORIDE 900 MG: 9 INJECTION, SOLUTION INTRAVENOUS at 21:34

## 2021-09-12 RX ADMIN — VALPROATE SODIUM 200 MG: 100 INJECTION, SOLUTION INTRAVENOUS at 00:06

## 2021-09-12 RX ADMIN — VALPROATE SODIUM 200 MG: 100 INJECTION, SOLUTION INTRAVENOUS at 15:44

## 2021-09-12 RX ADMIN — Medication 10 ML: at 15:45

## 2021-09-12 RX ADMIN — Medication 10 ML: at 06:47

## 2021-09-12 RX ADMIN — VALPROATE SODIUM 200 MG: 100 INJECTION, SOLUTION INTRAVENOUS at 06:47

## 2021-09-12 RX ADMIN — AMLODIPINE BESYLATE 5 MG: 5 TABLET ORAL at 09:02

## 2021-09-12 RX ADMIN — APIXABAN 5 MG: 5 TABLET, FILM COATED ORAL at 21:34

## 2021-09-12 RX ADMIN — POTASSIUM CHLORIDE 20 MEQ: 1.5 POWDER, FOR SOLUTION ORAL at 09:02

## 2021-09-12 RX ADMIN — VALPROATE SODIUM 200 MG: 100 INJECTION, SOLUTION INTRAVENOUS at 18:00

## 2021-09-12 RX ADMIN — HYDRALAZINE HYDROCHLORIDE 10 MG: 20 INJECTION INTRAMUSCULAR; INTRAVENOUS at 22:02

## 2021-09-12 RX ADMIN — DEXAMETHASONE SODIUM PHOSPHATE 4 MG: 4 INJECTION, SOLUTION INTRA-ARTICULAR; INTRALESIONAL; INTRAMUSCULAR; INTRAVENOUS; SOFT TISSUE at 21:34

## 2021-09-12 RX ADMIN — SODIUM CHLORIDE 900 MG: 9 INJECTION, SOLUTION INTRAVENOUS at 15:44

## 2021-09-12 RX ADMIN — POTASSIUM CHLORIDE 20 MEQ: 1.5 POWDER, FOR SOLUTION ORAL at 18:00

## 2021-09-12 RX ADMIN — CITALOPRAM HYDROBROMIDE 20 MG: 20 TABLET ORAL at 09:00

## 2021-09-12 RX ADMIN — SODIUM CHLORIDE 75 ML/HR: 4.5 INJECTION, SOLUTION INTRAVENOUS at 21:40

## 2021-09-12 NOTE — PROGRESS NOTES
NEUROLOGY  PROGRESS NOTE    Admission History/Pertinent Events  Michael Titus is a 60y.o. year old female who presented on 9/3/2021. Patient has a past medical history of Breast Cancer, HL, DM2, Anxiety/Depression who presented with seizure like activity while on the floor by family. EMS reported patient having seizure-like activity for which patient was initially treated with midazolam.  In the ED, patient had a temperature a 102° F.  Emergent CT head was negative for any acute findings. Emergent contrasted MRI of the brain concerning for bilateral temporal pathology most concerning for HSV encephalitis or limbic encephalitis. Patient was loaded with IV levetiracetam and started on antibiotics as well as acyclovir. ASSESSMENT/PLAN      Impression  We will obtain MRI of the head given possible right MCA occlusion seen on previous CT of the head. We will continue patient on acyclovir and dexamethasone from neurologic perspective. Will consider antiplatelet and statin therapy based on findings on MRA of the head.       14 [facility] WO 9/3  1205 [facility] WO 9/9  Right temporal and insular region edema    CT WO 9/10  Possible RMCA ischemia with possible RMCA occlusion    CT WO 9/11  No interval changes from scan on 9/10    MRI Brain [facility] FOR COGNITIVE DISORDERS 9/3  T2/FLAIR hyperintense signal in the bilateral temporal regions more so in the right temporal and insular regions concerning for HSV encephalitis or possibly limbic encephalitis    MRI Brain [facility] FOR COGNITIVE DISORDERS 9/8  Interval progression of T2/FLAIR hyperintense signal change in the right cerebral and left frontal region concerning for worsening encephalitis    EEG 9/9  Generalized slowing of the background  Sharp discharges in the right frontal region    Valproic Acid Levels  44 (9/12)    CSF Studies   Positive: , , Protein 80, HSV-1 DNA  Negative/WNL: Glucose, HSV-2 DNA, Viral Cx     Labs  Positive: HSV 1 IgG Ab  Negative: HIV 1/2, RPR      Plan      Encephalopathy & Seizures d/t HSV1 Encephalitis  Cerebral Edema d/t Above  Possible RMCA Occlusion? Associated: Sepsis, Basilic Vein Thrombosus, INDIA  -[id number], TELE  -On Acyclovir per ID (Planned 21 day course)  -Continue Dexamethasone 4 TID  --> plan to decrease to 2 TID on 9/15 if patient is stable  -On Apixaban 5 BID  -Seizure Precautions  -Seizure Prophylaxis: Levetiracetam 2000 BID, Valproic Acid 200 Q6 Hrs  -SBP Goal < 160  -Glucose Goal < 180  -Head of Bed at 30 degrees  -PT/OT/ST as needed  -Management of metabolic/infectious derangements to referring teams  -F/U MRA Head WO  --> We will consider obtaining TTE, carotid ultrasound, TSH, lipid panel, HgbA1c if evidence of right MCA occlusion      SUBJECTIVE   Patient currently on acyclovir. Patient awake and alert but mild to moderately confused. Following central peripheral commands but seems to have decreased emotional drive when speaking however. Physical/Neurological Exam  Patient awake, alert; following central and peripheral commands   No expressive or receptive aphasia;  No dysarthria   Decreased emotional drive with speech  Pupils react to light bilaterally; EOM Intact   No visual field deficits on gross exam   No facial droop   Motor: 3 -/5 in the left hemibody, 4/5 in the right hemibody  No abnormal movements   Sensation to light touch intact grossly throughout       OBJECTIVE  Vital Signs  Temp:  [97 °F (36.1 °C)-99.9 °F (37.7 °C)]   Pulse (Heart Rate):  [44-97]   BP: ()/(54-93)   Resp Rate:  [15-36]   O2 Sat (%):  [96 %-100 %]   Weight: --    MEDICATIONS    Current Facility-Administered Medications:     dexamethasone (DECADRON) 4 mg/mL injection 4 mg, 4 mg, IntraVENous, Q8H, Yazan Xavier MD    0.45% sodium chloride infusion, 75 mL/hr, IntraVENous, CONTINUOUS, Harriet Lopez MD, Last Rate: 75 mL/hr at 09/12/21 0902, 75 mL/hr at 09/12/21 0902    citalopram (CELEXA) tablet 20 mg, 20 mg, Oral, DAILY, Liberty Moritz, Modena Petite, MD, 20 mg at 09/12/21 0900   nystatin (MYCOSTATIN) 100,000 unit/mL oral suspension 500,000 Units, 500,000 Units, Oral, QID, Alicia PATEL MD, 500,000 Units at 09/11/21 2256    apixaban (ELIQUIS) tablet 5 mg, 5 mg, Oral, BID, Alicia PATEL MD, 5 mg at 09/12/21 0902    acyclovir (ZOVIRAX) 900 mg in 0.9% sodium chloride 250 mL IVPB, 15 mg/kg (Ideal), IntraVENous, Q8H, Debra Serrato MD, Last Rate: 250 mL/hr at 09/12/21 1544, 900 mg at 09/12/21 1544    valproate (DEPACON) 200 mg in 0.9% sodium chloride 50 mL IVPB, 200 mg, IntraVENous, Q6H, Kitty Collins MD, Last Rate: 50 mL/hr at 09/12/21 1800, 200 mg at 09/12/21 1800    levETIRAcetam (KEPPRA) 2,000 mg in 0.9% sodium chloride 250 mL IVPB, 2,000 mg, IntraVENous, Q12H, Kitty Collins MD, 2,000 mg at 09/12/21 0905    amLODIPine (NORVASC) tablet 5 mg, 5 mg, Oral, DAILY, Zuleyma Carolina MD, 5 mg at 09/12/21 0902    hydrALAZINE (APRESOLINE) 20 mg/mL injection 10 mg, 10 mg, IntraVENous, Q6H PRN, Zuleyma Carolina MD, 10 mg at 09/10/21 1311    insulin lispro (HUMALOG) injection, , SubCUTAneous, AC&HS, Lizbeth Benavides MD, 2 Units at 09/11/21 1713    potassium chloride (KLOR-CON) packet for solution 20 mEq, 20 mEq, Oral, BID WITH MEALS, Emily Ellison MD, 20 mEq at 09/12/21 1800    sodium chloride (NS) flush 5-40 mL, 5-40 mL, IntraVENous, Q8H, Emily Mendoza MD, 10 mL at 09/12/21 1545    sodium chloride (NS) flush 5-40 mL, 5-40 mL, IntraVENous, PRN, Emily Ellison MD    acetaminophen (TYLENOL) tablet 650 mg, 650 mg, Oral, Q6H PRN, 650 mg at 09/06/21 1236 **OR** acetaminophen (TYLENOL) suppository 650 mg, 650 mg, Rectal, Q6H PRN, Emily Ellison MD    polyethylene glycol (MIRALAX) packet 17 g, 17 g, Oral, DAILY PRN, Emily Ellison MD    promethazine (PHENERGAN) tablet 12.5 mg, 12.5 mg, Oral, Q6H PRN **OR** ondansetron (ZOFRAN) injection 4 mg, 4 mg, IntraVENous, Q6H PRN, Emily Ellison MD    glucose chewable tablet 16 g, 4 Tablet, Oral, PRN, Yaneth Amezquita MD    dextrose (D50W) injection syrg 12.5-25 g, 25-50 mL, IntraVENous, PRN, Kana Baker MD, 25 g at 09/04/21 0409    glucagon (GLUCAGEN) injection 1 mg, 1 mg, IntraMUSCular, PRN, Kana Baker MD      Labs: I've reviewed the labs for today     This document has been prepared by the Dragon voice recognition system, typographical errors may have occurred.  Attempts have been made to correct errors, however inadvertent errors may persist.

## 2021-09-12 NOTE — PROGRESS NOTES
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 Unfortunately, CSF cx cannot be processed due to inadequate sample    During the course of her stay on 9/8 developed stroke-like-symptoms, MRI showed edema with 2cm left shift and increased attenuation. She was started on decadron and valproate, in addition to keppra. She had UE DVT on eliquis    Currently her GCS is still 13, not at baseline s/t encephalitis and delirium. Nathaniel Villalobos is a 60 y. o. female who presents with seizure-like activity after being found on the floor by her family overnight. [facility] [facility] arrival EMS she was having seizure activity and given Versed 5 mg which abated the convulsions. Dory Sissy started again and she was given another 5 mg of Versed.  On arrival to the ED she had a temperature of 102.  CT of the head was negative.  Labs were significant for a potassium of 2.8     Patient had been seen here in the ED on 8/31 with complaints of nausea and vomiting, dizziness and weakness. Hunter Jiménez was concerned she may have had food poisoning although this was not felt to be the case. Nurys Cruz her daughter spoke with her on the phone on 9/2/21 her daughter noted she was confused and disoriented.     MRI showed classic high signal on T2 and flair images right temporal lobe. Patient was started on IV acyclovir as well as IV antibiotics     CSF white count was 200 with 65% PMNs and 35% lymphocytes     CSF culture negative to date. HIV was nonreactive. Patient seen in follow-up. Awake alert and oriented x4. Denies headaches, visual disturbance chest pain or shortness of breath.      Problem List:  Problem List as of 9/12/2021 Never Reviewed        Codes Class Noted - Resolved    Mild protein-calorie malnutrition ([facility] ICD-10-CM: E44.1  ICD-9-CM: 263.1  9/10/2021 - Present        Sepsis ([facility] ICD-10-CM: A41.9  ICD-9-CM: 038.9, 995.91  9/3/2021 - Present        New onset seizure ([facility] ICD-10-CM: R56.9  ICD-9-CM: 780.39  9/3/2021 - Present              Medications reviewed  Current Facility-Administered Medications   Medication Dose Route Frequency    dexamethasone (DECADRON) 4 mg/mL injection 4 mg  4 mg IntraVENous Q8H    0.45% sodium chloride infusion  75 mL/hr IntraVENous CONTINUOUS    citalopram (CELEXA) tablet 20 mg  20 mg Oral DAILY    nystatin (MYCOSTATIN) 100,000 unit/mL oral suspension 500,000 Units  500,000 Units Oral QID    apixaban (ELIQUIS) tablet 5 mg  5 mg Oral BID    acyclovir (ZOVIRAX) 900 mg in 0.9% sodium chloride 250 mL IVPB  15 mg/kg (Ideal) IntraVENous Q8H    valproate (DEPACON) 200 mg in 0.9% sodium chloride 50 mL IVPB  200 mg IntraVENous Q6H    levETIRAcetam (KEPPRA) 2,000 mg in 0.9% sodium chloride 250 mL IVPB  2,000 mg IntraVENous Q12H    amLODIPine (NORVASC) tablet 5 mg  5 mg Oral DAILY    hydrALAZINE (APRESOLINE) 20 mg/mL injection 10 mg  10 mg IntraVENous Q6H PRN    insulin lispro (HUMALOG) injection   SubCUTAneous AC&HS    potassium chloride (KLOR-CON) packet for solution 20 mEq  20 mEq Oral BID WITH MEALS    sodium chloride (NS) flush 5-40 mL  5-40 mL IntraVENous Q8H    sodium chloride (NS) flush 5-40 mL  5-40 mL IntraVENous PRN    acetaminophen (TYLENOL) tablet 650 mg  650 mg Oral Q6H PRN    Or    acetaminophen (TYLENOL) suppository 650 mg  650 mg Rectal Q6H PRN    polyethylene glycol (MIRALAX) packet 17 g  17 g Oral DAILY PRN    promethazine (PHENERGAN) tablet 12.5 mg  12.5 mg Oral Q6H PRN    Or    ondansetron (ZOFRAN) injection 4 mg  4 mg IntraVENous Q6H PRN    glucose chewable tablet 16 g  4 Tablet Oral PRN    dextrose (D50W) injection syrg 12.5-25 g  25-50 mL IntraVENous PRN    glucagon (GLUCAGEN) injection 1 mg  1 mg IntraMUSCular PRN       Review of Systems:   A comprehensive review of systems was negative except for that written in the HPI. Objective:   Physical Exam:     Visit Vitals  BP (!) 148/69   Pulse (!) 53   Temp 98 °F (36.7 °C)   Resp 18   Ht 5' 6\" (1.676 m)   Wt 80 kg (176 lb 5.9 oz)   SpO2 97%   BMI 28.47 kg/m²    O2 Flow Rate (L/min): 2 l/min O2 Device: None (Room air)    Temp (24hrs), Av.2 °F (36.8 °C), Min:97.6 °F (36.4 °C), Max:99.9 °F (37.7 °C)    No intake/output data recorded. 09/10 1901 -  0700  In: 1750 [I.V.:1750]  Out: 3600 [Urine:3600]    General:  Alert, cooperative, no distress, appears stated age. Lungs:   Clear to auscultation bilaterally. Chest wall:  No tenderness or deformity. Heart:  Regular rate and rhythm, S1, S2 normal, no murmur, click, rub or gallop. Abdomen:   Soft, non-tender. Bowel sounds normal. No masses,  No organomegaly. Extremities: Extremities normal, atraumatic, no cyanosis or edema. Pulses: 2+ and symmetric all extremities. Skin: Skin color, texture, turgor normal. No rashes or lesions   Neurologic: CNII-XII intact. No gross sensory or motor deficits     Data Review:       Recent Days:  Recent Labs     21  0618 21  0555   WBC 12.5* 13.3*   HGB 13.9 13.5   HCT 42.3 41.5    253     Recent Labs     21  0618 21  0555 09/10/21  0500    146* 150*   K 4.0 3.6 3.6   * 114* 120*   CO2 25 24 22   * 150* 152*   BUN 20 18 16   CREA 0.68 0.74 0.91   CA 8.5 8.8 7.7*   PHOS 3.6 3.4 2.9   ALB 2.6* 2.6* 2.3*     No results for input(s): PH, PCO2, PO2, HCO3, FIO2 in the last 72 hours.     24 Hour Results:  Recent Results (from the past 24 hour(s))   GLUCOSE, POC    Collection Time: 21  4:48 PM   Result Value Ref Range    Glucose (POC) 157 (H) 65 - 117 mg/dL    Performed by CECE KAUFFMAN    GLUCOSE, POC    Collection Time: 21 10:41 PM   Result Value Ref Range    Glucose (POC) 128 (H) 65 - 117 mg/dL    Performed by MAUDE RODRIGUEZ    CBC WITH AUTOMATED DIFF    Collection Time: 09/12/21  6:18 AM   Result Value Ref Range    WBC 12.5 (H) 3.6 - 11.0 K/uL    RBC 4.74 3.80 - 5.20 M/uL    HGB 13.9 11.5 - 16.0 g/dL    HCT 42.3 35.0 - 47.0 %    MCV 89.2 80.0 - 99.0 FL    MCH 29.3 26.0 - 34.0 PG    MCHC 32.9 30.0 - 36.5 g/dL    RDW 14.2 11.5 - 14.5 %    PLATELET 439 581 - 681 K/uL    MPV 10.5 8.9 - 12.9 FL    NRBC 0.0 0.0  WBC    ABSOLUTE NRBC 0.00 0.00 - 0.01 K/uL    NEUTROPHILS 77 (H) 32 - 75 %    LYMPHOCYTES 15 12 - 49 %    MONOCYTES 7 5 - 13 %    EOSINOPHILS 0 0 - 7 %    BASOPHILS 0 0 - 1 %    IMMATURE GRANULOCYTES 1 (H) 0 - 0.5 %    ABS. NEUTROPHILS 9.7 (H) 1.8 - 8.0 K/UL    ABS. LYMPHOCYTES 1.9 0.8 - 3.5 K/UL    ABS. MONOCYTES 0.8 0.0 - 1.0 K/UL    ABS. EOSINOPHILS 0.0 0.0 - 0.4 K/UL    ABS. BASOPHILS 0.0 0.0 - 0.1 K/UL    ABS. IMM. GRANS. 0.1 (H) 0.00 - 0.04 K/UL    DF AUTOMATED     RENAL FUNCTION PANEL    Collection Time: 09/12/21  6:18 AM   Result Value Ref Range    Sodium 144 136 - 145 mmol/L    Potassium 4.0 3.5 - 5.1 mmol/L    Chloride 113 (H) 97 - 108 mmol/L    CO2 25 21 - 32 mmol/L    Anion gap 6 5 - 15 mmol/L    Glucose 193 (H) 65 - 100 mg/dL    BUN 20 6 - 20 mg/dL    Creatinine 0.68 0.55 - 1.02 mg/dL    BUN/Creatinine ratio 29 (H) 12 - 20      GFR est AA >60 >60 ml/min/1.73m2    GFR est non-AA >60 >60 ml/min/1.73m2    Calcium 8.5 8.5 - 10.1 mg/dL    Phosphorus 3.6 2.6 - 4.7 mg/dL    Albumin 2.6 (L) 3.5 - 5.0 g/dL   GLUCOSE, POC    Collection Time: 09/12/21 10:51 AM   Result Value Ref Range    Glucose (POC) 165 (H) 65 - 117 mg/dL    Performed by Manolo Ruiz            Assessment/     (1) 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 ID for duration.     (2) seixure. S/t #1. PO keppra and valproic acid     (3)Sepsis: s/t #1. Resolved.  DC other abx.     (4) Diabetes mellitus type 2: SSI    (5) Remote history of breast cancer     (6) Hypokalemia    (7) Benign essential hypertension    (8) DVT in upper extremity: eliquis 5 mg BID    (9) hypernatremia: o.45 with speech, likely valproic acid    (10) acute encephalopathy : encephalopathy + delerium        Plan:  0.45% saline  Conservative delirium treatment   Continue acyclovir, decadron , valproate and           DISPO: Transfer to floor tomorrow (please inform daughter before doing that thank you )        Care Plan discussed with: Nurse    Total time spent with patient: 30 minutes.     Maryanne Donnelly MD

## 2021-09-12 NOTE — PROGRESS NOTES
Infectious Disease Progress Note         Subjective:   Pt seen and examined at bedside, doing well, no acute events since last seen. Was resting comfortably during my assessment. Oral intake remains poor. Does not want NGT reinsertion per daughter   Objective:   Physical Exam:     Visit Vitals  BP (!) 145/70 (BP Patient Position: At rest)   Pulse (!) 53   Temp 98 °F (36.7 °C)   Resp 19   Ht 5' 6\" (1.676 m)   Wt 176 lb 5.9 oz (80 kg)   SpO2 98%   BMI 28.47 kg/m²    O2 Flow Rate (L/min): 2 l/min O2 Device: None (Room air)    Temp (24hrs), Av.2 °F (36.8 °C), Min:97.6 °F (36.4 °C), Max:99.9 °F (37.7 °C)    No intake/output data recorded. 09/10 1901 -  0700  In: 1750 [I.V.:1750]  Out: 2800 [Urine:2800]    General: NAD, resting not following commands  HEENT: LAQUITA, Moist mucosa  Lungs: CTA b/l, no wheeze/rhonchi   Heart: S1S2+, RRR, no murmur  Abdo: Soft, NT, ND, +BS   Exts: improved strength on LUE, normal strength on right   Skin: No chronic wounds or ulcers     Data Review:       Recent Days:  Recent Labs     21  0618 21  0555   WBC 12.5* 13.3*   HGB 13.9 13.5   HCT 42.3 41.5    253     Recent Labs     21  0618 21  0555 09/10/21  0500   BUN 20 18 16   CREA 0.68 0.74 0.91       Lab Results   Component Value Date/Time    C-Reactive protein 4.70 (H) 2021 03:30 AM        Microbiology     Results     Procedure Component Value Units Date/Time    HSV 1 AND 2 BY PCR [[id number]] Collected: 21 1315    Order Status: Canceled Specimen: Other from Miscellaneous sample     HSV 1 AND 2 BY PCR [[id number]]  (Abnormal) Collected: 21 1300    Order Status: Completed Specimen: Other from Miscellaneous sample Updated: 21 1037     Source Cerebrospinal fluid        Comment: Corrected on  AT 1037: Previously reported as Blood        HSV-1 DNA by PCR Positive        HSV-2 DNA by PCR Negative        Comment:  This test was developed and its performance characteristics  determined by "[employer]". It has not been cleared or  approved by the U.S. Food and Drug Administration. The FDA has  determined that such clearance or approval is not necessary. This  test is used for clinical purposes. It should not be regarded as  investigational or research. Performed At: [address], [address] [place] [address] [id number]  Julio C Pierce MD ZT:[id number]         Rosy Hernandez [[id number]] Collected: 09/03/21 1300    Order Status: Completed Specimen: Other from Miscellaneous sample Updated: 09/08/21 1338     Source Cerebrospinal fluid        Viral Culture, General Comment        Comment: Preliminary Report:  No virus isolated at 4 days. Next report to follow after 7 days. Performed At: [address] [id number]  Julio C Pierce MD LS:[id number]         CULTURE, BODY Rocio Danielleof [[id number]] Collected: 09/03/21 1300    Order Status: Canceled Specimen: Body Fluid     CULTURE, CSF Riley Bina [[id number]] Collected: 09/03/21 1200    Order Status: Completed Specimen: Cerebrospinal Fluid Updated: 09/11/21 1311     Special Requests: No Special Requests        GRAM STAIN Occasional WBCs seen         No organisms seen        Culture result:       no growth on solid media at 4 days                  no growth in Thio broth at 7 days          CULTURE, BLOOD, PAIRED [[id number]] Collected: 09/03/21 0700    Order Status: Completed Specimen: Blood Updated: 09/09/21 0740     Special Requests: No Special Requests        Culture result: No growth 6 days       COVID-19 RAPID TEST [[id number]] Collected: 09/03/21 0636    Order Status: Completed Specimen: Nasopharyngeal Updated: 09/03/21 0741     Specimen source Nasopharyngeal        COVID-19 rapid test Not Detected        Comment: Rapid Abbott ID Now   Rapid NAAT:  The specimen is NEGATIVE for SARS-CoV-2, the novel coronavirus associated with COVID-19.    Negative results should be treated as presumptive and, if inconsistent with clinical signs and symptoms or necessary for patient management, should be tested with an alternative molecular assay. Negative results do not preclude SARS-CoV-2 infection and should not be used as the sole basis for patient management decisions. This test has been authorized by the FDA under   an Emergency Use Authorization (EUA) for use by authorized laboratories. Fact sheet for Healthcare Providers: [employer] Fact sheet for Patients: [employer]   Methodology: Isothermal Nucleic Acid Amplification                Diagnostics   CXR Results  (Last 48 hours)    None         Assessment/Plan     1. 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 [place]: Resolved, will continue to monitor while on Acyclovir and adjust dosing if necessary     3. Seizure on admission: Due to # 1. On Keppra, and Valproic      Generalized slowing w no seizure on EEG  (09/09)    4. 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  Confusion and increased lethargy:Due to # 1, high dose Acyclovir also a contributor     Farrukh Chris MD    9/12/2021

## 2021-09-12 NOTE — PROGRESS NOTES
Bedside shift change report given to Lily Gonzalez RN (oncoming nurse) by Linda Dial RN (offgoing nurse). Report included the following information SBAR.

## 2021-09-12 NOTE — PROGRESS NOTES
Daughter attempts to encourage patient to participate, though patient is drowsy and unable to maintain level of alertness for po trials.  ST to f/u 9/13/21

## 2021-09-12 NOTE — CONSULTS
Consult Date: 9/12/2021    Consults  Ms. Nasrin Ashraf is a 60year old woman with history of breast cancer who was brought in 09/03 because she was found down on the floor unresposnive. Per EMS she was having seizures. She was given a total of 10 mg versed. She has fevers of 102. MRI showed bitemporal hyper intensities suspicious for hsv encephalitis. She was started on acyclovir 10 mg /kg q8h and LP done/ LP results consistent with viral encephalitis. She is also on keppra 1000 mg q12hrs. Subjective  opens eyes, tells me her name. 2 days ago had worsening mental status. Received 10 mg stat dose od IV decadron because of worsening edema of the right temporal love. Valproic acid 250 gm tid added. EEG ordered. ID increased acyclovir to 15mg/kg from 10mg/kg. 9/11/21: Ct reviewed and right temporal lobe edema is stable. Patient clinically more alert and awake    Past Medical History:   Diagnosis Date    [facility]     Depression     Diabetes ([facility]     High cholesterol       Past Surgical History:   Procedure Laterality Date    HX MASTECTOMY      IR FLUORO [employer] CVAD  9/9/2021     History reviewed. No pertinent family history.    Social History     Tobacco Use    Smoking status: Never Smoker   Substance Use Topics    Alcohol use: Not Currently       Current Facility-Administered Medications   Medication Dose Route Frequency Provider Last Rate Last Admin    dexamethasone (DECADRON) 4 mg/mL injection 4 mg  4 mg IntraVENous Q8H Torito Burnett MD        0.45% sodium chloride infusion  75 mL/hr IntraVENous CONTINUOUS Ita Zuniga MD 75 mL/hr at 09/12/21 0902 75 mL/hr at 09/12/21 0902    citalopram (CELEXA) tablet 20 mg  20 mg Oral DAILY Dottie Luciano MD   20 mg at 09/12/21 0900    nystatin (MYCOSTATIN) 100,000 unit/mL oral suspension 500,000 Units  500,000 Units Oral QID Dottie Luciano MD   500,000 Units at 09/11/21 2256    apixaban (ELIQUIS) tablet 5 mg  5 mg Oral BID Reji Fabiola Cano MD   5 mg at 09/12/21 0902    acyclovir (ZOVIRAX) 900 mg in 0.9% sodium chloride 250 mL IVPB  15 mg/kg (Ideal) IntraVENous Q8H Debra Serrato  mL/hr at 09/12/21 0647 900 mg at 09/12/21 0647    valproate (DEPACON) 200 mg in 0.9% sodium chloride 50 mL IVPB  200 mg IntraVENous Q6H Gracia Gonzalez MD 50 mL/hr at 09/12/21 0647 200 mg at 09/12/21 0647    levETIRAcetam (KEPPRA) 2,000 mg in 0.9% sodium chloride 250 mL IVPB  2,000 mg IntraVENous Q12H Gracia Gonzalez MD   2,000 mg at 09/12/21 0905    amLODIPine (NORVASC) tablet 5 mg  5 mg Oral DAILY Sammie Nicole MD   5 mg at 09/12/21 0902    hydrALAZINE (APRESOLINE) 20 mg/mL injection 10 mg  10 mg IntraVENous Q6H PRN Sammie Nicole MD   10 mg at 09/10/21 1311    insulin lispro (HUMALOG) injection   SubCUTAneous AC&HS Tiffany Steward MD   2 Units at 09/11/21 1713    potassium chloride (KLOR-CON) packet for solution 20 mEq  20 mEq Oral BID WITH MEALS Tiffany Steward MD   20 mEq at 09/12/21 0902    sodium chloride (NS) flush 5-40 mL  5-40 mL IntraVENous Q8H Tiffany Steward MD   10 mL at 09/12/21 0647    sodium chloride (NS) flush 5-40 mL  5-40 mL IntraVENous PRN Tiffany Steward MD        acetaminophen (TYLENOL) tablet 650 mg  650 mg Oral Q6H PRN Tiffany Steward MD   650 mg at 09/06/21 1236    Or    acetaminophen (TYLENOL) suppository 650 mg  650 mg Rectal Q6H PRN Tiffany Steward MD        polyethylene glycol (MIRALAX) packet 17 g  17 g Oral DAILY PRN Tiffany Steward MD        promethazine (PHENERGAN) tablet 12.5 mg  12.5 mg Oral Q6H PRN Tiffany Steward MD        Or    ondansetron [facility]) injection 4 mg  4 mg IntraVENous Q6H PRN Fabiola Mendoza MD        glucose chewable tablet 16 g  4 Tablet Oral PRN Kana Baker MD        dextrose (D50W) injection syrg 12.5-25 g  25-50 mL IntraVENous PRN Kana Baker MD   25 g at 09/04/21 0409    glucagon (GLUCAGEN) injection 1 mg  1 mg IntraMUSCular PRN Melanie Rodriguez MD            Review of Systems   Unable to perform ROS: Mental status change   All other systems reviewed and are negative. Objective     Vital signs for last 24 hours:  Visit Vitals  BP (!) 145/70 (BP Patient Position: At rest)   Pulse (!) 53   Temp 98 °F (36.7 °C)   Resp 19   Ht 5' 6\" (1.676 m)   Wt 80 kg (176 lb 5.9 oz)   SpO2 98%   BMI 28.47 kg/m²       Intake/Output this shift:  Current Shift: No intake/output data recorded. Last 3 Shifts: 09/10 1901 - 09/12 0700  In: 1750 [I.V.:1750]  Out: 2800 [Urine:2800]    Data Review:   Recent Results (from the past 24 hour(s))   GLUCOSE, POC    Collection Time: 09/11/21  4:48 PM   Result Value Ref Range    Glucose (POC) 157 (H) 65 - 117 mg/dL    Performed by CECE KAUFFMAN    GLUCOSE, POC    Collection Time: 09/11/21 10:41 PM   Result Value Ref Range    Glucose (POC) 128 (H) 65 - 117 mg/dL    Performed by Adrianna Sanz    CBC WITH AUTOMATED DIFF    Collection Time: 09/12/21  6:18 AM   Result Value Ref Range    WBC 12.5 (H) 3.6 - 11.0 K/uL    RBC 4.74 3.80 - 5.20 M/uL    HGB 13.9 11.5 - 16.0 g/dL    HCT 42.3 35.0 - 47.0 %    MCV 89.2 80.0 - 99.0 FL    MCH 29.3 26.0 - 34.0 PG    MCHC 32.9 30.0 - 36.5 g/dL    RDW 14.2 11.5 - 14.5 %    PLATELET 450 252 - 449 K/uL    MPV 10.5 8.9 - 12.9 FL    NRBC 0.0 0.0  WBC    ABSOLUTE NRBC 0.00 0.00 - 0.01 K/uL    NEUTROPHILS 77 (H) 32 - 75 %    LYMPHOCYTES 15 12 - 49 %    MONOCYTES 7 5 - 13 %    EOSINOPHILS 0 0 - 7 %    BASOPHILS 0 0 - 1 %    IMMATURE GRANULOCYTES 1 (H) 0 - 0.5 %    ABS. NEUTROPHILS 9.7 (H) 1.8 - 8.0 K/UL    ABS. LYMPHOCYTES 1.9 0.8 - 3.5 K/UL    ABS. MONOCYTES 0.8 0.0 - 1.0 K/UL    ABS. EOSINOPHILS 0.0 0.0 - 0.4 K/UL    ABS. BASOPHILS 0.0 0.0 - 0.1 K/UL    ABS. IMM.  GRANS. 0.1 (H) 0.00 - 0.04 K/UL    DF AUTOMATED     RENAL FUNCTION PANEL    Collection Time: 09/12/21  6:18 AM   Result Value Ref Range    Sodium 144 136 - 145 mmol/L    Potassium 4.0 3.5 - 5.1 mmol/L    Chloride 113 (H) 97 - 108 mmol/L    CO2 25 21 - 32 mmol/L    Anion gap 6 5 - 15 mmol/L    Glucose 193 (H) 65 - 100 mg/dL    BUN 20 6 - 20 mg/dL    Creatinine 0.68 0.55 - 1.02 mg/dL    BUN/Creatinine ratio 29 (H) 12 - 20      GFR est AA >60 >60 ml/min/1.73m2    GFR est non-AA >60 >60 ml/min/1.73m2    Calcium 8.5 8.5 - 10.1 mg/dL    Phosphorus 3.6 2.6 - 4.7 mg/dL    Albumin 2.6 (L) 3.5 - 5.0 g/dL   GLUCOSE, POC    Collection Time: 09/12/21 10:51 AM   Result Value Ref Range    Glucose (POC) 165 (H) 65 - 117 mg/dL    Performed by Cristina Cons        Physical Exam    Neuro Physical Exam      General: Well developed, well nourished. Patient in no apparent distress. Neurological Exam:  Mental Status: Eyes open, oriented x3, Knows daughters name. Mental status better this am.    Cranial Nerves:   Intact visual fields. PERRL, EOMI, no nystagmus, no ptosis. Facial sensation is normal. Mild left facial droop. Palate is midline. Neck turned to left  Tongue is midline. Hearing is intact bilaterally. Motor:  Moves all 4 extremities spontaneously    Reflexes:   Deep tendon reflexes 2+/4 and symmetrical.   Sensory:   Normal to light touch in all 4 ext    Gait:  Cannot assess   Tremor:   No tremor noted. Cerebellar:  No cerebellar signs present. Babinski:       Down b/l     Assessment and Plan: Ms. 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 - cont acyclovir 15mg/kg q8h. Follow up ID recs. Correction made to reported hsv pcr result. It was infact from CSF. - Will keep keppra at 2g bid IV and cont valrpoic acid 250 mg q8h iv  - will check valproic acid levels stat. They have Not bene drawn till date  - cont iv decadron 4 mg q6h given worsening right temporal lobe edema  - repeat CT head stable. Taper down decadron to 4 mg q8h  - EEG : no seizures, generalized slowing  - basilic vein thrombus seen:  on eliquis 5 mg bid     Discussed plan with daughter Antonio Olivarez at bedside.  It was also discussed that HSV encephalitis even with treatment can result in death and those who survive usually have neurological sequelae with behavioral and cognitive issues. can transfer out of icu tomorrow if stable.  Daughter Meseret Correa requests she be called before patient is transferred

## 2021-09-12 NOTE — PROGRESS NOTES
Patient taken to radiology department  For CT Head on continuous cardiac monitoring.  Patient remained in a hemodynamically stable state during the procedure and taken back to ICU

## 2021-09-13 ENCOUNTER — APPOINTMENT (OUTPATIENT)
Dept: MRI IMAGING | Age: 66
DRG: 871 | End: 2021-09-13
Attending: STUDENT IN AN ORGANIZED HEALTH CARE EDUCATION/TRAINING PROGRAM
Payer: COMMERCIAL

## 2021-09-13 LAB
ALBUMIN SERPL-MCNC: 2.5 G/DL (ref 3.5–5)
ANION GAP SERPL CALC-SCNC: 6 MMOL/L (ref 5–15)
BUN SERPL-MCNC: 16 MG/DL (ref 6–20)
BUN/CREAT SERPL: 33 (ref 12–20)
CA-I BLD-MCNC: 8 MG/DL (ref 8.5–10.1)
CHLORIDE SERPL-SCNC: 112 MMOL/L (ref 97–108)
CO2 SERPL-SCNC: 25 MMOL/L (ref 21–32)
CREAT SERPL-MCNC: 0.49 MG/DL (ref 0.55–1.02)
GLUCOSE BLD STRIP.AUTO-MCNC: 148 MG/DL (ref 65–117)
GLUCOSE BLD STRIP.AUTO-MCNC: 160 MG/DL (ref 65–117)
GLUCOSE BLD STRIP.AUTO-MCNC: 203 MG/DL (ref 65–117)
GLUCOSE SERPL-MCNC: 155 MG/DL (ref 65–100)
PERFORMED BY, TECHID: ABNORMAL
PHOSPHATE SERPL-MCNC: 2.8 MG/DL (ref 2.6–4.7)
POTASSIUM SERPL-SCNC: 3.5 MMOL/L (ref 3.5–5.1)
SODIUM SERPL-SCNC: 143 MMOL/L (ref 136–145)
SPECIMEN SOURCE: NORMAL
VIRUS SPEC CULT: NORMAL

## 2021-09-13 PROCEDURE — 36415 COLL VENOUS BLD VENIPUNCTURE: CPT

## 2021-09-13 PROCEDURE — 74011636637 HC RX REV CODE- 636/637: Performed by: INTERNAL MEDICINE

## 2021-09-13 PROCEDURE — 82962 GLUCOSE BLOOD TEST: CPT

## 2021-09-13 PROCEDURE — 74011250637 HC RX REV CODE- 250/637: Performed by: STUDENT IN AN ORGANIZED HEALTH CARE EDUCATION/TRAINING PROGRAM

## 2021-09-13 PROCEDURE — 65270000029 HC RM PRIVATE

## 2021-09-13 PROCEDURE — 74011000250 HC RX REV CODE- 250: Performed by: HOSPITALIST

## 2021-09-13 PROCEDURE — 99232 SBSQ HOSP IP/OBS MODERATE 35: CPT | Performed by: INTERNAL MEDICINE

## 2021-09-13 PROCEDURE — 70544 MR ANGIOGRAPHY HEAD W/O DYE: CPT

## 2021-09-13 PROCEDURE — 74011250636 HC RX REV CODE- 250/636: Performed by: HOSPITALIST

## 2021-09-13 PROCEDURE — 92526 ORAL FUNCTION THERAPY: CPT

## 2021-09-13 PROCEDURE — 80069 RENAL FUNCTION PANEL: CPT

## 2021-09-13 PROCEDURE — 74011000258 HC RX REV CODE- 258: Performed by: HOSPITALIST

## 2021-09-13 PROCEDURE — 74011250637 HC RX REV CODE- 250/637: Performed by: INTERNAL MEDICINE

## 2021-09-13 PROCEDURE — 74011250636 HC RX REV CODE- 250/636: Performed by: PSYCHIATRY & NEUROLOGY

## 2021-09-13 PROCEDURE — 74011250636 HC RX REV CODE- 250/636: Performed by: INTERNAL MEDICINE

## 2021-09-13 RX ORDER — VALPROIC ACID 250 MG/1
250 CAPSULE, LIQUID FILLED ORAL 3 TIMES DAILY
Status: DISCONTINUED | OUTPATIENT
Start: 2021-09-13 | End: 2021-09-17

## 2021-09-13 RX ADMIN — POTASSIUM CHLORIDE 20 MEQ: 1.5 POWDER, FOR SOLUTION ORAL at 11:09

## 2021-09-13 RX ADMIN — DEXAMETHASONE SODIUM PHOSPHATE 4 MG: 4 INJECTION, SOLUTION INTRA-ARTICULAR; INTRALESIONAL; INTRAMUSCULAR; INTRAVENOUS; SOFT TISSUE at 14:06

## 2021-09-13 RX ADMIN — Medication 10 ML: at 15:14

## 2021-09-13 RX ADMIN — SODIUM CHLORIDE 900 MG: 9 INJECTION, SOLUTION INTRAVENOUS at 15:14

## 2021-09-13 RX ADMIN — INSULIN LISPRO 2 UNITS: 100 INJECTION, SOLUTION INTRAVENOUS; SUBCUTANEOUS at 11:09

## 2021-09-13 RX ADMIN — POTASSIUM CHLORIDE 20 MEQ: 1.5 POWDER, FOR SOLUTION ORAL at 16:30

## 2021-09-13 RX ADMIN — Medication 10 ML: at 05:56

## 2021-09-13 RX ADMIN — NYSTATIN 500000 UNITS: 100000 SUSPENSION ORAL at 18:20

## 2021-09-13 RX ADMIN — VALPROATE SODIUM 200 MG: 100 INJECTION, SOLUTION INTRAVENOUS at 03:04

## 2021-09-13 RX ADMIN — CITALOPRAM HYDROBROMIDE 20 MG: 20 TABLET ORAL at 11:12

## 2021-09-13 RX ADMIN — SODIUM CHLORIDE 75 ML/HR: 4.5 INJECTION, SOLUTION INTRAVENOUS at 14:06

## 2021-09-13 RX ADMIN — VALPROIC ACID 250 MG: 250 CAPSULE, LIQUID FILLED ORAL at 18:20

## 2021-09-13 RX ADMIN — VALPROIC ACID 250 MG: 250 CAPSULE, LIQUID FILLED ORAL at 11:11

## 2021-09-13 RX ADMIN — DEXAMETHASONE SODIUM PHOSPHATE 4 MG: 4 INJECTION, SOLUTION INTRA-ARTICULAR; INTRALESIONAL; INTRAMUSCULAR; INTRAVENOUS; SOFT TISSUE at 05:56

## 2021-09-13 RX ADMIN — SODIUM CHLORIDE 900 MG: 9 INJECTION, SOLUTION INTRAVENOUS at 05:56

## 2021-09-13 RX ADMIN — INSULIN LISPRO 2 UNITS: 100 INJECTION, SOLUTION INTRAVENOUS; SUBCUTANEOUS at 16:30

## 2021-09-13 RX ADMIN — NYSTATIN 500000 UNITS: 100000 SUSPENSION ORAL at 14:06

## 2021-09-13 RX ADMIN — AMLODIPINE BESYLATE 5 MG: 5 TABLET ORAL at 11:12

## 2021-09-13 RX ADMIN — LEVETIRACETAM 2000 MG: 100 INJECTION, SOLUTION INTRAVENOUS at 11:09

## 2021-09-13 RX ADMIN — NYSTATIN 500000 UNITS: 100000 SUSPENSION ORAL at 11:09

## 2021-09-13 RX ADMIN — APIXABAN 5 MG: 5 TABLET, FILM COATED ORAL at 11:12

## 2021-09-13 NOTE — PROGRESS NOTES
Report given to [facility]. Patient moving to room 207. Placed on telemetry monitor. Transport here to take patient to MRI prior to transporting to new room. Daughter Tobias Mireles at bedside and aware of update. VSS.

## 2021-09-13 NOTE — PROGRESS NOTES
Transfer orders to [facility]. Patient will require PT/OT evaluation for discharge recommendation. Patient's insurance will require auth.           POLO Carrasquillo

## 2021-09-13 NOTE — PROGRESS NOTES
NEUROLOGY  PROGRESS NOTE    Admission History/Pertinent Events  Claudia Martins is a 60y.o. year old female who presented on 9/3/2021. Patient has a past medical history of Breast Cancer, HL, DM2, Anxiety/Depression who presented with seizure like activity while on the floor by family. EMS reported patient having seizure-like activity for which patient was initially treated with midazolam.  In the ED, patient had a temperature a 102° F.  Emergent CT head was negative for any acute findings. Emergent contrasted MRI of the brain concerning for bilateral temporal pathology most concerning for HSV encephalitis or limbic encephalitis. Patient was loaded with IV levetiracetam and started on antibiotics as well as acyclovir. ASSESSMENT/PLAN      Impression  Reviewed patient's MRA of the head which does not show any significant stenosis, occlusions or aneurysms intracranially. No evidence of left MCA right MCA ischemia on diffusion-weighted imaging. We will continue patient on acyclovir and dexamethasone for now. We will continue patient on levetiracetam and valproic acid for seizure prophylaxis.       14 [facility] WO 9/3  1205 [facility] WO 9/9  Right temporal and insular region edema    CTH WO 9/10  Possible RMCA ischemia with possible RMCA occlusion    CTH WO 9/11  No interval changes from scan on 9/10    MRI Brain [facility] FOR COGNITIVE DISORDERS 9/3  T2/FLAIR hyperintense signal in the bilateral temporal regions more so in the right temporal and insular regions concerning for HSV encephalitis or possibly limbic encephalitis    MRI Brain [facility] FOR COGNITIVE DISORDERS 9/8  Interval progression of T2/FLAIR hyperintense signal change in the right cerebral and left frontal region concerning for worsening encephalitis    MRA Head WO 9/13  No occlusions, dissections, significant stenosis, pseudoaneurysms or aneurysms in the intracranial arterial system    EEG 9/9  Generalized slowing of the background  Sharp discharges in the right frontal region    Valproic Acid Levels  44 (9/12)    CSF Studies   Positive: , , Protein 80, HSV-1 DNA  Negative/WNL: Glucose, HSV-2 DNA, Viral Cx     Labs  Positive: HSV 1 IgG Ab  Negative: HIV 1/2, RPR      Plan      Encephalopathy & Seizures d/t HSV1 Encephalitis  Cerebral Edema d/t Above  Associated: Sepsis, Basilic Vein Thrombosus, [place]  -H2MhereIpkrac, TELE  -On Acyclovir per ID (Planned 21 day course)  --> Plan to repeat LP with CSF studies after 21 days of Acyclovir has been completed  ---> WBC, RBC, Protein, HSV-1 PCR  -Continue Dexamethasone 4 TID  --> plan to decrease to 2 TID on 9/15 if patient is stable  -On Apixaban 5 BID  -Seizure Precautions  -Seizure Prophylaxis: Levetiracetam 2000 BID, Valproic Acid 250 TID  -SBP Goal < 160  -Glucose Goal < 180  -Head of Bed at 30 degrees  -PT/OT/ST as needed  -Management of metabolic/infectious derangements to referring teams      SUBJECTIVE   Patient currently on acyclovir. No significant changes on neurological examination. Patient continues to have decreased emotional drive during conversation. Physical/Neurological Exam  Patient awake, alert; following central and peripheral commands   No expressive or receptive aphasia;  No dysarthria   Decreased emotional drive with speech  Pupils react to light bilaterally; EOM Intact   No visual field deficits on gross exam   No facial droop   Motor: 3-/5 in the left hemibody, 4/5 in the right hemibody  No abnormal movements   Sensation to light touch intact grossly throughout       OBJECTIVE  Vital Signs  Temp:  [97 °F (36.1 °C)-99.9 °F (37.7 °C)]   Pulse (Heart Rate):  [44-97]   BP: ()/(48-85)   Resp Rate:  [15-25]   O2 Sat (%):  [96 %-99 %]   Weight: --    MEDICATIONS    Current Facility-Administered Medications:     dexamethasone (DECADRON) 4 mg/mL injection 4 mg, 4 mg, IntraVENous, Q8H, Shelton Hill MD, 4 mg at 09/13/21 0556    0.45% sodium chloride infusion, 75 mL/hr, IntraVENous, CONTINUOUS, Anastasia Capone MD, Last Rate: 75 mL/hr at 09/12/21 2140, 75 mL/hr at 09/12/21 2140    citalopram (CELEXA) tablet 20 mg, 20 mg, Oral, DAILY, Vibha Dewitt, Merline Speedy, MD, 20 mg at 09/12/21 0900    nystatin (MYCOSTATIN) 100,000 unit/mL oral suspension 500,000 Units, 500,000 Units, Oral, QID, Don PATEL MD, 500,000 Units at 09/12/21 2133    apixaban (ELIQUIS) tablet 5 mg, 5 mg, Oral, BID, Don PATEL MD, 5 mg at 09/12/21 2134    acyclovir (ZOVIRAX) 900 mg in 0.9% sodium chloride 250 mL IVPB, 15 mg/kg (Ideal), IntraVENous, Q8H, Debra Serrato MD, Last Rate: 250 mL/hr at 09/13/21 0556, 900 mg at 09/13/21 0556    valproate (DEPACON) 200 mg in 0.9% sodium chloride 50 mL IVPB, 200 mg, IntraVENous, Q6H, Belén Collins MD, Last Rate: 50 mL/hr at 09/13/21 0304, 200 mg at 09/13/21 0304    levETIRAcetam (KEPPRA) 2,000 mg in 0.9% sodium chloride 250 mL IVPB, 2,000 mg, IntraVENous, Q12H, Belén Collins MD, 2,000 mg at 09/12/21 2134    amLODIPine (NORVASC) tablet 5 mg, 5 mg, Oral, DAILY, Kristin Flores MD, 5 mg at 09/12/21 0902    hydrALAZINE (APRESOLINE) 20 mg/mL injection 10 mg, 10 mg, IntraVENous, Q6H PRN, Kristin Flores MD, 10 mg at 09/12/21 2202    insulin lispro (HUMALOG) injection, , SubCUTAneous, AC&HS, Pamela De Dios MD, 2 Units at 09/11/21 1713    potassium chloride (KLOR-CON) packet for solution 20 mEq, 20 mEq, Oral, BID WITH MEALS, Pamela De Dios MD, 20 mEq at 09/12/21 1800    sodium chloride (NS) flush 5-40 mL, 5-40 mL, IntraVENous, Q8H, Pamela De Dios MD, 10 mL at 09/13/21 0556    sodium chloride (NS) flush 5-40 mL, 5-40 mL, IntraVENous, PRN, Kerin Res, Merline Speedy, MD    acetaminophen (TYLENOL) tablet 650 mg, 650 mg, Oral, Q6H PRN, 650 mg at 09/06/21 1236 **OR** acetaminophen (TYLENOL) suppository 650 mg, 650 mg, Rectal, Q6H PRN, Pamela De Dios MD    polyethylene glycol (MIRALAX) packet 17 g, 17 g, Oral, DAILY PRN, Kerin Res, Merline Speedy, MD    promethazine (PHENERGAN) tablet 12.5 mg, 12.5 mg, Oral, Q6H PRN **OR** ondansetron (ZOFRAN) injection 4 mg, 4 mg, IntraVENous, Q6H PRN, Anastacio Mendoza MD    glucose chewable tablet 16 g, 4 Tablet, Oral, PRN, Kana Baker MD    dextrose (D50W) injection syrg 12.5-25 g, 25-50 mL, IntraVENous, PRN, Kana Baker MD, 25 g at 09/04/21 0409    glucagon (GLUCAGEN) injection 1 mg, 1 mg, IntraMUSCular, PRN, Kana Baker MD      Labs: I've reviewed the labs for today     This document has been prepared by the Dragon voice recognition system, typographical errors may have occurred.  Attempts have been made to correct errors, however inadvertent errors may persist.

## 2021-09-13 NOTE — PROGRESS NOTES
Hospitalist Progress Note         Efra Segura MD          Daily Progress Note: 9/13/2021      Subjective:     65F, h/o DMII on oral meds, with acute encephalopathy s/y suspected HSV encephalitis complicated by seizure like activity    MRI , physical findings and Bcx positive for HSV1. Unfortunately, CSF cx cannot be processed due to inadequate sample    During the course of her stay on 9/8 developed stroke-like-symptoms, MRI showed edema with 2cm left shift and increased attenuation. She was started on decadron and valproate, in addition to keppra. She had UE DVT on eliquis    Currently her GCS is still 13, not at baseline s/t encephalitis and delirium. Jair Villalobos is a 60 y. o. female who presents with seizure-like activity after being found on the floor by her family overnight. [place] [facility] arrival EMS she was having seizure activity and given Versed 5 mg which abated the convulsions. [employer] started again and she was given another 5 mg of Versed.  On arrival to the ED she had a temperature of 102.  CT of the head was negative.  Labs were significant for a potassium of 2.8     Patient had been seen here in the ED on 8/31 with complaints of nausea and vomiting, dizziness and weakness. Cornelius Richardson was concerned she may have had food poisoning although this was not felt to be the case. Damián Bowles her daughter spoke with her on the phone on 9/2/21 her daughter noted she was confused and disoriented.     MRI showed classic high signal on T2 and flair images right temporal lobe. Patient was started on IV acyclovir as well as IV antibiotics     CSF white count was 200 with 65% PMNs and 35% lymphocytes     CSF culture negative to date. HIV was nonreactive. Patient seen in follow-up. Awake alert and oriented x4. Denies headaches, visual disturbance chest pain or shortness of breath.      Problem List:  Problem List as of 9/13/2021 Never Reviewed        Codes Class Noted - Resolved    Mild protein-calorie malnutrition ([facility] ICD-10-CM: E44.1  ICD-9-CM: 263.1  9/10/2021 - Present        Sepsis ([facility] ICD-10-CM: A41.9  ICD-9-CM: 038.9, 995.91  9/3/2021 - Present        New onset seizure ([facility] ICD-10-CM: R56.9  ICD-9-CM: 780.39  9/3/2021 - Present              Medications reviewed  Current Facility-Administered Medications   Medication Dose Route Frequency    valproic acid (DEPAKENE) capsule 250 mg  250 mg Oral TID    dexamethasone (DECADRON) 4 mg/mL injection 4 mg  4 mg IntraVENous Q8H    0.45% sodium chloride infusion  75 mL/hr IntraVENous CONTINUOUS    citalopram (CELEXA) tablet 20 mg  20 mg Oral DAILY    nystatin (MYCOSTATIN) 100,000 unit/mL oral suspension 500,000 Units  500,000 Units Oral QID    apixaban (ELIQUIS) tablet 5 mg  5 mg Oral BID    acyclovir (ZOVIRAX) 900 mg in 0.9% sodium chloride 250 mL IVPB  15 mg/kg (Ideal) IntraVENous Q8H    levETIRAcetam (KEPPRA) 2,000 mg in 0.9% sodium chloride 250 mL IVPB  2,000 mg IntraVENous Q12H    amLODIPine (NORVASC) tablet 5 mg  5 mg Oral DAILY    hydrALAZINE (APRESOLINE) 20 mg/mL injection 10 mg  10 mg IntraVENous Q6H PRN    insulin lispro (HUMALOG) injection   SubCUTAneous AC&HS    potassium chloride (KLOR-CON) packet for solution 20 mEq  20 mEq Oral BID WITH MEALS    sodium chloride (NS) flush 5-40 mL  5-40 mL IntraVENous Q8H    sodium chloride (NS) flush 5-40 mL  5-40 mL IntraVENous PRN    acetaminophen (TYLENOL) tablet 650 mg  650 mg Oral Q6H PRN    Or    acetaminophen (TYLENOL) suppository 650 mg  650 mg Rectal Q6H PRN    polyethylene glycol (MIRALAX) packet 17 g  17 g Oral DAILY PRN    promethazine (PHENERGAN) tablet 12.5 mg  12.5 mg Oral Q6H PRN    Or    ondansetron (ZOFRAN) injection 4 mg  4 mg IntraVENous Q6H PRN    glucose chewable tablet 16 g  4 Tablet Oral PRN    dextrose (D50W) injection syrg 12.5-25 g  25-50 mL IntraVENous PRN    glucagon (GLUCAGEN) injection 1 mg  1 mg IntraMUSCular PRN       Review of Systems:   A comprehensive review of systems was negative except for that written in the HPI. Objective:   Physical Exam:     Visit Vitals  /62 (BP 1 Location: Left upper arm, BP Patient Position: At rest)   Pulse (!) 55   Temp 97.6 °F (36.4 °C)   Resp 19   Ht 5' 6\" (1.676 m)   Wt 80 kg (176 lb 5.9 oz)   SpO2 97%   BMI 28.47 kg/m²    O2 Flow Rate (L/min): 2 l/min O2 Device: None (Room air)    Temp (24hrs), Av.8 °F (36.6 °C), Min:97.6 °F (36.4 °C), Max:98 °F (36.7 °C)    No intake/output data recorded.  1901 -  0700  In: 5442.5 [P.O.:240; I.V.:5202.5]  Out: 3250 [Urine:3250]    General:  Alert, cooperative, no distress, appears stated age. Lungs:   Clear to auscultation bilaterally. Chest wall:  No tenderness or deformity. Heart:  Regular rate and rhythm, S1, S2 normal, no murmur, click, rub or gallop. Abdomen:   Soft, non-tender. Bowel sounds normal. No masses,  No organomegaly. Extremities: Extremities normal, atraumatic, no cyanosis or edema. Pulses: 2+ and symmetric all extremities. Skin: Skin color, texture, turgor normal. No rashes or lesions   Neurologic: CNII-XII intact. No gross sensory or motor deficits     Data Review:       Recent Days:  Recent Labs     21  0618 21  0555   WBC 12.5* 13.3*   HGB 13.9 13.5   HCT 42.3 41.5    253     Recent Labs     21  0450 21  0618 21  0555    144 146*   K 3.5 4.0 3.6   * 113* 114*   CO2 25 25 24   * 193* 150*   BUN 16 20 18   CREA 0.49* 0.68 0.74   CA 8.0* 8.5 8.8   PHOS 2.8 3.6 3.4   ALB 2.5* 2.6* 2.6*     No results for input(s): PH, PCO2, PO2, HCO3, FIO2 in the last 72 hours.     24 Hour Results:  Recent Results (from the past 24 hour(s))   GLUCOSE, POC    Collection Time: 21  3:55 PM   Result Value Ref Range    Glucose (POC) 153 (H) 65 - 117 mg/dL    Performed by [address] [place] [address], POC    Collection Time: 21  9:34 PM   Result Value Ref Range    Glucose (POC) 106 65 - 117 mg/dL    Performed by Rocio Metzger    RENAL FUNCTION PANEL    Collection Time: 09/13/21  4:50 AM   Result Value Ref Range    Sodium 143 136 - 145 mmol/L    Potassium 3.5 3.5 - 5.1 mmol/L    Chloride 112 (H) 97 - 108 mmol/L    CO2 25 21 - 32 mmol/L    Anion gap 6 5 - 15 mmol/L    Glucose 155 (H) 65 - 100 mg/dL    BUN 16 6 - 20 mg/dL    Creatinine 0.49 (L) 0.55 - 1.02 mg/dL    BUN/Creatinine ratio 33 (H) 12 - 20      GFR est AA >60 >60 ml/min/1.73m2    GFR est non-AA >60 >60 ml/min/1.73m2    Calcium 8.0 (L) 8.5 - 10.1 mg/dL    Phosphorus 2.8 2.6 - 4.7 mg/dL    Albumin 2.5 (L) 3.5 - 5.0 g/dL   GLUCOSE, POC    Collection Time: 09/13/21 11:34 AM   Result Value Ref Range    Glucose (POC) 148 (H) 65 - 117 mg/dL    Performed by Linden Shepard            Assessment/     (1) 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 ID for duration. will order MRA.      (2) seixure. S/t #1. PO keppra and valproic acid     (3)Sepsis: s/t #1. Resolved. DC other abx.     (4) Diabetes mellitus type 2: SSI    (5) Remote history of breast cancer     (6) Hypokalemia    (7) Benign essential hypertension    (8) DVT in upper extremity: eliquis 5 mg BID    (9) hypernatremia: o.45 with speech, likely valproic acid    (10) acute encephalopathy : encephalopathy + delerium        Plan:  0.45% saline  Conservative delirium treatment   Continue acyclovir, decadron , valproate and   MRA            DISPO: Transfer to floor today. Daughter informed. Daughter requested,  there was a PICC line that fell and no one told daughter, she wants that investigated. I will let supervisor know. Care Plan discussed with: Nurse    Total time spent with patient: 30 minutes.     Ligia Rosado MD

## 2021-09-13 NOTE — PROGRESS NOTES
Infectious Disease Progress Note         Subjective:   Pt seen and examined at bedside. Doing well, denies new complaints less interactive. MRI head repeated. Transferred out of the ICU   Objective:   Physical Exam:     Visit Vitals  BP (!) 151/73 (BP 1 Location: Left upper arm, BP Patient Position: At rest)   Pulse 65   Temp 97.4 °F (36.3 °C)   Resp 18   Ht 5' 6\" (1.676 m)   Wt 176 lb 5.9 oz (80 kg)   SpO2 96%   BMI 28.47 kg/m²    O2 Flow Rate (L/min): 2 l/min O2 Device: None (Room air)    Temp (24hrs), Av.8 °F (36.6 °C), Min:97.4 °F (36.3 °C), Max:98 °F (36.7 °C)    No intake/output data recorded.  1901 -  0700  In: 5442.5 [P.O.:240; I.V.:5202.5]  Out: 3250 [Urine:3250]    General: NAD, resting not following commands  HEENT: LAQUITA, Moist mucosa  Lungs: CTA b/l, no wheeze/rhonchi   Heart: S1S2+, RRR, no murmur  Abdo: Soft, NT, ND, +BS   Exts: improved strength on LUE, normal strength on right   Skin: No chronic wounds or ulcers     Data Review:       Recent Days:  Recent Labs     21  0618 21  0555   WBC 12.5* 13.3*   HGB 13.9 13.5   HCT 42.3 41.5    253     Recent Labs     21  0450 21  0618 21  0555   BUN 16 20 18   CREA 0.49* 0.68 0.74       Lab Results   Component Value Date/Time    C-Reactive protein 4.70 (H) 2021 03:30 AM        Microbiology     Results     Procedure Component Value Units Date/Time    HSV 1 AND 2 BY PCR [[id number]] Collected: 21 1315    Order Status: Canceled Specimen: Other from Miscellaneous sample     HSV 1 AND 2 BY PCR [[id number]]  (Abnormal) Collected: 21 1300    Order Status: Completed Specimen: Other from Miscellaneous sample Updated: 21 1037     Source Cerebrospinal fluid        Comment: Corrected on  AT 1037: Previously reported as Blood        HSV-1 DNA by PCR Positive        HSV-2 DNA by PCR Negative        Comment:  This test was developed and its performance characteristics  determined by [facility]. It has not been cleared or  approved by the U.S. Food and Drug Administration. The FDA has  determined that such clearance or approval is not necessary. This  test is used for clinical purposes. It should not be regarded as  investigational or research. Performed At: [address] [id number]  Mel Mora MD [id number]         [employer] [[id number]] Collected: 09/03/21 1300    Order Status: Completed Specimen: Other from Miscellaneous sample Updated: 09/13/21 0836     Source Cerebrospinal fluid        Viral Culture, General No virus isolated. Comment: Performed At: [address] [id number]  Mel Mora MD [id number]  Corrected on 09/13 AT 0836: Previously reported as Comment         CULTURE, BODY FLUID [facility] [place] [facility] [[id number]] Collected: 09/03/21 1300    Order Status: Canceled Specimen: Body Fluid     CULTURE, CSF Cam Sprain [[id number]] Collected: 09/03/21 1200    Order Status: Completed Specimen: Cerebrospinal Fluid Updated: 09/11/21 1311     Special Requests: No Special Requests        GRAM STAIN Occasional WBCs seen         No organisms seen        Culture result:       no growth on solid media at 4 days                  no growth in Thio broth at 7 days          CULTURE, BLOOD, PAIRED [[id number]] Collected: 09/03/21 0700    Order Status: Completed Specimen: Blood Updated: 09/09/21 0740     Special Requests: No Special Requests        Culture result: No growth 6 days       COVID-19 RAPID TEST [[id number]] Collected: 09/03/21 0636    Order Status: Completed Specimen: Nasopharyngeal Updated: 09/03/21 0741     Specimen source Nasopharyngeal        COVID-19 rapid test Not Detected        Comment: Rapid Abbott ID Now   Rapid NAAT:  The specimen is NEGATIVE for SARS-CoV-2, the novel coronavirus associated with COVID-19.    Negative results should be treated as presumptive and, if inconsistent with clinical signs and symptoms or necessary for patient management, should be tested with an alternative molecular assay. Negative results do not preclude SARS-CoV-2 infection and should not be used as the sole basis for patient management decisions. This test has been authorized by the FDA under   an Emergency Use Authorization (EUA) for use by authorized laboratories. Fact sheet for Healthcare Providers: [employer] Fact sheet for Patients: [facility] - [facility] Healthcare Networkdate.co.nz   Methodology: Isothermal Nucleic Acid Amplification                Diagnostics   CXR Results  (Last 48 hours)    None         Assessment/Plan     1. 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 [place]: Continue fluids while on Acyclovir     3. Seizure on admission: Due to # 1. On Keppra, and Valproic      Generalized slowing w no seizure on EEG  ()    4. 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  Decreased responsiveness/increased lethargy: Likely from # 1    Daughter up dated on clinical status, she will like to speak w risk mgt, left a message     Eloisa Mendiola MD    2021

## 2021-09-13 NOTE — PROGRESS NOTES
Bedside shift change report given to [facility] Fernando Morales (oncoming nurse) by Destinee Key (offgoing nurse). Report included the following information SBAR.

## 2021-09-14 LAB
ALBUMIN SERPL-MCNC: 2.6 G/DL (ref 3.5–5)
ANION GAP SERPL CALC-SCNC: 6 MMOL/L (ref 5–15)
BUN SERPL-MCNC: 17 MG/DL (ref 6–20)
BUN/CREAT SERPL: 30 (ref 12–20)
CA-I BLD-MCNC: 8.5 MG/DL (ref 8.5–10.1)
CHLORIDE SERPL-SCNC: 108 MMOL/L (ref 97–108)
CO2 SERPL-SCNC: 24 MMOL/L (ref 21–32)
CREAT SERPL-MCNC: 0.57 MG/DL (ref 0.55–1.02)
GLUCOSE BLD STRIP.AUTO-MCNC: 126 MG/DL (ref 65–117)
GLUCOSE BLD STRIP.AUTO-MCNC: 145 MG/DL (ref 65–117)
GLUCOSE BLD STRIP.AUTO-MCNC: 153 MG/DL (ref 65–117)
GLUCOSE BLD STRIP.AUTO-MCNC: 158 MG/DL (ref 65–117)
GLUCOSE BLD STRIP.AUTO-MCNC: 162 MG/DL (ref 65–117)
GLUCOSE SERPL-MCNC: 184 MG/DL (ref 65–100)
PERFORMED BY, TECHID: ABNORMAL
PHOSPHATE SERPL-MCNC: 3.2 MG/DL (ref 2.6–4.7)
POTASSIUM SERPL-SCNC: 4.1 MMOL/L (ref 3.5–5.1)
SODIUM SERPL-SCNC: 138 MMOL/L (ref 136–145)

## 2021-09-14 PROCEDURE — 74011250636 HC RX REV CODE- 250/636: Performed by: HOSPITALIST

## 2021-09-14 PROCEDURE — 65270000029 HC RM PRIVATE

## 2021-09-14 PROCEDURE — 74011250636 HC RX REV CODE- 250/636: Performed by: PSYCHIATRY & NEUROLOGY

## 2021-09-14 PROCEDURE — 80069 RENAL FUNCTION PANEL: CPT

## 2021-09-14 PROCEDURE — 82962 GLUCOSE BLOOD TEST: CPT

## 2021-09-14 PROCEDURE — 99232 SBSQ HOSP IP/OBS MODERATE 35: CPT | Performed by: INTERNAL MEDICINE

## 2021-09-14 PROCEDURE — 36415 COLL VENOUS BLD VENIPUNCTURE: CPT

## 2021-09-14 PROCEDURE — 74011000250 HC RX REV CODE- 250: Performed by: HOSPITALIST

## 2021-09-14 PROCEDURE — 74011250637 HC RX REV CODE- 250/637: Performed by: INTERNAL MEDICINE

## 2021-09-14 PROCEDURE — 74011250636 HC RX REV CODE- 250/636: Performed by: INTERNAL MEDICINE

## 2021-09-14 PROCEDURE — 74011636637 HC RX REV CODE- 636/637: Performed by: INTERNAL MEDICINE

## 2021-09-14 PROCEDURE — 74011250637 HC RX REV CODE- 250/637: Performed by: STUDENT IN AN ORGANIZED HEALTH CARE EDUCATION/TRAINING PROGRAM

## 2021-09-14 RX ORDER — PANTOPRAZOLE SODIUM 40 MG/1
40 TABLET, DELAYED RELEASE ORAL
Status: DISCONTINUED | OUTPATIENT
Start: 2021-09-15 | End: 2021-09-25 | Stop reason: HOSPADM

## 2021-09-14 RX ADMIN — DEXAMETHASONE SODIUM PHOSPHATE 4 MG: 4 INJECTION, SOLUTION INTRA-ARTICULAR; INTRALESIONAL; INTRAMUSCULAR; INTRAVENOUS; SOFT TISSUE at 13:04

## 2021-09-14 RX ADMIN — LEVETIRACETAM 2000 MG: 100 INJECTION, SOLUTION INTRAVENOUS at 00:20

## 2021-09-14 RX ADMIN — DEXAMETHASONE SODIUM PHOSPHATE 4 MG: 4 INJECTION, SOLUTION INTRA-ARTICULAR; INTRALESIONAL; INTRAMUSCULAR; INTRAVENOUS; SOFT TISSUE at 08:01

## 2021-09-14 RX ADMIN — SODIUM CHLORIDE 900 MG: 9 INJECTION, SOLUTION INTRAVENOUS at 22:38

## 2021-09-14 RX ADMIN — NYSTATIN 500000 UNITS: 100000 SUSPENSION ORAL at 21:58

## 2021-09-14 RX ADMIN — DEXAMETHASONE SODIUM PHOSPHATE 4 MG: 4 INJECTION, SOLUTION INTRA-ARTICULAR; INTRALESIONAL; INTRAMUSCULAR; INTRAVENOUS; SOFT TISSUE at 00:22

## 2021-09-14 RX ADMIN — NYSTATIN 500000 UNITS: 100000 SUSPENSION ORAL at 13:03

## 2021-09-14 RX ADMIN — APIXABAN 5 MG: 5 TABLET, FILM COATED ORAL at 21:58

## 2021-09-14 RX ADMIN — INSULIN LISPRO 2 UNITS: 100 INJECTION, SOLUTION INTRAVENOUS; SUBCUTANEOUS at 13:03

## 2021-09-14 RX ADMIN — SODIUM CHLORIDE 75 ML/HR: 4.5 INJECTION, SOLUTION INTRAVENOUS at 22:38

## 2021-09-14 RX ADMIN — SODIUM CHLORIDE 900 MG: 9 INJECTION, SOLUTION INTRAVENOUS at 08:00

## 2021-09-14 RX ADMIN — LEVETIRACETAM 2000 MG: 100 INJECTION, SOLUTION INTRAVENOUS at 11:17

## 2021-09-14 RX ADMIN — VALPROIC ACID 250 MG: 250 CAPSULE, LIQUID FILLED ORAL at 21:58

## 2021-09-14 RX ADMIN — DEXAMETHASONE SODIUM PHOSPHATE 4 MG: 4 INJECTION, SOLUTION INTRA-ARTICULAR; INTRALESIONAL; INTRAMUSCULAR; INTRAVENOUS; SOFT TISSUE at 21:57

## 2021-09-14 RX ADMIN — Medication 10 ML: at 22:03

## 2021-09-14 RX ADMIN — VALPROIC ACID 250 MG: 250 CAPSULE, LIQUID FILLED ORAL at 16:01

## 2021-09-14 RX ADMIN — NYSTATIN 500000 UNITS: 100000 SUSPENSION ORAL at 17:57

## 2021-09-14 RX ADMIN — SODIUM CHLORIDE 900 MG: 9 INJECTION, SOLUTION INTRAVENOUS at 13:16

## 2021-09-14 RX ADMIN — POTASSIUM CHLORIDE 20 MEQ: 1.5 POWDER, FOR SOLUTION ORAL at 16:01

## 2021-09-14 RX ADMIN — Medication 10 ML: at 00:23

## 2021-09-14 RX ADMIN — INSULIN LISPRO 2 UNITS: 100 INJECTION, SOLUTION INTRAVENOUS; SUBCUTANEOUS at 17:57

## 2021-09-14 RX ADMIN — LEVETIRACETAM 2000 MG: 100 INJECTION, SOLUTION INTRAVENOUS at 21:54

## 2021-09-14 RX ADMIN — Medication 10 ML: at 08:01

## 2021-09-14 RX ADMIN — SODIUM CHLORIDE 900 MG: 9 INJECTION, SOLUTION INTRAVENOUS at 00:51

## 2021-09-14 NOTE — PROGRESS NOTES
NEUROLOGY  PROGRESS NOTE    Admission History/Pertinent Events  Nahomi Patel is a 60y.o. year old female who presented on 9/3/2021. Patient has a past medical history of Breast Cancer, HL, DM2, Anxiety/Depression who presented with seizure like activity while on the floor by family. EMS reported patient having seizure-like activity for which patient was initially treated with midazolam.  In the ED, patient had a temperature a 102° F.  Emergent CT head was negative for any acute findings. Emergent contrasted MRI of the brain concerning for bilateral temporal pathology most concerning for HSV encephalitis or limbic encephalitis. Patient was loaded with IV levetiracetam and started on antibiotics as well as acyclovir. ASSESSMENT/PLAN      Impression  Patient more lethargic this morning not answering questions producing as much speech as he usually does even with coaxing. Will obtain CT of the head to evaluate for interval worsening of cerebral edema and continue patient on current dexamethasone dose. We will continue patient on acyclovir. We will continue patient on levetiracetam and valproic acid for seizure prophylaxis.       14 [facility] WO 9/3  1205 [facility] WO 9/9  Right temporal and insular region edema    CT[facility] WO 9/10  Possible RMCA ischemia with possible RMCA occlusion    CT WO 9/11  No interval changes from scan on 9/10    MRI Brain [facility] 9/3  T2/FLAIR hyperintense signal in the bilateral temporal regions more so in the right temporal and insular regions concerning for HSV encephalitis or possibly limbic encephalitis    MRI Brain [facility]ORD[facility] 9/8  Interval progression of T2/FLAIR hyperintense signal change in the right cerebral and left frontal region concerning for worsening encephalitis    MRA Head WO 9/13  No occlusions, dissections, significant stenosis, pseudoaneurysms or aneurysms in the intracranial arterial system    EEG 9/9  Generalized slowing of the background  Sharp discharges in the right frontal region    Valproic Acid Levels  44 (9/12)    CSF Studies   Positive: , , Protein 80, HSV-1 DNA  Negative/WNL: Glucose, HSV-2 DNA, Viral Cx     Labs  Positive: HSV 1 IgG Ab  Negative: HIV 1/2, RPR      Plan      Encephalopathy & Seizures d/t HSV1 Encephalitis  Cerebral Edema d/t Above  Associated: Sepsis, Basilic Vein Thrombosus, [place]  -On Acyclovir per ID (Planned 21 day course)  --> Plan to repeat LP with CSF studies after 21 days of Acyclovir has been completed  ---> WBC, RBC, Protein, HSV-1 PCR  -Continue Dexamethasone 4 TID  -On Apixaban 5 BID  -Seizure Precautions  -Seizure Prophylaxis: Levetiracetam 2000 BID, Valproic Acid 250 TID  -SBP Goal < 160  -Glucose Goal < 180  -Head of Bed at 30 degrees  -PT/OT/ST as needed  -Management of metabolic/infectious derangements to referring teams  -F/U [facility]   Patient currently on acyclovir. Patient more lethargic this morning and not answering questions as well even with coaxing. Physical/Neurological Exam  Patient awake, alert; following central and peripheral commands   No expressive or receptive aphasia;  No dysarthria   Decreased emotional drive with speech  Pupils react to light bilaterally; EOM Intact   No visual field deficits on gross exam   No facial droop   Motor: 3-/5 in the left hemibody, 4/5 in the right hemibody  No abnormal movements   Sensation to light touch intact grossly throughout       OBJECTIVE  Vital Signs  Temp:  [97.3 °F (36.3 °C)-99.9 °F (37.7 °C)]   Pulse (Heart Rate):  [44-78]   BP: ()/(48-85)   Resp Rate:  [15-22]   O2 Sat (%):  [96 %-100 %]   Weight: --    MEDICATIONS    Current Facility-Administered Medications:     valproic acid (DEPAKENE) capsule 250 mg, 250 mg, Oral, TID, Glenda Whiteside MD, 250 mg at 09/13/21 1820    dexamethasone (DECADRON) 4 mg/mL injection 4 mg, 4 mg, IntraVENous, Q8H, Rogelio Josue MD, 4 mg at 09/14/21 0801    0.45% sodium chloride infusion, 75 mL/hr, IntraVENous, CONTINUOUS, Padmini Flores MD, Last Rate: 75 mL/hr at 09/13/21 1406, 75 mL/hr at 09/13/21 1406    citalopram (CELEXA) tablet 20 mg, 20 mg, Oral, DAILY, Mateusz Lat, Matt Rowan MD, 20 mg at 09/13/21 1112    nystatin (MYCOSTATIN) 100,000 unit/mL oral suspension 500,000 Units, 500,000 Units, Oral, QID, Edinson PATEL MD, 500,000 Units at 09/13/21 1820    apixaban (ELIQUIS) tablet 5 mg, 5 mg, Oral, BID, Edinson PATEL MD, 5 mg at 09/13/21 1112    acyclovir (ZOVIRAX) 900 mg in 0.9% sodium chloride 250 mL IVPB, 15 mg/kg (Ideal), IntraVENous, Q8H, Debra Serrato MD, Last Rate: 250 mL/hr at 09/14/21 0800, 900 mg at 09/14/21 0800    levETIRAcetam (KEPPRA) 2,000 mg in 0.9% sodium chloride 250 mL IVPB, 2,000 mg, IntraVENous, Q12H, Samuel Collins MD, 2,000 mg at 09/14/21 0020    amLODIPine (NORVASC) tablet 5 mg, 5 mg, Oral, DAILY, Karla German MD, 5 mg at 09/13/21 1112    hydrALAZINE (APRESOLINE) 20 mg/mL injection 10 mg, 10 mg, IntraVENous, Q6H PRN, Karla German MD, 10 mg at 09/12/21 2202    insulin lispro (HUMALOG) injection, , SubCUTAneous, AC&HS, Keyshawn Flores MD, 2 Units at 09/13/21 1630    potassium chloride (KLOR-CON) packet for solution 20 mEq, 20 mEq, Oral, BID WITH MEALS, Keyshawn Flores MD, 20 mEq at 09/13/21 1630    sodium chloride (NS) flush 5-40 mL, 5-40 mL, IntraVENous, Q8H, Keyshawn Flores MD, 10 mL at 09/14/21 0801    sodium chloride (NS) flush 5-40 mL, 5-40 mL, IntraVENous, PRN, Matt Juarez MD    acetaminophen (TYLENOL) tablet 650 mg, 650 mg, Oral, Q6H PRN, 650 mg at 09/06/21 1236 **OR** acetaminophen (TYLENOL) suppository 650 mg, 650 mg, Rectal, Q6H PRN, Matt Juarez MD    polyethylene glycol (MIRALAX) packet 17 g, 17 g, Oral, DAILY PRN, Matt Juarez MD    promethazine (PHENERGAN) tablet 12.5 mg, 12.5 mg, Oral, Q6H PRN **OR** ondansetron (ZOFRAN) injection 4 mg, 4 mg, IntraVENous, Q6H PRN, Matt Juarez MD    glucose chewable tablet 16 g, 4 Tablet, Oral, PRN, Kana Baker MD    dextrose (D50W) injection syrg 12.5-25 g, 25-50 mL, IntraVENous, PRN, Kana Baker MD, 25 g at 09/04/21 0409    glucagon (GLUCAGEN) injection 1 mg, 1 mg, IntraMUSCular, PRN, Kana Baker MD      Labs: I've reviewed the labs for today     This document has been prepared by the Dragon voice recognition system, typographical errors may have occurred.  Attempts have been made to correct errors, however inadvertent errors may persist.

## 2021-09-14 NOTE — PROGRESS NOTES
Hospitalist Progress Note    Subjective:   Daily Progress Note: 9/14/2021 10:59 AM    Withdrawn. Denies chest pain or shortness of breath. Bilateral upper extremity weakness    Current Facility-Administered Medications   Medication Dose Route Frequency    valproic acid (DEPAKENE) capsule 250 mg  250 mg Oral TID    dexamethasone (DECADRON) 4 mg/mL injection 4 mg  4 mg IntraVENous Q8H    0.45% sodium chloride infusion  75 mL/hr IntraVENous CONTINUOUS    citalopram (CELEXA) tablet 20 mg  20 mg Oral DAILY    nystatin (MYCOSTATIN) 100,000 unit/mL oral suspension 500,000 Units  500,000 Units Oral QID    apixaban (ELIQUIS) tablet 5 mg  5 mg Oral BID    acyclovir (ZOVIRAX) 900 mg in 0.9% sodium chloride 250 mL IVPB  15 mg/kg (Ideal) IntraVENous Q8H    levETIRAcetam (KEPPRA) 2,000 mg in 0.9% sodium chloride 250 mL IVPB  2,000 mg IntraVENous Q12H    amLODIPine (NORVASC) tablet 5 mg  5 mg Oral DAILY    hydrALAZINE (APRESOLINE) 20 mg/mL injection 10 mg  10 mg IntraVENous Q6H PRN    insulin lispro (HUMALOG) injection   SubCUTAneous AC&HS    potassium chloride (KLOR-CON) packet for solution 20 mEq  20 mEq Oral BID WITH MEALS    sodium chloride (NS) flush 5-40 mL  5-40 mL IntraVENous Q8H    sodium chloride (NS) flush 5-40 mL  5-40 mL IntraVENous PRN    acetaminophen (TYLENOL) tablet 650 mg  650 mg Oral Q6H PRN    Or    acetaminophen (TYLENOL) suppository 650 mg  650 mg Rectal Q6H PRN    polyethylene glycol (MIRALAX) packet 17 g  17 g Oral DAILY PRN    promethazine (PHENERGAN) tablet 12.5 mg  12.5 mg Oral Q6H PRN    Or    ondansetron (ZOFRAN) injection 4 mg  4 mg IntraVENous Q6H PRN    glucose chewable tablet 16 g  4 Tablet Oral PRN    dextrose (D50W) injection syrg 12.5-25 g  25-50 mL IntraVENous PRN    glucagon (GLUCAGEN) injection 1 mg  1 mg IntraMUSCular PRN        Review of Systems  Review of Systems   Constitutional: Positive for malaise/fatigue. Negative for chills and fever. HENT: Negative. Eyes: Negative. Respiratory: Negative for cough and shortness of breath. Cardiovascular: Negative for chest pain and leg swelling. Gastrointestinal: Negative for abdominal pain, nausea and vomiting. Genitourinary: Negative for dysuria. Musculoskeletal: Negative. Skin: Negative. Neurological: Positive for weakness. Psychiatric/Behavioral: Negative. Objective:     Visit Vitals  /70 (BP 1 Location: Left lower arm)   Pulse (!) 56   Temp 97.7 °F (36.5 °C)   Resp 16   Ht 5' 6\" (1.676 m)   Wt 80 kg (176 lb 5.9 oz)   SpO2 97%   BMI 28.47 kg/m²    O2 Flow Rate (L/min): 2 l/min O2 Device: None (Room air)    Temp (24hrs), Av.7 °F (36.5 °C), Min:97.4 °F (36.3 °C), Max:97.9 °F (36.6 °C)      No intake/output data recorded.    1901 -  0700  In: 2277.5 [I.V.:2277.5]  Out: 1700 [Urine:1700]    Recent Results (from the past 24 hour(s))   GLUCOSE, POC    Collection Time: 21 11:34 AM   Result Value Ref Range    Glucose (POC) 148 (H) 65 - 117 mg/dL    Performed by [address] [place] [address], POC    Collection Time: 21  4:02 PM   Result Value Ref Range    Glucose (POC) 160 (H) 65 - 117 mg/dL    Performed by Rosalba Live    GLUCOSE, POC    Collection Time: 21  8:19 PM   Result Value Ref Range    Glucose (POC) 203 (H) 65 - 117 mg/dL    Performed by [address] [place] [address], POC    Collection Time: 21 12:55 AM   Result Value Ref Range    Glucose (POC) 145 (H) 65 - 117 mg/dL    Performed by [facility]    RENAL FUNCTION PANEL    Collection Time: 21  6:29 AM   Result Value Ref Range    Sodium 138 136 - 145 mmol/L    Potassium 4.1 3.5 - 5.1 mmol/L    Chloride 108 97 - 108 mmol/L    CO2 24 21 - 32 mmol/L    Anion gap 6 5 - 15 mmol/L    Glucose 184 (H) 65 - 100 mg/dL    BUN 17 6 - 20 mg/dL    Creatinine 0.57 0.55 - 1.02 mg/dL    BUN/Creatinine ratio 30 (H) 12 - 20      GFR est AA >60 >60 ml/min/1.73m2    GFR est non-AA >60 >60 ml/min/1.73m2    Calcium 8.5 8.5 - 10.1 mg/dL    Phosphorus 3.2 2.6 - 4.7 mg/dL    Albumin 2.6 (L) 3.5 - 5.0 g/dL   GLUCOSE, POC    Collection Time: 09/14/21  8:32 AM   Result Value Ref Range    Glucose (POC) 153 (H) 65 - 117 mg/dL    Performed by Rachel Mcdaniel         MRA BRAIN WO CONT   Final Result   No major arterial occlusion or stenoses was demonstrated. CT HEAD WO CONT   Final Result      Stable exam            CT HEAD WO CONT   Final Result   [Findings most suggestive of a right middle cerebral artery   thrombosis with associated ischemia in the distribution of this artery manifest   by diffuse edema with sulcal effacement; the right middle cerebral artery   appears hyperdense. ]. The inpatient nursing station was contacted and the findings were conveyed at   the time of this report. They will have the covering physician call me back with   any questions. I spoke with nurse Beto Menjivar      XR CHEST PORT   Final Result   The cardiomediastinal silhouette is appropriate for age, technique,   and lung expansion. Pulmonary vasculature is not congested. The lungs are   essentially clear. No effusion or pneumothorax is seen. CT HEAD WO CONT   Final Result   1. Edema still evident in the right temporal lobe and insular cortex. No   significant change from previous CT examination. No evidence for hemorrhage or   increased mass effect. IR GUIDE INSERT PICC OVER 5 YRS   Final Result   1. Thrombosis of left basilic vein. 2.  Successful placement of a left-sided tunneled Powerline central venous   catheter. The catheter is ready for use. IR FLUORO GUIDE PLC CVAD   Final Result   1. Thrombosis of left basilic vein. 2.  Successful placement of a left-sided tunneled Powerline central venous   catheter. The catheter is ready for use. IR US GUIDED VASCULAR ACCESS   Final Result   1. Thrombosis of left basilic vein.    2.  Successful placement of a left-sided tunneled Powerline central venous catheter. The catheter is ready for use. IR US VENOUS EXT LTD   Final Result   1. Thrombosis of left basilic vein. 2.  Successful placement of a left-sided tunneled Powerline central venous   catheter. The catheter is ready for use. MRI BRAIN W WO CONT   Final Result   Interval progression of findings in the right cerebral hemisphere, extending   into the inferior left frontal lobe as above. Imaging features are most   consistent with worsening encephalitis (with involvement characteristic of   herpes encephalitis as stated on original exam from 9/3/2021). Limited   encephalitis can appear similar. Given rapid progression, neoplasm is felt   unlikely. No evidence of acute infarction. CT CODE NEURO HEAD WO CONTRAST   Final Result   1. Expanding edema in the right temporal lobe and along the sylvian fissure   without hemorrhage, with 2-3 mm of right-to-left midline shift. Called report to   Brandon Casiano on 5 W.  at 3:58 PM 9/8/2021. XR CHEST PORT   Final Result   Stable mild elevation of right hemidiaphragm. Right lateral chest   wall clips. Stable enlargement of cardiopericardial silhouette. Improved   aeration of the lungs bilaterally. No evidence of focal airspace consolidation. Gastric tube placed with tip overlying the right upper abdomen at the level of   the distal stomach or proximal duodenum. MRI BRAIN W WO CONT   Final Result   1. Abnormal high T2 signal evident in the right temporal lobe and insular   cortex. Subtle increased T2 signal evident in the left temporal uncus. The   appearances are concerning for herpes encephalitis. This could also represent   limbic encephalitis. A report was call to Dr. Michelle Brown at 9/3/2021 11:49 AM      XR CHEST PORT   Final Result      CT HEAD WO CONT   Final Result   No acute or active intracranial process. CT SPINE CERV WO CONT   Final Result   No specific acute or active process.    Multilevel degenerative disc and degenerative joint disease. PHYSICAL EXAM:    Physical Exam  Vitals reviewed. Constitutional:       General: She is not in acute distress. Appearance: She is not ill-appearing. HENT:      Head: Normocephalic and atraumatic. Mouth/Throat:      Mouth: Mucous membranes are moist.      Pharynx: Oropharynx is clear. Eyes:      Conjunctiva/sclera: Conjunctivae normal.   Cardiovascular:      Rate and Rhythm: Regular rhythm. Bradycardia present. Heart sounds: Normal heart sounds. Pulmonary:      Effort: Pulmonary effort is normal.      Breath sounds: Normal breath sounds. Abdominal:      General: Abdomen is flat. Bowel sounds are normal.      Palpations: Abdomen is soft. Musculoskeletal:      Cervical back: Normal range of motion and neck supple. Comments: Limited range of motion in the upper extremities, 3 x 5 on the left and 2 x 5 on the right. Hip flexion bilaterally patient is able to take her legs up off the bed. Sensation intact throughout   Skin:     General: Skin is warm and dry. Neurological:      Mental Status: She is alert and oriented to person, place, and time. Comments: Withdrawn and soft-spoken. Upper extremity weakness as noted above   Psychiatric:      Comments: Withdrawn with a history of depression.   Considering her medical condition will ask psych for further evaluation and treatment          Data Review    Recent Results (from the past 24 hour(s))   GLUCOSE, POC    Collection Time: 09/13/21 11:34 AM   Result Value Ref Range    Glucose (POC) 148 (H) 65 - 117 mg/dL    Performed by [address] [place] [address], POC    Collection Time: 09/13/21  4:02 PM   Result Value Ref Range    Glucose (POC) 160 (H) 65 - 117 mg/dL    Performed by Shankar Giron, POC    Collection Time: 09/13/21  8:19 PM   Result Value Ref Range    Glucose (POC) 203 (H) 65 - 117 mg/dL    Performed by [address] [place] [address], POC    Collection Time: 09/14/21 12:55 AM Result Value Ref Range    Glucose (POC) 145 (H) 65 - 117 mg/dL    Performed by [facility]    RENAL FUNCTION PANEL    Collection Time: 09/14/21  6:29 AM   Result Value Ref Range    Sodium 138 136 - 145 mmol/L    Potassium 4.1 3.5 - 5.1 mmol/L    Chloride 108 97 - 108 mmol/L    CO2 24 21 - 32 mmol/L    Anion gap 6 5 - 15 mmol/L    Glucose 184 (H) 65 - 100 mg/dL    BUN 17 6 - 20 mg/dL    Creatinine 0.57 0.55 - 1.02 mg/dL    BUN/Creatinine ratio 30 (H) 12 - 20      GFR est AA >60 >60 ml/min/1.73m2    GFR est non-AA >60 >60 ml/min/1.73m2    Calcium 8.5 8.5 - 10.1 mg/dL    Phosphorus 3.2 2.6 - 4.7 mg/dL    Albumin 2.6 (L) 3.5 - 5.0 g/dL   GLUCOSE, POC    Collection Time: 09/14/21  8:32 AM   Result Value Ref Range    Glucose (POC) 153 (H) 65 - 117 mg/dL    Performed by Butch Flores         Assessment/Plan:     Active Problems:    Sepsis ([address] (9/3/2021)      New onset seizure ([address] (9/3/2021)      Mild protein-calorie malnutrition ([address] (9/10/2021)      Hospital course: This is a 52-year-old female admitted on 9/3/2021 with a history of breast cancer, diabetes mellitus, type II, anxiety, depression who initially presented with seizure-like activity and found on the floor by family. Patient was found to be febrile and CT scan of the head head was performed with no acute process. 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 Patient recurrent seizures and was started on Keppra as well as immediately started on acyclovir. Numerous CT and MRI of the brain imaging performed with CT scans of the head initially without acute process. Repeat revealed right temporal and along the sylvian fissure without hemorrhage and 2 to 3 mm of right to left midline shift due to expanding edema.   Serial CTs of the head were performed although no significant changes on 9/11/2021 at which time a CT of the head revealed findings suspicious for right middle cerebral artery thrombosis with associated ischemia in the distribution of that artery. Initial MRI on 9/3/2021 revealed abnormal high T2 signal in the right temporal and insular cortex. Findings suspicious for herpes encephalitis. There was also subtle increased T2 signal evident in the left temporal uncus. Repeats MRI of the brain revealed progression of the findings of the right cerebral hemisphere extending into the left frontal lobe. Most consistent with worsening encephalitis. MRA of the brain on 9/13/2021 revealed no major arterial occlusion or stenosis demonstrated. Infectious disease consultation, Dr. Latina Schwab.  Neurological consultation. Patient remains on Keppra and valproic acid for seizure prophylaxis. Patient also found to have an upper extremity DVT and is currently on Eliquis. Psych consult ordered for worsening depression. Impression\plan:    1. HSV encephalitis  infectious disease consult  neurology consult  continue acyclovir for a total of 21 days  recommendation is to repeat LP at the end of therapy  HSV 1+ CSF PCR  MRI reveals bilateral temporal lobe edema secondary to encephalitis  continue dexamethasone    2. [place]  continue IV fluids  continue to monitor    3. 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 Generalized weakness of the right and left upper extremity  MRA of the brain revealed no major arterial occlusions  suspect this is related to the overall edema    5. DVT \thrombosis of the left basilic vein  remain on Eliquis    6. Moderate protein malnutrition  Albumin 2.6 and improving slowly  nutritional consultation    7. Essential hypertension  continue Norvasc    8.   Depression anxiety  continue Celexa  psych consultation    CODE STATUS: Full code    Ulcer prophylaxis: Protonix  DVT prophylaxis: Eliquis    Discharge barriers: Completion of treatment, improvement in overall brain function    Care Plan discussed with: Patient/Family    Total time spent with patient: >35 minutes.

## 2021-09-14 NOTE — PROGRESS NOTES
Infectious Disease Progress Note         Subjective:   Stable, more alert and following commands. No acute events since last seen  Objective:   Physical Exam:     Visit Vitals  /68 (BP 1 Location: Left upper arm, BP Patient Position: At rest)   Pulse 69   Temp 97.3 °F (36.3 °C)   Resp 18   Ht 5' 6\" (1.676 m)   Wt 176 lb 5.9 oz (80 kg)   SpO2 96%   BMI 28.47 kg/m²    O2 Flow Rate (L/min): 2 l/min O2 Device: None (Room air)    Temp (24hrs), Av.6 °F (36.4 °C), Min:97.3 °F (36.3 °C), Max:97.9 °F (36.6 °C)    701 -  1900  In: 600 [I.V.:600]  Out: 900 [Urine:900]   1901 -  0700  In: 2277.5 [I.V.:2277.5]  Out: 1700 [Urine:1700]    General: NAD, more alert and following commands today. HEENT: LAQUITA, Moist mucosa  Lungs: CTA b/l, no wheeze/rhonchi   Heart: S1S2+, RRR, no murmur  Abdo: Soft, NT, ND, +BS   Exts: improved strength on LUE, normal strength on right   Skin: No chronic wounds or ulcers     Data Review:       Recent Days:  Recent Labs     21  0618   WBC 12.5*   HGB 13.9   HCT 42.3        Recent Labs     21  0629 21  0450 21  0618   BUN 17 16 20   CREA 0.57 0.49* 0.68       Lab Results   Component Value Date/Time    C-Reactive protein 4.70 (H) 2021 03:30 AM        Microbiology     Results     Procedure Component Value Units Date/Time    HSV 1 AND 2 BY PCR [[id number]] Collected: 21 1315    Order Status: Canceled Specimen: Other from Miscellaneous sample     HSV 1 AND 2 BY PCR [[id number]]  (Abnormal) Collected: 21 1300    Order Status: Completed Specimen: Other from Miscellaneous sample Updated: 21 1037     Source Cerebrospinal fluid        Comment: Corrected on  AT 1037: Previously reported as Blood        HSV-1 DNA by PCR Positive        HSV-2 DNA by PCR Negative        Comment: This test was developed and its performance characteristics  determined by [employer].  It has not been cleared or  approved by the U.S. Food and Drug Administration. The FDA has  determined that such clearance or approval is not necessary. This  test is used for clinical purposes. It should not be regarded as  investigational or research. Performed At: [address] [id number]  Charley Koroma MD QX:[id number]         Uzma Jenkins [[id number]] Collected: 09/03/21 1300    Order Status: Completed Specimen: Other from Miscellaneous sample Updated: 09/13/21 0836     Source Cerebrospinal fluid        Viral Culture, General No virus isolated. Comment: Performed At: [address] [id number]  Charley Koroma MD MZ:[id number]  Corrected on 09/13 AT 0836: Previously reported as Comment         CULTURE, BODY FLUID W Fransico Maguire [[id number]] Collected: 09/03/21 1300    Order Status: Canceled Specimen: Body Fluid     CULTURE, CSF Allyssa Ford [[id number]] Collected: 09/03/21 1200    Order Status: Completed Specimen: Cerebrospinal Fluid Updated: 09/11/21 1311     Special Requests: No Special Requests        GRAM STAIN Occasional WBCs seen         No organisms seen        Culture result:       no growth on solid media at 4 days                  no growth in Thio broth at 7 days          CULTURE, BLOOD, PAIRED [[id number]] Collected: 09/03/21 0700    Order Status: Completed Specimen: Blood Updated: 09/09/21 0740     Special Requests: No Special Requests        Culture result: No growth 6 days       COVID-19 RAPID TEST [[id number]] Collected: 09/03/21 0636    Order Status: Completed Specimen: Nasopharyngeal Updated: 09/03/21 0741     Specimen source Nasopharyngeal        COVID-19 rapid test Not Detected        Comment: Rapid Abbott ID Now   Rapid NAAT:  The specimen is NEGATIVE for SARS-CoV-2, the novel coronavirus associated with COVID-19.    Negative results should be treated as presumptive and, if inconsistent with clinical signs and symptoms or necessary for patient management, should be tested with an alternative molecular assay. Negative results do not preclude SARS-CoV-2 infection and should not be used as the sole basis for patient management decisions. This test has been authorized by the FDA under   an Emergency Use Authorization (EUA) for use by authorized laboratories. Fact sheet for Healthcare Providers: [employer] Fact sheet for Patients: [employer]   Methodology: Isothermal Nucleic Acid Amplification                Diagnostics   CXR Results  (Last 48 hours)    None         Assessment/Plan     1. 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 CBC w AM labs     2. INDIA: Continue fluids while on Acyclovir, check renal panel daily     3. Seizure on admission: Due to # 1. On Keppra, and Valproic      Generalized slowing w no seizure on EEG  (09/09)    4. 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 Consult PT/OT        5. 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  Waxing and Waning mentation: Due to # 1      Psych consult per  Daughter request due to concerns of depression     Daughter up dated on clinical status, PT order placed per discussion, OT already on case (It seems)     Sivan Scott MD    9/14/2021

## 2021-09-15 ENCOUNTER — APPOINTMENT (OUTPATIENT)
Dept: CT IMAGING | Age: 66
DRG: 871 | End: 2021-09-15
Attending: STUDENT IN AN ORGANIZED HEALTH CARE EDUCATION/TRAINING PROGRAM
Payer: COMMERCIAL

## 2021-09-15 LAB
ALBUMIN SERPL-MCNC: 2.4 G/DL (ref 3.5–5)
ALBUMIN SERPL-MCNC: 2.5 G/DL (ref 3.5–5)
ALBUMIN/GLOB SERPL: 0.7 {RATIO} (ref 1.1–2.2)
ALP SERPL-CCNC: 52 U/L (ref 45–117)
ALT SERPL-CCNC: 52 U/L (ref 12–78)
ANION GAP SERPL CALC-SCNC: 7 MMOL/L (ref 5–15)
ANION GAP SERPL CALC-SCNC: 8 MMOL/L (ref 5–15)
AST SERPL W P-5'-P-CCNC: 22 U/L (ref 15–37)
BASOPHILS # BLD: 0 K/UL (ref 0–0.1)
BASOPHILS NFR BLD: 0 % (ref 0–1)
BILIRUB SERPL-MCNC: 0.5 MG/DL (ref 0.2–1)
BUN SERPL-MCNC: 34 MG/DL (ref 6–20)
BUN SERPL-MCNC: 35 MG/DL (ref 6–20)
BUN/CREAT SERPL: 21 (ref 12–20)
BUN/CREAT SERPL: 21 (ref 12–20)
CA-I BLD-MCNC: 8.5 MG/DL (ref 8.5–10.1)
CA-I BLD-MCNC: 8.6 MG/DL (ref 8.5–10.1)
CHLORIDE SERPL-SCNC: 113 MMOL/L (ref 97–108)
CHLORIDE SERPL-SCNC: 113 MMOL/L (ref 97–108)
CO2 SERPL-SCNC: 22 MMOL/L (ref 21–32)
CO2 SERPL-SCNC: 22 MMOL/L (ref 21–32)
CREAT SERPL-MCNC: 1.61 MG/DL (ref 0.55–1.02)
CREAT SERPL-MCNC: 1.63 MG/DL (ref 0.55–1.02)
DIFFERENTIAL METHOD BLD: ABNORMAL
EOSINOPHIL # BLD: 0 K/UL (ref 0–0.4)
EOSINOPHIL NFR BLD: 0 % (ref 0–7)
ERYTHROCYTE [DISTWIDTH] IN BLOOD BY AUTOMATED COUNT: 14.2 % (ref 11.5–14.5)
GLOBULIN SER CALC-MCNC: 3.5 G/DL (ref 2–4)
GLUCOSE BLD STRIP.AUTO-MCNC: 125 MG/DL (ref 65–117)
GLUCOSE BLD STRIP.AUTO-MCNC: 154 MG/DL (ref 65–117)
GLUCOSE BLD STRIP.AUTO-MCNC: 187 MG/DL (ref 65–117)
GLUCOSE BLD STRIP.AUTO-MCNC: 200 MG/DL (ref 65–117)
GLUCOSE SERPL-MCNC: 181 MG/DL (ref 65–100)
GLUCOSE SERPL-MCNC: 182 MG/DL (ref 65–100)
HCT VFR BLD AUTO: 44.5 % (ref 35–47)
HGB BLD-MCNC: 14.8 G/DL (ref 11.5–16)
IMM GRANULOCYTES # BLD AUTO: 0.2 K/UL (ref 0–0.04)
IMM GRANULOCYTES NFR BLD AUTO: 1 % (ref 0–0.5)
LYMPHOCYTES # BLD: 1.3 K/UL (ref 0.8–3.5)
LYMPHOCYTES NFR BLD: 5 % (ref 12–49)
MCH RBC QN AUTO: 29.6 PG (ref 26–34)
MCHC RBC AUTO-ENTMCNC: 33.3 G/DL (ref 30–36.5)
MCV RBC AUTO: 89 FL (ref 80–99)
MONOCYTES # BLD: 1.9 K/UL (ref 0–1)
MONOCYTES NFR BLD: 7 % (ref 5–13)
NEUTS SEG # BLD: 22.6 K/UL (ref 1.8–8)
NEUTS SEG NFR BLD: 87 % (ref 32–75)
NRBC # BLD: 0.02 K/UL (ref 0–0.01)
NRBC BLD-RTO: 0.1 PER 100 WBC
PERFORMED BY, TECHID: ABNORMAL
PHOSPHATE SERPL-MCNC: 4.3 MG/DL (ref 2.6–4.7)
PLATELET # BLD AUTO: 251 K/UL (ref 150–400)
PMV BLD AUTO: 10.4 FL (ref 8.9–12.9)
POTASSIUM SERPL-SCNC: 4.4 MMOL/L (ref 3.5–5.1)
POTASSIUM SERPL-SCNC: 4.4 MMOL/L (ref 3.5–5.1)
PROT SERPL-MCNC: 5.9 G/DL (ref 6.4–8.2)
RBC # BLD AUTO: 5 M/UL (ref 3.8–5.2)
SODIUM SERPL-SCNC: 142 MMOL/L (ref 136–145)
SODIUM SERPL-SCNC: 143 MMOL/L (ref 136–145)
TSH SERPL DL<=0.05 MIU/L-ACNC: 0.32 UIU/ML (ref 0.36–3.74)
WBC # BLD AUTO: 25.9 K/UL (ref 3.6–11)

## 2021-09-15 PROCEDURE — 74011636637 HC RX REV CODE- 636/637: Performed by: INTERNAL MEDICINE

## 2021-09-15 PROCEDURE — 65270000029 HC RM PRIVATE

## 2021-09-15 PROCEDURE — 80069 RENAL FUNCTION PANEL: CPT

## 2021-09-15 PROCEDURE — 84443 ASSAY THYROID STIM HORMONE: CPT

## 2021-09-15 PROCEDURE — 85025 COMPLETE CBC W/AUTO DIFF WBC: CPT

## 2021-09-15 PROCEDURE — 36415 COLL VENOUS BLD VENIPUNCTURE: CPT

## 2021-09-15 PROCEDURE — 80053 COMPREHEN METABOLIC PANEL: CPT

## 2021-09-15 PROCEDURE — 74011250637 HC RX REV CODE- 250/637: Performed by: STUDENT IN AN ORGANIZED HEALTH CARE EDUCATION/TRAINING PROGRAM

## 2021-09-15 PROCEDURE — 74011250636 HC RX REV CODE- 250/636: Performed by: HOSPITALIST

## 2021-09-15 PROCEDURE — 74011000250 HC RX REV CODE- 250: Performed by: PHYSICIAN ASSISTANT

## 2021-09-15 PROCEDURE — 99232 SBSQ HOSP IP/OBS MODERATE 35: CPT | Performed by: INTERNAL MEDICINE

## 2021-09-15 PROCEDURE — 74011250636 HC RX REV CODE- 250/636: Performed by: INTERNAL MEDICINE

## 2021-09-15 PROCEDURE — 74011250637 HC RX REV CODE- 250/637: Performed by: INTERNAL MEDICINE

## 2021-09-15 PROCEDURE — 74011250636 HC RX REV CODE- 250/636: Performed by: PSYCHIATRY & NEUROLOGY

## 2021-09-15 PROCEDURE — 82962 GLUCOSE BLOOD TEST: CPT

## 2021-09-15 PROCEDURE — 70450 CT HEAD/BRAIN W/O DYE: CPT

## 2021-09-15 PROCEDURE — 3E0336Z INTRODUCTION OF NUTRITIONAL SUBSTANCE INTO PERIPHERAL VEIN, PERCUTANEOUS APPROACH: ICD-10-PCS | Performed by: HOSPITALIST

## 2021-09-15 RX ADMIN — APIXABAN 5 MG: 5 TABLET, FILM COATED ORAL at 22:59

## 2021-09-15 RX ADMIN — CITALOPRAM HYDROBROMIDE 20 MG: 20 TABLET ORAL at 08:58

## 2021-09-15 RX ADMIN — NYSTATIN 500000 UNITS: 100000 SUSPENSION ORAL at 22:58

## 2021-09-15 RX ADMIN — VALPROIC ACID 250 MG: 250 CAPSULE, LIQUID FILLED ORAL at 22:59

## 2021-09-15 RX ADMIN — INSULIN LISPRO 3 UNITS: 100 INJECTION, SOLUTION INTRAVENOUS; SUBCUTANEOUS at 16:30

## 2021-09-15 RX ADMIN — VALPROIC ACID 250 MG: 250 CAPSULE, LIQUID FILLED ORAL at 08:58

## 2021-09-15 RX ADMIN — SODIUM CHLORIDE 900 MG: 9 INJECTION, SOLUTION INTRAVENOUS at 22:59

## 2021-09-15 RX ADMIN — SODIUM CHLORIDE 900 MG: 9 INJECTION, SOLUTION INTRAVENOUS at 13:59

## 2021-09-15 RX ADMIN — NYSTATIN 500000 UNITS: 100000 SUSPENSION ORAL at 08:59

## 2021-09-15 RX ADMIN — POTASSIUM CHLORIDE 20 MEQ: 1.5 POWDER, FOR SOLUTION ORAL at 08:59

## 2021-09-15 RX ADMIN — DEXAMETHASONE SODIUM PHOSPHATE 4 MG: 4 INJECTION, SOLUTION INTRA-ARTICULAR; INTRALESIONAL; INTRAMUSCULAR; INTRAVENOUS; SOFT TISSUE at 13:59

## 2021-09-15 RX ADMIN — DEXAMETHASONE SODIUM PHOSPHATE 4 MG: 4 INJECTION, SOLUTION INTRA-ARTICULAR; INTRALESIONAL; INTRAMUSCULAR; INTRAVENOUS; SOFT TISSUE at 22:59

## 2021-09-15 RX ADMIN — AMLODIPINE BESYLATE 5 MG: 5 TABLET ORAL at 08:58

## 2021-09-15 RX ADMIN — POTASSIUM CHLORIDE 20 MEQ: 1.5 POWDER, FOR SOLUTION ORAL at 16:51

## 2021-09-15 RX ADMIN — APIXABAN 5 MG: 5 TABLET, FILM COATED ORAL at 08:58

## 2021-09-15 RX ADMIN — SODIUM CHLORIDE 900 MG: 9 INJECTION, SOLUTION INTRAVENOUS at 05:20

## 2021-09-15 RX ADMIN — ASCORBIC ACID, VITAMIN A PALMITATE, CHOLECALCIFEROL, THIAMINE HYDROCHLORIDE, RIBOFLAVIN-5 PHOSPHATE SODIUM, PYRIDOXINE HYDROCHLORIDE, NIACINAMIDE, DEXPANTHENOL, ALPHA-TOCOPHEROL ACETATE, VITAMIN K1, FOLIC ACID, BIOTIN, CYANOCOBALAMIN: 200; 3300; 200; 6; 3.6; 6; 40; 15; 10; 150; 600; 60; 5 INJECTION, SOLUTION INTRAVENOUS at 23:04

## 2021-09-15 RX ADMIN — I.V. FAT EMULSION 250 ML: 20 EMULSION INTRAVENOUS at 22:59

## 2021-09-15 RX ADMIN — VALPROIC ACID 250 MG: 250 CAPSULE, LIQUID FILLED ORAL at 16:51

## 2021-09-15 RX ADMIN — INSULIN LISPRO 2 UNITS: 100 INJECTION, SOLUTION INTRAVENOUS; SUBCUTANEOUS at 11:30

## 2021-09-15 RX ADMIN — DEXAMETHASONE SODIUM PHOSPHATE 4 MG: 4 INJECTION, SOLUTION INTRA-ARTICULAR; INTRALESIONAL; INTRAMUSCULAR; INTRAVENOUS; SOFT TISSUE at 05:20

## 2021-09-15 RX ADMIN — LEVETIRACETAM 2000 MG: 100 INJECTION, SOLUTION INTRAVENOUS at 22:59

## 2021-09-15 RX ADMIN — LEVETIRACETAM 2000 MG: 100 INJECTION, SOLUTION INTRAVENOUS at 09:41

## 2021-09-15 RX ADMIN — Medication 10 ML: at 23:02

## 2021-09-15 RX ADMIN — Medication 10 ML: at 16:11

## 2021-09-15 RX ADMIN — Medication 10 ML: at 05:24

## 2021-09-15 NOTE — CONSULTS
[address] [facility]    Name:  Sherren Hurry  MR#:  [id number]  :  1955  ACCOUNT #:  [de-identified]  DATE OF SERVICE:  09/15/2021    PSYCHIATRIC CONSULTATION    DATE OF CONSULT:  2021    DATE SEEN:  2021, 09/15/2021    ORDERED BY:  Fabrizio Nunez PA-C, the patient's attending provider. REASON FOR CONSULTATION:  Depression. Saw the patient. Chart reviewed. Saw the patient originally on 2021. The patient drowsy, poor eye contact, did not talk any. Her sister was there, talked with her briefly. Again saw her today on 09/15/2021. The patient is drowsy, did not make any eye contact, closed eyes, did not respond. The sister suggested that I speak with her daughter, Norma Stewart, at [phone]. I spoke with her. Daughter says the patient is outgoing person. She has PhD in Psychology. 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 To her knowledge, there is no history of psych treatment. Of course, work has been stressful. CHIEF COMPLAINT:  The patient was brought to the triage for being dizzy, weakness, nauseated since . There are no new medications she is taking. Was at [facility] earlier in the day with a normal lab work. Apparently, later had multiple seizures, unresponsive. Brought into the emergency room. Obviously seizure-like activities, being found on the floor by family overnight, she was seizing. Received Versed twice. Had a temperature of 102. CT of the head was negative. On 2021, she was seen in the ED, was complaining of nausea, vomiting, dizziness, and weakness. Apparently, the day before when she talked with the daughter, daughter noted her to be confused and disoriented. Her COVID test was negative and she received COVID vaccine. During her stay, she was seen by Dr. Erinn Rodriguez and Dr. José Hanna, Neurology, and also seen by Dr. Ignacio Johnson, Infectious Disease physician.     PAST HISTORY:  To the knowledge of the sister and the daughter, there is no history of depression or prior psychiatric treatment. Now, she is getting Keppra. She had a fever of 102. MRI showed bitemporal hyperintensity, quite suspicious for HSV encephalitis. Started on acyclovir 10 mg/kg 8 hours. LP done, consistent with viral encephalitis. Also on Keppra 1000 mg twice a day. ALLERGIES:  NO KNOWN ALLERGIES TO MEDICATIONS. LABORATORY DATA:  COVID negative. Her glucose today 254, 187, 200. Metabolic panel today, electrolytes:  Chloride 113, glucose 182, creatinine 1.61, GFR 39. SUBSTANCE ABUSE HISTORY:  None noted. FAMILY HISTORY:  Unknown. TRAUMA HISTORY:  Unknown. MENTAL STATUS EXAM:  Nothing more to add. She is drowsy, tired. Poor eye contact to none. Apathetic. Did not engage in conversation. No agitation. No restlessness. Not alert, awake. DIAGNOSES:  Altered mental status. Depression. Considering encephalitis. Diabetes mellitus. Currently, she is already on citalopram 20 mg daily. She is getting acyclovir and Keppra for consideration of viral encephalitis and seizure disorder. I did speak with her daughter Gladys Lemus. Give some time for the acyclovir and Keppra to work and also the citalopram.  One could consider giving low-dose antipsychotic, to see if that will reduce some of the confusion, apathy, energy, and motivation issues. Do a TSH, T4, B12 level. Thank you.       Hui Welch MD      [id number]  D:  09/15/2021 15:34  T:  09/15/2021 19:20  JOB #:  [id number]

## 2021-09-15 NOTE — PROGRESS NOTES
NEUROLOGY  PROGRESS NOTE    Admission History/Pertinent Events  Chemo Rae is a 60y.o. year old female who presented on 9/3/2021. Patient has a past medical history of Breast Cancer, HL, DM2, Anxiety/Depression who presented with seizure like activity while on the floor by family. EMS reported patient having seizure-like activity for which patient was initially treated with midazolam.  In the ED, patient had a temperature a 102° F.  Emergent CT head was negative for any acute findings. Emergent contrasted MRI of the brain concerning for bilateral temporal pathology most concerning for HSV encephalitis or limbic encephalitis. Patient was loaded with IV levetiracetam and started on antibiotics as well as acyclovir. ASSESSMENT/PLAN      Impression  Reviewed patient's CT of the head which is without any worsening frontotemporal edema. Patient's waxing and waning mental status likely related to underlying nature of her encephalitis. Will obtain EEG and valproic acid level to see if AED regimen needs to be changed. We will continue patient on acyclovir dexamethasone at current dosages for now. We will continue AEDs per below.       [facility] WO 9/3  [address] WO 9/9  Right temporal and insular region edema    CT WO 9/10  Possible RMCA ischemia with possible RMCA occlusion    CT WO 9/11  No interval changes from scan on 9/10    14 [facility] WO 9/15  No interval changes or worsening of frontotemporal edema    MRI Brain [facility] FOR COGNITIVE DISORDERS 9/3  T2/FLAIR hyperintense signal in the bilateral temporal regions more so in the right temporal and insular regions concerning for HSV encephalitis or possibly limbic encephalitis    MRI Brain [facility] COGNITIVE DISORDERS 9/8  Interval progression of T2/FLAIR hyperintense signal change in the right cerebral and left frontal region concerning for worsening encephalitis    MRA Head WO 9/13  No occlusions, dissections, significant stenosis, pseudoaneurysms or aneurysms in the intracranial arterial system    EEG 9/9  Generalized slowing of the background  Sharp discharges in the right frontal region    Valproic Acid Levels  44 (9/12)    CSF Studies   Positive: , , Protein 80, HSV-1 DNA  Negative/WNL: Glucose, HSV-2 DNA, Viral Cx     Labs  Positive: HSV 1 IgG Ab  Negative: HIV 1/2, RPR      Plan      Encephalopathy & Seizures d/t HSV1 Encephalitis  Cerebral Edema d/t Above  Associated: Sepsis, Basilic Vein Thrombosus, INDIA  -On Acyclovir per ID (Planned 21 day course)  --> Plan to repeat LP with CSF studies after 21 days of Acyclovir has been completed  ---> WBC, RBC, Protein, HSV-1 PCR  -Continue Dexamethasone 4 TID  -On Apixaban 5 BID  -Seizure Precautions  -Seizure Prophylaxis: Levetiracetam 2000 BID, Valproic Acid 250 TID  -SBP Goal < 160  -Glucose Goal < 180  -Head of Bed at 30 degrees  -PT/OT/ST as needed  -Management of metabolic/infectious derangements to referring teams  -F/U EEG and Valproic Acid Level      SUBJECTIVE   Patient currently on acyclovir and dexamethasone. Patient continues to be lethargic. Physical/Neurological Exam  Patient awake, alert; following central and peripheral commands   No expressive or receptive aphasia;  No dysarthria   Decreased emotional drive with speech  Pupils react to light bilaterally; EOM Intact   No visual field deficits on gross exam   No facial droop   Motor: 3-/5 in the left hemibody, 4/5 in the right hemibody  No abnormal movements   Sensation to light touch intact grossly throughout       OBJECTIVE  Vital Signs  Temp:  [97.3 °F (36.3 °C)-98.6 °F (37 °C)]   Pulse (Heart Rate):  [53-78]   BP: (110-177)/(48-84)   Resp Rate:  [15-22]   O2 Sat (%):  [96 %-100 %]   Weight: --    MEDICATIONS    Current Facility-Administered Medications:     pantoprazole (PROTONIX) tablet 40 mg, 40 mg, Oral, ACB, Anthony Sotelo PA-C    valproic acid (DEPAKENE) capsule 250 mg, 250 mg, Oral, TID, Haley Griffiths MD, 250 mg at 09/14/21 6333    dexamethasone (DECADRON) 4 mg/mL injection 4 mg, 4 mg, IntraVENous, Q8H, Estrellita Horton MD, 4 mg at 09/15/21 0520    0.45% sodium chloride infusion, 75 mL/hr, IntraVENous, CONTINUOUS, Elmer Vela MD, Last Rate: 75 mL/hr at 09/14/21 2238, 75 mL/hr at 09/14/21 2238    citalopram (CELEXA) tablet 20 mg, 20 mg, Oral, DAILY, Don PATEL MD, 20 mg at 09/13/21 1112    nystatin (MYCOSTATIN) 100,000 unit/mL oral suspension 500,000 Units, 500,000 Units, Oral, QID, Don PATEL MD, 500,000 Units at 09/14/21 2158    apixaban (ELIQUIS) tablet 5 mg, 5 mg, Oral, BID, Don PATEL MD, 5 mg at 09/14/21 2158    acyclovir (ZOVIRAX) 900 mg in 0.9% sodium chloride 250 mL IVPB, 15 mg/kg (Ideal), IntraVENous, Q8H, Debra Serrato MD, Last Rate: 250 mL/hr at 09/15/21 0520, 900 mg at 09/15/21 0520    levETIRAcetam (KEPPRA) 2,000 mg in 0.9% sodium chloride 250 mL IVPB, 2,000 mg, IntraVENous, Q12H, Belén Collins MD, 2,000 mg at 09/14/21 2154    amLODIPine (NORVASC) tablet 5 mg, 5 mg, Oral, DAILY, Kristin Flores MD, 5 mg at 09/13/21 1112    hydrALAZINE (APRESOLINE) 20 mg/mL injection 10 mg, 10 mg, IntraVENous, Q6H PRN, Kristin Flores MD, 10 mg at 09/12/21 2202    insulin lispro (HUMALOG) injection, , SubCUTAneous, AC&HS, Pamela De Dios MD, 2 Units at 09/14/21 1757    potassium chloride (KLOR-CON) packet for solution 20 mEq, 20 mEq, Oral, BID WITH MEALS, Vibha Res, Merline Speedy, MD, 20 mEq at 09/14/21 1601    sodium chloride (NS) flush 5-40 mL, 5-40 mL, IntraVENous, Q8H, Frelier, Merline Speedy, MD, 10 mL at 09/15/21 0524    sodium chloride (NS) flush 5-40 mL, 5-40 mL, IntraVENous, PRN, Kerin Res, Merline Speedy, MD    acetaminophen (TYLENOL) tablet 650 mg, 650 mg, Oral, Q6H PRN, 650 mg at 09/06/21 1236 **OR** acetaminophen (TYLENOL) suppository 650 mg, 650 mg, Rectal, Q6H PRN, Kerin Res, Merline Speedy, MD    polyethylene glycol (MIRALAX) packet 17 g, 17 g, Oral, DAILY PRN, Kerin Res, Merline Speedy, MD    promethazine (PHENERGAN) tablet 12.5 mg, 12.5 mg, Oral, Q6H PRN **OR** ondansetron (ZOFRAN) injection 4 mg, 4 mg, IntraVENous, Q6H PRN, Tony Mendoza MD    glucose chewable tablet 16 g, 4 Tablet, Oral, PRN, Kana Baker MD    dextrose (D50W) injection syrg 12.5-25 g, 25-50 mL, IntraVENous, PRN, Kana Baker MD, 25 g at 09/04/21 0409    glucagon (GLUCAGEN) injection 1 mg, 1 mg, IntraMUSCular, PRN, Kana Baker MD      Labs: I've reviewed the labs for today     This document has been prepared by the Dragon voice recognition system, typographical errors may have occurred.  Attempts have been made to correct errors, however inadvertent errors may persist.

## 2021-09-15 NOTE — PROGRESS NOTES
OT eval order received and acknowledged. Per medical chart, pt lethargic and withdrawn this morning and repeat CT head pending to evaluate for interval worsening of cerebral edema. OT/PT to hold at this time as pt not appropriate. Will continue to follow and re-attempt evaluations at a later time as medically appropriate. Thank you.

## 2021-09-15 NOTE — PROGRESS NOTES
\"Bedside\" shift change report given to Marbella Black (oncoming nurse) by Theo Abrams (offgoing nurse).

## 2021-09-15 NOTE — PROGRESS NOTES
Chart reviewed. At this time patient is not medically stable for discharge. She remains on IV medications, PPN, and is not yet appropriate for therapy. CM will continue to follow for discharge planning needs.

## 2021-09-15 NOTE — PROGRESS NOTES
Attempted to see pt for follow-up. Pt's sister is present at bedside and reports pt tolerated breakfast and noon meal well, good intake, no overt s/s aspiration. Pt not adequately arousable to participate in tx, pt opens eyes to voice but does not adequately arouse. Will cont to follow. Cont current SBS/mildly thick liquids diet.

## 2021-09-15 NOTE — PROGRESS NOTES
Hospitalist Progress Note    Subjective:   Daily Progress Note: 9/15/2021 10:59 AM    Withdrawn. Denies chest pain or shortness of breath.   Bilateral upper extremity weakness    Current Facility-Administered Medications   Medication Dose Route Frequency    amino acid 4.25 % dextrose 5 % with electrolytes (CLINIMIX E) infusion   IntraVENous CONTINUOUS    fat emulsion 20% (LIPOSYN, INTRAlipid) infusion 250 mL  250 mL IntraVENous CONTINUOUS    pantoprazole (PROTONIX) tablet 40 mg  40 mg Oral ACB    valproic acid (DEPAKENE) capsule 250 mg  250 mg Oral TID    dexamethasone (DECADRON) 4 mg/mL injection 4 mg  4 mg IntraVENous Q8H    0.45% sodium chloride infusion  75 mL/hr IntraVENous CONTINUOUS    citalopram (CELEXA) tablet 20 mg  20 mg Oral DAILY    nystatin (MYCOSTATIN) 100,000 unit/mL oral suspension 500,000 Units  500,000 Units Oral QID    apixaban (ELIQUIS) tablet 5 mg  5 mg Oral BID    acyclovir (ZOVIRAX) 900 mg in 0.9% sodium chloride 250 mL IVPB  15 mg/kg (Ideal) IntraVENous Q8H    levETIRAcetam (KEPPRA) 2,000 mg in 0.9% sodium chloride 250 mL IVPB  2,000 mg IntraVENous Q12H    amLODIPine (NORVASC) tablet 5 mg  5 mg Oral DAILY    hydrALAZINE (APRESOLINE) 20 mg/mL injection 10 mg  10 mg IntraVENous Q6H PRN    insulin lispro (HUMALOG) injection   SubCUTAneous AC&HS    potassium chloride (KLOR-CON) packet for solution 20 mEq  20 mEq Oral BID WITH MEALS    sodium chloride (NS) flush 5-40 mL  5-40 mL IntraVENous Q8H    sodium chloride (NS) flush 5-40 mL  5-40 mL IntraVENous PRN    acetaminophen (TYLENOL) tablet 650 mg  650 mg Oral Q6H PRN    Or    acetaminophen (TYLENOL) suppository 650 mg  650 mg Rectal Q6H PRN    polyethylene glycol (MIRALAX) packet 17 g  17 g Oral DAILY PRN    promethazine (PHENERGAN) tablet 12.5 mg  12.5 mg Oral Q6H PRN    Or    ondansetron (ZOFRAN) injection 4 mg  4 mg IntraVENous Q6H PRN    glucose chewable tablet 16 g  4 Tablet Oral PRN    dextrose (D50W) injection syrg 12.5-25 g  25-50 mL IntraVENous PRN    glucagon (GLUCAGEN) injection 1 mg  1 mg IntraMUSCular PRN        Review of Systems  Review of Systems   Constitutional: Positive for malaise/fatigue. Negative for chills and fever. HENT: Negative. Eyes: Negative. Respiratory: Negative for cough and shortness of breath. Cardiovascular: Negative for chest pain and leg swelling. Gastrointestinal: Negative for abdominal pain, nausea and vomiting. Genitourinary: Negative for dysuria. Musculoskeletal: Negative. Skin: Negative. Neurological: Positive for weakness. Psychiatric/Behavioral: Negative. Objective:     Visit Vitals  BP (!) 147/72 (BP 1 Location: Left upper arm, BP Patient Position: At rest;Semi fowlers)   Pulse 66   Temp 97.6 °F (36.4 °C)   Resp 18   Ht 5' 6\" (1.676 m)   Wt 80 kg (176 lb 5.9 oz)   SpO2 96%   BMI 28.47 kg/m²    O2 Flow Rate (L/min): 2 l/min O2 Device: None (Room air)    Temp (24hrs), Av.7 °F (36.5 °C), Min:97.3 °F (36.3 °C), Max:98.6 °F (37 °C)      No intake/output data recorded.  1901 - 09/15 0700  In: 2970 [P.O.:170;  I.V.:2800]  Out: 1400 [Urine:1400]    Recent Results (from the past 24 hour(s))   GLUCOSE, POC    Collection Time: 21 12:23 PM   Result Value Ref Range    Glucose (POC) 158 (H) 65 - 117 mg/dL    Performed by Aristeo Hardy    GLUCOSE, POC    Collection Time: 21  4:36 PM   Result Value Ref Range    Glucose (POC) 162 (H) 65 - 117 mg/dL    Performed by Aristeo Minimally invasive devices    GLUCOSE, POC    Collection Time: 21  7:52 PM   Result Value Ref Range    Glucose (POC) 126 (H) 65 - 117 mg/dL    Performed by Michelle Renteria    CBC WITH AUTOMATED DIFF    Collection Time: 09/15/21  7:02 AM   Result Value Ref Range    WBC 25.9 (H) 3.6 - 11.0 K/uL    RBC 5.00 3.80 - 5.20 M/uL    HGB 14.8 11.5 - 16.0 g/dL    HCT 44.5 35.0 - 47.0 %    MCV 89.0 80.0 - 99.0 FL    MCH 29.6 26.0 - 34.0 PG    MCHC 33.3 30.0 - 36.5 g/dL    RDW 14.2 11.5 - 14.5 %    PLATELET 149 937 - 400 K/uL    MPV 10.4 8.9 - 12.9 FL    NRBC 0.1 (H) 0.0  WBC    ABSOLUTE NRBC 0.02 (H) 0.00 - 0.01 K/uL    NEUTROPHILS 87 (H) 32 - 75 %    LYMPHOCYTES 5 (L) 12 - 49 %    MONOCYTES 7 5 - 13 %    EOSINOPHILS 0 0 - 7 %    BASOPHILS 0 0 - 1 %    IMMATURE GRANULOCYTES 1 (H) 0 - 0.5 %    ABS. NEUTROPHILS 22.6 (H) 1.8 - 8.0 K/UL    ABS. LYMPHOCYTES 1.3 0.8 - 3.5 K/UL    ABS. MONOCYTES 1.9 (H) 0.0 - 1.0 K/UL    ABS. EOSINOPHILS 0.0 0.0 - 0.4 K/UL    ABS. BASOPHILS 0.0 0.0 - 0.1 K/UL    ABS. IMM. GRANS. 0.2 (H) 0.00 - 0.04 K/UL    DF AUTOMATED     METABOLIC PANEL, COMPREHENSIVE    Collection Time: 09/15/21  7:02 AM   Result Value Ref Range    Sodium 143 136 - 145 mmol/L    Potassium 4.4 3.5 - 5.1 mmol/L    Chloride 113 (H) 97 - 108 mmol/L    CO2 22 21 - 32 mmol/L    Anion gap 8 5 - 15 mmol/L    Glucose 182 (H) 65 - 100 mg/dL    BUN 34 (H) 6 - 20 mg/dL    Creatinine 1.61 (H) 0.55 - 1.02 mg/dL    BUN/Creatinine ratio 21 (H) 12 - 20      GFR est AA 39 (L) >60 ml/min/1.73m2    GFR est non-AA 32 (L) >60 ml/min/1.73m2    Calcium 8.5 8.5 - 10.1 mg/dL    Bilirubin, total 0.5 0.2 - 1.0 mg/dL    AST (SGOT) 22 15 - 37 U/L    ALT (SGPT) 52 12 - 78 U/L    Alk.  phosphatase 52 45 - 117 U/L    Protein, total 5.9 (L) 6.4 - 8.2 g/dL    Albumin 2.4 (L) 3.5 - 5.0 g/dL    Globulin 3.5 2.0 - 4.0 g/dL    A-G Ratio 0.7 (L) 1.1 - 2.2     RENAL FUNCTION PANEL    Collection Time: 09/15/21  7:02 AM   Result Value Ref Range    Sodium 142 136 - 145 mmol/L    Potassium 4.4 3.5 - 5.1 mmol/L    Chloride 113 (H) 97 - 108 mmol/L    CO2 22 21 - 32 mmol/L    Anion gap 7 5 - 15 mmol/L    Glucose 181 (H) 65 - 100 mg/dL    BUN 35 (H) 6 - 20 mg/dL    Creatinine 1.63 (H) 0.55 - 1.02 mg/dL    BUN/Creatinine ratio 21 (H) 12 - 20      GFR est AA 38 (L) >60 ml/min/1.73m2    GFR est non-AA 32 (L) >60 ml/min/1.73m2    Calcium 8.6 8.5 - 10.1 mg/dL    Phosphorus 4.3 2.6 - 4.7 mg/dL    Albumin 2.5 (L) 3.5 - 5.0 g/dL   GLUCOSE, POC    Collection Time: 09/15/21  7:17 AM   Result Value Ref Range    Glucose (POC) 154 (H) 65 - 117 mg/dL    Performed by Rich Pa         MRA BRAIN WO CONT   Final Result   No major arterial occlusion or stenoses was demonstrated. CT HEAD WO CONT   Final Result      Stable exam            CT HEAD WO CONT   Final Result   [Findings most suggestive of a right middle cerebral artery   thrombosis with associated ischemia in the distribution of this artery manifest   by diffuse edema with sulcal effacement; the right middle cerebral artery   appears hyperdense. ]. The inpatient nursing station was contacted and the findings were conveyed at   the time of this report. They will have the covering physician call me back with   any questions. I spoke with nurse Kirill Adams      XR CHEST PORT   Final Result   The cardiomediastinal silhouette is appropriate for age, technique,   and lung expansion. Pulmonary vasculature is not congested. The lungs are   essentially clear. No effusion or pneumothorax is seen. CT HEAD WO CONT   Final Result   1. Edema still evident in the right temporal lobe and insular cortex. No   significant change from previous CT examination. No evidence for hemorrhage or   increased mass effect. IR GUIDE INSERT PICC OVER 5 YRS   Final Result   1. Thrombosis of left basilic vein. 2.  Successful placement of a left-sided tunneled Powerline central venous   catheter. The catheter is ready for use. IR FLUORO GUIDE PLC CVAD   Final Result   1. Thrombosis of left basilic vein. 2.  Successful placement of a left-sided tunneled Powerline central venous   catheter. The catheter is ready for use. IR US GUIDED VASCULAR ACCESS   Final Result   1. Thrombosis of left basilic vein. 2.  Successful placement of a left-sided tunneled Powerline central venous   catheter. The catheter is ready for use. IR US VENOUS EXT LTD   Final Result   1. Thrombosis of left basilic vein.    2.  Successful placement of a left-sided tunneled Powerline central venous   catheter. The catheter is ready for use. MRI BRAIN W WO CONT   Final Result   Interval progression of findings in the right cerebral hemisphere, extending   into the inferior left frontal lobe as above. Imaging features are most   consistent with worsening encephalitis (with involvement characteristic of   herpes encephalitis as stated on original exam from 9/3/2021). Limited   encephalitis can appear similar. Given rapid progression, neoplasm is felt   unlikely. No evidence of acute infarction. CT CODE NEURO HEAD WO CONTRAST   Final Result   1. Expanding edema in the right temporal lobe and along the sylvian fissure   without hemorrhage, with 2-3 mm of right-to-left midline shift. Called report to   Holli Carrillo on 5 W.  at 3:58 PM 9/8/2021. XR CHEST PORT   Final Result   Stable mild elevation of right hemidiaphragm. Right lateral chest   wall clips. Stable enlargement of cardiopericardial silhouette. Improved   aeration of the lungs bilaterally. No evidence of focal airspace consolidation. Gastric tube placed with tip overlying the right upper abdomen at the level of   the distal stomach or proximal duodenum. MRI BRAIN W WO CONT   Final Result   1. Abnormal high T2 signal evident in the right temporal lobe and insular   cortex. Subtle increased T2 signal evident in the left temporal uncus. The   appearances are concerning for herpes encephalitis. This could also represent   limbic encephalitis. A report was call to Dr. Dwayne Guiterrez at 9/3/2021 11:49 AM      XR CHEST PORT   Final Result      CT HEAD WO CONT   Final Result   No acute or active intracranial process. CT SPINE CERV WO CONT   Final Result   No specific acute or active process. Multilevel degenerative disc and degenerative joint disease. CT HEAD WO CONT    (Results Pending)        PHYSICAL EXAM:    Physical Exam  Vitals reviewed.    Constitutional: General: She is not in acute distress. Appearance: She is not ill-appearing. HENT:      Head: Normocephalic and atraumatic. Mouth/Throat:      Mouth: Mucous membranes are moist.      Pharynx: Oropharynx is clear. Eyes:      Conjunctiva/sclera: Conjunctivae normal.   Cardiovascular:      Rate and Rhythm: Normal rate and regular rhythm. Heart sounds: Normal heart sounds. Pulmonary:      Effort: Pulmonary effort is normal.      Breath sounds: Normal breath sounds. Abdominal:      General: Abdomen is flat. Bowel sounds are normal.      Palpations: Abdomen is soft. Musculoskeletal:      Cervical back: Normal range of motion and neck supple. Comments: Limited range of motion in the upper extremities, 2 x 5 on the left and 4 x 5 on the right. Hip flexion bilaterally patient is able to raise her legs up off the bed. Sensation intact throughout   Skin:     General: Skin is warm and dry. Neurological:      Mental Status: She is alert and oriented to person, place, and time. Comments: Withdrawn and soft-spoken. Upper extremity weakness as noted above   Psychiatric:      Comments: Withdrawn with a history of depression.   Considering her medical condition will ask psych for further evaluation and treatment          Data Review    Recent Results (from the past 24 hour(s))   GLUCOSE, POC    Collection Time: 09/14/21 12:23 PM   Result Value Ref Range    Glucose (POC) 158 (H) 65 - 117 mg/dL    Performed by Laurie Gutierrez    GLUCOSE, POC    Collection Time: 09/14/21  4:36 PM   Result Value Ref Range    Glucose (POC) 162 (H) 65 - 117 mg/dL    Performed by Laurie Gutierrez    GLUCOSE, POC    Collection Time: 09/14/21  7:52 PM   Result Value Ref Range    Glucose (POC) 126 (H) 65 - 117 mg/dL    Performed by Janelle Mary    CBC WITH AUTOMATED DIFF    Collection Time: 09/15/21  7:02 AM   Result Value Ref Range    WBC 25.9 (H) 3.6 - 11.0 K/uL    RBC 5.00 3.80 - 5.20 M/uL    HGB 14.8 11.5 - 16.0 g/dL HCT 44.5 35.0 - 47.0 %    MCV 89.0 80.0 - 99.0 FL    MCH 29.6 26.0 - 34.0 PG    MCHC 33.3 30.0 - 36.5 g/dL    RDW 14.2 11.5 - 14.5 %    PLATELET 442 900 - 976 K/uL    MPV 10.4 8.9 - 12.9 FL    NRBC 0.1 (H) 0.0  WBC    ABSOLUTE NRBC 0.02 (H) 0.00 - 0.01 K/uL    NEUTROPHILS 87 (H) 32 - 75 %    LYMPHOCYTES 5 (L) 12 - 49 %    MONOCYTES 7 5 - 13 %    EOSINOPHILS 0 0 - 7 %    BASOPHILS 0 0 - 1 %    IMMATURE GRANULOCYTES 1 (H) 0 - 0.5 %    ABS. NEUTROPHILS 22.6 (H) 1.8 - 8.0 K/UL    ABS. LYMPHOCYTES 1.3 0.8 - 3.5 K/UL    ABS. MONOCYTES 1.9 (H) 0.0 - 1.0 K/UL    ABS. EOSINOPHILS 0.0 0.0 - 0.4 K/UL    ABS. BASOPHILS 0.0 0.0 - 0.1 K/UL    ABS. IMM. GRANS. 0.2 (H) 0.00 - 0.04 K/UL    DF AUTOMATED     METABOLIC PANEL, COMPREHENSIVE    Collection Time: 09/15/21  7:02 AM   Result Value Ref Range    Sodium 143 136 - 145 mmol/L    Potassium 4.4 3.5 - 5.1 mmol/L    Chloride 113 (H) 97 - 108 mmol/L    CO2 22 21 - 32 mmol/L    Anion gap 8 5 - 15 mmol/L    Glucose 182 (H) 65 - 100 mg/dL    BUN 34 (H) 6 - 20 mg/dL    Creatinine 1.61 (H) 0.55 - 1.02 mg/dL    BUN/Creatinine ratio 21 (H) 12 - 20      GFR est AA 39 (L) >60 ml/min/1.73m2    GFR est non-AA 32 (L) >60 ml/min/1.73m2    Calcium 8.5 8.5 - 10.1 mg/dL    Bilirubin, total 0.5 0.2 - 1.0 mg/dL    AST (SGOT) 22 15 - 37 U/L    ALT (SGPT) 52 12 - 78 U/L    Alk.  phosphatase 52 45 - 117 U/L    Protein, total 5.9 (L) 6.4 - 8.2 g/dL    Albumin 2.4 (L) 3.5 - 5.0 g/dL    Globulin 3.5 2.0 - 4.0 g/dL    A-G Ratio 0.7 (L) 1.1 - 2.2     RENAL FUNCTION PANEL    Collection Time: 09/15/21  7:02 AM   Result Value Ref Range    Sodium 142 136 - 145 mmol/L    Potassium 4.4 3.5 - 5.1 mmol/L    Chloride 113 (H) 97 - 108 mmol/L    CO2 22 21 - 32 mmol/L    Anion gap 7 5 - 15 mmol/L    Glucose 181 (H) 65 - 100 mg/dL    BUN 35 (H) 6 - 20 mg/dL    Creatinine 1.63 (H) 0.55 - 1.02 mg/dL    BUN/Creatinine ratio 21 (H) 12 - 20      GFR est AA 38 (L) >60 ml/min/1.73m2    GFR est non-AA 32 (L) >60 ml/min/1.73m2    Calcium 8.6 8.5 - 10.1 mg/dL    Phosphorus 4.3 2.6 - 4.7 mg/dL    Albumin 2.5 (L) 3.5 - 5.0 g/dL   GLUCOSE, POC    Collection Time: 09/15/21  7:17 AM   Result Value Ref Range    Glucose (POC) 154 (H) 65 - 117 mg/dL    Performed by Caralyn Councilman         Assessment/Plan:     Active Problems:    Sepsis ([facility] [address] (9/3/2021)      New onset seizure ([facility] [address] (9/3/2021)      Mild protein-calorie malnutrition ([facility] [address] (9/10/2021)      Hospital course: This is a 29-year-old female admitted on 9/3/2021 with a history of breast cancer, diabetes mellitus, type II, anxiety, depression who initially presented with seizure-like activity and found on the floor by family. Patient was found to be febrile and CT scan of the head head was performed with no acute process. 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 Patient recurrent seizures and was started on Keppra as well as immediately started on acyclovir. Numerous CT and MRI of the brain imaging performed with CT scans of the head initially without acute process. Repeat revealed right temporal and along the sylvian fissure without hemorrhage and 2 to 3 mm of right to left midline shift due to expanding edema. Serial CTs of the head were performed although no significant changes on 9/11/2021 at which time a CT of the head revealed findings suspicious for right middle cerebral artery thrombosis with associated ischemia in the distribution of that artery. Initial MRI on 9/3/2021 revealed abnormal high T2 signal in the right temporal and insular cortex. Findings suspicious for herpes encephalitis. There was also subtle increased T2 signal evident in the left temporal uncus. Repeats MRI of the brain revealed progression of the findings of the right cerebral hemisphere extending into the left frontal lobe. Most consistent with worsening encephalitis.   MRA of the brain on 9/13/2021 revealed no major arterial occlusion or stenosis demonstrated. Infectious disease consultation, Dr. Army Aguayo.  Neurological consultation. Patient remains on Keppra and valproic acid for seizure prophylaxis. Patient also found to have an upper extremity DVT and is currently on Eliquis. Psych consult ordered for worsening depression. Repeat CT of the Head pening    Impression\plan:    1. HSV encephalitis  infectious disease consult  neurology consult  continue acyclovir for a total of 21 days  recommendation is to repeat LP at the end of therapy  HSV 1+ CSF PCR  MRI reveals bilateral temporal lobe edema secondary to encephalitis  continue dexamethasone  CT head pending    2. [place]  Increasing IV fluids  continue to monitor  Creatinine 1.6    3. 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 Generalized weakness of the right and left upper extremity  MRA of the brain revealed no major arterial occlusions  suspect this is related to the overall edema    5. DVT \thrombosis of the left basilic vein  remain on Eliquis    6. Moderate protein malnutrition  Albumin 2.4   nutritional consultation  Start PPN patient refuses NG tube    7. Essential hypertension  continue Norvasc    8. Depression anxiety  continue Celexa  psych consultation    9. Functional limitations  Encourage patient to participate with PT and OT    CODE STATUS: Full code    Ulcer prophylaxis: Protonix  DVT prophylaxis: Eliquis    Discussed case with patient's daughter Rory Cea at bedside    Discharge barriers: Completion of treatment, improvement in overall brain function    Care Plan discussed with: Patient/Family    Total time spent with patient: >35 minutes.

## 2021-09-15 NOTE — PROGRESS NOTES
Infectious Disease Progress Note         Subjective:   Remains stable, denies new complaints. More lethargic today. Started on PPN for nutrition, but ate 50% of lunch   Objective:   Physical Exam:     Visit Vitals  /68 (BP 1 Location: Left upper arm, BP Patient Position: At rest;Semi fowlers)   Pulse 86   Temp 98.2 °F (36.8 °C)   Resp 18   Ht 5' 6\" (1.676 m)   Wt 176 lb 5.9 oz (80 kg)   SpO2 96%   BMI 28.47 kg/m²    O2 Flow Rate (L/min): 2 l/min O2 Device: None (Room air)    Temp (24hrs), Av °F (36.7 °C), Min:97.6 °F (36.4 °C), Max:98.6 °F (37 °C)    09/15 0701 - 09/15 1900  In: 360 [P.O.:360]  Out: 700 [Urine:700]   1901 - 09/15 0700  In: 2970 [P.O.:170; I.V.:2800]  Out: 1400 [Urine:1400]    General: NAD, resting, not following commands    HEENT: LAQUITA, Moist mucosa  Lungs: CTA b/l, no wheeze/rhonchi   Heart: S1S2+, RRR, no murmur  Abdo: Soft, NT, ND, +BS   Exts: improved strength on LUE, normal strength on right   Skin: No chronic wounds or ulcers     Data Review:       Recent Days:  Recent Labs     09/15/21  0702   WBC 25.9*   HGB 14.8   HCT 44.5        Recent Labs     09/15/21  0702 21  0629 21  0450   BUN 34*  35* 17 16   CREA 1.61*  1.63* 0.57 0.49*     Lab Results   Component Value Date/Time    C-Reactive protein 4.70 (H) 2021 03:30 AM        Microbiology     Results     Procedure Component Value Units Date/Time    HSV 1 AND 2 BY PCR [[id number]] Collected: 21 1315    Order Status: Canceled Specimen: Other from Miscellaneous sample     HSV 1 AND 2 BY PCR [[id number]]  (Abnormal) Collected: 21 1300    Order Status: Completed Specimen: Other from Miscellaneous sample Updated: 21 1037     Source Cerebrospinal fluid        Comment: Corrected on  AT 1037: Previously reported as Blood        HSV-1 DNA by PCR Positive        HSV-2 DNA by PCR Negative        Comment:  This test was developed and its performance characteristics  determined by [facility]. It has not been cleared or  approved by the U.S. Food and Drug Administration. The FDA has  determined that such clearance or approval is not necessary. This  test is used for clinical purposes. It should not be regarded as  investigational or research. Performed At: [address] [id number]  Carol Ameqzuita MD YK:[id number]         Keegan Toney [[id number]] Collected: 09/03/21 1300    Order Status: Completed Specimen: Other from Miscellaneous sample Updated: 09/13/21 0836     Source Cerebrospinal fluid        Viral Culture, General No virus isolated. Comment: Performed At: [address] [id number]  Carol Amezquita MD PP:[id number]  Corrected on 09/13 AT 0836: Previously reported as Comment         CULTURE, BODY FLUID W Roya Mojica [[id number]] Collected: 09/03/21 1300    Order Status: Canceled Specimen: Body Fluid     CULTURE, CSF Miguelangel Fenton [[id number]] Collected: 09/03/21 1200    Order Status: Completed Specimen: Cerebrospinal Fluid Updated: 09/11/21 1311     Special Requests: No Special Requests        GRAM STAIN Occasional WBCs seen         No organisms seen        Culture result:       no growth on solid media at 4 days                  no growth in Thio broth at 7 days          CULTURE, BLOOD, PAIRED [[id number]] Collected: 09/03/21 0700    Order Status: Completed Specimen: Blood Updated: 09/09/21 0740     Special Requests: No Special Requests        Culture result: No growth 6 days       COVID-19 RAPID TEST [[id number]] Collected: 09/03/21 0636    Order Status: Completed Specimen: Nasopharyngeal Updated: 09/03/21 0741     Specimen source Nasopharyngeal        COVID-19 rapid test Not Detected        Comment: Rapid Abbott ID Now   Rapid NAAT:  The specimen is NEGATIVE for SARS-CoV-2, the novel coronavirus associated with COVID-19.    Negative results should be treated as presumptive and, if inconsistent with clinical signs and symptoms or necessary for patient management, should be tested with an alternative molecular assay. Negative results do not preclude SARS-CoV-2 infection and should not be used as the sole basis for patient management decisions. This test has been authorized by the FDA under   an Emergency Use Authorization (EUA) for use by authorized laboratories. Fact sheet for Healthcare Providers: [employer] Fact sheet for Patients: [employer]   Methodology: Isothermal Nucleic Acid Amplification                Diagnostics   CXR Results  (Last 48 hours)    None         Assessment/Plan     1. 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 [place]: Mild rise in Cr, suspect pre-renal azotemia, from poor oral intake      Started on PPN today, will leave MIVF at 75 cc/hr, increase to 100 cc/hr on 08/16 if continued rise in Cr          3. Leukocytosis: Suspect from steroid therapy, remains afebrile and hemodynamically stable       Will add CRP, ESR and Procal to morning labs     4. Seizure on admission: Due to # 1. On Keppra, and Valproic    5. 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 DVT of axillary vein/Thrombosis of left basilic vein: Anticoagulated on low dose Eliquis     7.  Poor oral intake: Started on PPN by primary     Daughter up dated on clinical status telephonically     Gerald Basilio MD    9/15/2021

## 2021-09-15 NOTE — PROGRESS NOTES
Problem: Falls - Risk of  Goal: *Absence of Falls  Description: Document John Olea Fall Risk and appropriate interventions in the flowsheet. Outcome: Progressing Towards Goal  Note: Fall Risk Interventions:  Mobility Interventions: Bed/chair exit alarm, PT Consult for mobility concerns, PT Consult for assist device competence, OT consult for ADLs    Mentation Interventions: Adequate sleep, hydration, pain control, Bed/chair exit alarm, Door open when patient unattended, More frequent rounding, Reorient patient, Room close to nurse's station, Toileting rounds, Update white board    Medication Interventions: Bed/chair exit alarm, Patient to call before getting OOB    Elimination Interventions: Bed/chair exit alarm, Call light in reach, Patient to call for help with toileting needs, Toileting schedule/hourly rounds    History of Falls Interventions: Bed/chair exit alarm, Door open when patient unattended, Room close to nurse's station         Problem: Pressure Injury - Risk of  Goal: *Prevention of pressure injury  Description: Document Harish Scale and appropriate interventions in the flowsheet.   Outcome: Progressing Towards Goal  Note: Pressure Injury Interventions:  Sensory Interventions: Float heels, Keep linens dry and wrinkle-free, Maintain/enhance activity level, Assess changes in LOC    Moisture Interventions: Minimize layers, Check for incontinence Q2 hours and as needed, Absorbent underpads, Apply protective barrier, creams and emollients    Activity Interventions: PT/OT evaluation    Mobility Interventions: HOB 30 degrees or less, PT/OT evaluation    Nutrition Interventions: Document food/fluid/supplement intake    Friction and Shear Interventions: HOB 30 degrees or less, Minimize layers

## 2021-09-16 LAB
ALBUMIN SERPL-MCNC: 2.4 G/DL (ref 3.5–5)
ALBUMIN/GLOB SERPL: 0.7 {RATIO} (ref 1.1–2.2)
ALP SERPL-CCNC: 50 U/L (ref 45–117)
ALT SERPL-CCNC: 42 U/L (ref 12–78)
ANION GAP SERPL CALC-SCNC: 4 MMOL/L (ref 5–15)
AST SERPL W P-5'-P-CCNC: 19 U/L (ref 15–37)
BASOPHILS # BLD: 0 K/UL (ref 0–0.1)
BASOPHILS NFR BLD: 0 % (ref 0–1)
BILIRUB SERPL-MCNC: 0.2 MG/DL (ref 0.2–1)
BUN SERPL-MCNC: 34 MG/DL (ref 6–20)
BUN/CREAT SERPL: 32 (ref 12–20)
CA-I BLD-MCNC: 8.6 MG/DL (ref 8.5–10.1)
CHLORIDE SERPL-SCNC: 113 MMOL/L (ref 97–108)
CO2 SERPL-SCNC: 25 MMOL/L (ref 21–32)
CREAT SERPL-MCNC: 1.05 MG/DL (ref 0.55–1.02)
CRP SERPL-MCNC: 2.95 MG/DL (ref 0–0.6)
DIFFERENTIAL METHOD BLD: ABNORMAL
EOSINOPHIL # BLD: 0 K/UL (ref 0–0.4)
EOSINOPHIL NFR BLD: 0 % (ref 0–7)
ERYTHROCYTE [DISTWIDTH] IN BLOOD BY AUTOMATED COUNT: 14.4 % (ref 11.5–14.5)
ERYTHROCYTE [SEDIMENTATION RATE] IN BLOOD: 4 MM/HR
FOLATE SERPL-MCNC: 17 NG/ML (ref 5–21)
GLOBULIN SER CALC-MCNC: 3.4 G/DL (ref 2–4)
GLUCOSE BLD STRIP.AUTO-MCNC: 124 MG/DL (ref 65–117)
GLUCOSE BLD STRIP.AUTO-MCNC: 154 MG/DL (ref 65–117)
GLUCOSE BLD STRIP.AUTO-MCNC: 202 MG/DL (ref 65–117)
GLUCOSE BLD STRIP.AUTO-MCNC: 263 MG/DL (ref 65–117)
GLUCOSE SERPL-MCNC: 232 MG/DL (ref 65–100)
HCT VFR BLD AUTO: 42.4 % (ref 35–47)
HGB BLD-MCNC: 14.2 G/DL (ref 11.5–16)
IMM GRANULOCYTES # BLD AUTO: 0.2 K/UL (ref 0–0.04)
IMM GRANULOCYTES NFR BLD AUTO: 1 % (ref 0–0.5)
LYMPHOCYTES # BLD: 1.3 K/UL (ref 0.8–3.5)
LYMPHOCYTES NFR BLD: 6 % (ref 12–49)
MCH RBC QN AUTO: 29.7 PG (ref 26–34)
MCHC RBC AUTO-ENTMCNC: 33.5 G/DL (ref 30–36.5)
MCV RBC AUTO: 88.7 FL (ref 80–99)
MONOCYTES # BLD: 0.8 K/UL (ref 0–1)
MONOCYTES NFR BLD: 3 % (ref 5–13)
NEUTS SEG # BLD: 21 K/UL (ref 1.8–8)
NEUTS SEG NFR BLD: 90 % (ref 32–75)
NRBC # BLD: 0 K/UL (ref 0–0.01)
NRBC BLD-RTO: 0 PER 100 WBC
PERFORMED BY, TECHID: ABNORMAL
PLATELET # BLD AUTO: 256 K/UL (ref 150–400)
PMV BLD AUTO: 10.2 FL (ref 8.9–12.9)
POTASSIUM SERPL-SCNC: 4.4 MMOL/L (ref 3.5–5.1)
PROCALCITONIN SERPL-MCNC: 0.26 NG/ML
PROT SERPL-MCNC: 5.8 G/DL (ref 6.4–8.2)
RBC # BLD AUTO: 4.78 M/UL (ref 3.8–5.2)
SODIUM SERPL-SCNC: 142 MMOL/L (ref 136–145)
VALPROATE SERPL-MCNC: 31 UG/ML (ref 50–100)
VIT B12 SERPL-MCNC: >2000 PG/ML (ref 193–986)
WBC # BLD AUTO: 23.3 K/UL (ref 3.6–11)

## 2021-09-16 PROCEDURE — 84145 PROCALCITONIN (PCT): CPT

## 2021-09-16 PROCEDURE — 97530 THERAPEUTIC ACTIVITIES: CPT

## 2021-09-16 PROCEDURE — 74011250637 HC RX REV CODE- 250/637: Performed by: PHYSICIAN ASSISTANT

## 2021-09-16 PROCEDURE — 80053 COMPREHEN METABOLIC PANEL: CPT

## 2021-09-16 PROCEDURE — 95816 EEG AWAKE AND DROWSY: CPT | Performed by: STUDENT IN AN ORGANIZED HEALTH CARE EDUCATION/TRAINING PROGRAM

## 2021-09-16 PROCEDURE — 85652 RBC SED RATE AUTOMATED: CPT

## 2021-09-16 PROCEDURE — 36415 COLL VENOUS BLD VENIPUNCTURE: CPT

## 2021-09-16 PROCEDURE — 74011250636 HC RX REV CODE- 250/636: Performed by: HOSPITALIST

## 2021-09-16 PROCEDURE — 74011250637 HC RX REV CODE- 250/637: Performed by: INTERNAL MEDICINE

## 2021-09-16 PROCEDURE — 65270000029 HC RM PRIVATE

## 2021-09-16 PROCEDURE — 97163 PT EVAL HIGH COMPLEX 45 MIN: CPT

## 2021-09-16 PROCEDURE — 82962 GLUCOSE BLOOD TEST: CPT

## 2021-09-16 PROCEDURE — 92526 ORAL FUNCTION THERAPY: CPT

## 2021-09-16 PROCEDURE — 74011250636 HC RX REV CODE- 250/636: Performed by: INTERNAL MEDICINE

## 2021-09-16 PROCEDURE — 86140 C-REACTIVE PROTEIN: CPT

## 2021-09-16 PROCEDURE — 82607 VITAMIN B-12: CPT

## 2021-09-16 PROCEDURE — 80164 ASSAY DIPROPYLACETIC ACD TOT: CPT

## 2021-09-16 PROCEDURE — 74011636637 HC RX REV CODE- 636/637: Performed by: INTERNAL MEDICINE

## 2021-09-16 PROCEDURE — 74011250637 HC RX REV CODE- 250/637: Performed by: STUDENT IN AN ORGANIZED HEALTH CARE EDUCATION/TRAINING PROGRAM

## 2021-09-16 PROCEDURE — 97166 OT EVAL MOD COMPLEX 45 MIN: CPT

## 2021-09-16 PROCEDURE — 74011000250 HC RX REV CODE- 250: Performed by: PHYSICIAN ASSISTANT

## 2021-09-16 PROCEDURE — 99232 SBSQ HOSP IP/OBS MODERATE 35: CPT | Performed by: INTERNAL MEDICINE

## 2021-09-16 PROCEDURE — 85025 COMPLETE CBC W/AUTO DIFF WBC: CPT

## 2021-09-16 PROCEDURE — 74011000250 HC RX REV CODE- 250: Performed by: HOSPITALIST

## 2021-09-16 PROCEDURE — 74011250636 HC RX REV CODE- 250/636: Performed by: PSYCHIATRY & NEUROLOGY

## 2021-09-16 RX ADMIN — SODIUM CHLORIDE 900 MG: 9 INJECTION, SOLUTION INTRAVENOUS at 14:10

## 2021-09-16 RX ADMIN — NYSTATIN 500000 UNITS: 100000 SUSPENSION ORAL at 17:36

## 2021-09-16 RX ADMIN — LEVETIRACETAM 2000 MG: 100 INJECTION, SOLUTION INTRAVENOUS at 12:01

## 2021-09-16 RX ADMIN — SODIUM CHLORIDE 75 ML/HR: 4.5 INJECTION, SOLUTION INTRAVENOUS at 20:38

## 2021-09-16 RX ADMIN — LEVETIRACETAM 2000 MG: 100 INJECTION, SOLUTION INTRAVENOUS at 23:01

## 2021-09-16 RX ADMIN — POTASSIUM CHLORIDE 20 MEQ: 1.5 POWDER, FOR SOLUTION ORAL at 17:36

## 2021-09-16 RX ADMIN — VALPROIC ACID 250 MG: 250 CAPSULE, LIQUID FILLED ORAL at 17:36

## 2021-09-16 RX ADMIN — NYSTATIN 500000 UNITS: 100000 SUSPENSION ORAL at 08:46

## 2021-09-16 RX ADMIN — DEXAMETHASONE SODIUM PHOSPHATE 4 MG: 4 INJECTION, SOLUTION INTRA-ARTICULAR; INTRALESIONAL; INTRAMUSCULAR; INTRAVENOUS; SOFT TISSUE at 14:09

## 2021-09-16 RX ADMIN — DEXAMETHASONE SODIUM PHOSPHATE 4 MG: 4 INJECTION, SOLUTION INTRA-ARTICULAR; INTRALESIONAL; INTRAMUSCULAR; INTRAVENOUS; SOFT TISSUE at 23:02

## 2021-09-16 RX ADMIN — Medication 10 ML: at 05:37

## 2021-09-16 RX ADMIN — NYSTATIN 500000 UNITS: 100000 SUSPENSION ORAL at 12:43

## 2021-09-16 RX ADMIN — SODIUM CHLORIDE 900 MG: 9 INJECTION, SOLUTION INTRAVENOUS at 05:37

## 2021-09-16 RX ADMIN — SODIUM CHLORIDE 900 MG: 9 INJECTION, SOLUTION INTRAVENOUS at 23:01

## 2021-09-16 RX ADMIN — APIXABAN 5 MG: 5 TABLET, FILM COATED ORAL at 08:46

## 2021-09-16 RX ADMIN — POTASSIUM CHLORIDE 20 MEQ: 1.5 POWDER, FOR SOLUTION ORAL at 08:46

## 2021-09-16 RX ADMIN — VALPROIC ACID 250 MG: 250 CAPSULE, LIQUID FILLED ORAL at 08:46

## 2021-09-16 RX ADMIN — APIXABAN 5 MG: 5 TABLET, FILM COATED ORAL at 23:02

## 2021-09-16 RX ADMIN — INSULIN LISPRO 3 UNITS: 100 INJECTION, SOLUTION INTRAVENOUS; SUBCUTANEOUS at 08:46

## 2021-09-16 RX ADMIN — PANTOPRAZOLE SODIUM 40 MG: 40 TABLET, DELAYED RELEASE ORAL at 08:46

## 2021-09-16 RX ADMIN — NYSTATIN 500000 UNITS: 100000 SUSPENSION ORAL at 23:01

## 2021-09-16 RX ADMIN — AMLODIPINE BESYLATE 5 MG: 5 TABLET ORAL at 08:46

## 2021-09-16 RX ADMIN — TRACE ELEMENTS INJECTION 4: 7.4; .75; 98; 151 INJECTION, SOLUTION INTRAVENOUS at 23:02

## 2021-09-16 RX ADMIN — I.V. FAT EMULSION 250 ML: 20 EMULSION INTRAVENOUS at 23:02

## 2021-09-16 RX ADMIN — DEXAMETHASONE SODIUM PHOSPHATE 4 MG: 4 INJECTION, SOLUTION INTRA-ARTICULAR; INTRALESIONAL; INTRAMUSCULAR; INTRAVENOUS; SOFT TISSUE at 05:37

## 2021-09-16 RX ADMIN — Medication 10 ML: at 14:09

## 2021-09-16 RX ADMIN — VALPROIC ACID 250 MG: 250 CAPSULE, LIQUID FILLED ORAL at 23:01

## 2021-09-16 RX ADMIN — CITALOPRAM HYDROBROMIDE 20 MG: 20 TABLET ORAL at 08:46

## 2021-09-16 RX ADMIN — SODIUM CHLORIDE 75 ML/HR: 4.5 INJECTION, SOLUTION INTRAVENOUS at 05:37

## 2021-09-16 RX ADMIN — INSULIN LISPRO 5 UNITS: 100 INJECTION, SOLUTION INTRAVENOUS; SUBCUTANEOUS at 12:43

## 2021-09-16 NOTE — PROGRESS NOTES
NEUROLOGY  PROGRESS NOTE    Admission History/Pertinent Events  Cash Anthony is a 60y.o. year old female who presented on 9/3/2021. Patient has a past medical history of Breast Cancer, HL, DM2, Anxiety/Depression who presented with seizure like activity while on the floor by family. EMS reported patient having seizure-like activity for which patient was initially treated with midazolam.  In the ED, patient had a temperature a 102° F.  Emergent CT head was negative for any acute findings. Emergent contrasted MRI of the brain concerning for bilateral temporal pathology most concerning for HSV encephalitis or limbic encephalitis. Patient was loaded with IV levetiracetam and started on antibiotics as well as acyclovir. ASSESSMENT/PLAN      Impression  Reviewed patient's EEG which still show some occasional right frontal sharp discharges and during photic stimulation some bilateral fronto central spike and wave discharges without electrographic seizure criteria being met. Patient also with low valproic acid level. With a combination of these 2 findings will increase patient's valproic acid per below. We will also decrease patient's dexamethasone at this point as patient's neuroimaging has been stable in terms of bilateral frontal temporal edema and on examination patient is more awake and alert and communicating now. We will continue patient on acyclovir.       [facility] WO 9/3  [facility] WO 9/9  Right temporal and insular region edema    CT WO 9/10  Possible RMCA ischemia with possible RMCA occlusion    CT WO 9/11  No interval changes from scan on 9/10    [facility] WO 9/15  No interval changes or worsening of frontotemporal edema    MRI Brain [facility] FOR COGNITIVE DISORDERS 9/3  T2/FLAIR hyperintense signal in the bilateral temporal regions more so in the right temporal and insular regions concerning for HSV encephalitis or possibly limbic encephalitis    MRI Brain [facility] FOR COGNITIVE DISORDERS 9/8  Interval progression of T2/FLAIR hyperintense signal change in the right cerebral and left frontal region concerning for worsening encephalitis    MRA Head WO 9/13  No occlusions, dissections, significant stenosis, pseudoaneurysms or aneurysms in the intracranial arterial system    EEG 9/9  Generalized slowing of the background  Sharp discharges in the right frontal region    EEG 9/16  Mild generalized encephalopathy  Occasional right frontal sharp discharges  During photic stimulation, bilateral frontal central spike and wave discharges  No seizures    Valproic Acid Levels  44 (9/12), 31 (9/16)    CSF Studies   Positive: , , Protein 80, HSV-1 DNA  Negative/WNL: Glucose, HSV-2 DNA, Viral Cx     Labs  Positive: HSV 1 IgG Ab  Negative: HIV 1/2, RPR      Plan      Encephalopathy & Seizures d/t HSV1 Encephalitis  Cerebral Edema d/t Above  Associated: Sepsis, Basilic Vein Thrombosus, [place]IA  -On Acyclovir per ID (Planned 21 day course)  --> Plan to repeat LP with CSF studies after 21 days of Acyclovir has been completed  ---> WBC, RBC, Protein, HSV-1 PCR  -Continue Dexamethasone 2 TID  -On Apixaban 5 BID  -Seizure Precautions  -Seizure Prophylaxis: Levetiracetam 2000 BID, Valproic Acid 500 TID  -SBP Goal < 160  -Glucose Goal < 180  -Head of Bed at 30 degrees  -PT/OT/ST as needed  -Management of metabolic/infectious derangements to referring teams      SUBJECTIVE   Patient currently on acyclovir and dexamethasone. Patient more alert and interactive this morning and having more production of speech. 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 She reported that her sister came to visit her yesterday. She feels that she is getting better. Physical/Neurological Exam  Patient awake, alert; following central and peripheral commands   No expressive or receptive aphasia;  No dysarthria   Decreased emotional drive with speech  Pupils react to light bilaterally; EOM Intact   No visual field deficits on gross exam   No facial droop   Motor: 3-/5 in the left hemibody, 4/5 in the right hemibody  No abnormal movements   Sensation to light touch intact grossly throughout       OBJECTIVE  Vital Signs  Temp:  [97.2 °F (36.2 °C)-98.6 °F (37 °C)]   Pulse (Heart Rate):  [54-86]   BP: (113-158)/(58-79)   Resp Rate:  [16-18]   O2 Sat (%):  [96 %-100 %]   Weight: --    MEDICATIONS    Current Facility-Administered Medications:     amino acid 4.25 % dextrose 5 % with electrolytes (CLINIMIX E) infusion, , IntraVENous, CONTINUOUS, Anthony Sotelo PA-C    fat emulsion 20% (LIPOSYN, INTRAlipid) infusion 250 mL, 250 mL, IntraVENous, DAILY, Anthony Sotelo PA-C    amino acid 4.25 % dextrose 5 % with electrolytes (CLINIMIX E) infusion, , IntraVENous, CONTINUOUS, Flora Sotelo PA-C, Last Rate: 40 mL/hr at 09/15/21 2304, New Bag at 09/15/21 2304    pantoprazole (PROTONIX) tablet 40 mg, 40 mg, Oral, ACB, Anthony Sotelo PA-C, 40 mg at 09/16/21 0846    valproic acid (DEPAKENE) capsule 250 mg, 250 mg, Oral, TID, Kevin Robbins MD, 250 mg at 09/16/21 1736    dexamethasone (DECADRON) 4 mg/mL injection 4 mg, 4 mg, IntraVENous, Q8H, Rosa Soler MD, 4 mg at 09/16/21 1409    0.45% sodium chloride infusion, 75 mL/hr, IntraVENous, CONTINUOUS, Yomaira Koroma MD, Last Rate: 75 mL/hr at 09/16/21 0537, 75 mL/hr at 09/16/21 0537    citalopram (CELEXA) tablet 20 mg, 20 mg, Oral, DAILY, Fabiola Landry MD, 20 mg at 09/16/21 0846    nystatin (MYCOSTATIN) 100,000 unit/mL oral suspension 500,000 Units, 500,000 Units, Oral, QID, Fabiola Landry MD, 500,000 Units at 09/16/21 1736    apixaban (ELIQUIS) tablet 5 mg, 5 mg, Oral, BID, Tiffany Steward MD, 5 mg at 09/16/21 0846    acyclovir (ZOVIRAX) 900 mg in 0.9% sodium chloride 250 mL IVPB, 15 mg/kg (Ideal), IntraVENous, Q8H, Debra Serrato MD, Last Rate: 250 mL/hr at 09/16/21 1410, 900 mg at 09/16/21 1410    levETIRAcetam (KEPPRA) 2,000 mg in 0.9% sodium chloride 250 mL IVPB, 2,000 mg, IntraVENous, Q12H, Karina, Denisa Aguirre MD, 2,000 mg at 09/16/21 1201    amLODIPine (NORVASC) tablet 5 mg, 5 mg, Oral, DAILY, Clarke Perez MD, 5 mg at 09/16/21 0846    hydrALAZINE (APRESOLINE) 20 mg/mL injection 10 mg, 10 mg, IntraVENous, Q6H PRN, Clarke Perez MD, 10 mg at 09/12/21 2202    insulin lispro (HUMALOG) injection, , SubCUTAneous, AC&HS, Chandni Cortes MD, 5 Units at 09/16/21 1243    potassium chloride (KLOR-CON) packet for solution 20 mEq, 20 mEq, Oral, BID WITH MEALS, Chandni Cortes MD, 20 mEq at 09/16/21 1736    sodium chloride (NS) flush 5-40 mL, 5-40 mL, IntraVENous, Q8H, Chandni Cortes MD, 10 mL at 09/16/21 1409    sodium chloride (NS) flush 5-40 mL, 5-40 mL, IntraVENous, PRN, Pepper Painting Do, MD    acetaminophen (TYLENOL) tablet 650 mg, 650 mg, Oral, Q6H PRN, 650 mg at 09/06/21 1236 **OR** acetaminophen (TYLENOL) suppository 650 mg, 650 mg, Rectal, Q6H PRN, Pepper Painting Do, MD    polyethylene glycol (MIRALAX) packet 17 g, 17 g, Oral, DAILY PRN, Pepper Painting Do, MD    promethazine (PHENERGAN) tablet 12.5 mg, 12.5 mg, Oral, Q6H PRN **OR** ondansetron (ZOFRAN) injection 4 mg, 4 mg, IntraVENous, Q6H PRN, Pepper Painting Do, MD    glucose chewable tablet 16 g, 4 Tablet, Oral, PRN, Kana Baker MD    dextrose (D50W) injection syrg 12.5-25 g, 25-50 mL, IntraVENous, PRN, Kana Baker MD, 25 g at 09/04/21 0409    glucagon (GLUCAGEN) injection 1 mg, 1 mg, IntraMUSCular, PRN, Kana Baker MD      Labs: I've reviewed the labs for today     This document has been prepared by the Dragon voice recognition system, typographical errors may have occurred.  Attempts have been made to correct errors, however inadvertent errors may persist.

## 2021-09-16 NOTE — PROGRESS NOTES
Infectious Disease Progress Note         Subjective:   Pt seen and examined at bedside. Remains stable w no changes in clinical status. Denies new complaints. Pt was reportedly more alert this morning but was resting during my assessment   Objective:   Physical Exam:     Visit Vitals  BP (!) 151/58 (BP 1 Location: Left lower arm, BP Patient Position: At rest)   Pulse 80   Temp 97.8 °F (36.6 °C)   Resp 18   Ht 5' 6\" (1.676 m)   Wt 176 lb 5.9 oz (80 kg)   SpO2 97%   BMI 28.47 kg/m²    O2 Flow Rate (L/min): 2 l/min O2 Device: None (Room air)    Temp (24hrs), Av.9 °F (36.6 °C), Min:97.4 °F (36.3 °C), Max:98.2 °F (36.8 °C)    No intake/output data recorded.     1901 -  0700  In: 4199.8 [P.O.:410; I.V.:3789.8]  Out: 2400 [Urine:2400]    General: NAD, resting, not following commands    HEENT: LAQUITA, Moist mucosa  Lungs: CTA b/l, no wheeze/rhonchi   Heart: S1S2+, RRR, no murmur  Abdo: Soft, NT, ND, +BS   Exts: no new weakness, no edema   Skin: No chronic wounds or ulcers, + right chest wall power line     Data Review:       Recent Days:  Recent Labs     21  0728 09/15/21  0702   WBC 23.3* 25.9*   HGB 14.2 14.8   HCT 42.4 44.5    251     Recent Labs     21  0728 09/15/21  0702 21  0629   BUN 34* 34*  35* 17   CREA 1.05* 1.61*  1.63* 0.57     Lab Results   Component Value Date/Time    C-Reactive protein 2.95 (H) 2021 07:28 AM        Microbiology     Results     Procedure Component Value Units Date/Time    HSV 1 AND 2 BY PCR [[id number]] Collected: 21 1315    Order Status: Canceled Specimen: Other from Miscellaneous sample     HSV 1 AND 2 BY PCR [[id number]]  (Abnormal) Collected: 21 1300    Order Status: Completed Specimen: Other from Miscellaneous sample Updated: 21 1037     Source Cerebrospinal fluid        Comment: Corrected on  AT 1037: Previously reported as Blood        HSV-1 DNA by PCR Positive        HSV-2 DNA by PCR Negative Comment: This test was developed and its performance characteristics  determined by [facility]. It has not been cleared or  approved by the U.S. Food and Drug Administration. The FDA has  determined that such clearance or approval is not necessary. This  test is used for clinical purposes. It should not be regarded as  investigational or research. Performed At: [address] [id number]  Julio C Pierce MD PA:[id number]         Rosy Hernandez [[id number]] Collected: 09/03/21 1300    Order Status: Completed Specimen: Other from Miscellaneous sample Updated: 09/13/21 0836     Source Cerebrospinal fluid        Viral Culture, General No virus isolated. Comment: Performed At: [address] [id number]  Julio C Pierce MD MU:[id number]  Corrected on 09/13 AT 0836: Previously reported as Comment         CULTURE, BODY FLUID W Duyen Dick [[id number]] Collected: 09/03/21 1300    Order Status: Canceled Specimen:  Body Fluid     CULTURE, CSF Riley Curran [[id number]] Collected: 09/03/21 1200    Order Status: Completed Specimen: Cerebrospinal Fluid Updated: 09/11/21 1311     Special Requests: No Special Requests        GRAM STAIN Occasional WBCs seen         No organisms seen        Culture result:       no growth on solid media at 4 days                  no growth in Thio broth at 7 days          CULTURE, BLOOD, PAIRED [[id number]] Collected: 09/03/21 0700    Order Status: Completed Specimen: Blood Updated: 09/09/21 0740     Special Requests: No Special Requests        Culture result: No growth 6 days       COVID-19 RAPID TEST [[id number]] Collected: 09/03/21 0636    Order Status: Completed Specimen: Nasopharyngeal Updated: 09/03/21 0741     Specimen source Nasopharyngeal        COVID-19 rapid test Not Detected        Comment: Rapid [employer] ID Now   Rapid NAAT:  The specimen is NEGATIVE for SARS-CoV-2, the novel coronavirus associated with COVID-19. Negative results should be treated as presumptive and, if inconsistent with clinical signs and symptoms or necessary for patient management, should be tested with an alternative molecular assay. Negative results do not preclude SARS-CoV-2 infection and should not be used as the sole basis for patient management decisions. This test has been authorized by the FDA under   an Emergency Use Authorization (EUA) for use by authorized laboratories. Fact sheet for Healthcare Providers: [employer] Fact sheet for Patients: [employer]   Methodology: Isothermal Nucleic Acid Amplification                Diagnostics   CXR Results  (Last 48 hours)    None         Assessment/Plan     1. 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 [place]IA: Cr trending down w fluids, will continue to monitor          3. Leukocytosis: Likely from steroid therapy, remains afebrile and hemodynamically stable       ESR 4, CRP 2.95 and Procal 0.26, doubt bacteria superinfection       Continue antiviral therapy as above, no indication for antibiotics      4. Seizure on admission: Due to # 1. On Keppra, and Valproic      Repeat EEG ordered for today, will follow results     5. 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 DVT of axillary vein/Thrombosis of left basilic vein: Anticoagulated on low dose Eliquis     7.  Poor oral intake: Remains on PPN     Daughter up dated on clinical status from ID stand point     Luis Reyes MD    9/16/2021

## 2021-09-16 NOTE — PROGRESS NOTES
Hospitalist Progress Note    Subjective:   Daily Progress Note: 9/16/2021 10:59 AM    More alert. Following commands.     Current Facility-Administered Medications   Medication Dose Route Frequency    amino acid 4.25 % dextrose 5 % with electrolytes (CLINIMIX E) infusion   IntraVENous CONTINUOUS    fat emulsion 20% (LIPOSYN, INTRAlipid) infusion 250 mL  250 mL IntraVENous CONTINUOUS    pantoprazole (PROTONIX) tablet 40 mg  40 mg Oral ACB    valproic acid (DEPAKENE) capsule 250 mg  250 mg Oral TID    dexamethasone (DECADRON) 4 mg/mL injection 4 mg  4 mg IntraVENous Q8H    0.45% sodium chloride infusion  75 mL/hr IntraVENous CONTINUOUS    citalopram (CELEXA) tablet 20 mg  20 mg Oral DAILY    nystatin (MYCOSTATIN) 100,000 unit/mL oral suspension 500,000 Units  500,000 Units Oral QID    apixaban (ELIQUIS) tablet 5 mg  5 mg Oral BID    acyclovir (ZOVIRAX) 900 mg in 0.9% sodium chloride 250 mL IVPB  15 mg/kg (Ideal) IntraVENous Q8H    levETIRAcetam (KEPPRA) 2,000 mg in 0.9% sodium chloride 250 mL IVPB  2,000 mg IntraVENous Q12H    amLODIPine (NORVASC) tablet 5 mg  5 mg Oral DAILY    hydrALAZINE (APRESOLINE) 20 mg/mL injection 10 mg  10 mg IntraVENous Q6H PRN    insulin lispro (HUMALOG) injection   SubCUTAneous AC&HS    potassium chloride (KLOR-CON) packet for solution 20 mEq  20 mEq Oral BID WITH MEALS    sodium chloride (NS) flush 5-40 mL  5-40 mL IntraVENous Q8H    sodium chloride (NS) flush 5-40 mL  5-40 mL IntraVENous PRN    acetaminophen (TYLENOL) tablet 650 mg  650 mg Oral Q6H PRN    Or    acetaminophen (TYLENOL) suppository 650 mg  650 mg Rectal Q6H PRN    polyethylene glycol (MIRALAX) packet 17 g  17 g Oral DAILY PRN    promethazine (PHENERGAN) tablet 12.5 mg  12.5 mg Oral Q6H PRN    Or    ondansetron (ZOFRAN) injection 4 mg  4 mg IntraVENous Q6H PRN    glucose chewable tablet 16 g  4 Tablet Oral PRN    dextrose (D50W) injection syrg 12.5-25 g  25-50 mL IntraVENous PRN    glucagon (GLUCAGEN) injection 1 mg  1 mg IntraMUSCular PRN        Review of Systems  Review of Systems   Constitutional: Positive for malaise/fatigue. Negative for chills and fever. HENT: Negative. Eyes: Negative. Respiratory: Negative for cough and shortness of breath. Cardiovascular: Negative for chest pain and leg swelling. Gastrointestinal: Negative for abdominal pain, nausea and vomiting. Genitourinary: Negative for dysuria. Musculoskeletal: Negative. Skin: Negative. Neurological: Positive for weakness. Psychiatric/Behavioral: Negative. Objective:     Visit Vitals  BP (!) 151/58 (BP 1 Location: Left lower arm, BP Patient Position: At rest)   Pulse 80   Temp 97.8 °F (36.6 °C)   Resp 18   Ht 5' 6\" (1.676 m)   Wt 80 kg (176 lb 5.9 oz)   SpO2 97%   BMI 28.47 kg/m²    O2 Flow Rate (L/min): 2 l/min O2 Device: None (Room air)    Temp (24hrs), Av.9 °F (36.6 °C), Min:97.4 °F (36.3 °C), Max:98.2 °F (36.8 °C)      No intake/output data recorded.    1901 -  0700  In: 4199.8 [P.O.:410; I.V.:3789.8]  Out: 2400 [Urine:2400]    Recent Results (from the past 24 hour(s))   GLUCOSE, POC    Collection Time: 09/15/21  3:07 PM   Result Value Ref Range    Glucose (POC) 200 (H) 65 - 117 mg/dL    Performed by Carla Thrasher    TSH 3RD GENERATION    Collection Time: 09/15/21  5:48 PM   Result Value Ref Range    TSH 0.32 (L) 0.36 - 3.74 uIU/mL   GLUCOSE, POC    Collection Time: 09/15/21  8:53 PM   Result Value Ref Range    Glucose (POC) 125 (H) 65 - 117 mg/dL    Performed by CHAYA PUENTE    CBC WITH AUTOMATED DIFF    Collection Time: 21  7:28 AM   Result Value Ref Range    WBC 23.3 (H) 3.6 - 11.0 K/uL    RBC 4.78 3.80 - 5.20 M/uL    HGB 14.2 11.5 - 16.0 g/dL    HCT 42.4 35.0 - 47.0 %    MCV 88.7 80.0 - 99.0 FL    MCH 29.7 26.0 - 34.0 PG    MCHC 33.5 30.0 - 36.5 g/dL    RDW 14.4 11.5 - 14.5 %    PLATELET 921 769 - 551 K/uL    MPV 10.2 8.9 - 12.9 FL    NRBC 0.0 0.0  WBC    ABSOLUTE NRBC 0. 00 0.00 - 0.01 K/uL    NEUTROPHILS 90 (H) 32 - 75 %    LYMPHOCYTES 6 (L) 12 - 49 %    MONOCYTES 3 (L) 5 - 13 %    EOSINOPHILS 0 0 - 7 %    BASOPHILS 0 0 - 1 %    IMMATURE GRANULOCYTES 1 (H) 0 - 0.5 %    ABS. NEUTROPHILS 21.0 (H) 1.8 - 8.0 K/UL    ABS. LYMPHOCYTES 1.3 0.8 - 3.5 K/UL    ABS. MONOCYTES 0.8 0.0 - 1.0 K/UL    ABS. EOSINOPHILS 0.0 0.0 - 0.4 K/UL    ABS. BASOPHILS 0.0 0.0 - 0.1 K/UL    ABS. IMM. GRANS. 0.2 (H) 0.00 - 0.04 K/UL    DF AUTOMATED     METABOLIC PANEL, COMPREHENSIVE    Collection Time: 09/16/21  7:28 AM   Result Value Ref Range    Sodium 142 136 - 145 mmol/L    Potassium 4.4 3.5 - 5.1 mmol/L    Chloride 113 (H) 97 - 108 mmol/L    CO2 25 21 - 32 mmol/L    Anion gap 4 (L) 5 - 15 mmol/L    Glucose 232 (H) 65 - 100 mg/dL    BUN 34 (H) 6 - 20 mg/dL    Creatinine 1.05 (H) 0.55 - 1.02 mg/dL    BUN/Creatinine ratio 32 (H) 12 - 20      GFR est AA >60 >60 ml/min/1.73m2    GFR est non-AA 53 (L) >60 ml/min/1.73m2    Calcium 8.6 8.5 - 10.1 mg/dL    Bilirubin, total 0.2 0.2 - 1.0 mg/dL    AST (SGOT) 19 15 - 37 U/L    ALT (SGPT) 42 12 - 78 U/L    Alk. phosphatase 50 45 - 117 U/L    Protein, total 5.8 (L) 6.4 - 8.2 g/dL    Albumin 2.4 (L) 3.5 - 5.0 g/dL    Globulin 3.4 2.0 - 4.0 g/dL    A-G Ratio 0.7 (L) 1.1 - 2.2     C REACTIVE PROTEIN, QT    Collection Time: 09/16/21  7:28 AM   Result Value Ref Range    C-Reactive protein 2.95 (H) 0.00 - 0.60 mg/dL   SED RATE (ESR)    Collection Time: 09/16/21  7:28 AM   Result Value Ref Range    Sed rate, automated 4 mm/hr   PROCALCITONIN    Collection Time: 09/16/21  7:28 AM   Result Value Ref Range    Procalcitonin 0.26 (H) 0 ng/mL   GLUCOSE, POC    Collection Time: 09/16/21  8:18 AM   Result Value Ref Range    Glucose (POC) 202 (H) 65 - 117 mg/dL    Performed by Emilie Aleman         CT HEAD WO CONT   Final Result   Similar distribution and degree of edema involving right temporal   parietal cerebrum.       Recent MRI findings concerning for herpes encephalitis again noted.      MRA BRAIN WO CONT   Final Result   No major arterial occlusion or stenoses was demonstrated. CT HEAD WO CONT   Final Result      Stable exam            CT HEAD WO CONT   Final Result   [Findings most suggestive of a right middle cerebral artery   thrombosis with associated ischemia in the distribution of this artery manifest   by diffuse edema with sulcal effacement; the right middle cerebral artery   appears hyperdense. ]. The inpatient nursing station was contacted and the findings were conveyed at   the time of this report. They will have the covering physician call me back with   any questions. I spoke with nurse Florina Hanson      XR CHEST PORT   Final Result   The cardiomediastinal silhouette is appropriate for age, technique,   and lung expansion. Pulmonary vasculature is not congested. The lungs are   essentially clear. No effusion or pneumothorax is seen. CT HEAD WO CONT   Final Result   1. Edema still evident in the right temporal lobe and insular cortex. No   significant change from previous CT examination. No evidence for hemorrhage or   increased mass effect. IR GUIDE INSERT PICC OVER 5 YRS   Final Result   1. Thrombosis of left basilic vein. 2.  Successful placement of a left-sided tunneled Powerline central venous   catheter. The catheter is ready for use. IR FLUORO GUIDE PLC CVAD   Final Result   1. Thrombosis of left basilic vein. 2.  Successful placement of a left-sided tunneled Powerline central venous   catheter. The catheter is ready for use. IR US GUIDED VASCULAR ACCESS   Final Result   1. Thrombosis of left basilic vein. 2.  Successful placement of a left-sided tunneled Powerline central venous   catheter. The catheter is ready for use. IR US VENOUS EXT LTD   Final Result   1. Thrombosis of left basilic vein. 2.  Successful placement of a left-sided tunneled Powerline central venous   catheter.  The catheter is ready for use. MRI BRAIN W WO CONT   Final Result   Interval progression of findings in the right cerebral hemisphere, extending   into the inferior left frontal lobe as above. Imaging features are most   consistent with worsening encephalitis (with involvement characteristic of   herpes encephalitis as stated on original exam from 9/3/2021). Limited   encephalitis can appear similar. Given rapid progression, neoplasm is felt   unlikely. No evidence of acute infarction. CT CODE NEURO HEAD WO CONTRAST   Final Result   1. Expanding edema in the right temporal lobe and along the sylvian fissure   without hemorrhage, with 2-3 mm of right-to-left midline shift. Called report to   [place] on 5 W.  at 3:58 PM 9/8/2021. XR CHEST PORT   Final Result   Stable mild elevation of right hemidiaphragm. Right lateral chest   wall clips. Stable enlargement of cardiopericardial silhouette. Improved   aeration of the lungs bilaterally. No evidence of focal airspace consolidation. Gastric tube placed with tip overlying the right upper abdomen at the level of   the distal stomach or proximal duodenum. MRI BRAIN W WO CONT   Final Result   1. Abnormal high T2 signal evident in the right temporal lobe and insular   cortex. Subtle increased T2 signal evident in the left temporal uncus. The   appearances are concerning for herpes encephalitis. This could also represent   limbic encephalitis. A report was call to Dr. Daniel Bernal at 9/3/2021 11:49 AM      XR CHEST PORT   Final Result      CT HEAD WO CONT   Final Result   No acute or active intracranial process. CT SPINE CERV WO CONT   Final Result   No specific acute or active process. Multilevel degenerative disc and degenerative joint disease. PHYSICAL EXAM:    Physical Exam  Vitals reviewed. Constitutional:       General: She is not in acute distress. Appearance: She is not ill-appearing.    HENT:      Head: Normocephalic and atraumatic. Mouth/Throat:      Mouth: Mucous membranes are moist.      Pharynx: Oropharynx is clear. Eyes:      Conjunctiva/sclera: Conjunctivae normal.   Cardiovascular:      Rate and Rhythm: Normal rate and regular rhythm. Heart sounds: Normal heart sounds. Pulmonary:      Effort: Pulmonary effort is normal.      Breath sounds: Normal breath sounds. Abdominal:      General: Abdomen is flat. Bowel sounds are normal.      Palpations: Abdomen is soft. Musculoskeletal:      Cervical back: Normal range of motion and neck supple. Comments: Improvement in overall upper extremity weakness bilaterally. Skin:     General: Skin is warm and dry. Neurological:      Mental Status: She is alert and oriented to person, place, and time. Mental status is at baseline. Comments: Withdrawn and soft-spoken. Upper extremity weakness as noted above   Psychiatric:      Comments: More alert and following commands.           Data Review    Recent Results (from the past 24 hour(s))   GLUCOSE, POC    Collection Time: 09/15/21  3:07 PM   Result Value Ref Range    Glucose (POC) 200 (H) 65 - 117 mg/dL    Performed by Bustamante Mini    TSH 3RD GENERATION    Collection Time: 09/15/21  5:48 PM   Result Value Ref Range    TSH 0.32 (L) 0.36 - 3.74 uIU/mL   GLUCOSE, POC    Collection Time: 09/15/21  8:53 PM   Result Value Ref Range    Glucose (POC) 125 (H) 65 - 117 mg/dL    Performed by CHAYA PUENTE    CBC WITH AUTOMATED DIFF    Collection Time: 09/16/21  7:28 AM   Result Value Ref Range    WBC 23.3 (H) 3.6 - 11.0 K/uL    RBC 4.78 3.80 - 5.20 M/uL    HGB 14.2 11.5 - 16.0 g/dL    HCT 42.4 35.0 - 47.0 %    MCV 88.7 80.0 - 99.0 FL    MCH 29.7 26.0 - 34.0 PG    MCHC 33.5 30.0 - 36.5 g/dL    RDW 14.4 11.5 - 14.5 %    PLATELET 155 950 - 117 K/uL    MPV 10.2 8.9 - 12.9 FL    NRBC 0.0 0.0  WBC    ABSOLUTE NRBC 0.00 0.00 - 0.01 K/uL    NEUTROPHILS 90 (H) 32 - 75 %    LYMPHOCYTES 6 (L) 12 - 49 %    MONOCYTES 3 (L) 5 - 13 % EOSINOPHILS 0 0 - 7 %    BASOPHILS 0 0 - 1 %    IMMATURE GRANULOCYTES 1 (H) 0 - 0.5 %    ABS. NEUTROPHILS 21.0 (H) 1.8 - 8.0 K/UL    ABS. LYMPHOCYTES 1.3 0.8 - 3.5 K/UL    ABS. MONOCYTES 0.8 0.0 - 1.0 K/UL    ABS. EOSINOPHILS 0.0 0.0 - 0.4 K/UL    ABS. BASOPHILS 0.0 0.0 - 0.1 K/UL    ABS. IMM. GRANS. 0.2 (H) 0.00 - 0.04 K/UL    DF AUTOMATED     METABOLIC PANEL, COMPREHENSIVE    Collection Time: 09/16/21  7:28 AM   Result Value Ref Range    Sodium 142 136 - 145 mmol/L    Potassium 4.4 3.5 - 5.1 mmol/L    Chloride 113 (H) 97 - 108 mmol/L    CO2 25 21 - 32 mmol/L    Anion gap 4 (L) 5 - 15 mmol/L    Glucose 232 (H) 65 - 100 mg/dL    BUN 34 (H) 6 - 20 mg/dL    Creatinine 1.05 (H) 0.55 - 1.02 mg/dL    BUN/Creatinine ratio 32 (H) 12 - 20      GFR est AA >60 >60 ml/min/1.73m2    GFR est non-AA 53 (L) >60 ml/min/1.73m2    Calcium 8.6 8.5 - 10.1 mg/dL    Bilirubin, total 0.2 0.2 - 1.0 mg/dL    AST (SGOT) 19 15 - 37 U/L    ALT (SGPT) 42 12 - 78 U/L    Alk. phosphatase 50 45 - 117 U/L    Protein, total 5.8 (L) 6.4 - 8.2 g/dL    Albumin 2.4 (L) 3.5 - 5.0 g/dL    Globulin 3.4 2.0 - 4.0 g/dL    A-G Ratio 0.7 (L) 1.1 - 2.2     C REACTIVE PROTEIN, QT    Collection Time: 09/16/21  7:28 AM   Result Value Ref Range    C-Reactive protein 2.95 (H) 0.00 - 0.60 mg/dL   SED RATE (ESR)    Collection Time: 09/16/21  7:28 AM   Result Value Ref Range    Sed rate, automated 4 mm/hr   PROCALCITONIN    Collection Time: 09/16/21  7:28 AM   Result Value Ref Range    Procalcitonin 0.26 (H) 0 ng/mL   GLUCOSE, POC    Collection Time: 09/16/21  8:18 AM   Result Value Ref Range    Glucose (POC) 202 (H) 65 - 117 mg/dL    Performed by Maeve Schaeffer         Assessment/Plan:     Active Problems:    Sepsis ([address] (9/3/2021)      New onset seizure ([facility] [address] (9/3/2021)      Mild protein-calorie malnutrition ([facility] [address] (9/10/2021)      Hospital course:    This is a 52-year-old female admitted on 9/3/2021 with a history of breast cancer, diabetes mellitus, type II, anxiety, depression who initially presented with seizure-like activity and found on the floor by family. Patient was found to be febrile and CT scan of the head head was performed with no acute process. 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 Patient recurrent seizures and was started on Keppra as well as immediately started on acyclovir. Numerous CT and MRI of the brain imaging performed with CT scans of the head initially without acute process. Repeat revealed right temporal and along the sylvian fissure without hemorrhage and 2 to 3 mm of right to left midline shift due to expanding edema. Serial CTs of the head were performed although no significant changes on 9/11/2021 at which time a CT of the head revealed findings suspicious for right middle cerebral artery thrombosis with associated ischemia in the distribution of that artery. Initial MRI on 9/3/2021 revealed abnormal high T2 signal in the right temporal and insular cortex. Findings suspicious for herpes encephalitis. There was also subtle increased T2 signal evident in the left temporal uncus. Repeats MRI of the brain revealed progression of the findings of the right cerebral hemisphere extending into the left frontal lobe. Most consistent with worsening encephalitis. MRA of the brain on 9/13/2021 revealed no major arterial occlusion or stenosis demonstrated. Infectious disease consultation, Dr. Buzz Magaña.  Neurological consultation. Patient remains on Keppra and valproic acid for seizure prophylaxis. Patient also found to have an upper extremity DVT and is currently on Eliquis. Psych consult ordered for worsening depression. Repeat CT of the Head with no change. Impression\plan:    1.   HSV encephalitis  infectious disease consult  neurology consult  continue acyclovir for a total of 21 days  recommendation is to repeat LP at the end of therapy  HSV 1+ CSF PCR  MRI reveals bilateral temporal lobe edema secondary to encephalitis  continue dexamethasone  CT head with no change    2. 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 Generalized weakness of the right and left upper extremity  MRA of the brain revealed no major arterial occlusions  suspect this is related to the overall edema    5. DVT \thrombosis of the left basilic vein  remain on Eliquis    6. Moderate protein malnutrition  Albumin 2.4   nutritional consultation  Reorder PPN patient refuses NG tube    7. Essential hypertension  continue Norvasc    8. Depression anxiety  continue Celexa  psych consultation    9. Functional limitations  Encourage patient to participate with PT and OT    CODE STATUS: Full code    Ulcer prophylaxis: Protonix  DVT prophylaxis: Eliquis    Discussed case with patient's daughter [facility]. at bedside    Discharge barriers: Completion of treatment, improvement in overall brain function    Care Plan discussed with: Patient/Family    Total time spent with patient: >35 minutes.

## 2021-09-16 NOTE — PROGRESS NOTES
Problem: Mobility Impaired (Adult and Pediatric)  Goal: *Acute Goals and Plan of Care (Insert Text)  Description: Patient will move from supine to sit and sit to supine , scoot up and down, and roll side to side in bed with minimal assistance/contact guard assist within 7 day(s). Patient will transfer from bed to chair and chair to bed with minimal assistance/contact guard assist using the least restrictive device within 7 day(s). Patient will improve static sitting/standing balance to minimal assistance within 1 week(s). Patient will ambulate 10 feet with minimal assistance with least restrictive device within 1 weeks. Outcome: Progressing Towards Goal   PHYSICAL THERAPY EVALUATION  Patient: Iam Hong (89 y.o. female)  Date: 9/16/2021  Primary Diagnosis: New onset seizure ([facility] [address] [R56.9]  Sepsis ([facility] [address] [A41.9]        Precautions: fall  ASSESSMENT  Iam Hong is a 60 y.o. female who presents with like activity after being found on the floor by her family overnight. Upon arrival EMS she was having seizure activity and given Versed 5 mg which abated the convulsions. Seizure started again and she was given another 5 mg of Versed. On arrival to the ED she had a temperature of 102. CT of the head was negative. Labs were significant for a potassium of 2.8     Patient had been seen here in the ED on 8/31 with complaints of nausea and vomiting, dizziness and weakness. She was concerned she may have had food poisoning although this was not felt to be the case. When her daughter spoke with her on the phone yesterday her daughter noted she was confused and disoriented. Please refer to neuro notes for details about CTA and MRI reports. As per DR gardner no precautions as far as mobility is concern. AS tolerated. Patient found in the bed, sister feeding her. (attempted x 2 this AM to work with patient.  Patient was eating breakfast at first then had MD and again and after that  almost starting to work with her, patient had transport came to take her for test.daughter was present in the room this morning)Patient talking with low voice. calm and agreeable to work with therapy. Patient required vcs to physical assist to perform bed mob, supine to sit to stand and transfer to recliner due to cognitive impairment at this time. Physically able to move indep but not safe. Patient is fully vaccinated for COVID-19. Rapid Covid testing is negative  Patient lives alone and indep at home. 1 level home with 2 steps to get in the house no hand rail. 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 PLAN :  Recommendations and Planned Interventions: bed mobility training, transfer training, gait training, therapeutic exercises, patient and family training/education and therapeutic activities      Frequency/Duration: Patient will be followed by physical therapy:  5 times a week to address goals. Recommendation for discharge: (in order for the patient to meet his/her long term goals)  Therapy 3 hours per day 5-7 days per week    This discharge recommendation:  Has been made in collaboration with the attending provider and/or case management    IF patient discharges home will need the following DME: to be determined (TBD)         SUBJECTIVE:   Patient stated i am ok.     OBJECTIVE DATA SUMMARY:   HISTORY:    Past Medical History:   Diagnosis Date    [facility] cancer ([facility] [address]     Depression     Diabetes ([facility] [address]     High cholesterol      Past Surgical History:   Procedure Laterality Date    HX MASTECTOMY      IR FLUORO GUIDE [facility] CVAD  9/9/2021       Personal factors and/or comorbidities impacting plan of care: Home Situation  Home Environment: Private residence  # Steps to Enter: 2  One/Two Story Residence: One story  Living Alone: Yes  Support Systems: Other Family Member(s)  Patient Expects to be Discharged to[de-identified] Rehabilitation facility  Current DME Used/Available at Home: None    EXAMINATION/PRESENTATION/DECISION MAKING:   Critical Behavior:  Neurologic State: Alert  Orientation Level: Disoriented to person, Disoriented to situation  Cognition: Decreased command following  Safety/Judgement: Decreased awareness of need for safety  Hearing: Auditory  Auditory Impairment: None  Range Of Motion:  AROM: Within functional limits                       Strength:    Strength: Generally decreased, functional                    Tone & Sensation:                  Sensation: Intact               Coordination:  Coordination: Generally decreased, functional       Functional Mobility:  Bed Mobility:  Rolling: Moderate assistance;Maximum assistance;Assist x1;Assist x2  Supine to Sit: Moderate assistance;Maximum assistance;Assist x2     Scooting: Moderate assistance;Assist x2  Transfers:  Sit to Stand: Moderate assistance;Assist x2  Stand to Sit: Moderate assistance;Assist x2        Bed to Chair: Moderate assistance;Assist x2              Balance:   Sitting: Impaired; With support  Sitting - Static: Fair (occasional)  Sitting - Dynamic: Poor (constant support)  Standing: Impaired; With support  Standing - Static: Poor;Constant support  Standing - Dynamic : Poor;Constant support  Ambulation/Gait Training:      took few steps to the recliner with RW, gait belt constant VC and Ax2. Functional Measure:  [address] [place] [address] AM-PAC 6 Clicks         Basic Mobility Inpatient Short Form  How much difficulty does the patient currently have. .. Unable A Lot A Little None   1. Turning over in bed (including adjusting bedclothes, sheets and blankets)? [] 1   [x] 2   [] 3   [] 4   2. Sitting down on and standing up from a chair with arms ( e.g., wheelchair, bedside commode, etc.)   [] 1   [x] 2   [] 3   [] 4   3. Moving from lying on back to sitting on the side of the bed?    [] 1   [x] 2   [] 3   [] 4          How much help from another person does the patient currently need. .. Total A Lot A Little None   4. Moving to and from a bed to a chair (including a wheelchair)? [] 1   [x] 2   [] 3   [] 4   5. Need to walk in hospital room? [] 1   [x] 2   [] 3   [] 4   6. Climbing 3-5 steps with a railing? [] 1   [x] 2   [] 3   [] 4   © , Trustees of [address] [place] [address] [address] [address], under license to [employer]. All rights reserved     Score:  Initial:  Most Recent: X (Date: 2021 )   Interpretation of Tool:  Represents activities that are increasingly more difficult (i.e. Bed mobility, Transfers, Gait). Score 24 23 22-20 19-15 14-10 9-7 6   Modifier CH CI CJ CK CL CM CN            Physical Therapy Evaluation Charge Determination   History Examination Presentation Decision-Making   LOW Complexity : Zero comorbidities / personal factors that will impact the outcome / POC HIGH Complexity : 4+ Standardized tests and measures addressing body structure, function, activity limitation and / or participation in recreation  HIGH Complexity : Unstable and unpredictable characteristics  HIGH Complexity : FOTO score of 1- 25       Based on the above components, the patient evaluation is determined to be of the following complexity level: HIGH     Pain Ratin/10    Activity Tolerance:   Fair  Please refer to the flowsheet for vital signs taken during this treatment. After treatment patient left in no apparent distress:   Sitting in chair, Call bell within reach and Caregiver / family present    COMMUNICATION/EDUCATION:   The patients plan of care was discussed with: Occupational therapist, Registered nurse, Physician and Case management. Fall prevention education was provided and the patient/caregiver indicated understanding., Patient/family have participated as able in goal setting and plan of care. and Patient/family agree to work toward stated goals and plan of care.     Thank you for this referral.  Karin Canas PT   Time Calculation: 43 mins

## 2021-09-16 NOTE — PROGRESS NOTES
OCCUPATIONAL THERAPY EVALUATION  Patient: Leslie Ward (7 y.o. female)  Date: 9/16/2021  Primary Diagnosis: New onset seizure ([facility] [address] [R56.9]  Sepsis ([facility] [address] [A41.9]        Precautions: fall risk, seizure, aspiration      ASSESSMENT  Pt is a 61 y/o F with PMH of breast cancer, HL, DM2, and anxiety/depression who presented to [facility]/3/21 for seizure like activity while on the floor by family. Pt was found to be febrile and CT scan of the head head was performed with no acute process. 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 Pt recurrent seizures and was started on Keppra as well as immediately started on acyclovir. Numerous CT and MRI of the brain imaging performed with CT scans of the head initially without acute process. Repeat revealed right temporal and along the sylvian fissure without hemorrhage and 2 to 3 mm of right to left midline shift due to expanding edema. Serial CTs of the head were performed although no significant changes on 9/11/2021 at which time a CT of the head revealed findings suspicious for right middle cerebral artery thrombosis with associated ischemia in the distribution of that artery. Initial MRI on 9/3/2021 revealed abnormal high T2 signal in the right temporal and insular cortex. Findings suspicious for herpes encephalitis. There was also subtle increased T2 signal evident in the left temporal uncus. Repeats MRI of the brain revealed progression of the findings of the right cerebral hemisphere extending into the left frontal lobe. Most consistent with worsening encephalitis. MRA of the brain on 9/13/2021 revealed no major arterial occlusion or stenosis demonstrated. Pt also found to have an upper extremity DVT and is currently on Eliquis. Psych consult ordered for worsening depression. Repeat CT of the Head with no change.     Pt received semi-supine in bed upon arrival, AXO to month/year, disoriented to time; stated she was in [facility], confused and does not state her name at this time despite prompts. Sister present at bedside. 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 No DME owned at this time. Based on current observations, pt presents with deficits in generalized strength/AROM, static/dynamic sitting balance, static/dynamic standing balance, functional activity tolerance, coordination, cognition/confusion, and decreased command following impacting overall performance of ADLs and functional transfers/mobility. Pt currently requires mod-max A x2 with increased time and cues for supine>sit to EOB with fair sitting balance noted. Pt slightly impulsive with scooting forward and attempting to bring LE back up onto bedside 2' decreased command following, however able to be redirected with verbal, visual and tactile cues. Pt required total A to don socks to feet in sitting with observed R UE coordination deficits and difficulty manipulating opening of sock. STS completed to/from EOB to chair with mod x2 using RW with continued mod A x2 for safety and verbal/visual cues for safe descent into chair. Once seated, pt completes oral care using oral swab s/p setup. Left resting comfortably with call bell/needs in reach and family at bedside. Overall, pt tolerates session fair today, pt would benefit from continued skilled OT services to address current impairments and improve overall IND and safety with self cares and functional transfers/mobility. Recommend discharge to IRF_ once medically appropriate. Other factors to consider for discharge: family support, DME, time since onset, severity of deficits      Patient will benefit from skilled therapy intervention to address the above noted impairments.        PLAN :  Recommendations and Planned Interventions: self care training, functional mobility training, therapeutic exercise, balance training, visual/perceptual training, therapeutic activities, cognitive retraining, endurance activities, neuromuscular re-education, patient education and family training/education    Frequency/Duration: Patient will be followed by occupational therapy 5 times a week to address goals. Recommendation for discharge: (in order for the patient to meet his/her long term goals)  Therapy 3 hours per day 5-7 days per week    This discharge recommendation:  Has been made in collaboration with the attending provider and/or case management       SUBJECTIVE:   Patient stated I don't have any pain.     OBJECTIVE DATA SUMMARY:   HISTORY:   Past Medical History:   Diagnosis Date    Breast cancer ([facility] [address]     Depression     Diabetes ([facility] [address]     High cholesterol      Past Surgical History:   Procedure Laterality Date    HX MASTECTOMY      IR FLUORO GUIDE [facility] CVAD  9/9/2021       Expanded or extensive additional review of patient history:     Home Situation  Home Environment: Private residence  # Steps to Enter: 2  One/Two Story Residence: One story  Living Alone: Yes  Support Systems: Other Family Member(s)  Patient Expects to be Discharged to[de-identified] Rehabilitation facility  Current DME Used/Available at Home: None    EXAMINATION OF PERFORMANCE DEFICITS:  Cognitive/Behavioral Status:  Neurologic State: Alert  Orientation Level: Disoriented to person;Disoriented to place;Oriented to time  Cognition: Decreased command following;Decreased attention/concentration; Impaired decision making;Poor safety awareness        Safety/Judgement: Decreased awareness of need for safety    Hearing:   Auditory  Auditory Impairment: None    Range of Motion:  AROM: Generally decreased, functional     Strength:  Strength: Generally decreased, functional (Grossly observed 3+/5 )    Coordination:  Coordination: Generally decreased, functional (Impaired R UE)  Fine Motor Skills-Upper: Right Impaired     difficult to formally assess, unable to follow complex commands to complete finger opposition testing     Tone & Sensation:  Sensation: Intact    Balance:  Sitting: Impaired; With support  Sitting - Static: Fair (occasional)  Sitting - Dynamic: Poor (constant support)  Standing: Impaired; With support  Standing - Static: Poor;Constant support  Standing - Dynamic : Poor;Constant support    Functional Mobility and Transfers for ADLs:  Bed Mobility:  Rolling: Moderate assistance;Maximum assistance;Assist x2  Supine to Sit: Moderate assistance;Maximum assistance;Assist x2  Scooting: Moderate assistance;Assist x2    Transfers:  Sit to Stand: Moderate assistance;Assist x2  Stand to Sit: Moderate assistance;Assist x2  Bed to Chair: Moderate assistance;Assist x2    ADL Intervention and task modifications:  Grooming  Grooming Assistance: Set-up  Position Performed: Seated in chair  Brushing Teeth: Set-up  Cues: Verbal cues provided;Visual cues provided    Lower Body Dressing Assistance  Socks: Total assistance (dependent)  Position Performed: Seated edge of bed    Cognitive Retraining  Safety/Judgement: Decreased awareness of need for safety    Therapeutic Exercise:  Pt would benefit from UE HEP to improve overall UE AROM/strength and can be further educated in next treatment session. Functional Measure:    [address] [facility] [address] [address] AM-PACTM \"6 Clicks\"                                                       Daily Activity Inpatient Short Form  How much help from another person does the patient currently need. .. Total; A Lot A Little None   1. Putting on and taking off regular lower body clothing? [x]  1 []  2 []  3 []  4   2. Bathing (including washing, rinsing, drying)? []  1 [x]  2 []  3 []  4   3. Toileting, which includes using toilet, bedpan or urinal? [x] 1 []  2 []  3 []  4   4. Putting on and taking off regular upper body clothing? []  1 [x]  2 []  3 []  4   5.   Taking care of personal grooming such as brushing teeth? []  1 []  2 [x]  3 []  4   6. Eating meals? []  1 []  2 [x]  3 []  4   © , Trustees of [address] [place] [address] [address] [address], under license to [employer]. All rights reserved     Score: 1224     Interpretation of Tool:  Represents clinically-significant functional categories (i.e. Activities of daily living). Percentage of Impairment CH    0%   CI    1-19% CJ    20-39% CK    40-59% CL    60-79% CM    80-99% CN     100%   [facility]  Score 6-24 24 23 20-22 15-19 10-14 7-9 6       Occupational Therapy Evaluation Charge Determination   History Examination Decision-Making   MEDIUM Complexity : Expanded review of history including physical, cognitive and psychosocial  history  MEDIUM Complexity : 3-5 performance deficits relating to physical, cognitive , or psychosocial skils that result in activity limitations and / or participation restrictions MEDIUM Complexity : Patient may present with comorbidities that affect occupational performnce. Miniml to moderate modification of tasks or assistance (eg, physical or verbal ) with assesment(s) is necessary to enable patient to complete evaluation       Based on the above components, the patient evaluation is determined to be of the following complexity level: MEDIUM  Pain Ratin/10    Activity Tolerance:   Fair    After treatment patient left in no apparent distress:    Sitting in chair, Heels elevated for pressure relief, Call bell within reach and Caregiver / family present    COMMUNICATION/EDUCATION:   The patients plan of care was discussed with: Physical therapist, Registered nurse and Case management. Patient/family agree to work toward stated goals and plan of care. This patients plan of care is appropriate for delegation to [facility] [place].     OT/PT sessions occurred together for increased patient and clinician safety    Thank you for this referral.  Marsha Lee  Time Calculation: 43 mins   Problem: Self Care Deficits Care Plan (Adult)  Goal: *Acute Goals and Plan of Care (Insert Text)  Description: Pt will be Mod I sup <> sit in prep for EOB ADLs  Pt will be Mod I grooming standing sink side LRAD  Pt will be IND dressing sitting EOB/long sit  Pt will be Mod I sit <>  prep for toileting LRAD  Pt will be IND toileting/toilet transfer/cloth mgmt LRAD  Pt will be IND following UE HEP in prep for self care tasks   Outcome: Not Met

## 2021-09-16 NOTE — PROGRESS NOTES
Problem: Dysphagia (Adult)  Goal: *Acute Goals and Plan of Care (Insert Text)  Description: Speech Therapy Goals  Initiated 9/10/2021  -Patient will participate in modified barium swallow study within 7 day(s), pending pt's progress and tolerance. [ ] Not met  [ ]  MET   [ x] Progressing  [ ] Discontinue  -Patient will tolerate SBS diet with thin liquids without signs/symptoms of aspiration given no cues within 7 day(s). [ ] Not met  [ ]  MET   [ x] Progressing  [ ] Discontinue  -Patient will demonstrate understanding of swallow safety precautions and aspiration precautions, diet recs with no cues within 7 day(s). [ ] Not met  [ ]  MET   [ x] Progressing  [ ] Discontinue  Outcome: William Yu  Patient: Michael Titus (78 y.o. female)  Date: 9/16/2021  Diagnosis: New onset seizure ([address] [R56.9]  Sepsis ([address] [A41.9] <principal problem not specified>       Precautions: aspiration      ASSESSMENT:  Pt seen for follow-up, alert. Assisted nsg in cleaning pt, pt requires verbal and tactile prompts to roll in bed. Pt positioned upright in bed. Pt with limited verbalizations, eyes open limited visual engagement is noted. Pt given trials of soft solids, and thin via straw. Oral phase with difficulty with initiation of biting solid trials. Once initiated, mastication appears functional for limited trials provided. Pharyngeal phase appears WNL once initiated, tolerates all trials without overt s/s aspiration. PLAN:  Recommendations and Planned Interventions: At this time, recommend diet upgrade to SBS/thin liquids with 1:1 assistance with ALL PO intake, STRICT aspiration and GERD precautions, monitor pt closely for s/s aspiration, meds as tolerated, FEED ONLY IF AWAKE AND ALERT. Pt will need complete cog-ling assesment when medically appropriate.      Patient continues to benefit from skilled intervention to address the above impairments. Continue treatment per established plan of care. Frequency/Duration: Patient will be followed by speech-language pathology 3 times a week to address goals. Discharge Recommendations:  Pt will need continued SLP Intervention at this time. SUBJECTIVE:   Patient alert, limited verbalizations this date, appears withdrawn, unfocused gazed, fixed. OBJECTIVE:       CT Results  (Last 48 hours)                 09/15/21 0843  CT HEAD WO CONT Final result    Impression:  Similar distribution and degree of edema involving right temporal   parietal cerebrum. Recent MRI findings concerning for herpes encephalitis again noted. Narrative:  CT head. Comparison CT head September 11, 2021. Axial images are reviewed along with reformatted sagittal/coronal images. Dose reduction: All CT scans at this facility are performed using dose reduction   optimization techniques as appropriate to a performed exam including the   following-   automated exposure control, adjustments of mA and/or Kv according to patient   size, or use of iterative reconstructive technique. Bone windows demonstrate normal aeration of the imaged sinuses and mastoid air   cells. Review of intracranial content reveals similar distribution and degree cerebral   edema right temporal parietal cerebrum. No midline shift. No hydrocephalus. No   associated intracranial hemorrhage. Cognitive and Communication Status:  Neurologic State: Alert  Orientation Level: Unable to verbalize  Cognition: Decreased attention/concentration, Decreased command following    Pain:  Pain Scale 1: FLACC  Pain Intensity 1: 0       After treatment:   Patient left in no apparent distress in bed, Call bell within reach, and Nursing notified    COMMUNICATION/EDUCATION:   Patient was educated regarding her deficit(s) of dysphagia, swallow safety precautions, diet recs and POC.   She demonstrated Guarded understanding as evidenced by AMS. The patient's plan of care including recommendations, planned interventions, and recommended diet changes were discussed with: Registered nurse and PA, daughter .      Patience Graham M.S. CCC-SLP  Time Calculation: 27 mins

## 2021-09-17 LAB
ALBUMIN SERPL-MCNC: 2.4 G/DL (ref 3.5–5)
ALBUMIN/GLOB SERPL: 0.8 {RATIO} (ref 1.1–2.2)
ALP SERPL-CCNC: 48 U/L (ref 45–117)
ALT SERPL-CCNC: 31 U/L (ref 12–78)
ANION GAP SERPL CALC-SCNC: 7 MMOL/L (ref 5–15)
AST SERPL W P-5'-P-CCNC: 10 U/L (ref 15–37)
BASOPHILS # BLD: 0 K/UL (ref 0–0.1)
BASOPHILS NFR BLD: 0 % (ref 0–1)
BILIRUB SERPL-MCNC: 0.3 MG/DL (ref 0.2–1)
BUN SERPL-MCNC: 28 MG/DL (ref 6–20)
BUN/CREAT SERPL: 41 (ref 12–20)
CA-I BLD-MCNC: 8.6 MG/DL (ref 8.5–10.1)
CHLORIDE SERPL-SCNC: 106 MMOL/L (ref 97–108)
CO2 SERPL-SCNC: 25 MMOL/L (ref 21–32)
CREAT SERPL-MCNC: 0.68 MG/DL (ref 0.55–1.02)
DIFFERENTIAL METHOD BLD: ABNORMAL
EOSINOPHIL # BLD: 0 K/UL (ref 0–0.4)
EOSINOPHIL NFR BLD: 0 % (ref 0–7)
ERYTHROCYTE [DISTWIDTH] IN BLOOD BY AUTOMATED COUNT: 14.1 % (ref 11.5–14.5)
GLOBULIN SER CALC-MCNC: 3.2 G/DL (ref 2–4)
GLUCOSE BLD STRIP.AUTO-MCNC: 181 MG/DL (ref 65–117)
GLUCOSE BLD STRIP.AUTO-MCNC: 222 MG/DL (ref 65–117)
GLUCOSE BLD STRIP.AUTO-MCNC: 234 MG/DL (ref 65–117)
GLUCOSE BLD STRIP.AUTO-MCNC: 238 MG/DL (ref 65–117)
GLUCOSE BLD STRIP.AUTO-MCNC: 302 MG/DL (ref 65–117)
GLUCOSE SERPL-MCNC: 250 MG/DL (ref 65–100)
HCT VFR BLD AUTO: 41.2 % (ref 35–47)
HGB BLD-MCNC: 13.7 G/DL (ref 11.5–16)
IMM GRANULOCYTES # BLD AUTO: 0.1 K/UL (ref 0–0.04)
IMM GRANULOCYTES NFR BLD AUTO: 1 % (ref 0–0.5)
LYMPHOCYTES # BLD: 1.7 K/UL (ref 0.8–3.5)
LYMPHOCYTES NFR BLD: 10 % (ref 12–49)
MCH RBC QN AUTO: 29.9 PG (ref 26–34)
MCHC RBC AUTO-ENTMCNC: 33.3 G/DL (ref 30–36.5)
MCV RBC AUTO: 90 FL (ref 80–99)
MONOCYTES # BLD: 0.8 K/UL (ref 0–1)
MONOCYTES NFR BLD: 4 % (ref 5–13)
NEUTS SEG # BLD: 14.9 K/UL (ref 1.8–8)
NEUTS SEG NFR BLD: 85 % (ref 32–75)
NRBC # BLD: 0 K/UL (ref 0–0.01)
NRBC BLD-RTO: 0 PER 100 WBC
PERFORMED BY, TECHID: ABNORMAL
PLATELET # BLD AUTO: 229 K/UL (ref 150–400)
PMV BLD AUTO: 11.2 FL (ref 8.9–12.9)
POTASSIUM SERPL-SCNC: 4.5 MMOL/L (ref 3.5–5.1)
PROT SERPL-MCNC: 5.6 G/DL (ref 6.4–8.2)
RBC # BLD AUTO: 4.58 M/UL (ref 3.8–5.2)
SODIUM SERPL-SCNC: 138 MMOL/L (ref 136–145)
WBC # BLD AUTO: 17.5 K/UL (ref 3.6–11)

## 2021-09-17 PROCEDURE — 97530 THERAPEUTIC ACTIVITIES: CPT

## 2021-09-17 PROCEDURE — 74011250636 HC RX REV CODE- 250/636: Performed by: HOSPITALIST

## 2021-09-17 PROCEDURE — 74011250636 HC RX REV CODE- 250/636: Performed by: STUDENT IN AN ORGANIZED HEALTH CARE EDUCATION/TRAINING PROGRAM

## 2021-09-17 PROCEDURE — 74011250637 HC RX REV CODE- 250/637: Performed by: INTERNAL MEDICINE

## 2021-09-17 PROCEDURE — 99232 SBSQ HOSP IP/OBS MODERATE 35: CPT | Performed by: INTERNAL MEDICINE

## 2021-09-17 PROCEDURE — 80053 COMPREHEN METABOLIC PANEL: CPT

## 2021-09-17 PROCEDURE — 85025 COMPLETE CBC W/AUTO DIFF WBC: CPT

## 2021-09-17 PROCEDURE — 65270000029 HC RM PRIVATE

## 2021-09-17 PROCEDURE — 74011250636 HC RX REV CODE- 250/636: Performed by: INTERNAL MEDICINE

## 2021-09-17 PROCEDURE — 74011636637 HC RX REV CODE- 636/637: Performed by: PHYSICIAN ASSISTANT

## 2021-09-17 PROCEDURE — 82962 GLUCOSE BLOOD TEST: CPT

## 2021-09-17 PROCEDURE — 74011250636 HC RX REV CODE- 250/636: Performed by: PSYCHIATRY & NEUROLOGY

## 2021-09-17 PROCEDURE — 36415 COLL VENOUS BLD VENIPUNCTURE: CPT

## 2021-09-17 PROCEDURE — 74011636637 HC RX REV CODE- 636/637: Performed by: INTERNAL MEDICINE

## 2021-09-17 PROCEDURE — 74011250637 HC RX REV CODE- 250/637: Performed by: STUDENT IN AN ORGANIZED HEALTH CARE EDUCATION/TRAINING PROGRAM

## 2021-09-17 PROCEDURE — 74011000250 HC RX REV CODE- 250: Performed by: PHYSICIAN ASSISTANT

## 2021-09-17 RX ORDER — VALPROIC ACID 250 MG/1
500 CAPSULE, LIQUID FILLED ORAL 3 TIMES DAILY
Status: DISCONTINUED | OUTPATIENT
Start: 2021-09-17 | End: 2021-09-25 | Stop reason: HOSPADM

## 2021-09-17 RX ORDER — DEXAMETHASONE SODIUM PHOSPHATE 4 MG/ML
2 INJECTION, SOLUTION INTRA-ARTICULAR; INTRALESIONAL; INTRAMUSCULAR; INTRAVENOUS; SOFT TISSUE EVERY 8 HOURS
Status: DISCONTINUED | OUTPATIENT
Start: 2021-09-17 | End: 2021-09-25

## 2021-09-17 RX ADMIN — Medication 10 ML: at 06:35

## 2021-09-17 RX ADMIN — VALPROIC ACID 500 MG: 250 CAPSULE, LIQUID FILLED ORAL at 09:00

## 2021-09-17 RX ADMIN — APIXABAN 5 MG: 5 TABLET, FILM COATED ORAL at 21:11

## 2021-09-17 RX ADMIN — SODIUM CHLORIDE 900 MG: 9 INJECTION, SOLUTION INTRAVENOUS at 14:18

## 2021-09-17 RX ADMIN — DEXAMETHASONE SODIUM PHOSPHATE 2 MG: 4 INJECTION, SOLUTION INTRA-ARTICULAR; INTRALESIONAL; INTRAMUSCULAR; INTRAVENOUS; SOFT TISSUE at 14:17

## 2021-09-17 RX ADMIN — POTASSIUM CHLORIDE 20 MEQ: 1.5 POWDER, FOR SOLUTION ORAL at 16:48

## 2021-09-17 RX ADMIN — POTASSIUM CHLORIDE 20 MEQ: 1.5 POWDER, FOR SOLUTION ORAL at 09:00

## 2021-09-17 RX ADMIN — VALPROIC ACID 500 MG: 250 CAPSULE, LIQUID FILLED ORAL at 21:11

## 2021-09-17 RX ADMIN — LEVETIRACETAM 2000 MG: 100 INJECTION, SOLUTION INTRAVENOUS at 22:56

## 2021-09-17 RX ADMIN — LEVETIRACETAM 2000 MG: 100 INJECTION, SOLUTION INTRAVENOUS at 09:38

## 2021-09-17 RX ADMIN — ASCORBIC ACID, VITAMIN A PALMITATE, CHOLECALCIFEROL, THIAMINE HYDROCHLORIDE, RIBOFLAVIN-5 PHOSPHATE SODIUM, PYRIDOXINE HYDROCHLORIDE, NIACINAMIDE, DEXPANTHENOL, ALPHA-TOCOPHEROL ACETATE, VITAMIN K1, FOLIC ACID, BIOTIN, CYANOCOBALAMIN: 200; 3300; 200; 6; 3.6; 6; 40; 15; 10; 150; 600; 60; 5 INJECTION, SOLUTION INTRAVENOUS at 21:02

## 2021-09-17 RX ADMIN — APIXABAN 5 MG: 5 TABLET, FILM COATED ORAL at 09:00

## 2021-09-17 RX ADMIN — I.V. FAT EMULSION 250 ML: 20 EMULSION INTRAVENOUS at 21:02

## 2021-09-17 RX ADMIN — NYSTATIN 500000 UNITS: 100000 SUSPENSION ORAL at 21:11

## 2021-09-17 RX ADMIN — AMLODIPINE BESYLATE 5 MG: 5 TABLET ORAL at 09:00

## 2021-09-17 RX ADMIN — NYSTATIN 500000 UNITS: 100000 SUSPENSION ORAL at 09:00

## 2021-09-17 RX ADMIN — INSULIN LISPRO 2 UNITS: 100 INJECTION, SOLUTION INTRAVENOUS; SUBCUTANEOUS at 16:07

## 2021-09-17 RX ADMIN — Medication 10 ML: at 23:16

## 2021-09-17 RX ADMIN — CITALOPRAM HYDROBROMIDE 20 MG: 20 TABLET ORAL at 09:00

## 2021-09-17 RX ADMIN — SODIUM CHLORIDE 900 MG: 9 INJECTION, SOLUTION INTRAVENOUS at 06:31

## 2021-09-17 RX ADMIN — SODIUM CHLORIDE 900 MG: 9 INJECTION, SOLUTION INTRAVENOUS at 21:30

## 2021-09-17 RX ADMIN — VALPROIC ACID 500 MG: 250 CAPSULE, LIQUID FILLED ORAL at 14:17

## 2021-09-17 RX ADMIN — VALPROIC ACID 500 MG: 250 CAPSULE, LIQUID FILLED ORAL at 16:48

## 2021-09-17 RX ADMIN — DEXAMETHASONE SODIUM PHOSPHATE 4 MG: 4 INJECTION, SOLUTION INTRA-ARTICULAR; INTRALESIONAL; INTRAMUSCULAR; INTRAVENOUS; SOFT TISSUE at 06:31

## 2021-09-17 RX ADMIN — INSULIN LISPRO 3 UNITS: 100 INJECTION, SOLUTION INTRAVENOUS; SUBCUTANEOUS at 11:44

## 2021-09-17 RX ADMIN — DEXAMETHASONE SODIUM PHOSPHATE 2 MG: 4 INJECTION, SOLUTION INTRA-ARTICULAR; INTRALESIONAL; INTRAMUSCULAR; INTRAVENOUS; SOFT TISSUE at 21:11

## 2021-09-17 RX ADMIN — INSULIN LISPRO 4 UNITS: 100 INJECTION, SOLUTION INTRAVENOUS; SUBCUTANEOUS at 22:56

## 2021-09-17 RX ADMIN — INSULIN LISPRO 3 UNITS: 100 INJECTION, SOLUTION INTRAVENOUS; SUBCUTANEOUS at 09:00

## 2021-09-17 RX ADMIN — Medication 10 ML: at 21:39

## 2021-09-17 RX ADMIN — NYSTATIN 500000 UNITS: 100000 SUSPENSION ORAL at 16:48

## 2021-09-17 NOTE — PROGRESS NOTES
OCCUPATIONAL THERAPY TREATMENT  Patient: Paulina Griffin (54 y.o. female)  Date: 9/17/2021  Diagnosis: New onset seizure ([facility] [R56.9]  Sepsis ([facility] [A41.9] <principal problem not specified>       Precautions:    Chart, occupational therapy assessment, plan of care, and goals were reviewed. ASSESSMENT  Patient continues with skilled OT services and is progressing towards goals. Pt. Received semi-supine in bed sleeping and agreeable to therapy session. Pt required total A for LB dressing while semi-supine in bed. Pt. Continues to require increased cues for attending to task. Pt. Performed bed mobility with Min A -cues for bring LE off to the EOB. Pt. Demonstrated good sitting balance seated at EOB while holding on to bed rails. Pt. Performed sit-> stand Min A x2, functional ambulation close CGA x2 and assistance required to navigate and maneuver  RW. Pt. Able to tolerate increased functional ambulation from bed to bathroom with CGA x2, toilet transfer with CGA, pt completed bladder hygiene seated on commode and bowel hygiene standing with use of RW for assistance with balance while therapist assisted with total A. D/t pt's difficulty with navigating RW, Pt performed functional ambulation from bathroom to chair with HHA x2. Pt. Completed seated UE and LE therex however pt unable to follow commands to perform in 10 reps of therex for each set. Pt. Educated to perform throughout the day- will require continuous educated d.t poor retention of education during session. Pt left reclined in chair with needs met. REC. IRF when medically appropriate for discharge. Other factors to consider for discharge: PLOF, time since on set         PLAN :  Patient continues to benefit from skilled intervention to address the above impairments. Continue treatment per established plan of care. to address goals.     Recommendation for discharge: (in order for the patient to meet his/her long term goals)  Therapy 3 hours per day 5-7 days per week    This discharge recommendation:  Has been made in collaboration with the attending provider and/or case management    IF patient discharges home will need the following DME: TBD       SUBJECTIVE:   Patient agreeable to therapy session. OBJECTIVE DATA SUMMARY:   Cognitive/Behavioral Status:  Neurologic State: Alert  Orientation Level: Oriented to person  Cognition: Follows commands;Poor safety awareness      Functional Mobility and Transfers for ADLs:  Bed Mobility:  Supine to Sit: Minimum assistance  Scooting: Minimum assistance    Transfers:  Sit to Stand: Minimum assistance;Assist x2  Functional Transfers  Toilet Transfer : Contact guard assistance;Assist x2  Bed to Chair: Contact guard assistance;Assist x2    Balance:  Sitting: Impaired; With support  Sitting - Static: Fair (occasional)  Sitting - Dynamic: Fair (occasional)  Standing: Impaired; With support  Standing - Static: Constant support;Good  Standing - Dynamic : Constant support; Fair    ADL Intervention:  Lower Body Dressing Assistance  Antiembolitic Stockings: Total assistance (dependent)  Position Performed: Long sitting on bed    Toileting  Toileting Assistance: Stand-by assistance  Bladder Hygiene: Stand-by assistance  Bowel Hygiene: Total assistance (dependent)  Clothing Management: Total assistance (dependent)    Therapeutic Exercises: lizabeth UE  Exercise Sets Reps AROM AAROM PROM Self PROM Comments   Shoulder flex/ext 1 5 [x] [] [] []    Elbow flex/ext 1 5 [x] [] [] []    Wrist flex/ext 1 5 [x] [] [] []       [] [] [] []          Pain:  0/ 10 pain reported    Activity Tolerance:   Fair  Please refer to the flowsheet for vital signs taken during this treatment. After treatment patient left in no apparent distress:   Sitting in chair, Heels elevated for pressure relief, Call bell within reach, and Caregiver / family present    COMMUNICATION/COLLABORATION:   The patients plan of care was discussed with: Physical therapy assistant. Co-tx with PTA for increased assistance with transfers and mobility.       Aidee Villalobos  Time Calculation: 39 mins    Problem: Self Care Deficits Care Plan (Adult)  Goal: *Acute Goals and Plan of Care (Insert Text)  Description: Pt will be Mod I sup <> sit in prep for EOB ADLs  Pt will be Mod I grooming standing sink side LRAD  Pt will be IND dressing sitting EOB/long sit  Pt will be Mod I sit <>  prep for toileting LRAD  Pt will be IND toileting/toilet transfer/cloth mgmt LRAD  Pt will be IND following UE HEP in prep for self care tasks   Outcome: Progressing Towards Goal

## 2021-09-17 NOTE — PROGRESS NOTES
Hospitalist Progress Note    Subjective:   Daily Progress Note: 9/17/2021 10:59 AM    More alert. Following commands. Remains weak.     Current Facility-Administered Medications   Medication Dose Route Frequency    valproic acid (DEPAKENE) capsule 500 mg  500 mg Oral TID    dexamethasone (DECADRON) 4 mg/mL injection 2 mg  2 mg IntraVENous Q8H    amino acid 4.25 % dextrose 5 % with electrolytes (CLINIMIX E) infusion   IntraVENous CONTINUOUS    fat emulsion 20% (LIPOSYN, INTRAlipid) infusion 250 mL  250 mL IntraVENous CONTINUOUS    amino acid 4.25 % dextrose 5 % with electrolytes (CLINIMIX E) infusion   IntraVENous CONTINUOUS    fat emulsion 20% (LIPOSYN, INTRAlipid) infusion 250 mL  250 mL IntraVENous DAILY    pantoprazole (PROTONIX) tablet 40 mg  40 mg Oral ACB    0.45% sodium chloride infusion  75 mL/hr IntraVENous CONTINUOUS    citalopram (CELEXA) tablet 20 mg  20 mg Oral DAILY    nystatin (MYCOSTATIN) 100,000 unit/mL oral suspension 500,000 Units  500,000 Units Oral QID    apixaban (ELIQUIS) tablet 5 mg  5 mg Oral BID    acyclovir (ZOVIRAX) 900 mg in 0.9% sodium chloride 250 mL IVPB  15 mg/kg (Ideal) IntraVENous Q8H    levETIRAcetam (KEPPRA) 2,000 mg in 0.9% sodium chloride 250 mL IVPB  2,000 mg IntraVENous Q12H    amLODIPine (NORVASC) tablet 5 mg  5 mg Oral DAILY    hydrALAZINE (APRESOLINE) 20 mg/mL injection 10 mg  10 mg IntraVENous Q6H PRN    insulin lispro (HUMALOG) injection   SubCUTAneous AC&HS    potassium chloride (KLOR-CON) packet for solution 20 mEq  20 mEq Oral BID WITH MEALS    sodium chloride (NS) flush 5-40 mL  5-40 mL IntraVENous Q8H    sodium chloride (NS) flush 5-40 mL  5-40 mL IntraVENous PRN    acetaminophen (TYLENOL) tablet 650 mg  650 mg Oral Q6H PRN    Or    acetaminophen (TYLENOL) suppository 650 mg  650 mg Rectal Q6H PRN    polyethylene glycol (MIRALAX) packet 17 g  17 g Oral DAILY PRN    promethazine (PHENERGAN) tablet 12.5 mg  12.5 mg Oral Q6H PRN    Or    ondansetron (ZOFRAN) injection 4 mg  4 mg IntraVENous Q6H PRN    glucose chewable tablet 16 g  4 Tablet Oral PRN    dextrose (D50W) injection syrg 12.5-25 g  25-50 mL IntraVENous PRN    glucagon (GLUCAGEN) injection 1 mg  1 mg IntraMUSCular PRN        Review of Systems  Review of Systems   Constitutional: Positive for malaise/fatigue. Negative for chills and fever. HENT: Negative. Eyes: Negative. Respiratory: Negative for cough and shortness of breath. Cardiovascular: Negative for chest pain and leg swelling. Gastrointestinal: Negative for abdominal pain, nausea and vomiting. Genitourinary: Negative for dysuria. Musculoskeletal: Negative. Skin: Negative. Neurological: Positive for weakness. Psychiatric/Behavioral: Negative. Objective:     Visit Vitals  BP (!) 141/57 (BP 1 Location: Left upper arm, BP Patient Position: At rest)   Pulse (!) 58   Temp 97.3 °F (36.3 °C)   Resp 18   Ht 5' 6\" (1.676 m)   Wt 80 kg (176 lb 5.9 oz)   SpO2 98%   BMI 28.47 kg/m²    O2 Flow Rate (L/min): 2 l/min O2 Device: None (Room air)    Temp (24hrs), Av.6 °F (36.4 °C), Min:97.2 °F (36.2 °C), Max:97.9 °F (36.6 °C)      No intake/output data recorded. 09/15 1901 -  0700  In: 4919.1 [P.O.:500;  I.V.:4419.1]  Out: 3400 [Urine:3400]    Recent Results (from the past 24 hour(s))   GLUCOSE, POC    Collection Time: 21 12:16 PM   Result Value Ref Range    Glucose (POC) 263 (H) 65 - 117 mg/dL    Performed by Alma 68, POC    Collection Time: 21  4:53 PM   Result Value Ref Range    Glucose (POC) 124 (H) 65 - 117 mg/dL    Performed by Gurdeep Rhoades    VALPROIC ACID    Collection Time: 21  4:54 PM   Result Value Ref Range    Valproic acid 31 (L) 50 - 100 ug/mL   GLUCOSE, POC    Collection Time: 21  8:29 PM   Result Value Ref Range    Glucose (POC) 154 (H) 65 - 117 mg/dL    Performed by [address] [place] [address] Drive, POC    Collection Time: 21  7:05 AM   Result Value Ref Range    Glucose (POC) 222 (H) 65 - 117 mg/dL    Performed by [facility]         CT HEAD WO CONT   Final Result   Similar distribution and degree of edema involving right temporal   parietal cerebrum. Recent MRI findings concerning for herpes encephalitis again noted. MRA BRAIN WO CONT   Final Result   No major arterial occlusion or stenoses was demonstrated. CT HEAD WO CONT   Final Result      Stable exam            CT HEAD WO CONT   Final Result   [Findings most suggestive of a right middle cerebral artery   thrombosis with associated ischemia in the distribution of this artery manifest   by diffuse edema with sulcal effacement; the right middle cerebral artery   appears hyperdense. ]. The inpatient nursing station was contacted and the findings were conveyed at   the time of this report. They will have the covering physician call me back with   any questions. I spoke with nurse Franklin Fisher      XR CHEST PORT   Final Result   The cardiomediastinal silhouette is appropriate for age, technique,   and lung expansion. Pulmonary vasculature is not congested. The lungs are   essentially clear. No effusion or pneumothorax is seen. CT HEAD WO CONT   Final Result   1. Edema still evident in the right temporal lobe and insular cortex. No   significant change from previous CT examination. No evidence for hemorrhage or   increased mass effect. IR GUIDE INSERT PICC OVER 5 YRS   Final Result   1. Thrombosis of left basilic vein. 2.  Successful placement of a left-sided tunneled Powerline central venous   catheter. The catheter is ready for use. IR FLUORO GUIDE PLC CVAD   Final Result   1. Thrombosis of left basilic vein. 2.  Successful placement of a left-sided tunneled Powerline central venous   catheter. The catheter is ready for use. IR US GUIDED VASCULAR ACCESS   Final Result   1. Thrombosis of left basilic vein.    2.  Successful placement of a left-sided tunneled Powerline central venous   catheter. The catheter is ready for use. IR US VENOUS EXT LTD   Final Result   1. Thrombosis of left basilic vein. 2.  Successful placement of a left-sided tunneled Powerline central venous   catheter. The catheter is ready for use. MRI BRAIN W WO CONT   Final Result   Interval progression of findings in the right cerebral hemisphere, extending   into the inferior left frontal lobe as above. Imaging features are most   consistent with worsening encephalitis (with involvement characteristic of   herpes encephalitis as stated on original exam from 9/3/2021). Limited   encephalitis can appear similar. Given rapid progression, neoplasm is felt   unlikely. No evidence of acute infarction. CT CODE NEURO HEAD WO CONTRAST   Final Result   1. Expanding edema in the right temporal lobe and along the sylvian fissure   without hemorrhage, with 2-3 mm of right-to-left midline shift. Called report to   [facility] on 5 W.  at 3:58 PM 9/8/2021. XR CHEST PORT   Final Result   Stable mild elevation of right hemidiaphragm. Right lateral chest   wall clips. Stable enlargement of cardiopericardial silhouette. Improved   aeration of the lungs bilaterally. No evidence of focal airspace consolidation. Gastric tube placed with tip overlying the right upper abdomen at the level of   the distal stomach or proximal duodenum. MRI BRAIN W WO CONT   Final Result   1. Abnormal high T2 signal evident in the right temporal lobe and insular   cortex. Subtle increased T2 signal evident in the left temporal uncus. The   appearances are concerning for herpes encephalitis. This could also represent   limbic encephalitis. A report was call to Dr. Marlene Taylor at 9/3/2021 11:49 AM      XR CHEST PORT   Final Result      CT HEAD WO CONT   Final Result   No acute or active intracranial process.             CT SPINE CERV WO CONT   Final Result   No specific acute or active process. Multilevel degenerative disc and degenerative joint disease. PHYSICAL EXAM:    Physical Exam  Vitals reviewed. Constitutional:       General: She is not in acute distress. Appearance: She is not ill-appearing. HENT:      Head: Normocephalic and atraumatic. Mouth/Throat:      Mouth: Mucous membranes are moist.      Pharynx: Oropharynx is clear. Eyes:      Conjunctiva/sclera: Conjunctivae normal.   Cardiovascular:      Rate and Rhythm: Normal rate and regular rhythm. Heart sounds: Normal heart sounds. Pulmonary:      Effort: Pulmonary effort is normal.      Breath sounds: Normal breath sounds. Abdominal:      General: Abdomen is flat. Bowel sounds are normal.      Palpations: Abdomen is soft. Musculoskeletal:      Cervical back: Normal range of motion and neck supple. Comments: Improvement in overall upper extremity weakness bilaterally. Skin:     General: Skin is warm and dry. Neurological:      Mental Status: She is alert and oriented to person, place, and time. Mental status is at baseline. Comments: Withdrawn and soft-spoken. Upper extremity weakness as noted above   Psychiatric:      Comments: More alert and following commands.           Data Review    Recent Results (from the past 24 hour(s))   GLUCOSE, POC    Collection Time: 09/16/21 12:16 PM   Result Value Ref Range    Glucose (POC) 263 (H) 65 - 117 mg/dL    Performed by Alma Anna, POC    Collection Time: 09/16/21  4:53 PM   Result Value Ref Range    Glucose (POC) 124 (H) 65 - 117 mg/dL    Performed by Anthony Ash    VALPROIC ACID    Collection Time: 09/16/21  4:54 PM   Result Value Ref Range    Valproic acid 31 (L) 50 - 100 ug/mL   GLUCOSE, POC    Collection Time: 09/16/21  8:29 PM   Result Value Ref Range    Glucose (POC) 154 (H) 65 - 117 mg/dL    Performed by [address] [place] [address], POC    Collection Time: 09/17/21  7:05 AM   Result Value Ref Range    Glucose (POC) 222 (H) 65 - 117 mg/dL    Performed by Karlos Blanchard         Assessment/Plan:     Active Problems:    Sepsis ([address] (9/3/2021)      New onset seizure ([address] (9/3/2021)      Mild protein-calorie malnutrition ([address] (9/10/2021)      Hospital course: This is a 27-year-old female admitted on 9/3/2021 with a history of breast cancer, diabetes mellitus, type II, anxiety, depression who initially presented with seizure-like activity and found on the floor by family. Patient was found to be febrile and CT scan of the head head was performed with no acute process. 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 Patient recurrent seizures and was started on Keppra as well as immediately started on acyclovir. Numerous CT and MRI of the brain imaging performed with CT scans of the head initially without acute process. Repeat revealed right temporal and along the sylvian fissure without hemorrhage and 2 to 3 mm of right to left midline shift due to expanding edema. Serial CTs of the head were performed although no significant changes on 9/11/2021 at which time a CT of the head revealed findings suspicious for right middle cerebral artery thrombosis with associated ischemia in the distribution of that artery. Initial MRI on 9/3/2021 revealed abnormal high T2 signal in the right temporal and insular cortex. Findings suspicious for herpes encephalitis. There was also subtle increased T2 signal evident in the left temporal uncus. Repeats MRI of the brain revealed progression of the findings of the right cerebral hemisphere extending into the left frontal lobe. Most consistent with worsening encephalitis. MRA of the brain on 9/13/2021 revealed no major arterial occlusion or stenosis demonstrated. Infectious disease consultation, Dr. Dann Estrada.  Neurological consultation. Patient remains on Keppra and valproic acid for seizure prophylaxis.   Patient also found to have an upper extremity DVT and is currently on Eliquis. Psych consult ordered for worsening depression. Repeat CT of the Head with no change. Valproic levels are not therapeutic and increasing valproic acidosis. EEG showed generalized slowing in the background with disorganization. There were episodic focal slowing in the right temporal occipital regions. Occasional sharp waves in the right frontal region. No obvious seizure-like activity. Continuing PPN    Impression\plan:    1. HSV encephalitis  infectious disease consult  neurology consult  continue acyclovir for a total of 21 days  recommendation is to repeat LP at the end of therapy  HSV 1+ CSF PCR  MRI reveals bilateral temporal lobe edema secondary to encephalitis  continue dexamethasone  CT head with no change    2. 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 There were episodic focal slowing in the right temporal occipital regions. Occasional sharp waves in the right frontal region  Valproic acid levels pending    4. Generalized weakness of the right and left upper extremity  MRA of the brain revealed no major arterial occlusions  suspect this is related to the overall edema    5. DVT \thrombosis of the left basilic vein  remain on Eliquis    6. Moderate protein malnutrition  Albumin 2.4   nutritional consultation  Reorder PPN patient refuses NG tube. Add insulin for hyperglycemia    7. Essential hypertension  continue Norvasc    8. Depression anxiety  continue Celexa  psych consultation    9.   Functional limitations  Encourage patient to participate with PT and OT    10. DM, type 2  SSI    CODE STATUS: Full code    Ulcer prophylaxis: Protonix  DVT prophylaxis: Eliquis    Discussed case with patient's daughter [facility]. at bedside    Discharge barriers: Completion of treatment, improvement in overall brain function and functional status    Care Plan discussed with: Patient/Family    Total time spent with patient: >35 minutes.

## 2021-09-17 NOTE — PROGRESS NOTES
Comprehensive Nutrition Assessment    Type and Reason for Visit: Reassess (Goal)    Nutrition Recommendations/Plan:     Continue Soft + Bite Sized/thin diet       Texture per SLP       Honor pt preferences  Continue Ensure Clear TID, Gelatein BID  D/c Magic cup, Ensure pdg per pt request    RD rec'd EN d/t functional GI tract  Supplemental PPN to continue per MD request d/t NGT refusal  Goal of Standard PPN at 60mL/hr, continuous  250mL 20% lipids daily  989kcal (~50%), 61g pro (76%) - sufficient in addition to PO           Consider addition of appetite stimulant  Provide PRN bowel regimen    Document %meal and supplement intakes, BMs in I/Os    Nutrition Assessment:  Worsening AMS, d/c HSV encephalitis. Neuro + ID following, mentation improving with tx. Initially NPO with NG in place x3 days. TF of Jevity 1.5 initiated late 9/5 at 25mL/hr, advanced to goal rate briefly before d/c next morning (9/6). Diet then advanced to Easy to Chew (9/6). SLP f/u (9/10) recd SBS6/Mildly Thick lq then SBS6/thin (9/16). Pt mentation improving. PPN w/ lipids ordered x2 days (9/16-17). Per Attending, plans to continue PN d/t NGT refusal and ongoing poor PO. Agreeable to trial appetite stimulant. Spoke with pt and family at bedside, pt providing limited responses  relayed B  0%, D last night  50%. Likes Ensure Clear and Gelatein Jello; requested d/c of Ensure pdg and Magic cup d/t dislike. Family bringing some outside foods (chicken, peas). Denied snack preferences. Earlier this admit PO intakes 50% at D 9/9, and 25% at B 9/10. RD added ONS. Labs: BUN 28, , POC , lytes wnl. Meds: acyclovir, eliquis, celexa, insulin, keppra, miralax prn.     Malnutrition Assessment:  Malnutrition Status:  Mild malnutrition    Context:  Acute illness     Findings of the 6 clinical characteristics of malnutrition:   Energy Intake:  7 - 50% or less of est energy requirements for 5 or more days  Weight Loss:  No significant weight loss Body Fat Loss:  No significant body fat loss,     Muscle Mass Loss:  No significant muscle mass loss,    Fluid Accumulation:  No significant fluid accumulation,        Estimated Daily Nutrient Needs:  Energy (kcal): 2000kcal (25kcal/kg); Weight Used for Energy Requirements: Current  Protein (g): 80g (1g/kg); Weight Used for Protein Requirements: Current  Fluid (ml/day): 2000mL; Method Used for Fluid Requirements: 1 ml/kcal      Nutrition Related Findings:  NFPE not performed, pt appears well nourished on visual assessment. No documented n/v or c/d. Soft BM 9/11, none documented since, pt unsure of last BM. +BS. No previous hx dysphagia, SLP now following. No edema. Wounds:    None       Current Nutrition Therapies:  ADULT ORAL NUTRITION SUPPLEMENT Breakfast, Lunch, Dinner; Clear Liquid  ADULT ORAL NUTRITION SUPPLEMENT Breakfast, Lunch; Other Supplement; Gelatein Jell-o  amino acid 4.25 % dextrose 5 % with electrolytes (CLINIMIX E) infusion  amino acid 4.25 % dextrose 5 % with electrolytes (CLINIMIX E) infusion  ADULT DIET Dysphagia - Soft & Bite Sized; SWEET POTATOES EVERY L + D  Current Parenteral Nutrition Orders:  · Type and Formula: Standard PPN (5%Dex, 4.25%AA)   · Lipids: 250ml, Daily  · Duration: Continuous  · Rate/Volume: 60mL/hr  · Current PN Order Provides: 989kcal (~50%), 61g pro (76%)  · Goal PN Orders Provides: PN per MD      Anthropometric Measures:  · Height:  5' 6\" (167.6 cm)  · Current Body Wt:  80 kg (176 lb 5.9 oz) (9/7)    · Ideal Body Wt:  130 lbs:  135.7 %    · BMI Category: Overweight (BMI 25.0-29. 9)     Wt Readings from Last 3 Encounters:   09/07/21 80 kg (176 lb 5.9 oz)   08/31/21 83 kg (183 lb)   Limited wt hx based on stated BW; unable to assess wt changes.     Nutrition Diagnosis:   · Inadequate oral intake related to cognitive or neurological impairment, early satiety, swallowing difficulty as evidenced by intake 0-25%, swallowing study results      Nutrition Interventions:   Food and/or Nutrient Delivery: Continue current diet, Modify oral nutrition supplement, Continue parenteral nutrition  Nutrition Education and Counseling: No recommendations at this time  Coordination of Nutrition Care: Continue to monitor while inpatient    Goals:  Meet >50% EENs in 5 days, Lytes wnl, Wt maintenance +/- 0.5kg per week       Nutrition Monitoring and Evaluation:   Behavioral-Environmental Outcomes: None identified  Food/Nutrient Intake Outcomes: Food and nutrient intake, Supplement intake  Physical Signs/Symptoms Outcomes: Chewing or swallowing, Biochemical data, Meal time behavior, Weight, GI status    Discharge Planning:     Too soon to determine     Electronically signed by Dalia Tobin on 9/17/2021 at 12:57 PM    Contact: [phone] or via [employer]

## 2021-09-17 NOTE — PROCEDURES
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 EEG being done to evaluate for degree of cortical irritability and seizure potential given persistent lethargy.         MEDICATIONS      Current Facility-Administered Medications:     amino acid 4.25 % dextrose 5 % with electrolytes (CLINIMIX E) infusion, , IntraVENous, CONTINUOUS, Kings Sotelo PA-C, Last Rate: 60 mL/hr at 09/16/21 2302, New Bag at 09/16/21 2302    fat emulsion 20% (LIPOSYN, INTRAlipid) infusion 250 mL, 250 mL, IntraVENous, DAILY, Kings Sotelo PA-C, Last Rate: 20.83 mL/hr at 09/16/21 2302, 250 mL at 09/16/21 2302    pantoprazole (PROTONIX) tablet 40 mg, 40 mg, Oral, ACB, Anthony Sotelo PA-C, 40 mg at 09/16/21 0846    valproic acid (DEPAKENE) capsule 250 mg, 250 mg, Oral, TID, Muriel Potts MD, 250 mg at 09/16/21 2301    dexamethasone (DECADRON) 4 mg/mL injection 4 mg, 4 mg, IntraVENous, Q8H, Torito Burnett MD, 4 mg at 09/17/21 0631    0.45% sodium chloride infusion, 75 mL/hr, IntraVENous, CONTINUOUS, Ita Zuniga MD, Last Rate: 75 mL/hr at 09/16/21 2038, 75 mL/hr at 09/16/21 2038    citalopram (CELEXA) tablet 20 mg, 20 mg, Oral, DAILY, Chana Prado MD, 20 mg at 09/16/21 0846    nystatin (MYCOSTATIN) 100,000 unit/mL oral suspension 500,000 Units, 500,000 Units, Oral, QID, Chana Prado MD, 500,000 Units at 09/16/21 2301    apixaban (ELIQUIS) tablet 5 mg, 5 mg, Oral, BID, Dottie Luciano MD, 5 mg at 09/16/21 2302    acyclovir (ZOVIRAX) 900 mg in 0.9% sodium chloride 250 mL IVPB, 15 mg/kg (Ideal), IntraVENous, Q8H, Debra Serrato MD, Last Rate: 250 mL/hr at 09/17/21 0631, 900 mg at 09/17/21 0631    levETIRAcetam (KEPPRA) 2,000 mg in 0.9% sodium chloride 250 mL IVPB, 2,000 mg, IntraVENous, Q12H, Belén Collins MD, 2,000 mg at 09/16/21 2301    amLODIPine (NORVASC) tablet 5 mg, 5 mg, Oral, DAILY, Kristin Flores MD, 5 mg at 09/16/21 0846    hydrALAZINE (APRESOLINE) 20 mg/mL injection 10 mg, 10 mg, IntraVENous, Q6H PRN, Kristin Flores MD, 10 mg at 09/12/21 2202    insulin lispro (HUMALOG) injection, , SubCUTAneous, AC&HS, Pamela De Dios MD, 5 Units at 09/16/21 1243    potassium chloride (KLOR-CON) packet for solution 20 mEq, 20 mEq, Oral, BID WITH MEALS, Kerin Res, Merline Speedy, MD, 20 mEq at 09/16/21 1736    sodium chloride (NS) flush 5-40 mL, 5-40 mL, IntraVENous, Q8H, Frelier, Merline Speedy, MD, 10 mL at 09/17/21 6747    sodium chloride (NS) flush 5-40 mL, 5-40 mL, IntraVENous, PRN, Kerin Res, Merline Speedy, MD    acetaminophen (TYLENOL) tablet 650 mg, 650 mg, Oral, Q6H PRN, 650 mg at 09/06/21 1236 **OR** acetaminophen (TYLENOL) suppository 650 mg, 650 mg, Rectal, Q6H PRN, Kerin Res, Merline Speedy, MD    polyethylene glycol (MIRALAX) packet 17 g, 17 g, Oral, DAILY PRN, Kerin Res, Merline Speedy, MD    promethazine (PHENERGAN) tablet 12.5 mg, 12.5 mg, Oral, Q6H PRN **OR** ondansetron (ZOFRAN) injection 4 mg, 4 mg, IntraVENous, Q6H PRN, Frelier, Merline Speedy, MD    glucose chewable tablet 16 g, 4 Tablet, Oral, PRN, Kana Baker MD    dextrose (D50W) injection syrg 12.5-25 g, 25-50 mL, IntraVENous, PRN, Kana Baker MD, 25 g at 09/04/21 0409    glucagon (GLUCAGEN) injection 1 mg, 1 mg, IntraMUSCular, PRN, Kana Baker MD        DESCRIPTION OF PROCEDURE    Electrodes were applied using paste technique in positions dictated by the International 10-20 system of placement. Recording montages included both referential and bipolar derivations. In addition to EEG data, EKG and eye movements were recorded.  This routine EEG with video began at 1034AM on 9/16/21. The recording time of the study is 39 minutes and 36 seconds. States: Awake, Drowsy       DESCRIPTION OF ACTIVITIES    The background is continuous with a frequency of 7.5 to 8 Hz maximally in amplitude of 15 to 45 µV intermixed with slower medium and lower voltage theta activity. The background is reactive physical stimulation attenuates symmetrically with eye opening. There is a suboptimal anterior-posterior gradient of the rhythms and thus is not well organized. There is episodic focal slowing in the right temporal occipital regions with activity in the 5 to 7 Hz range with amplitudes of 20 to 40 µV. There are occasional right frontal sharp and sharp and wave discharges that are 1 to 1.5 Hz with amplitudes of 40 to 70 µV with maximal negativity at F4 without any clinical correlate or evolution/organization. During photic stimulation, there are frequent bilateral frontocentral sharp and wave discharges that are 1 Hz with amplitudes of 50 to 80 µV without any clinical correlate or evolution/organization. During greater degrees of drowsiness, there is generalized attenuation and slowing of the background. Sleep transients not captured in the study. Photic stimulation did not elicit a driving response at multiple flash frequencies and also elicited abnormalities noted above. Hyperventilation was not performed.     An EKG showed a sinus rhythm with a heart rate between 62 and 76 bpm.       PERTINENT FINDINGS   1) Generalized slowing of the background with disorganization of the background  2) Episodic focal slowing in the right temporal occipital regions  3) Occasional sharp and sharp and wave discharges in the right frontal region  4) Bilateral frontal central spike and wave discharges during photic stimulation  5) No organization of the epileptiform discharges or electrographic seizures         CLINICAL INTERPRETATION    This is an abnormal awake and drowsy study. The findings above indicate    1) Mild generalized encephalopathy with greater degree of dysfunction in the right temporal occipital regions with underlying lesion which is consistent with patient's known diagnosis of HSV-1 Encephalitis  2) Mild to moderate degree of cortical irritability in the bilateral frontal central regions with slightly greater degree of cortical irritability in the right frontal region with decreased seizure threshold    Note, there are no electrographic seizures that meet criteria during the length of the study.

## 2021-09-17 NOTE — PROGRESS NOTES
NEUROLOGY  PROGRESS NOTE    Admission History/Pertinent Events  Norma Silverman is a 60y.o. year old female who presented on 9/3/2021. Patient has a past medical history of Breast Cancer, HL, DM2, Anxiety/Depression who presented with seizure like activity while on the floor by family. EMS reported patient having seizure-like activity for which patient was initially treated with midazolam.  In the ED, patient had a temperature a 102° F.  Emergent CT head was negative for any acute findings. Emergent contrasted MRI of the brain concerning for bilateral temporal pathology most concerning for HSV encephalitis or limbic encephalitis. Patient was loaded with IV levetiracetam and started on antibiotics as well as acyclovir. ASSESSMENT/PLAN      Impression  Patient doing well this morning. She is sitting in her chair and answering questions. Still lack of emotional drive with her speech. Spoke to patient's daughter who is at bedside extensively in regards to patient's diagnosis and likely prognosis moving forward. We will continue patient on acyclovir and dexamethasone along with AEDs per below for seizure prophylaxis.       [facility] WO 9/3  [facility] WO 9/9  Right temporal and insular region edema    CTH WO 9/10  Possible RMCA ischemia with possible RMCA occlusion    CT WO 9/11  No interval changes from scan on 9/10    14 [facility] WO 9/15  No interval changes or worsening of frontotemporal edema    MRI Brain [facility] FOR COGNITIVE DISORDERS 9/3  T2/FLAIR hyperintense signal in the bilateral temporal regions more so in the right temporal and insular regions concerning for HSV encephalitis or possibly limbic encephalitis    MRI Brain [facility] COGNITIVE DISORDERS 9/8  Interval progression of T2/FLAIR hyperintense signal change in the right cerebral and left frontal region concerning for worsening encephalitis    MRA Head WO 9/13  No occlusions, dissections, significant stenosis, pseudoaneurysms or aneurysms in the intracranial arterial system    EEG 9/9  Generalized slowing of the background  Sharp discharges in the right frontal region    EEG 9/16  Mild generalized encephalopathy  Occasional right frontal sharp discharges  During photic stimulation, bilateral frontal central spike and wave discharges  No seizures    Valproic Acid Levels  44 (9/12), 31 (9/16)    CSF Studies   Positive: , , Protein 80, HSV-1 DNA  Negative/WNL: Glucose, HSV-2 DNA, Viral Cx     Labs  Positive: HSV 1 IgG Ab  Negative: HIV 1/2, RPR      Plan      Encephalopathy & Seizures d/t HSV1 Encephalitis  Cerebral Edema d/t Above  Associated: Sepsis, Basilic Vein Thrombosus, INDIA  -On Acyclovir per ID (Planned 21 day course)  --> Plan to repeat LP with CSF studies after 21 days of Acyclovir has been completed  ---> WBC, RBC, Protein, HSV-1 PCR  -Continue Dexamethasone 2 TID  -On Apixaban 5 BID  -Seizure Precautions  -Seizure Prophylaxis: Levetiracetam 2000 BID, Valproic Acid 500 TID  -SBP Goal < 160  -Glucose Goal < 180  -Head of Bed at 30 degrees  -PT/OT/ST as needed  -Management of metabolic/infectious derangements to referring teams      SUBJECTIVE   Patient currently on acyclovir and dexamethasone. Patient with improved examination is she is more alert and interactive. Physical/Neurological Exam  Patient awake, alert; following central and peripheral commands   No expressive or receptive aphasia;  No dysarthria   Decreased emotional drive with speech  Pupils react to light bilaterally; EOM Intact   No visual field deficits on gross exam   No facial droop   Motor: 3+/5 in the left hemibody, 4/5 in the right hemibody  No abnormal movements   Sensation to light touch intact grossly throughout       OBJECTIVE  Vital Signs  Temp:  [97.2 °F (36.2 °C)-98.6 °F (37 °C)]   Pulse (Heart Rate):  [53-86]   BP: (113-158)/(57-79)   Resp Rate:  [16-18]   O2 Sat (%):  [96 %-99 %]   Weight: --    MEDICATIONS    Current Facility-Administered Medications:     valproic acid (DEPAKENE) capsule 500 mg, 500 mg, Oral, TID, Jeny Mendoza MD    dexamethasone (DECADRON) 4 mg/mL injection 2 mg, 2 mg, IntraVENous, Q8H, Amber Restrepo MD    amino acid 4.25 % dextrose 5 % with electrolytes (CLINIMIX E) infusion, , IntraVENous, CONTINUOUS, Amie Sotelo PA-C, Last Rate: 60 mL/hr at 09/16/21 2302, New Bag at 09/16/21 2302    fat emulsion 20% (LIPOSYN, INTRAlipid) infusion 250 mL, 250 mL, IntraVENous, DAILY, Amie Sotelo PA-C, Last Rate: 20.83 mL/hr at 09/16/21 2302, 250 mL at 09/16/21 2302    pantoprazole (PROTONIX) tablet 40 mg, 40 mg, Oral, ACB, Anthony Sotelo PA-C, 40 mg at 09/16/21 0846    0.45% sodium chloride infusion, 75 mL/hr, IntraVENous, CONTINUOUS, Jose Angel Hardy MD, Last Rate: 75 mL/hr at 09/16/21 2038, 75 mL/hr at 09/16/21 2038    citalopram (CELEXA) tablet 20 mg, 20 mg, Oral, DAILY, Nusrat PATEL MD, 20 mg at 09/16/21 0846    nystatin (MYCOSTATIN) 100,000 unit/mL oral suspension 500,000 Units, 500,000 Units, Oral, QID, Tangela Cast MD, 500,000 Units at 09/16/21 2301    apixaban (ELIQUIS) tablet 5 mg, 5 mg, Oral, BID, Tangela Cast MD, 5 mg at 09/16/21 2302    acyclovir (ZOVIRAX) 900 mg in 0.9% sodium chloride 250 mL IVPB, 15 mg/kg (Ideal), IntraVENous, Q8H, Debra Serrato MD, Last Rate: 250 mL/hr at 09/17/21 0631, 900 mg at 09/17/21 0631    levETIRAcetam (KEPPRA) 2,000 mg in 0.9% sodium chloride 250 mL IVPB, 2,000 mg, IntraVENous, Q12H, Rhett Collins MD, 2,000 mg at 09/16/21 2301    amLODIPine (NORVASC) tablet 5 mg, 5 mg, Oral, DAILY, Jailene Peterson MD, 5 mg at 09/16/21 0846    hydrALAZINE (APRESOLINE) 20 mg/mL injection 10 mg, 10 mg, IntraVENous, Q6H PRN, Jailene Peterson MD, 10 mg at 09/12/21 2202    insulin lispro (HUMALOG) injection, , SubCUTAneous, AC&HS, Tangela Cast MD, 5 Units at 09/16/21 1243    potassium chloride (KLOR-CON) packet for solution 20 mEq, 20 mEq, Oral, BID WITH MEALS, Dilma Hdz MD, 20 mEq at 09/16/21 1734    sodium chloride (NS) flush 5-40 mL, 5-40 mL, IntraVENous, Q8H, Merissa Mendoza MD, 10 mL at 09/17/21 9561    sodium chloride (NS) flush 5-40 mL, 5-40 mL, IntraVENous, PRN, Merissa Gray MD    acetaminophen (TYLENOL) tablet 650 mg, 650 mg, Oral, Q6H PRN, 650 mg at 09/06/21 1236 **OR** acetaminophen (TYLENOL) suppository 650 mg, 650 mg, Rectal, Q6H PRN, Merissa Gray MD    polyethylene glycol (MIRALAX) packet 17 g, 17 g, Oral, DAILY PRN, Merissa Gray MD    promethazine (PHENERGAN) tablet 12.5 mg, 12.5 mg, Oral, Q6H PRN **OR** ondansetron (ZOFRAN) injection 4 mg, 4 mg, IntraVENous, Q6H PRN, Merissa Gray MD    glucose chewable tablet 16 g, 4 Tablet, Oral, PRN, Kana Baker MD    dextrose (D50W) injection syrg 12.5-25 g, 25-50 mL, IntraVENous, PRN, Kana Baker MD, 25 g at 09/04/21 0409    glucagon (GLUCAGEN) injection 1 mg, 1 mg, IntraMUSCular, PRN, Kana Baker MD      Labs: I've reviewed the labs for today     This document has been prepared by the Dragon voice recognition system, typographical errors may have occurred.  Attempts have been made to correct errors, however inadvertent errors may persist.

## 2021-09-17 NOTE — PROGRESS NOTES
Patient was not alert enough to take her medication this afternoon. When I asked her about her name and  and she kept repeating her  and was very groggy so I told her sister in the room that I will come back and see if she can take them later.

## 2021-09-17 NOTE — BH NOTES
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

## 2021-09-17 NOTE — PROGRESS NOTES
Infectious Disease Progress Note         Subjective:   More alert and following commands today, was working w PT during my assessment   Objective:   Physical Exam:     Visit Vitals  BP (!) 153/57 (BP 1 Location: Left upper arm, BP Patient Position: At rest)   Pulse 78   Temp 98.4 °F (36.9 °C)   Resp 18   Ht 5' 6\" (1.676 m)   Wt 176 lb 6.4 oz (80 kg)   SpO2 96%   BMI 28.47 kg/m²    O2 Flow Rate (L/min): 2 l/min O2 Device: None (Room air)    Temp (24hrs), Av.9 °F (36.6 °C), Min:97.3 °F (36.3 °C), Max:98.4 °F (36.9 °C)    No intake/output data recorded. 09/15 1901 -  0700  In: 4919.1 [P.O.:500; I.V.:4419.1]  Out: 3400 [Urine:3400]    General: NAD, resting, not following commands    HEENT: LAQUITA, Moist mucosa  Lungs: CTA b/l, no wheeze/rhonchi   Heart: S1S2+, RRR, no murmur  Abdo: Soft, NT, ND, +BS   Exts: no new weakness, no edema   Skin: No chronic wounds or ulcers, + right chest wall power line     Data Review:       Recent Days:  Recent Labs     21  0656 21  0728 09/15/21  0702   WBC 17.5* 23.3* 25.9*   HGB 13.7 14.2 14.8   HCT 41.2 42.4 44.5    256 251     Recent Labs     21  0656 21  0728 09/15/21  0702   BUN 28* 34* 34*  35*   CREA 0.68 1.05* 1.61*  1.63*     Lab Results   Component Value Date/Time    C-Reactive protein 2.95 (H) 2021 07:28 AM        Microbiology     Results     ** No results found for the last 336 hours. **           Diagnostics   CXR Results  (Last 48 hours)    None         Assessment/Plan     1. Viral encephalitis, due to HSV 1 w a positive CSF PCR        On day # 14 of Acyclovir, at 15 mg/kg        Afebrile, Leukocytosis trending down w steroid taper        Improved mentation, following commands appropriately        CBC, and Renal panel  w AM labs         2. INDIA: Cr normalized on todays labs, will continue fluids          3.  Leukocytosis: Trending down w steroid taper       ESR 4, CRP 2.95 and Procal 0.26, doubt bacteria superinfection       Continue antiviral therapy as above, no indication for antibiotics      4. Seizure on admission: Due to # 1. On Keppra, and Valproic      Mild generalized encephalopathy with greater degree of dysfunction in the right temporal occipital on EEG    5. Left sided weakness: Due to # 1. Improved weakness, no new findings on repeat CT head (09/15)        6. DVT of axillary vein/Thrombosis of left basilic vein.  Anticoagulated on low dose Eliquis     Daughter up dated on clinical status from ID stand point, questions answered to her satisfaction     Ryan Franco MD    9/17/2021

## 2021-09-17 NOTE — PROGRESS NOTES
Went back and patient was alert and oriented enough to take medications and was able to tell me her name and  so I was comfortable giving her medications PO

## 2021-09-17 NOTE — PROGRESS NOTES
CM met with the patient's daughter, Uma Howard, and the patient's sister, Donnamae Cogan to discuss discharge planning. At this time they are open to considering inpatient rehab and they are agreeable for a referral to be sent to [address] [place] [address]. They would also like to see if [facility] would be an option for the patient due to her complex medical needs. CM has sent referrals to both facilities to see which would be most appropriate and if they can accommodate her needs. Cm will continue to follow once the referral sources have reviewed.

## 2021-09-17 NOTE — PROGRESS NOTES
PHYSICAL THERAPY TREATMENT  Patient: Leslie Ward (27 y.o. female)  Date: 9/17/2021  Diagnosis: New onset seizure ([facility] [address] [R56.9]  Sepsis ([facility] [address] [A41.9] <principal problem not specified>       Precautions:    Chart, physical therapy assessment, plan of care and goals were reviewed. ASSESSMENT  Patient continues with skilled PT services and is progressing towards goals. Pt semi supine in bed upon arrival and agreeable to session. Pt has difficulty following commands and requires vc/tactile cues/manual cues throughout session. Performed bed mobility with min A, required assistance and cues to move BLE off EOB. Pt completed STS from EOB with min A x 2. Pt ambulated to toilet with RW and min A x 2, patient having difficulty maneuvering RW and noted with decreased command following even with vc and direction. Pt completed toileting with SBA, required assistance for bowel hygiene, see OT for further details. Due to pt's difficulty with use of RW, pt ambulated back to recliner with HHA x 2. Attempted seated therex, however pt having difficulty following commands and required manual assistance to complete movements. Current Level of Function Impacting Discharge (mobility/balance): min A x 2 for mobility     Other factors to consider for discharge: PLOF, time since onset, confusion, decreased command following. PLAN :  Patient continues to benefit from skilled intervention to address the above impairments. Continue treatment per established plan of care. to address goals. Recommendation for discharge: (in order for the patient to meet his/her long term goals)  [facility]     This discharge recommendation:  Has been made in collaboration with the attending provider and/or case management    IF patient discharges home will need the following DME: to be determined (TBD)       SUBJECTIVE:   Patient stated I can try when asked if she could wipe herself after toileting.      OBJECTIVE DATA SUMMARY:   Critical Behavior:  Neurologic State: Alert  Orientation Level: Oriented to person  Cognition: Follows commands, Poor safety awareness  Safety/Judgement: Decreased awareness of need for safety  Functional Mobility Training:  Bed Mobility:  Supine to Sit: Minimum assistance  Scooting: Minimum assistance    Transfers:  Sit to Stand: Minimum assistance;Assist x2  Stand to Sit: Minimum assistance;Assist x2  Bed to Chair: Contact guard assistance;Assist x2    Balance:  Sitting: Impaired; With support  Sitting - Static: Fair (occasional)  Sitting - Dynamic: Fair (occasional)  Standing: Impaired; With support  Standing - Static: Constant support;Good  Standing - Dynamic : Constant support; Fair    Ambulation/Gait Training:  Distance (ft): 20 Feet (ft)  Assistive Device: Gait belt;Walker, rolling; Other (comment) (HHA x 2)  Ambulation - Level of Assistance: Minimal assistance;Assist x2  Base of Support: Narrowed  Speed/Elda: Slow;Shuffled  Step Length: Right shortened;Left shortened      Therapeutic Exercises:   1 x 10 AP  Attempted LAQ however pt has difficulty with command following    Pain Ratin/10    Activity Tolerance:   Fair and requires rest breaks  Please refer to the flowsheet for vital signs taken during this treatment. After treatment patient left in no apparent distress:   Sitting in chair, Call bell within reach, and visitor in room    COMMUNICATION/COLLABORATION:   The patients plan of care was discussed with: Occupational therapy assistant and Registered nurse. OT/PT sessions occurred together for increased patient and clinician safety    Problem: Mobility Impaired (Adult and Pediatric)  Goal: *Acute Goals and Plan of Care (Insert Text)  Description: Patient will move from supine to sit and sit to supine , scoot up and down, and roll side to side in bed with minimal assistance/contact guard assist within 7 day(s).     Patient will transfer from bed to chair and chair to bed with minimal assistance/contact guard assist using the least restrictive device within 7 day(s). Patient will improve static sitting/standing balance to minimal assistance within 1 week(s). Patient will ambulate 10 feet with minimal assistance with least restrictive device within 1 weeks.        Outcome: Progressing Towards Goal       Dania Montalvo, YAN   Time Calculation: 39 mins

## 2021-09-17 NOTE — PROGRESS NOTES
Chart reviewed and therapy is recommending Inpatient Rehab at this time. Attempted to meet with the patient and her sister at the bedside. Patient's sister stated CM needed to speak with the patient's daughter, Sandra Contreras, at [phone]. CM reached out to the patient's daughter and we discussed discharge planning. She is not interested in the rehab close to the hospital and would like to meet with CM at 2:15pm. CM will meet upon arrival.    DC plan: Inpatient Rehab  DC barrier: will need to continue IV acyclovir at discharge.

## 2021-09-18 LAB
ALBUMIN SERPL-MCNC: 2.4 G/DL (ref 3.5–5)
ALBUMIN/GLOB SERPL: 0.7 {RATIO} (ref 1.1–2.2)
ALP SERPL-CCNC: 47 U/L (ref 45–117)
ALT SERPL-CCNC: 26 U/L (ref 12–78)
ANION GAP SERPL CALC-SCNC: 8 MMOL/L (ref 5–15)
AST SERPL W P-5'-P-CCNC: 8 U/L (ref 15–37)
BASOPHILS # BLD: 0 K/UL (ref 0–0.1)
BASOPHILS NFR BLD: 0 % (ref 0–1)
BILIRUB SERPL-MCNC: 0.4 MG/DL (ref 0.2–1)
BUN SERPL-MCNC: 27 MG/DL (ref 6–20)
BUN/CREAT SERPL: 38 (ref 12–20)
CA-I BLD-MCNC: 8.5 MG/DL (ref 8.5–10.1)
CHLORIDE SERPL-SCNC: 104 MMOL/L (ref 97–108)
CO2 SERPL-SCNC: 24 MMOL/L (ref 21–32)
CREAT SERPL-MCNC: 0.71 MG/DL (ref 0.55–1.02)
DIFFERENTIAL METHOD BLD: ABNORMAL
EOSINOPHIL # BLD: 0 K/UL (ref 0–0.4)
EOSINOPHIL NFR BLD: 0 % (ref 0–7)
ERYTHROCYTE [DISTWIDTH] IN BLOOD BY AUTOMATED COUNT: 13.9 % (ref 11.5–14.5)
GLOBULIN SER CALC-MCNC: 3.4 G/DL (ref 2–4)
GLUCOSE BLD STRIP.AUTO-MCNC: 179 MG/DL (ref 65–117)
GLUCOSE BLD STRIP.AUTO-MCNC: 197 MG/DL (ref 65–117)
GLUCOSE BLD STRIP.AUTO-MCNC: 212 MG/DL (ref 65–117)
GLUCOSE SERPL-MCNC: 204 MG/DL (ref 65–100)
HCT VFR BLD AUTO: 42.4 % (ref 35–47)
HGB BLD-MCNC: 14.1 G/DL (ref 11.5–16)
IMM GRANULOCYTES # BLD AUTO: 0.1 K/UL (ref 0–0.04)
IMM GRANULOCYTES NFR BLD AUTO: 1 % (ref 0–0.5)
LYMPHOCYTES # BLD: 1.5 K/UL (ref 0.8–3.5)
LYMPHOCYTES NFR BLD: 12 % (ref 12–49)
MCH RBC QN AUTO: 29.7 PG (ref 26–34)
MCHC RBC AUTO-ENTMCNC: 33.3 G/DL (ref 30–36.5)
MCV RBC AUTO: 89.3 FL (ref 80–99)
MONOCYTES # BLD: 1 K/UL (ref 0–1)
MONOCYTES NFR BLD: 8 % (ref 5–13)
NEUTS SEG # BLD: 9.4 K/UL (ref 1.8–8)
NEUTS SEG NFR BLD: 79 % (ref 32–75)
NRBC # BLD: 0.02 K/UL (ref 0–0.01)
NRBC BLD-RTO: 0.2 PER 100 WBC
PERFORMED BY, TECHID: ABNORMAL
PLATELET # BLD AUTO: 244 K/UL (ref 150–400)
PMV BLD AUTO: 11.2 FL (ref 8.9–12.9)
POTASSIUM SERPL-SCNC: 4.5 MMOL/L (ref 3.5–5.1)
PROT SERPL-MCNC: 5.8 G/DL (ref 6.4–8.2)
RBC # BLD AUTO: 4.75 M/UL (ref 3.8–5.2)
SODIUM SERPL-SCNC: 136 MMOL/L (ref 136–145)
WBC # BLD AUTO: 12 K/UL (ref 3.6–11)

## 2021-09-18 PROCEDURE — 65270000029 HC RM PRIVATE

## 2021-09-18 PROCEDURE — 74011250636 HC RX REV CODE- 250/636: Performed by: STUDENT IN AN ORGANIZED HEALTH CARE EDUCATION/TRAINING PROGRAM

## 2021-09-18 PROCEDURE — 74011250637 HC RX REV CODE- 250/637: Performed by: INTERNAL MEDICINE

## 2021-09-18 PROCEDURE — 74011250636 HC RX REV CODE- 250/636: Performed by: INTERNAL MEDICINE

## 2021-09-18 PROCEDURE — 74011636637 HC RX REV CODE- 636/637: Performed by: PHYSICIAN ASSISTANT

## 2021-09-18 PROCEDURE — 36415 COLL VENOUS BLD VENIPUNCTURE: CPT

## 2021-09-18 PROCEDURE — 74011250636 HC RX REV CODE- 250/636: Performed by: HOSPITALIST

## 2021-09-18 PROCEDURE — 80053 COMPREHEN METABOLIC PANEL: CPT

## 2021-09-18 PROCEDURE — 74011250637 HC RX REV CODE- 250/637: Performed by: PHYSICIAN ASSISTANT

## 2021-09-18 PROCEDURE — 74011000250 HC RX REV CODE- 250: Performed by: HOSPITALIST

## 2021-09-18 PROCEDURE — 82962 GLUCOSE BLOOD TEST: CPT

## 2021-09-18 PROCEDURE — 74011250637 HC RX REV CODE- 250/637: Performed by: STUDENT IN AN ORGANIZED HEALTH CARE EDUCATION/TRAINING PROGRAM

## 2021-09-18 PROCEDURE — 74011636637 HC RX REV CODE- 636/637: Performed by: INTERNAL MEDICINE

## 2021-09-18 PROCEDURE — 74011000250 HC RX REV CODE- 250: Performed by: PHYSICIAN ASSISTANT

## 2021-09-18 PROCEDURE — 85025 COMPLETE CBC W/AUTO DIFF WBC: CPT

## 2021-09-18 RX ADMIN — VALPROIC ACID 500 MG: 250 CAPSULE, LIQUID FILLED ORAL at 09:01

## 2021-09-18 RX ADMIN — PANTOPRAZOLE SODIUM 40 MG: 40 TABLET, DELAYED RELEASE ORAL at 09:00

## 2021-09-18 RX ADMIN — APIXABAN 5 MG: 5 TABLET, FILM COATED ORAL at 21:32

## 2021-09-18 RX ADMIN — APIXABAN 5 MG: 5 TABLET, FILM COATED ORAL at 09:00

## 2021-09-18 RX ADMIN — VALPROIC ACID 500 MG: 250 CAPSULE, LIQUID FILLED ORAL at 21:32

## 2021-09-18 RX ADMIN — INSULIN LISPRO 2 UNITS: 100 INJECTION, SOLUTION INTRAVENOUS; SUBCUTANEOUS at 09:01

## 2021-09-18 RX ADMIN — DEXAMETHASONE SODIUM PHOSPHATE 2 MG: 4 INJECTION, SOLUTION INTRA-ARTICULAR; INTRALESIONAL; INTRAMUSCULAR; INTRAVENOUS; SOFT TISSUE at 13:05

## 2021-09-18 RX ADMIN — INSULIN LISPRO 3 UNITS: 100 INJECTION, SOLUTION INTRAVENOUS; SUBCUTANEOUS at 17:23

## 2021-09-18 RX ADMIN — POTASSIUM CHLORIDE 20 MEQ: 1.5 POWDER, FOR SOLUTION ORAL at 17:18

## 2021-09-18 RX ADMIN — VALPROIC ACID 500 MG: 250 CAPSULE, LIQUID FILLED ORAL at 17:18

## 2021-09-18 RX ADMIN — POTASSIUM CHLORIDE 20 MEQ: 1.5 POWDER, FOR SOLUTION ORAL at 09:00

## 2021-09-18 RX ADMIN — LEVETIRACETAM 2000 MG: 100 INJECTION, SOLUTION INTRAVENOUS at 12:06

## 2021-09-18 RX ADMIN — NYSTATIN 500000 UNITS: 100000 SUSPENSION ORAL at 17:18

## 2021-09-18 RX ADMIN — CITALOPRAM HYDROBROMIDE 20 MG: 20 TABLET ORAL at 09:01

## 2021-09-18 RX ADMIN — SODIUM CHLORIDE 900 MG: 9 INJECTION, SOLUTION INTRAVENOUS at 22:11

## 2021-09-18 RX ADMIN — DEXAMETHASONE SODIUM PHOSPHATE 2 MG: 4 INJECTION, SOLUTION INTRA-ARTICULAR; INTRALESIONAL; INTRAMUSCULAR; INTRAVENOUS; SOFT TISSUE at 06:18

## 2021-09-18 RX ADMIN — NYSTATIN 500000 UNITS: 100000 SUSPENSION ORAL at 13:05

## 2021-09-18 RX ADMIN — LEVETIRACETAM 2000 MG: 100 INJECTION, SOLUTION INTRAVENOUS at 22:10

## 2021-09-18 RX ADMIN — SODIUM CHLORIDE 75 ML/HR: 4.5 INJECTION, SOLUTION INTRAVENOUS at 03:33

## 2021-09-18 RX ADMIN — AMLODIPINE BESYLATE 5 MG: 5 TABLET ORAL at 09:01

## 2021-09-18 RX ADMIN — I.V. FAT EMULSION 250 ML: 20 EMULSION INTRAVENOUS at 21:33

## 2021-09-18 RX ADMIN — NYSTATIN 500000 UNITS: 100000 SUSPENSION ORAL at 09:01

## 2021-09-18 RX ADMIN — TRACE ELEMENTS INJECTION 4: 7.4; .75; 98; 151 INJECTION, SOLUTION INTRAVENOUS at 21:33

## 2021-09-18 RX ADMIN — Medication 10 ML: at 06:18

## 2021-09-18 RX ADMIN — SODIUM CHLORIDE 900 MG: 9 INJECTION, SOLUTION INTRAVENOUS at 13:05

## 2021-09-18 RX ADMIN — SODIUM CHLORIDE 900 MG: 9 INJECTION, SOLUTION INTRAVENOUS at 06:18

## 2021-09-18 RX ADMIN — NYSTATIN 500000 UNITS: 100000 SUSPENSION ORAL at 21:32

## 2021-09-18 RX ADMIN — INSULIN LISPRO 3 UNITS: 100 INJECTION, SOLUTION INTRAVENOUS; SUBCUTANEOUS at 12:06

## 2021-09-18 RX ADMIN — DEXAMETHASONE SODIUM PHOSPHATE 2 MG: 4 INJECTION, SOLUTION INTRA-ARTICULAR; INTRALESIONAL; INTRAMUSCULAR; INTRAVENOUS; SOFT TISSUE at 21:32

## 2021-09-18 NOTE — PROGRESS NOTES
Problem: Falls - Risk of  Goal: *Absence of Falls  Description: Document Bernarda Neal Fall Risk and appropriate interventions in the flowsheet. Outcome: Progressing Towards Goal  Note: Fall Risk Interventions:  Mobility Interventions: Bed/chair exit alarm    Mentation Interventions: Bed/chair exit alarm    Medication Interventions: Bed/chair exit alarm    Elimination Interventions: Bed/chair exit alarm    History of Falls Interventions: Bed/chair exit alarm         Problem: Risk for Spread of Infection  Goal: Prevent transmission of infectious organism to others  Description: Prevent the transmission of infectious organisms to other patients, staff members, and visitors. Outcome: Progressing Towards Goal     Problem: Pressure Injury - Risk of  Goal: *Prevention of pressure injury  Description: Document Harish Scale and appropriate interventions in the flowsheet.   Outcome: Progressing Towards Goal  Note: Pressure Injury Interventions:  Sensory Interventions: Float heels    Moisture Interventions: Absorbent underpads    Activity Interventions: Increase time out of bed    Mobility Interventions: Float heels    Nutrition Interventions: Document food/fluid/supplement intake    Friction and Shear Interventions: HOB 30 degrees or less

## 2021-09-18 NOTE — PROGRESS NOTES
PT attempted to see patient on 2 separate occasions today: 11:52 am and 12:52 am treatments were attempted. First attempt patient was sleeping soundly and did not awake to the sound of her name. Second attempt, family and nursing in room and patient laying with eyes closed still. Will continue to follow patient and attempt PT session on a later date as time allows.

## 2021-09-18 NOTE — BH NOTES
Patient seen for follow-up she is resting in the chair spoke with patient's sister who was diagnosed in the morning patient she says patient had good day she was talking more more focused feeling better engaging she apparently also participated in some physical therapy and made efforts and now resting time and her vital signs are stable apparently she is also eating good and making slow steady progress from psych point of view

## 2021-09-18 NOTE — PROGRESS NOTES
Hospitalist Progress Note    Subjective:   Daily Progress Note: 9/18/2021 10:59 AM    More alert. Following commands. Considerable progress in her overall communication and strength. Current Facility-Administered Medications   Medication Dose Route Frequency    valproic acid (DEPAKENE) capsule 500 mg  500 mg Oral TID    dexamethasone (DECADRON) 4 mg/mL injection 2 mg  2 mg IntraVENous Q8H    amino acid 4.25 % dextrose 5 % with electrolytes (CLINIMIX E) infusion   IntraVENous CONTINUOUS    pantoprazole (PROTONIX) tablet 40 mg  40 mg Oral ACB    0.45% sodium chloride infusion  75 mL/hr IntraVENous CONTINUOUS    citalopram (CELEXA) tablet 20 mg  20 mg Oral DAILY    nystatin (MYCOSTATIN) 100,000 unit/mL oral suspension 500,000 Units  500,000 Units Oral QID    apixaban (ELIQUIS) tablet 5 mg  5 mg Oral BID    acyclovir (ZOVIRAX) 900 mg in 0.9% sodium chloride 250 mL IVPB  15 mg/kg (Ideal) IntraVENous Q8H    levETIRAcetam (KEPPRA) 2,000 mg in 0.9% sodium chloride 250 mL IVPB  2,000 mg IntraVENous Q12H    amLODIPine (NORVASC) tablet 5 mg  5 mg Oral DAILY    hydrALAZINE (APRESOLINE) 20 mg/mL injection 10 mg  10 mg IntraVENous Q6H PRN    insulin lispro (HUMALOG) injection   SubCUTAneous AC&HS    potassium chloride (KLOR-CON) packet for solution 20 mEq  20 mEq Oral BID WITH MEALS    acetaminophen (TYLENOL) tablet 650 mg  650 mg Oral Q6H PRN    Or    acetaminophen (TYLENOL) suppository 650 mg  650 mg Rectal Q6H PRN    polyethylene glycol (MIRALAX) packet 17 g  17 g Oral DAILY PRN    promethazine (PHENERGAN) tablet 12.5 mg  12.5 mg Oral Q6H PRN    Or    ondansetron (ZOFRAN) injection 4 mg  4 mg IntraVENous Q6H PRN    glucose chewable tablet 16 g  4 Tablet Oral PRN    dextrose (D50W) injection syrg 12.5-25 g  25-50 mL IntraVENous PRN    glucagon (GLUCAGEN) injection 1 mg  1 mg IntraMUSCular PRN        Review of Systems  Review of Systems   Constitutional: Positive for malaise/fatigue.  Negative for chills and fever. HENT: Negative. Eyes: Negative. Respiratory: Negative for cough and shortness of breath. Cardiovascular: Negative for chest pain and leg swelling. Gastrointestinal: Negative for abdominal pain, nausea and vomiting. Genitourinary: Negative for dysuria. Musculoskeletal: Negative. Skin: Negative. Neurological: Positive for weakness. Psychiatric/Behavioral: Negative. Objective:     Visit Vitals  /63 (BP 1 Location: Left upper arm, BP Patient Position: At rest)   Pulse 61   Temp 97.7 °F (36.5 °C)   Resp 18   Ht 5' 6\" (1.676 m)   Wt 80 kg (176 lb 6.4 oz)   SpO2 97%   BMI 28.47 kg/m²    O2 Flow Rate (L/min): 2 l/min O2 Device: None (Room air)    Temp (24hrs), Av °F (36.7 °C), Min:97.5 °F (36.4 °C), Max:98.7 °F (37.1 °C)      No intake/output data recorded.  1901 -  0700  In: 3996.1 [I.V.:3996.1]  Out: 8754 [Urine:1750]    Recent Results (from the past 24 hour(s))   GLUCOSE, POC    Collection Time: 21  3:21 PM   Result Value Ref Range    Glucose (POC) 181 (H) 65 - 117 mg/dL    Performed by Opal Ring, POC    Collection Time: 21  6:59 PM   Result Value Ref Range    Glucose (POC) 302 (H) 65 - 117 mg/dL    Performed by Opal Ring, POC    Collection Time: 21 11:36 PM   Result Value Ref Range    Glucose (POC) 234 (H) 65 - 117 mg/dL    Performed by [employer]    GLUCOSE, POC    Collection Time: 21  5:59 AM   Result Value Ref Range    Glucose (POC) 197 (H) 65 - 117 mg/dL    Performed by [employer]         CT HEAD WO CONT   Final Result   Similar distribution and degree of edema involving right temporal   parietal cerebrum. Recent MRI findings concerning for herpes encephalitis again noted. MRA BRAIN WO CONT   Final Result   No major arterial occlusion or stenoses was demonstrated.           CT HEAD WO CONT   Final Result      Stable exam            CT HEAD WO CONT   Final Result   [Findings most suggestive of a right middle cerebral artery   thrombosis with associated ischemia in the distribution of this artery manifest   by diffuse edema with sulcal effacement; the right middle cerebral artery   appears hyperdense. ]. The inpatient nursing station was contacted and the findings were conveyed at   the time of this report. They will have the covering physician call me back with   any questions. I spoke with nurse Celeste Estrada      XR CHEST PORT   Final Result   The cardiomediastinal silhouette is appropriate for age, technique,   and lung expansion. Pulmonary vasculature is not congested. The lungs are   essentially clear. No effusion or pneumothorax is seen. CT HEAD WO CONT   Final Result   1. Edema still evident in the right temporal lobe and insular cortex. No   significant change from previous CT examination. No evidence for hemorrhage or   increased mass effect. IR GUIDE INSERT PICC OVER 5 YRS   Final Result   1. Thrombosis of left basilic vein. 2.  Successful placement of a left-sided tunneled Powerline central venous   catheter. The catheter is ready for use. IR FLUORO GUIDE PLC CVAD   Final Result   1. Thrombosis of left basilic vein. 2.  Successful placement of a left-sided tunneled Powerline central venous   catheter. The catheter is ready for use. IR US GUIDED VASCULAR ACCESS   Final Result   1. Thrombosis of left basilic vein. 2.  Successful placement of a left-sided tunneled Powerline central venous   catheter. The catheter is ready for use. IR US VENOUS EXT LTD   Final Result   1. Thrombosis of left basilic vein. 2.  Successful placement of a left-sided tunneled Powerline central venous   catheter. The catheter is ready for use. MRI BRAIN W WO CONT   Final Result   Interval progression of findings in the right cerebral hemisphere, extending   into the inferior left frontal lobe as above.  Imaging features are most   consistent with worsening encephalitis (with involvement characteristic of   herpes encephalitis as stated on original exam from 9/3/2021). Limited   encephalitis can appear similar. Given rapid progression, neoplasm is felt   unlikely. No evidence of acute infarction. CT CODE NEURO HEAD WO CONTRAST   Final Result   1. Expanding edema in the right temporal lobe and along the sylvian fissure   without hemorrhage, with 2-3 mm of right-to-left midline shift. Called report to   Boris Richardson on 5 W.  at 3:58 PM 9/8/2021. XR CHEST PORT   Final Result   Stable mild elevation of right hemidiaphragm. Right lateral chest   wall clips. Stable enlargement of cardiopericardial silhouette. Improved   aeration of the lungs bilaterally. No evidence of focal airspace consolidation. Gastric tube placed with tip overlying the right upper abdomen at the level of   the distal stomach or proximal duodenum. MRI BRAIN W WO CONT   Final Result   1. Abnormal high T2 signal evident in the right temporal lobe and insular   cortex. Subtle increased T2 signal evident in the left temporal uncus. The   appearances are concerning for herpes encephalitis. This could also represent   limbic encephalitis. A report was call to Dr. Ridge Herrera at 9/3/2021 11:49 AM      XR CHEST PORT   Final Result      CT HEAD WO CONT   Final Result   No acute or active intracranial process. CT SPINE CERV WO CONT   Final Result   No specific acute or active process. Multilevel degenerative disc and degenerative joint disease. PHYSICAL EXAM:    Physical Exam  Vitals reviewed. Constitutional:       General: She is not in acute distress. Appearance: She is not ill-appearing. HENT:      Head: Normocephalic and atraumatic. Mouth/Throat:      Mouth: Mucous membranes are moist.      Pharynx: Oropharynx is clear.    Eyes:      Conjunctiva/sclera: Conjunctivae normal.   Cardiovascular:      Rate and Rhythm: Normal rate and regular rhythm. Heart sounds: Normal heart sounds. Pulmonary:      Effort: Pulmonary effort is normal.      Breath sounds: Normal breath sounds. Abdominal:      General: Abdomen is flat. Bowel sounds are normal.      Palpations: Abdomen is soft. Musculoskeletal:      Cervical back: Normal range of motion and neck supple. Comments: Approaching near normal upper extremity strength bilaterally   Skin:     General: Skin is warm and dry. Neurological:      Mental Status: She is alert and oriented to person, place, and time. Mental status is at baseline. Comments: More alert and soft-spoken. Upper extremities are much improved over the last 72 hours. Psychiatric:      Comments: More alert and following commands. Data Review    Recent Results (from the past 24 hour(s))   GLUCOSE, POC    Collection Time: 09/17/21  3:21 PM   Result Value Ref Range    Glucose (POC) 181 (H) 65 - 117 mg/dL    Performed by Royal Lundberg, POC    Collection Time: 09/17/21  6:59 PM   Result Value Ref Range    Glucose (POC) 302 (H) 65 - 117 mg/dL    Performed by Royal Lundberg, POC    Collection Time: 09/17/21 11:36 PM   Result Value Ref Range    Glucose (POC) 234 (H) 65 - 117 mg/dL    Performed by Trudi Washburn, POC    Collection Time: 09/18/21  5:59 AM   Result Value Ref Range    Glucose (POC) 197 (H) 65 - 117 mg/dL    Performed by Phong Horta         Assessment/Plan:     Active Problems:    Sepsis ([address] (9/3/2021)      New onset seizure ([address] (9/3/2021)      Mild protein-calorie malnutrition ([address] (9/10/2021)      Hospital course: This is a 67-year-old female admitted on 9/3/2021 with a history of breast cancer, diabetes mellitus, type II, anxiety, depression who initially presented with seizure-like activity and found on the floor by family. Patient was found to be febrile and CT scan of the head head was performed with no acute process.   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 Patient recurrent seizures and was started on Keppra as well as immediately started on acyclovir. Numerous CT and MRI of the brain imaging performed with CT scans of the head initially without acute process. Repeat revealed right temporal and along the sylvian fissure without hemorrhage and 2 to 3 mm of right to left midline shift due to expanding edema. Serial CTs of the head were performed although no significant changes on 9/11/2021 at which time a CT of the head revealed findings suspicious for right middle cerebral artery thrombosis with associated ischemia in the distribution of that artery. Initial MRI on 9/3/2021 revealed abnormal high T2 signal in the right temporal and insular cortex. Findings suspicious for herpes encephalitis. There was also subtle increased T2 signal evident in the left temporal uncus. Repeats MRI of the brain revealed progression of the findings of the right cerebral hemisphere extending into the left frontal lobe. Most consistent with worsening encephalitis. MRA of the brain on 9/13/2021 revealed no major arterial occlusion or stenosis demonstrated. Infectious disease consultation, Dr. Sherley Geronimo.  Neurological consultation. Patient remains on Keppra and valproic acid for seizure prophylaxis. Patient also found to have an upper extremity DVT and is currently on Eliquis. Psych consult ordered for worsening depression. Repeat CT of the Head with no change. Valproic levels are not therapeutic and increasing valproic acidosis. EEG showed generalized slowing in the background with disorganization. There were episodic focal slowing in the right temporal occipital regions. Occasional sharp waves in the right frontal region. No obvious seizure-like activity. Continuing PPN    Impression\plan:    1.   HSV encephalitis  infectious disease consult  neurology consult  continue acyclovir for a total of 21 days  recommendation is to repeat LP at the end of therapy  HSV 1+ CSF PCR  MRI reveals bilateral temporal lobe edema secondary to encephalitis  continue dexamethasone  CT head with no change    2. 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 There were episodic focal slowing in the right temporal occipital regions. Occasional sharp waves in the right frontal region  Valproic acid levels pending    4. Generalized weakness of the right and left upper extremity  MRA of the brain revealed no major arterial occlusions  suspect this is related to the overall edema  Significant improvement    5. DVT \thrombosis of the left basilic vein  remain on Eliquis    6. Moderate protein malnutrition  Albumin 2.4, labs pending for the day  nutritional consultation  Reorder PPN. Add insulin for hyperglycemia    7. Essential hypertension  continue Norvasc    8. Depression anxiety  continue Celexa  psych consultation    9. Functional limitations  Encourage patient to participate with PT and OT    10. DM, type 2  SSI    CODE STATUS: Full code    Ulcer prophylaxis: Protonix  DVT prophylaxis: Eliquis    Discussed case with patient's daughter sister at bedside    Discharge barriers: Completion of treatment, improvement in overall brain function and functional status    Care Plan discussed with: Patient/Family    Total time spent with patient: >35 minutes.

## 2021-09-19 LAB
ALBUMIN SERPL-MCNC: 2.2 G/DL (ref 3.5–5)
ALBUMIN/GLOB SERPL: 0.7 {RATIO} (ref 1.1–2.2)
ALP SERPL-CCNC: 43 U/L (ref 45–117)
ALT SERPL-CCNC: 23 U/L (ref 12–78)
ANION GAP SERPL CALC-SCNC: 6 MMOL/L (ref 5–15)
AST SERPL W P-5'-P-CCNC: 7 U/L (ref 15–37)
BASOPHILS # BLD: 0 K/UL (ref 0–0.1)
BASOPHILS NFR BLD: 0 % (ref 0–1)
BILIRUB SERPL-MCNC: 0.3 MG/DL (ref 0.2–1)
BUN SERPL-MCNC: 26 MG/DL (ref 6–20)
BUN/CREAT SERPL: 38 (ref 12–20)
CA-I BLD-MCNC: 8.3 MG/DL (ref 8.5–10.1)
CHLORIDE SERPL-SCNC: 106 MMOL/L (ref 97–108)
CO2 SERPL-SCNC: 26 MMOL/L (ref 21–32)
CREAT SERPL-MCNC: 0.68 MG/DL (ref 0.55–1.02)
DIFFERENTIAL METHOD BLD: ABNORMAL
EOSINOPHIL # BLD: 0 K/UL (ref 0–0.4)
EOSINOPHIL NFR BLD: 0 % (ref 0–7)
ERYTHROCYTE [DISTWIDTH] IN BLOOD BY AUTOMATED COUNT: 13.8 % (ref 11.5–14.5)
GLOBULIN SER CALC-MCNC: 3.2 G/DL (ref 2–4)
GLUCOSE BLD STRIP.AUTO-MCNC: 186 MG/DL (ref 65–117)
GLUCOSE BLD STRIP.AUTO-MCNC: 198 MG/DL (ref 65–117)
GLUCOSE BLD STRIP.AUTO-MCNC: 241 MG/DL (ref 65–117)
GLUCOSE BLD STRIP.AUTO-MCNC: 282 MG/DL (ref 65–117)
GLUCOSE SERPL-MCNC: 221 MG/DL (ref 65–100)
HCT VFR BLD AUTO: 40.2 % (ref 35–47)
HGB BLD-MCNC: 13.5 G/DL (ref 11.5–16)
IMM GRANULOCYTES # BLD AUTO: 0.1 K/UL (ref 0–0.04)
IMM GRANULOCYTES NFR BLD AUTO: 1 % (ref 0–0.5)
LYMPHOCYTES # BLD: 2.2 K/UL (ref 0.8–3.5)
LYMPHOCYTES NFR BLD: 20 % (ref 12–49)
MCH RBC QN AUTO: 29.9 PG (ref 26–34)
MCHC RBC AUTO-ENTMCNC: 33.6 G/DL (ref 30–36.5)
MCV RBC AUTO: 88.9 FL (ref 80–99)
MONOCYTES # BLD: 0.9 K/UL (ref 0–1)
MONOCYTES NFR BLD: 8 % (ref 5–13)
NEUTS SEG # BLD: 7.6 K/UL (ref 1.8–8)
NEUTS SEG NFR BLD: 71 % (ref 32–75)
NRBC # BLD: 0 K/UL (ref 0–0.01)
NRBC BLD-RTO: 0 PER 100 WBC
PERFORMED BY, TECHID: ABNORMAL
PLATELET # BLD AUTO: 228 K/UL (ref 150–400)
PMV BLD AUTO: 10.8 FL (ref 8.9–12.9)
POTASSIUM SERPL-SCNC: 4.4 MMOL/L (ref 3.5–5.1)
PROT SERPL-MCNC: 5.4 G/DL (ref 6.4–8.2)
RBC # BLD AUTO: 4.52 M/UL (ref 3.8–5.2)
SODIUM SERPL-SCNC: 138 MMOL/L (ref 136–145)
WBC # BLD AUTO: 10.8 K/UL (ref 3.6–11)

## 2021-09-19 PROCEDURE — 80053 COMPREHEN METABOLIC PANEL: CPT

## 2021-09-19 PROCEDURE — 85025 COMPLETE CBC W/AUTO DIFF WBC: CPT

## 2021-09-19 PROCEDURE — 74011636637 HC RX REV CODE- 636/637: Performed by: INTERNAL MEDICINE

## 2021-09-19 PROCEDURE — 74011250637 HC RX REV CODE- 250/637: Performed by: INTERNAL MEDICINE

## 2021-09-19 PROCEDURE — 74011636637 HC RX REV CODE- 636/637: Performed by: PHYSICIAN ASSISTANT

## 2021-09-19 PROCEDURE — 74011000250 HC RX REV CODE- 250: Performed by: PHYSICIAN ASSISTANT

## 2021-09-19 PROCEDURE — 36415 COLL VENOUS BLD VENIPUNCTURE: CPT

## 2021-09-19 PROCEDURE — 65270000029 HC RM PRIVATE

## 2021-09-19 PROCEDURE — 74011250637 HC RX REV CODE- 250/637: Performed by: STUDENT IN AN ORGANIZED HEALTH CARE EDUCATION/TRAINING PROGRAM

## 2021-09-19 PROCEDURE — 74011250636 HC RX REV CODE- 250/636: Performed by: HOSPITALIST

## 2021-09-19 PROCEDURE — 74011000250 HC RX REV CODE- 250: Performed by: HOSPITALIST

## 2021-09-19 PROCEDURE — 74011250637 HC RX REV CODE- 250/637: Performed by: PHYSICIAN ASSISTANT

## 2021-09-19 PROCEDURE — 74011250636 HC RX REV CODE- 250/636: Performed by: STUDENT IN AN ORGANIZED HEALTH CARE EDUCATION/TRAINING PROGRAM

## 2021-09-19 PROCEDURE — 74011250636 HC RX REV CODE- 250/636: Performed by: INTERNAL MEDICINE

## 2021-09-19 PROCEDURE — 82962 GLUCOSE BLOOD TEST: CPT

## 2021-09-19 RX ADMIN — INSULIN LISPRO 2 UNITS: 100 INJECTION, SOLUTION INTRAVENOUS; SUBCUTANEOUS at 08:08

## 2021-09-19 RX ADMIN — SODIUM CHLORIDE 900 MG: 9 INJECTION, SOLUTION INTRAVENOUS at 14:23

## 2021-09-19 RX ADMIN — NYSTATIN 500000 UNITS: 100000 SUSPENSION ORAL at 08:08

## 2021-09-19 RX ADMIN — VALPROIC ACID 500 MG: 250 CAPSULE, LIQUID FILLED ORAL at 17:34

## 2021-09-19 RX ADMIN — APIXABAN 5 MG: 5 TABLET, FILM COATED ORAL at 08:08

## 2021-09-19 RX ADMIN — APIXABAN 5 MG: 5 TABLET, FILM COATED ORAL at 22:09

## 2021-09-19 RX ADMIN — NYSTATIN 500000 UNITS: 100000 SUSPENSION ORAL at 14:23

## 2021-09-19 RX ADMIN — CITALOPRAM HYDROBROMIDE 20 MG: 20 TABLET ORAL at 08:08

## 2021-09-19 RX ADMIN — VALPROIC ACID 500 MG: 250 CAPSULE, LIQUID FILLED ORAL at 22:09

## 2021-09-19 RX ADMIN — POTASSIUM CHLORIDE 20 MEQ: 1.5 POWDER, FOR SOLUTION ORAL at 08:08

## 2021-09-19 RX ADMIN — DEXAMETHASONE SODIUM PHOSPHATE 2 MG: 4 INJECTION, SOLUTION INTRA-ARTICULAR; INTRALESIONAL; INTRAMUSCULAR; INTRAVENOUS; SOFT TISSUE at 14:23

## 2021-09-19 RX ADMIN — SODIUM CHLORIDE 900 MG: 9 INJECTION, SOLUTION INTRAVENOUS at 22:08

## 2021-09-19 RX ADMIN — I.V. FAT EMULSION 250 ML: 20 EMULSION INTRAVENOUS at 22:08

## 2021-09-19 RX ADMIN — INSULIN LISPRO 3 UNITS: 100 INJECTION, SOLUTION INTRAVENOUS; SUBCUTANEOUS at 11:20

## 2021-09-19 RX ADMIN — TRACE ELEMENTS INJECTION 4: 7.4; .75; 98; 151 INJECTION, SOLUTION INTRAVENOUS at 22:08

## 2021-09-19 RX ADMIN — NYSTATIN 500000 UNITS: 100000 SUSPENSION ORAL at 17:34

## 2021-09-19 RX ADMIN — INSULIN LISPRO 5 UNITS: 100 INJECTION, SOLUTION INTRAVENOUS; SUBCUTANEOUS at 17:34

## 2021-09-19 RX ADMIN — NYSTATIN 500000 UNITS: 100000 SUSPENSION ORAL at 22:10

## 2021-09-19 RX ADMIN — POTASSIUM CHLORIDE 20 MEQ: 1.5 POWDER, FOR SOLUTION ORAL at 17:34

## 2021-09-19 RX ADMIN — PANTOPRAZOLE SODIUM 40 MG: 40 TABLET, DELAYED RELEASE ORAL at 06:35

## 2021-09-19 RX ADMIN — SODIUM CHLORIDE 75 ML/HR: 4.5 INJECTION, SOLUTION INTRAVENOUS at 06:35

## 2021-09-19 RX ADMIN — LEVETIRACETAM 2000 MG: 100 INJECTION, SOLUTION INTRAVENOUS at 11:21

## 2021-09-19 RX ADMIN — VALPROIC ACID 500 MG: 250 CAPSULE, LIQUID FILLED ORAL at 08:08

## 2021-09-19 RX ADMIN — DEXAMETHASONE SODIUM PHOSPHATE 2 MG: 4 INJECTION, SOLUTION INTRA-ARTICULAR; INTRALESIONAL; INTRAMUSCULAR; INTRAVENOUS; SOFT TISSUE at 22:09

## 2021-09-19 RX ADMIN — AMLODIPINE BESYLATE 5 MG: 5 TABLET ORAL at 08:08

## 2021-09-19 RX ADMIN — SODIUM CHLORIDE 900 MG: 9 INJECTION, SOLUTION INTRAVENOUS at 06:35

## 2021-09-19 RX ADMIN — LEVETIRACETAM 2000 MG: 100 INJECTION, SOLUTION INTRAVENOUS at 22:08

## 2021-09-19 RX ADMIN — DEXAMETHASONE SODIUM PHOSPHATE 2 MG: 4 INJECTION, SOLUTION INTRA-ARTICULAR; INTRALESIONAL; INTRAMUSCULAR; INTRAVENOUS; SOFT TISSUE at 06:35

## 2021-09-19 NOTE — PROGRESS NOTES
PT/OT attempted to co-treat patient today and upon entry into room she was in recliner (nursing stated they helped her to bathroom and recliner earlier) but patient declined participating with therapy at this time. She was sleepy and could barely keep eyes open, stating that she didn't want to get up or do exercises right now. Will try back at a later time or another day as time allows.

## 2021-09-19 NOTE — PROGRESS NOTES
Hospitalist Progress Note    Subjective:   Daily Progress Note: 9/19/2021 10:59 AM    More alert. Following commands. Considerable progress in her overall communication and strength.     Current Facility-Administered Medications   Medication Dose Route Frequency    amino acid 4.25 % dextrose 5 % with electrolytes (CLINIMIX E) infusion   IntraVENous CONTINUOUS    fat emulsion 20% (LIPOSYN, INTRAlipid) infusion 250 mL  250 mL IntraVENous CONTINUOUS    amino acid 4.25 % dextrose 5 % with electrolytes (CLINIMIX E) infusion   IntraVENous CONTINUOUS    valproic acid (DEPAKENE) capsule 500 mg  500 mg Oral TID    dexamethasone (DECADRON) 4 mg/mL injection 2 mg  2 mg IntraVENous Q8H    pantoprazole (PROTONIX) tablet 40 mg  40 mg Oral ACB    0.45% sodium chloride infusion  75 mL/hr IntraVENous CONTINUOUS    citalopram (CELEXA) tablet 20 mg  20 mg Oral DAILY    nystatin (MYCOSTATIN) 100,000 unit/mL oral suspension 500,000 Units  500,000 Units Oral QID    apixaban (ELIQUIS) tablet 5 mg  5 mg Oral BID    acyclovir (ZOVIRAX) 900 mg in 0.9% sodium chloride 250 mL IVPB  15 mg/kg (Ideal) IntraVENous Q8H    levETIRAcetam (KEPPRA) 2,000 mg in 0.9% sodium chloride 250 mL IVPB  2,000 mg IntraVENous Q12H    amLODIPine (NORVASC) tablet 5 mg  5 mg Oral DAILY    hydrALAZINE (APRESOLINE) 20 mg/mL injection 10 mg  10 mg IntraVENous Q6H PRN    insulin lispro (HUMALOG) injection   SubCUTAneous AC&HS    potassium chloride (KLOR-CON) packet for solution 20 mEq  20 mEq Oral BID WITH MEALS    acetaminophen (TYLENOL) tablet 650 mg  650 mg Oral Q6H PRN    Or    acetaminophen (TYLENOL) suppository 650 mg  650 mg Rectal Q6H PRN    polyethylene glycol (MIRALAX) packet 17 g  17 g Oral DAILY PRN    promethazine (PHENERGAN) tablet 12.5 mg  12.5 mg Oral Q6H PRN    Or    ondansetron (ZOFRAN) injection 4 mg  4 mg IntraVENous Q6H PRN    glucose chewable tablet 16 g  4 Tablet Oral PRN    dextrose (D50W) injection syrg 12.5-25 g  25-50 mL IntraVENous PRN    glucagon (GLUCAGEN) injection 1 mg  1 mg IntraMUSCular PRN        Review of Systems  Review of Systems   Constitutional: Positive for malaise/fatigue. Negative for chills and fever. HENT: Negative. Eyes: Negative. Respiratory: Negative for cough and shortness of breath. Cardiovascular: Negative for chest pain and leg swelling. Gastrointestinal: Negative for abdominal pain, nausea and vomiting. Genitourinary: Negative for dysuria. Musculoskeletal: Negative. Skin: Negative. Neurological: Positive for weakness. Psychiatric/Behavioral: Negative. Objective:     Visit Vitals  BP (!) 104/57 (BP 1 Location: Left upper arm, BP Patient Position: At rest;Sitting)   Pulse 66   Temp 98.8 °F (37.1 °C)   Resp 18   Ht 5' 6\" (1.676 m)   Wt 80 kg (176 lb 6.4 oz)   SpO2 100%   BMI 28.47 kg/m²    O2 Flow Rate (L/min): 2 l/min O2 Device: None (Room air)    Temp (24hrs), Av.3 °F (36.8 °C), Min:97.6 °F (36.4 °C), Max:98.8 °F (37.1 °C)      701 - 1900  In: -   Out: 575 [Urine:575]  1901 - 700  In: 6581.8 [P.O.:825;  I.V.:5756.8]  Out: 2775 [Urine:2775]    Recent Results (from the past 24 hour(s))   GLUCOSE, POC    Collection Time: 21  5:14 PM   Result Value Ref Range    Glucose (POC) 179 (H) 65 - 117 mg/dL    Performed by ANDREY [id number] Jose Juan Alejandro, POC    Collection Time: 21  1:35 AM   Result Value Ref Range    Glucose (POC) 198 (H) 65 - 117 mg/dL    Performed by CHAYA PUENTE    CBC WITH AUTOMATED DIFF    Collection Time: 21  5:17 AM   Result Value Ref Range    WBC 10.8 3.6 - 11.0 K/uL    RBC 4.52 3.80 - 5.20 M/uL    HGB 13.5 11.5 - 16.0 g/dL    HCT 40.2 35.0 - 47.0 %    MCV 88.9 80.0 - 99.0 FL    MCH 29.9 26.0 - 34.0 PG    MCHC 33.6 30.0 - 36.5 g/dL    RDW 13.8 11.5 - 14.5 %    PLATELET 517 752 - 001 K/uL    MPV 10.8 8.9 - 12.9 FL    NRBC 0.0 0.0  WBC    ABSOLUTE NRBC 0.00 0.00 - 0.01 K/uL    NEUTROPHILS 71 32 - 75 % LYMPHOCYTES 20 12 - 49 %    MONOCYTES 8 5 - 13 %    EOSINOPHILS 0 0 - 7 %    BASOPHILS 0 0 - 1 %    IMMATURE GRANULOCYTES 1 (H) 0 - 0.5 %    ABS. NEUTROPHILS 7.6 1.8 - 8.0 K/UL    ABS. LYMPHOCYTES 2.2 0.8 - 3.5 K/UL    ABS. MONOCYTES 0.9 0.0 - 1.0 K/UL    ABS. EOSINOPHILS 0.0 0.0 - 0.4 K/UL    ABS. BASOPHILS 0.0 0.0 - 0.1 K/UL    ABS. IMM. GRANS. 0.1 (H) 0.00 - 0.04 K/UL    DF AUTOMATED     METABOLIC PANEL, COMPREHENSIVE    Collection Time: 09/19/21  5:17 AM   Result Value Ref Range    Sodium 138 136 - 145 mmol/L    Potassium 4.4 3.5 - 5.1 mmol/L    Chloride 106 97 - 108 mmol/L    CO2 26 21 - 32 mmol/L    Anion gap 6 5 - 15 mmol/L    Glucose 221 (H) 65 - 100 mg/dL    BUN 26 (H) 6 - 20 mg/dL    Creatinine 0.68 0.55 - 1.02 mg/dL    BUN/Creatinine ratio 38 (H) 12 - 20      GFR est AA >60 >60 ml/min/1.73m2    GFR est non-AA >60 >60 ml/min/1.73m2    Calcium 8.3 (L) 8.5 - 10.1 mg/dL    Bilirubin, total 0.3 0.2 - 1.0 mg/dL    AST (SGOT) 7 (L) 15 - 37 U/L    ALT (SGPT) 23 12 - 78 U/L    Alk. phosphatase 43 (L) 45 - 117 U/L    Protein, total 5.4 (L) 6.4 - 8.2 g/dL    Albumin 2.2 (L) 3.5 - 5.0 g/dL    Globulin 3.2 2.0 - 4.0 g/dL    A-G Ratio 0.7 (L) 1.1 - 2.2     GLUCOSE, POC    Collection Time: 09/19/21  6:36 AM   Result Value Ref Range    Glucose (POC) 186 (H) 65 - 117 mg/dL    Performed by [address] [place] [address], POC    Collection Time: 09/19/21 11:15 AM   Result Value Ref Range    Glucose (POC) 241 (H) 65 - 117 mg/dL    Performed by ANDREY MCCULLOUGH         CT HEAD WO CONT   Final Result   Similar distribution and degree of edema involving right temporal   parietal cerebrum. Recent MRI findings concerning for herpes encephalitis again noted. MRA BRAIN WO CONT   Final Result   No major arterial occlusion or stenoses was demonstrated.           CT HEAD WO CONT   Final Result      Stable exam            CT HEAD WO CONT   Final Result   [Findings most suggestive of a right middle cerebral artery   thrombosis with associated ischemia in the distribution of this artery manifest   by diffuse edema with sulcal effacement; the right middle cerebral artery   appears hyperdense. ]. The inpatient nursing station was contacted and the findings were conveyed at   the time of this report. They will have the covering physician call me back with   any questions. I spoke with nurse Mildred Lay      XR CHEST PORT   Final Result   The cardiomediastinal silhouette is appropriate for age, technique,   and lung expansion. Pulmonary vasculature is not congested. The lungs are   essentially clear. No effusion or pneumothorax is seen. CT HEAD WO CONT   Final Result   1. Edema still evident in the right temporal lobe and insular cortex. No   significant change from previous CT examination. No evidence for hemorrhage or   increased mass effect. IR GUIDE INSERT PICC OVER 5 YRS   Final Result   1. Thrombosis of left basilic vein. 2.  Successful placement of a left-sided tunneled Powerline central venous   catheter. The catheter is ready for use. IR FLUORO GUIDE PLC CVAD   Final Result   1. Thrombosis of left basilic vein. 2.  Successful placement of a left-sided tunneled Powerline central venous   catheter. The catheter is ready for use. IR US GUIDED VASCULAR ACCESS   Final Result   1. Thrombosis of left basilic vein. 2.  Successful placement of a left-sided tunneled Powerline central venous   catheter. The catheter is ready for use. IR US VENOUS EXT LTD   Final Result   1. Thrombosis of left basilic vein. 2.  Successful placement of a left-sided tunneled Powerline central venous   catheter. The catheter is ready for use. MRI BRAIN W WO CONT   Final Result   Interval progression of findings in the right cerebral hemisphere, extending   into the inferior left frontal lobe as above.  Imaging features are most   consistent with worsening encephalitis (with involvement characteristic of herpes encephalitis as stated on original exam from 9/3/2021). Limited   encephalitis can appear similar. Given rapid progression, neoplasm is felt   unlikely. No evidence of acute infarction. CT CODE NEURO HEAD WO CONTRAST   Final Result   1. Expanding edema in the right temporal lobe and along the sylvian fissure   without hemorrhage, with 2-3 mm of right-to-left midline shift. Called report to   Lolis Dennis on 5 W.  at 3:58 PM 9/8/2021. XR CHEST PORT   Final Result   Stable mild elevation of right hemidiaphragm. Right lateral chest   wall clips. Stable enlargement of cardiopericardial silhouette. Improved   aeration of the lungs bilaterally. No evidence of focal airspace consolidation. Gastric tube placed with tip overlying the right upper abdomen at the level of   the distal stomach or proximal duodenum. MRI BRAIN W WO CONT   Final Result   1. Abnormal high T2 signal evident in the right temporal lobe and insular   cortex. Subtle increased T2 signal evident in the left temporal uncus. The   appearances are concerning for herpes encephalitis. This could also represent   limbic encephalitis. A report was call to Dr. Roark Klinefelter at 9/3/2021 11:49 AM      XR CHEST PORT   Final Result      CT HEAD WO CONT   Final Result   No acute or active intracranial process. CT SPINE CERV WO CONT   Final Result   No specific acute or active process. Multilevel degenerative disc and degenerative joint disease. PHYSICAL EXAM:    Physical Exam  Vitals reviewed. Constitutional:       General: She is not in acute distress. Appearance: She is not ill-appearing. HENT:      Head: Normocephalic and atraumatic. Mouth/Throat:      Mouth: Mucous membranes are moist.      Pharynx: Oropharynx is clear. Eyes:      Conjunctiva/sclera: Conjunctivae normal.   Cardiovascular:      Rate and Rhythm: Normal rate and regular rhythm. Heart sounds: Normal heart sounds.    Pulmonary: Effort: Pulmonary effort is normal.      Breath sounds: Normal breath sounds. Abdominal:      General: Abdomen is flat. Bowel sounds are normal.      Palpations: Abdomen is soft. Musculoskeletal:      Cervical back: Normal range of motion and neck supple. Comments: Approaching near normal upper extremity strength bilaterally   Skin:     General: Skin is warm and dry. Neurological:      Mental Status: She is alert and oriented to person, place, and time. Mental status is at baseline. Comments: More alert and soft-spoken. Upper extremities are much improved over the last 72 hours. Psychiatric:      Comments: More alert and following commands. Data Review    Recent Results (from the past 24 hour(s))   GLUCOSE, POC    Collection Time: 09/18/21  5:14 PM   Result Value Ref Range    Glucose (POC) 179 (H) 65 - 117 mg/dL    Performed by ANDREY [id number] Jose Juan Alejandro, POC    Collection Time: 09/19/21  1:35 AM   Result Value Ref Range    Glucose (POC) 198 (H) 65 - 117 mg/dL    Performed by CHAYA PUENTE    CBC WITH AUTOMATED DIFF    Collection Time: 09/19/21  5:17 AM   Result Value Ref Range    WBC 10.8 3.6 - 11.0 K/uL    RBC 4.52 3.80 - 5.20 M/uL    HGB 13.5 11.5 - 16.0 g/dL    HCT 40.2 35.0 - 47.0 %    MCV 88.9 80.0 - 99.0 FL    MCH 29.9 26.0 - 34.0 PG    MCHC 33.6 30.0 - 36.5 g/dL    RDW 13.8 11.5 - 14.5 %    PLATELET 985 574 - 884 K/uL    MPV 10.8 8.9 - 12.9 FL    NRBC 0.0 0.0  WBC    ABSOLUTE NRBC 0.00 0.00 - 0.01 K/uL    NEUTROPHILS 71 32 - 75 %    LYMPHOCYTES 20 12 - 49 %    MONOCYTES 8 5 - 13 %    EOSINOPHILS 0 0 - 7 %    BASOPHILS 0 0 - 1 %    IMMATURE GRANULOCYTES 1 (H) 0 - 0.5 %    ABS. NEUTROPHILS 7.6 1.8 - 8.0 K/UL    ABS. LYMPHOCYTES 2.2 0.8 - 3.5 K/UL    ABS. MONOCYTES 0.9 0.0 - 1.0 K/UL    ABS. EOSINOPHILS 0.0 0.0 - 0.4 K/UL    ABS. BASOPHILS 0.0 0.0 - 0.1 K/UL    ABS. IMM.  GRANS. 0.1 (H) 0.00 - 0.04 K/UL    DF AUTOMATED     METABOLIC PANEL, COMPREHENSIVE    Collection Time: 09/19/21  5:17 AM   Result Value Ref Range    Sodium 138 136 - 145 mmol/L    Potassium 4.4 3.5 - 5.1 mmol/L    Chloride 106 97 - 108 mmol/L    CO2 26 21 - 32 mmol/L    Anion gap 6 5 - 15 mmol/L    Glucose 221 (H) 65 - 100 mg/dL    BUN 26 (H) 6 - 20 mg/dL    Creatinine 0.68 0.55 - 1.02 mg/dL    BUN/Creatinine ratio 38 (H) 12 - 20      GFR est AA >60 >60 ml/min/1.73m2    GFR est non-AA >60 >60 ml/min/1.73m2    Calcium 8.3 (L) 8.5 - 10.1 mg/dL    Bilirubin, total 0.3 0.2 - 1.0 mg/dL    AST (SGOT) 7 (L) 15 - 37 U/L    ALT (SGPT) 23 12 - 78 U/L    Alk. phosphatase 43 (L) 45 - 117 U/L    Protein, total 5.4 (L) 6.4 - 8.2 g/dL    Albumin 2.2 (L) 3.5 - 5.0 g/dL    Globulin 3.2 2.0 - 4.0 g/dL    A-G Ratio 0.7 (L) 1.1 - 2.2     GLUCOSE, POC    Collection Time: 09/19/21  6:36 AM   Result Value Ref Range    Glucose (POC) 186 (H) 65 - 117 mg/dL    Performed by [address] [place] [address], POC    Collection Time: 09/19/21 11:15 AM   Result Value Ref Range    Glucose (POC) 241 (H) 65 - 117 mg/dL    Performed by ANDREY MCCULLOUGH         Assessment/Plan:     Active Problems:    Sepsis ([address] (9/3/2021)      New onset seizure ([address] (9/3/2021)      Mild protein-calorie malnutrition ([address] (9/10/2021)      Hospital course: This is a 52-year-old female admitted on 9/3/2021 with a history of breast cancer, diabetes mellitus, type II, anxiety, depression who initially presented with seizure-like activity and found on the floor by family. Patient was found to be febrile and CT scan of the head head was performed with no acute process. 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 Patient recurrent seizures and was started on Keppra as well as immediately started on acyclovir. Numerous CT and MRI of the brain imaging performed with CT scans of the head initially without acute process.   Repeat revealed right temporal and along the sylvian fissure without hemorrhage and 2 to 3 mm of right to left midline shift due to expanding edema. Serial CTs of the head were performed although no significant changes on 9/11/2021 at which time a CT of the head revealed findings suspicious for right middle cerebral artery thrombosis with associated ischemia in the distribution of that artery. Initial MRI on 9/3/2021 revealed abnormal high T2 signal in the right temporal and insular cortex. Findings suspicious for herpes encephalitis. There was also subtle increased T2 signal evident in the left temporal uncus. Repeats MRI of the brain revealed progression of the findings of the right cerebral hemisphere extending into the left frontal lobe. Most consistent with worsening encephalitis. MRA of the brain on 9/13/2021 revealed no major arterial occlusion or stenosis demonstrated. Infectious disease consultation, Dr. Ashly Garcia.  Neurological consultation. Patient remains on Keppra and valproic acid for seizure prophylaxis. Patient also found to have an upper extremity DVT and is currently on Eliquis. Psych consult ordered for worsening depression. Repeat CT of the Head with no change. Valproic levels are not therapeutic and increasing valproic acidosis. EEG showed generalized slowing in the background with disorganization. There were episodic focal slowing in the right temporal occipital regions. Occasional sharp waves in the right frontal region. No obvious seizure-like activity. Continuing PPN. Will need to discontinue anticoagulation prior to LP    Impression\plan:    1. HSV encephalitis  infectious disease consult  neurology consult  continue acyclovir for a total of 21 days  recommendation is to repeat LP at the end of therapy  HSV 1+ CSF PCR  MRI reveals bilateral temporal lobe edema secondary to encephalitis  continue dexamethasone  CT head with no change    2. INDIA  Increasing IV fluids  continue to monitor  Improving    3.   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 There were episodic focal slowing in the right temporal occipital regions. Occasional sharp waves in the right frontal region  Valproic acid levels low and increased Depakene    4. Generalized weakness of the right and left upper extremity  MRA of the brain revealed no major arterial occlusions  suspect this is related to the overall edema  Significant improvement    5. DVT \thrombosis of the left basilic vein  remain on Eliquis    6. Moderate protein malnutrition  nutritional consultation  Reorder PPN. Add 20 units of insulin for hyperglycemia    7. Essential hypertension  continue Norvasc    8. Depression anxiety  continue Celexa  psych consultation    9. Functional limitations  Encourage patient to participate with PT and OT    10. DM, type 2  SSI    CODE STATUS: Full code    Ulcer prophylaxis: Protonix  DVT prophylaxis: Eliquis    Please discussed case daily with family member at the bedside. Discharge barriers: Completion of treatment, improvement in overall brain function and functional status and LP    Care Plan discussed with: Patient/Family    Total time spent with patient: >35 minutes.

## 2021-09-19 NOTE — PROGRESS NOTES
NEUROLOGY  PROGRESS NOTE    Admission History/Pertinent Events  Ezequiel Zheng is a 60y.o. year old female who presented on 9/3/2021. Patient has a past medical history of Breast Cancer, HL, DM2, Anxiety/Depression who presented with seizure like activity while on the floor by family. EMS reported patient having seizure-like activity for which patient was initially treated with midazolam.  In the ED, patient had a temperature a 102° F.  Emergent CT head was negative for any acute findings. Emergent contrasted MRI of the brain concerning for bilateral temporal pathology most concerning for HSV encephalitis or limbic encephalitis. Patient was loaded with IV levetiracetam and started on antibiotics as well as acyclovir. ASSESSMENT/PLAN      Impression  Patient doing well this morning with stable examination. Spoke to patient's daughter Paola Crisostomo in regards to plan moving forward. She acknowledged understanding. We will continue patient on acyclovir and dexamethasone along with AEDs per below for seizure prophylaxis.       [facility] WO 9/3  12[facility] WO 9/9  Right temporal and insular region edema    [facility] WO 9/10  Possible RMCA ischemia with possible RMCA occlusion    CT WO 9/11  No interval changes from scan on 9/10    14 [facility] WO 9/15  No interval changes or worsening of frontotemporal edema    MRI Brain [facility] 9/3  T2/FLAIR hyperintense signal in the bilateral temporal regions more so in the right temporal and insular regions concerning for HSV encephalitis or possibly limbic encephalitis    MRI Brain [facility] 9/8  Interval progression of T2/FLAIR hyperintense signal change in the right cerebral and left frontal region concerning for worsening encephalitis    MRA Head WO 9/13  No occlusions, dissections, significant stenosis, pseudoaneurysms or aneurysms in the intracranial arterial system    EEG 9/9  Generalized slowing of the background  Sharp discharges in the right frontal region    EEG 9/16  Mild generalized encephalopathy  Occasional right frontal sharp discharges  During photic stimulation, bilateral frontal central spike and wave discharges  No seizures    Valproic Acid Levels  44 (9/12), 31 (9/16)    CSF Studies   Positive: , , Protein 80, HSV-1 DNA  Negative/WNL: Glucose, HSV-2 DNA, Viral Cx     Labs  Positive: HSV 1 IgG Ab  Negative: HIV 1/2, RPR      Plan      Encephalopathy & Seizures d/t HSV1 Encephalitis  Cerebral Edema d/t Above  Associated: Sepsis, Basilic Vein Thrombosus, INDIA  -On Acyclovir per ID (Planned 21 day course)  --> Plan to repeat LP with CSF studies after 21 days of Acyclovir has been completed  ---> WBC, RBC, Protein, HSV-1 PCR  -Continue Dexamethasone 2 TID  -On Apixaban 5 BID  -Seizure Precautions  -Seizure Prophylaxis: Levetiracetam 2000 BID, Valproic Acid 500 TID  -SBP Goal < 160  -Glucose Goal < 180  -Head of Bed at 30 degrees  -PT/OT/ST as needed  -Management of metabolic/infectious derangements to referring teams    Please contact neurology with any further questions/concerns    Patient to follow-up with neurology in 2 weeks from discharge with Dr. Jill Lagnua [facility])      SUBJECTIVE   Patient currently on acyclovir and dexamethasone. No significant changes on neurological examination. Physical/Neurological Exam  Patient awake, alert; following central and peripheral commands   No expressive or receptive aphasia;  No dysarthria   Decreased emotional drive with speech  Pupils react to light bilaterally; EOM Intact   No visual field deficits on gross exam   No facial droop   Motor: 3+/5 in the left hemibody, 4/5 in the right hemibody  No abnormal movements   Sensation to light touch intact grossly throughout       OBJECTIVE  Vital Signs  Temp:  [97.2 °F (36.2 °C)-98.7 °F (37.1 °C)]   Pulse (Heart Rate):  [53-80]   BP: (100-160)/(57-74)   Resp Rate:  [16-20]   O2 Sat (%):  [96 %-98 %]   Weight:  [80 kg (176 lb 6.4 oz)-81 kg (178 lb 9.6 oz)] MEDICATIONS    Current Facility-Administered Medications:     amino acid 4.25 % dextrose 5 % with electrolytes (CLINIMIX E) infusion, , IntraVENous, CONTINUOUS, Hood Sotelo PA-C, Last Rate: 60 mL/hr at 09/18/21 2133, New Bag at 09/18/21 2133    fat emulsion 20% (LIPOSYN, INTRAlipid) infusion 250 mL, 250 mL, IntraVENous, CONTINUOUS, Hood Sotelo PA-C, Last Rate: 20.83 mL/hr at 09/18/21 2133, 250 mL at 09/18/21 2133    valproic acid (DEPAKENE) capsule 500 mg, 500 mg, Oral, TID, Rachelle Ruiz MD, 500 mg at 09/18/21 2132    dexamethasone (DECADRON) 4 mg/mL injection 2 mg, 2 mg, IntraVENous, Q8H, Amber Restrepo MD, 2 mg at 09/19/21 0635    pantoprazole (PROTONIX) tablet 40 mg, 40 mg, Oral, ACB, Anthony Sotelo PA-C, 40 mg at 09/19/21 0635    0.45% sodium chloride infusion, 75 mL/hr, IntraVENous, CONTINUOUS, Altagracia Whitt MD, Last Rate: 75 mL/hr at 09/19/21 0635, 75 mL/hr at 09/19/21 0635    citalopram (CELEXA) tablet 20 mg, 20 mg, Oral, DAILY, Ingrid PATEL MD, 20 mg at 09/18/21 0901    nystatin (MYCOSTATIN) 100,000 unit/mL oral suspension 500,000 Units, 500,000 Units, Oral, QID, Ramin Lawrence, Stalin Reynolds MD, 500,000 Units at 09/18/21 2132    apixaban (ELIQUIS) tablet 5 mg, 5 mg, Oral, BID, Katy Juarez MD, 5 mg at 09/18/21 2132    acyclovir (ZOVIRAX) 900 mg in 0.9% sodium chloride 250 mL IVPB, 15 mg/kg (Ideal), IntraVENous, Q8H, Debra Serrato MD, Last Rate: 250 mL/hr at 09/19/21 0635, 900 mg at 09/19/21 0635    levETIRAcetam (KEPPRA) 2,000 mg in 0.9% sodium chloride 250 mL IVPB, 2,000 mg, IntraVENous, Q12H, Alisa Collins MD, 2,000 mg at 09/18/21 2210    amLODIPine (NORVASC) tablet 5 mg, 5 mg, Oral, DAILY, Young Roca MD, 5 mg at 09/18/21 0901    hydrALAZINE (APRESOLINE) 20 mg/mL injection 10 mg, 10 mg, IntraVENous, Q6H PRN, Young Roca MD, 10 mg at 09/12/21 2202    insulin lispro (HUMALOG) injection, , SubCUTAneous, AC&HS, Katy Juarez MD, 3 Units at 09/18/21 1723    potassium chloride (KLOR-CON) packet for solution 20 mEq, 20 mEq, Oral, BID WITH MEALS, Author Ellis Joseph MD, 20 mEq at 09/18/21 1718    acetaminophen (TYLENOL) tablet 650 mg, 650 mg, Oral, Q6H PRN, 650 mg at 09/06/21 1236 **OR** acetaminophen (TYLENOL) suppository 650 mg, 650 mg, Rectal, Q6H PRN, Ellis Ennis MD    polyethylene glycol (MIRALAX) packet 17 g, 17 g, Oral, DAILY PRN, Ellis Ennis MD    promethazine (PHENERGAN) tablet 12.5 mg, 12.5 mg, Oral, Q6H PRN **OR** ondansetron (ZOFRAN) injection 4 mg, 4 mg, IntraVENous, Q6H PRN, Ellis Ennis MD    glucose chewable tablet 16 g, 4 Tablet, Oral, PRN, Kana Baker MD    dextrose (D50W) injection syrg 12.5-25 g, 25-50 mL, IntraVENous, PRN, Kana Baker MD, 25 g at 09/04/21 0409    glucagon (GLUCAGEN) injection 1 mg, 1 mg, IntraMUSCular, PRN, Kana Baker MD      Labs: I've reviewed the labs for today     This document has been prepared by the Dragon voice recognition system, typographical errors may have occurred.  Attempts have been made to correct errors, however inadvertent errors may persist.

## 2021-09-19 NOTE — PROGRESS NOTES
Problem: Pressure Injury - Risk of  Goal: *Prevention of pressure injury  Description: Document Harish Scale and appropriate interventions in the flowsheet.   Outcome: Progressing Towards Goal  Note: Pressure Injury Interventions:  Sensory Interventions: Minimize linen layers    Moisture Interventions: Minimize layers    Activity Interventions: PT/OT evaluation    Mobility Interventions: HOB 30 degrees or less    Nutrition Interventions: Document food/fluid/supplement intake    Friction and Shear Interventions: HOB 30 degrees or less

## 2021-09-20 LAB
ALBUMIN SERPL-MCNC: 2.2 G/DL (ref 3.5–5)
ALBUMIN/GLOB SERPL: 0.7 {RATIO} (ref 1.1–2.2)
ALP SERPL-CCNC: 40 U/L (ref 45–117)
ALT SERPL-CCNC: 21 U/L (ref 12–78)
ANION GAP SERPL CALC-SCNC: 5 MMOL/L (ref 5–15)
AST SERPL W P-5'-P-CCNC: 8 U/L (ref 15–37)
BASOPHILS # BLD: 0 K/UL (ref 0–0.1)
BASOPHILS NFR BLD: 0 % (ref 0–1)
BILIRUB SERPL-MCNC: 0.3 MG/DL (ref 0.2–1)
BUN SERPL-MCNC: 26 MG/DL (ref 6–20)
BUN/CREAT SERPL: 41 (ref 12–20)
CA-I BLD-MCNC: 8 MG/DL (ref 8.5–10.1)
CHLORIDE SERPL-SCNC: 108 MMOL/L (ref 97–108)
CO2 SERPL-SCNC: 26 MMOL/L (ref 21–32)
CREAT SERPL-MCNC: 0.63 MG/DL (ref 0.55–1.02)
DIFFERENTIAL METHOD BLD: ABNORMAL
EOSINOPHIL # BLD: 0 K/UL (ref 0–0.4)
EOSINOPHIL NFR BLD: 1 % (ref 0–7)
ERYTHROCYTE [DISTWIDTH] IN BLOOD BY AUTOMATED COUNT: 14 % (ref 11.5–14.5)
GLOBULIN SER CALC-MCNC: 3 G/DL (ref 2–4)
GLUCOSE BLD STRIP.AUTO-MCNC: 159 MG/DL (ref 65–117)
GLUCOSE BLD STRIP.AUTO-MCNC: 164 MG/DL (ref 65–117)
GLUCOSE BLD STRIP.AUTO-MCNC: 199 MG/DL (ref 65–117)
GLUCOSE BLD STRIP.AUTO-MCNC: 228 MG/DL (ref 65–117)
GLUCOSE BLD STRIP.AUTO-MCNC: 236 MG/DL (ref 65–117)
GLUCOSE SERPL-MCNC: 163 MG/DL (ref 65–100)
HCT VFR BLD AUTO: 40.4 % (ref 35–47)
HGB BLD-MCNC: 13.1 G/DL (ref 11.5–16)
IMM GRANULOCYTES # BLD AUTO: 0.1 K/UL (ref 0–0.04)
IMM GRANULOCYTES NFR BLD AUTO: 2 % (ref 0–0.5)
LYMPHOCYTES # BLD: 1.6 K/UL (ref 0.8–3.5)
LYMPHOCYTES NFR BLD: 20 % (ref 12–49)
MCH RBC QN AUTO: 29.2 PG (ref 26–34)
MCHC RBC AUTO-ENTMCNC: 32.4 G/DL (ref 30–36.5)
MCV RBC AUTO: 90 FL (ref 80–99)
MONOCYTES # BLD: 0.9 K/UL (ref 0–1)
MONOCYTES NFR BLD: 12 % (ref 5–13)
NEUTS SEG # BLD: 5.1 K/UL (ref 1.8–8)
NEUTS SEG NFR BLD: 65 % (ref 32–75)
NRBC # BLD: 0 K/UL (ref 0–0.01)
NRBC BLD-RTO: 0 PER 100 WBC
PERFORMED BY, TECHID: ABNORMAL
PLATELET # BLD AUTO: 199 K/UL (ref 150–400)
PMV BLD AUTO: 11.3 FL (ref 8.9–12.9)
POTASSIUM SERPL-SCNC: 4.3 MMOL/L (ref 3.5–5.1)
PROT SERPL-MCNC: 5.2 G/DL (ref 6.4–8.2)
RBC # BLD AUTO: 4.49 M/UL (ref 3.8–5.2)
SODIUM SERPL-SCNC: 139 MMOL/L (ref 136–145)
WBC # BLD AUTO: 7.8 K/UL (ref 3.6–11)

## 2021-09-20 PROCEDURE — 74011000250 HC RX REV CODE- 250: Performed by: NURSE PRACTITIONER

## 2021-09-20 PROCEDURE — 80053 COMPREHEN METABOLIC PANEL: CPT

## 2021-09-20 PROCEDURE — 85025 COMPLETE CBC W/AUTO DIFF WBC: CPT

## 2021-09-20 PROCEDURE — 36415 COLL VENOUS BLD VENIPUNCTURE: CPT

## 2021-09-20 PROCEDURE — 74011250637 HC RX REV CODE- 250/637: Performed by: INTERNAL MEDICINE

## 2021-09-20 PROCEDURE — 74011250636 HC RX REV CODE- 250/636: Performed by: STUDENT IN AN ORGANIZED HEALTH CARE EDUCATION/TRAINING PROGRAM

## 2021-09-20 PROCEDURE — 82962 GLUCOSE BLOOD TEST: CPT

## 2021-09-20 PROCEDURE — 92526 ORAL FUNCTION THERAPY: CPT

## 2021-09-20 PROCEDURE — 74011250637 HC RX REV CODE- 250/637: Performed by: STUDENT IN AN ORGANIZED HEALTH CARE EDUCATION/TRAINING PROGRAM

## 2021-09-20 PROCEDURE — 97530 THERAPEUTIC ACTIVITIES: CPT

## 2021-09-20 PROCEDURE — 74011636637 HC RX REV CODE- 636/637: Performed by: INTERNAL MEDICINE

## 2021-09-20 PROCEDURE — 74011250636 HC RX REV CODE- 250/636: Performed by: INTERNAL MEDICINE

## 2021-09-20 PROCEDURE — 74011636637 HC RX REV CODE- 636/637: Performed by: NURSE PRACTITIONER

## 2021-09-20 PROCEDURE — 74011250637 HC RX REV CODE- 250/637: Performed by: PHYSICIAN ASSISTANT

## 2021-09-20 PROCEDURE — 65270000029 HC RM PRIVATE

## 2021-09-20 PROCEDURE — 74011000250 HC RX REV CODE- 250: Performed by: HOSPITALIST

## 2021-09-20 PROCEDURE — 74011250636 HC RX REV CODE- 250/636: Performed by: HOSPITALIST

## 2021-09-20 RX ADMIN — ASCORBIC ACID, VITAMIN A PALMITATE, CHOLECALCIFEROL, THIAMINE HYDROCHLORIDE, RIBOFLAVIN-5 PHOSPHATE SODIUM, PYRIDOXINE HYDROCHLORIDE, NIACINAMIDE, DEXPANTHENOL, ALPHA-TOCOPHEROL ACETATE, VITAMIN K1, FOLIC ACID, BIOTIN, CYANOCOBALAMIN: 200; 3300; 200; 6; 3.6; 6; 40; 15; 10; 150; 600; 60; 5 INJECTION, SOLUTION INTRAVENOUS at 22:00

## 2021-09-20 RX ADMIN — DEXAMETHASONE SODIUM PHOSPHATE 2 MG: 4 INJECTION, SOLUTION INTRA-ARTICULAR; INTRALESIONAL; INTRAMUSCULAR; INTRAVENOUS; SOFT TISSUE at 22:00

## 2021-09-20 RX ADMIN — DEXAMETHASONE SODIUM PHOSPHATE 2 MG: 4 INJECTION, SOLUTION INTRA-ARTICULAR; INTRALESIONAL; INTRAMUSCULAR; INTRAVENOUS; SOFT TISSUE at 06:34

## 2021-09-20 RX ADMIN — VALPROIC ACID 500 MG: 250 CAPSULE, LIQUID FILLED ORAL at 15:53

## 2021-09-20 RX ADMIN — INSULIN LISPRO 2 UNITS: 100 INJECTION, SOLUTION INTRAVENOUS; SUBCUTANEOUS at 15:52

## 2021-09-20 RX ADMIN — LEVETIRACETAM 2000 MG: 100 INJECTION, SOLUTION INTRAVENOUS at 10:13

## 2021-09-20 RX ADMIN — INSULIN LISPRO 3 UNITS: 100 INJECTION, SOLUTION INTRAVENOUS; SUBCUTANEOUS at 12:39

## 2021-09-20 RX ADMIN — APIXABAN 5 MG: 5 TABLET, FILM COATED ORAL at 10:07

## 2021-09-20 RX ADMIN — VALPROIC ACID 500 MG: 250 CAPSULE, LIQUID FILLED ORAL at 21:59

## 2021-09-20 RX ADMIN — AMLODIPINE BESYLATE 5 MG: 5 TABLET ORAL at 10:07

## 2021-09-20 RX ADMIN — SODIUM CHLORIDE 900 MG: 9 INJECTION, SOLUTION INTRAVENOUS at 22:00

## 2021-09-20 RX ADMIN — INSULIN LISPRO 2 UNITS: 100 INJECTION, SOLUTION INTRAVENOUS; SUBCUTANEOUS at 10:07

## 2021-09-20 RX ADMIN — APIXABAN 5 MG: 5 TABLET, FILM COATED ORAL at 22:00

## 2021-09-20 RX ADMIN — INSULIN LISPRO 2 UNITS: 100 INJECTION, SOLUTION INTRAVENOUS; SUBCUTANEOUS at 22:21

## 2021-09-20 RX ADMIN — SODIUM CHLORIDE 900 MG: 9 INJECTION, SOLUTION INTRAVENOUS at 15:54

## 2021-09-20 RX ADMIN — SODIUM CHLORIDE 75 ML/HR: 4.5 INJECTION, SOLUTION INTRAVENOUS at 15:54

## 2021-09-20 RX ADMIN — NYSTATIN 500000 UNITS: 100000 SUSPENSION ORAL at 21:59

## 2021-09-20 RX ADMIN — VALPROIC ACID 500 MG: 250 CAPSULE, LIQUID FILLED ORAL at 10:07

## 2021-09-20 RX ADMIN — POTASSIUM CHLORIDE 20 MEQ: 1.5 POWDER, FOR SOLUTION ORAL at 10:07

## 2021-09-20 RX ADMIN — CITALOPRAM HYDROBROMIDE 20 MG: 20 TABLET ORAL at 10:07

## 2021-09-20 RX ADMIN — SODIUM CHLORIDE 900 MG: 9 INJECTION, SOLUTION INTRAVENOUS at 06:34

## 2021-09-20 RX ADMIN — NYSTATIN 500000 UNITS: 100000 SUSPENSION ORAL at 18:28

## 2021-09-20 RX ADMIN — I.V. FAT EMULSION 250 ML: 20 EMULSION INTRAVENOUS at 21:58

## 2021-09-20 RX ADMIN — LEVETIRACETAM 2000 MG: 100 INJECTION, SOLUTION INTRAVENOUS at 22:00

## 2021-09-20 RX ADMIN — NYSTATIN 500000 UNITS: 100000 SUSPENSION ORAL at 12:39

## 2021-09-20 RX ADMIN — DEXAMETHASONE SODIUM PHOSPHATE 2 MG: 4 INJECTION, SOLUTION INTRA-ARTICULAR; INTRALESIONAL; INTRAMUSCULAR; INTRAVENOUS; SOFT TISSUE at 15:53

## 2021-09-20 RX ADMIN — PANTOPRAZOLE SODIUM 40 MG: 40 TABLET, DELAYED RELEASE ORAL at 06:34

## 2021-09-20 RX ADMIN — NYSTATIN 500000 UNITS: 100000 SUSPENSION ORAL at 10:07

## 2021-09-20 NOTE — PROGRESS NOTES
OCCUPATIONAL THERAPY TREATMENT  Patient: Cristiane Randall (26 y.o. female)  Date: 9/20/2021  Diagnosis: New onset seizure ([facility] [address] [R56.9]  Sepsis ([address] [A41.9] New onset seizure ([facility] [address]       Precautions:    Chart, occupational therapy assessment, plan of care, and goals were reviewed. ASSESSMENT  Patient continues with skilled OT services and is progressing towards goals. Pt. Received semi-supine in bed sleeping on arrival, pt woke with name called pt is lethargic and initially having difficulty keeping her eyes open and required continued vc to stay on task. Pt. A&O to self and place however required reorientation to time and situation. Pt continues to require increased verbal cues for  sequencing of task and cues to complete task/ activity. Pt. performed bed mobility with Min A x2 , demonstrated fair sitting balance at EOB, sit<> stand performed with Min A x2, functional ambulation from bed to bathroom with Min Ax2 with RW initiation and maneuvering RW during OOB transfers. Pt. Urinated during functional ambulation from bed to bathroom. Pt. Completed toilet transfer with Min A. Therapist assisted Pt. With LB dressing and gown changing d/t sock soiled and gown soiled. Pt. Required total A for charlotte-care while standing at RW. Pt. Agreeable to increased ambulation distance today. Pt having difficulty maneuvering RW, so further ambulation done with HHA x 2. Patient was unsteady during ambulation and required constant v/c's to keep ambulating as pt would stop and stand until prompted to continue walking. Pt returned to recliner and left sitting up with call bell in reach and needs met, visitor in room. Recommending d/c to IRF once medically appropriate.     Current Level of Function Impacting Discharge (ADLs): increased confusion, decreased attention/ concentration, decreased safety awareness, assistance with transfers and mobility, cues to complete task    Other factors to consider for discharge: PLOF, time since on set PLAN :  Patient continues to benefit from skilled intervention to address the above impairments. Continue treatment per established plan of care. to address goals. Recommendation for discharge: (in order for the patient to meet his/her long term goals)  Therapy 3 hours per day 5-7 days per week    This discharge recommendation:  Has been made in collaboration with the attending provider and/or case management    IF patient discharges home will need the following DME: TBD       SUBJECTIVE:   Patient agreeable to therapy session. OBJECTIVE DATA SUMMARY:   Cognitive/Behavioral Status:  Neurologic State: Alert  Orientation Level: Oriented to person;Oriented to place; Disoriented to situation;Disoriented to time  Cognition: Decreased attention/concentration;Decreased command following; Impaired decision making;Poor safety awareness    Functional Mobility and Transfers for ADLs:  Bed Mobility:  Supine to Sit: Moderate assistance  Scooting: Moderate assistance    Transfers:  Sit to Stand: Minimum assistance;Assist x2  Functional Transfers  Bathroom Mobility: Minimum assistance (A x2)  Toilet Transfer : Minimum assistance; Additional time;Assist x2  Bed to Chair: Minimum assistance;Assist x2    Balance:  Sitting: Impaired; With support  Sitting - Static: Good (unsupported)  Sitting - Dynamic: Fair (occasional)  Standing: Impaired; With support  Standing - Static: Constant support;Good  Standing - Dynamic : Constant support; Fair    ADL Intervention:  Lower Body Dressing Assistance  Socks: Total assistance (dependent)  Position Performed: Seated edge of bed    Toileting  Toileting Assistance: Stand-by assistance  Bladder Hygiene: Total assistance (dependent)  Clothing Management: Total assistance (dependent)    Pain:  No pain reported    Activity Tolerance:   Fair  Please refer to the flowsheet for vital signs taken during this treatment.     After treatment patient left in no apparent distress:   Sitting in chair, Heels elevated for pressure relief, Call bell within reach, and Caregiver / family present    COMMUNICATION/COLLABORATION:   The patients plan of care was discussed with: Physical therapy assistant. RN .  Co-tx with PTA for increased assistance with transfers and mobility    Kimberly Goode  Time Calculation: 28 mins    Problem: Self Care Deficits Care Plan (Adult)  Goal: *Acute Goals and Plan of Care (Insert Text)  Description: Pt will be Mod I sup <> sit in prep for EOB ADLs  Pt will be Mod I grooming standing sink side LRAD  Pt will be IND dressing sitting EOB/long sit  Pt will be Mod I sit <>  prep for toileting LRAD  Pt will be IND toileting/toilet transfer/cloth mgmt LRAD  Pt will be IND following UE HEP in prep for self care tasks   Outcome: Progressing Towards Goal

## 2021-09-20 NOTE — PROGRESS NOTES
SPEECH LANGUAGE PATHOLOGY DYSPHAGIA TREATMENT  Patient: Nehemias Cummings (47 y.o. female)  Date: 9/20/2021  Diagnosis: New onset seizure ([facility] [address] [R56.9]  Sepsis ([facility] [address] [A41.9] New onset seizure ([facility] [address]       Precautions: Aspiration     ASSESSMENT:  Patient presents with mild oral dysphagia. Oral phase c/b slow and incomplete manipulation and mastication, delayed A-P transit, and min oral residue, which is cleared with liquid wash. Patient receptive to min cues to continue masticating regular consistency. Pharyngeal phase appears largely [facility]. HLE adequate to palpation. Patient tolerated single and consecutive straw sips thin liquid, puree, SBS, regular, and mixed consistency with no overt s/s of pen/aspiration. PLAN:  Recommendations and Planned Interventions:  Recommend continue SBS, thin liquid diet. STRICT aspiration and GERD precautions, monitor pt closely for s/s aspiration, meds crushed if able in puree    Patient continues to benefit from skilled intervention to address the above impairments. Continue treatment per established plan of care. Frequency/Duration: Patient will be followed by speech-language pathology 5 times a week to address goals. Discharge Recommendations: To Be Determined     SUBJECTIVE:   Patient is agreeable to beginning treatment. Patient's sister present during treatment, with patient consent     OBJECTIVE:   Cognitive and Communication Status:  Neurologic State: Alert  Orientation Level: Oriented to situation, Oriented to place, Oriented to person, Disoriented to time  Cognition: Decreased attention/concentration, Decreased command following  Safety/Judgement: Decreased awareness of need for safety    Dysphagia Treatment:  Oral Assessment:  P.O. Trials:  Patient Position:  (HOB raised)  Vocal quality prior to P.O.: No impairment  Consistency Presented: Thin liquid; Solid;Puree;Mixed consistency;Mechanical soft  How Presented: Self-fed/presented;Straw;Spoon; Successive swallows  How Much: (Multiple presentations)  Bolus Acceptance: No impairment  Bolus Formation/Control: Impaired  Type of Impairment:  (Incomplete, slow manipulation/mastication)  Propulsion: Delayed (# of seconds)  Oral Residue: Less than 10% of bolus  Initiation of Swallow: No impairment  Laryngeal Elevation: Functional  Aspiration Signs/Symptoms: None  Pharyngeal Phase Characteristics: Easily fatigued ; Poor endurance  Oral Phase Severity: Mild  Pharyngeal Phase Severity : No impairment    Pain: 0    After treatment:   Patient left in no apparent distress in bed, Call bell within reach, Nursing notified, and sister at bedside    COMMUNICATION/EDUCATION:   Patient was educated regarding her deficit(s) of dysphagia, swallow safety precautions, diet recs and POC. She demonstrated Fair understanding as evidenced by verbal agreement. The patient's plan of care including recommendations, planned interventions, and recommended diet changes were discussed with: Registered nurse. Problem: Dysphagia (Adult)  Goal: *Acute Goals and Plan of Care (Insert Text)  Description: Speech Therapy Goals  Initiated 9/10/2021  -Patient will participate in modified barium swallow study within 7 day(s), pending pt's progress and tolerance. [ ] Not met  [ ]  MET   [ x] Progressing  [ ] Discontinue  -Patient will tolerate SBS diet with thin liquids without signs/symptoms of aspiration given no cues within 7 day(s). [ ] Not met  [ ]  MET   [ x] Progressing  [ ] Discontinue  -Patient will demonstrate understanding of swallow safety precautions and aspiration precautions, diet recs with no cues within 7 day(s).         [ ] Not met  [ ]  MET   [ x] Progressing  [ ] Discontinue    Outcome: Mary Zhang M.S. CCC-SLP  Time Calculation: 15 mins

## 2021-09-20 NOTE — PROGRESS NOTES
Infectious Disease Progress Note         Subjective:   Pt seen and examined at bedside. Stable denies new complaints. No acute events since last seen. More alert and following commands   Objective:   Physical Exam:     Visit Vitals  /69 (BP 1 Location: Left upper arm, BP Patient Position: At rest)   Pulse 64   Temp 97.7 °F (36.5 °C)   Resp 17   Ht 5' 6\" (1.676 m)   Wt 176 lb 6.4 oz (80 kg)   SpO2 100%   BMI 28.47 kg/m²    O2 Flow Rate (L/min): 2 l/min O2 Device: None (Room air)    Temp (24hrs), Av.9 °F (36.6 °C), Min:97.6 °F (36.4 °C), Max:98.8 °F (37.1 °C)    No intake/output data recorded.  1901 -  0700  In: 8671.3 [P.O.:1140; I.V.:7531.3]  Out: 3450 [Urine:3450]    General: NAD, more alert and following commands   HEENT: LAQUITA, Moist mucosa  Lungs: CTA b/l, no wheeze/rhonchi   Heart: S1S2+, RRR, no murmur  Abdo: Soft, NT, ND, +BS   Exts: no new weakness, no edema   Skin: No chronic wounds or ulcers, + right chest wall power line     Data Review:       Recent Days:  Recent Labs     21  0725 21  0517 21  1141   WBC 7.8 10.8 12.0*   HGB 13.1 13.5 14.1   HCT 40.4 40.2 42.4    228 244     Recent Labs     21  0725 21  0517 21  1141   BUN 26* 26* 27*   CREA 0.63 0.68 0.71     Lab Results   Component Value Date/Time    C-Reactive protein 2.95 (H) 2021 07:28 AM        Microbiology     Results     ** No results found for the last 336 hours. **           Diagnostics   CXR Results  (Last 48 hours)    None         Assessment/Plan     1. 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 INDIA: Cr normalized on todays labs, will continue fluids while on Acyclovir         3. Leukocytosis: Trending down w steroid taper: No indication for antibiotics     4. Seizure on admission: Due to # 1.  On Keppra, and Valproic    5. Left sided weakness: Due to # 1. Improved, being followed by PT     6. DVT of axillary vein/Thrombosis of left basilic vein. Anticoagulated on low dose Eliquis     Dispo: Daughter up dated.  Looking at d/c to Rehab later this wk     Dakota Rocha MD    9/20/2021

## 2021-09-20 NOTE — PROGRESS NOTES
NEUROLOGY  PROGRESS NOTE    Admission History/Pertinent Events  Elvia Lindsay is a 60y.o. year old female is seen in follow-up and she is appearing to be the same as previously, answering simple questions and she is feeling all right. She presented on 9/3/2021. Patient has a past medical history of Breast Cancer, HL, DM2, Anxiety/Depression who presented with seizure like activity while on the floor by family. EMS reported patient having seizure-like activity for which patient was initially treated with midazolam.  In the ED, patient had a temperature a 102° F.  Emergent CT head was negative for any acute findings. Emergent contrasted MRI of the brain concerning for bilateral temporal pathology most concerning for HSV encephalitis or limbic encephalitis. Patient was loaded with IV levetiracetam and started on antibiotics as well as acyclovir. ASSESSMENT/PLAN      Impression  Patient doing well this morning with stable examination. We will continue patient on acyclovir and dexamethasone along with AEDs per below for seizure prophylaxis.       [address] [facility] WO 9/3  1205 [facility] WO 9/9  Right temporal and insular region edema    CTH WO 9/10  Possible RMCA ischemia with possible RMCA occlusion    CT WO 9/11  No interval changes from scan on 9/10    14 [facility] WO 9/15  No interval changes or worsening of frontotemporal edema    MRI Brain [facility] FOR COGNITIVE DISORDERS 9/3  T2/FLAIR hyperintense signal in the bilateral temporal regions more so in the right temporal and insular regions concerning for HSV encephalitis or possibly limbic encephalitis    MRI Brain [facility] FOR COGNITIVE DISORDERS 9/8  Interval progression of T2/FLAIR hyperintense signal change in the right cerebral and left frontal region concerning for worsening encephalitis    MRA Head WO 9/13  No occlusions, dissections, significant stenosis, pseudoaneurysms or aneurysms in the intracranial arterial system    EEG 9/9  Generalized slowing of the background  Sharp discharges in the right frontal region    EEG 9/16  Mild generalized encephalopathy  Occasional right frontal sharp discharges  During photic stimulation, bilateral frontal central spike and wave discharges  No seizures    Valproic Acid Levels  44 (9/12), 31 (9/16)    CSF Studies   Positive: , , Protein 80, HSV-1 DNA  Negative/WNL: Glucose, HSV-2 DNA, Viral Cx     Labs  Positive: HSV 1 IgG Ab  Negative: HIV 1/2, RPR      Plan      Encephalopathy & Seizures d/t HSV1 Encephalitis  Cerebral Edema d/t Above  Associated: Sepsis, Basilic Vein Thrombosus, INDIA  -On Acyclovir per ID (Planned 21 day course)  --> Plan to repeat LP with CSF studies after 21 days of Acyclovir has been completed  ---> WBC, RBC, Protein, HSV-1 PCR  -Continue Dexamethasone 2 TID  -On Apixaban 5 BID  -Seizure Precautions  -Seizure Prophylaxis: Levetiracetam 2000 BID, Valproic Acid 500 TID  -SBP Goal < 160  -Glucose Goal < 180  -Head of Bed at 30 degrees  -PT/OT/ST as needed  -Management of metabolic/infectious derangements to referring teams    Please contact neurology with any further questions/concerns    Patient to follow-up with neurology in 2 weeks from discharge with Dr. Gaynelle Skiff [facility])      SUBJECTIVE   Patient currently on acyclovir and dexamethasone. No significant changes on neurological examination. Physical/Neurological Exam  Patient awake, alert; following central and peripheral commands   No expressive or receptive aphasia;  No dysarthria   Decreased emotional drive with speech  Pupils react to light bilaterally; EOM Intact   No visual field deficits on gross exam   No facial droop   Motor: 3+/5 in the left hemibody, 4/5 in the right hemibody  No abnormal movements   Sensation to light touch intact grossly throughout       OBJECTIVE  Vital Signs  Temp:  [97.3 °F (36.3 °C)-98.8 °F (37.1 °C)]   Pulse (Heart Rate):  [54-99]   BP: (100-160)/(57-74)   Resp Rate:  [16-20]   O2 Sat (%):  [95 %-100 %]   Weight:  [80 kg (176 lb 6.4 oz)-81 kg (178 lb 9.6 oz)]     MEDICATIONS    Current Facility-Administered Medications:     TPN ADULT-PERIPHERAL AA 4.25% D5% + ELECTROLYTES, , IntraVENous, CONTINUOUS, Yoselyn Handy NP    fat emulsion 20% (LIPOSYN, INTRAlipid) infusion 250 mL, 250 mL, IntraVENous, CONTINUOUS, Yoselyn Handy NP    amino acid 4.25 % dextrose 5 % with electrolytes (CLINIMIX E) infusion, , IntraVENous, CONTINUOUS, Laci Sotelo PA-C, Last Rate: 60 mL/hr at 09/19/21 2208, New Bag at 09/19/21 2208    fat emulsion 20% (LIPOSYN, INTRAlipid) infusion 250 mL, 250 mL, IntraVENous, CONTINUOUS, Laci Sotelo PA-C, Last Rate: 25 mL/hr at 09/19/21 2208, 250 mL at 09/19/21 2208    amino acid 4.25 % dextrose 5 % with electrolytes (CLINIMIX E) infusion, , IntraVENous, CONTINUOUS, Laci Sotelo PA-C    valproic acid (DEPAKENE) capsule 500 mg, 500 mg, Oral, TID, Alicia Dempsey MD, 500 mg at 09/19/21 2209    dexamethasone (DECADRON) 4 mg/mL injection 2 mg, 2 mg, IntraVENous, Q8H, Amber Restrepo MD, 2 mg at 09/20/21 0634    pantoprazole (PROTONIX) tablet 40 mg, 40 mg, Oral, ACB, Anthony Sotelo PA-C, 40 mg at 09/20/21 0634    0.45% sodium chloride infusion, 75 mL/hr, IntraVENous, CONTINUOUS, Yanet Kong MD, Last Rate: 75 mL/hr at 09/19/21 0635, 75 mL/hr at 09/19/21 0635    citalopram (CELEXA) tablet 20 mg, 20 mg, Oral, DAILY, Tod Rand MD, 20 mg at 09/19/21 0808    nystatin (MYCOSTATIN) 100,000 unit/mL oral suspension 500,000 Units, 500,000 Units, Oral, QID, Tod Rand MD, 500,000 Units at 09/19/21 2210    apixaban (ELIQUIS) tablet 5 mg, 5 mg, Oral, BID, Tod Rand MD, 5 mg at 09/19/21 2209    acyclovir (ZOVIRAX) 900 mg in 0.9% sodium chloride 250 mL IVPB, 15 mg/kg (Ideal), IntraVENous, Q8H, Debra Serrato MD, Last Rate: 250 mL/hr at 09/20/21 0634, 900 mg at 09/20/21 0634    levETIRAcetam (KEPPRA) 2,000 mg in 0.9% sodium chloride 250 mL IVPB, 2,000 mg, IntraVENous, Q12H, [place], Lindalee Hodgkins, MD, 2,000 mg at 09/19/21 2208    amLODIPine (NORVASC) tablet 5 mg, 5 mg, Oral, DAILY, Jinny Mcclain MD, 5 mg at 09/19/21 0808    hydrALAZINE (APRESOLINE) 20 mg/mL injection 10 mg, 10 mg, IntraVENous, Q6H PRN, Jinny Mcclain MD, 10 mg at 09/12/21 2202    insulin lispro (HUMALOG) injection, , SubCUTAneous, AC&HS, Ramakrishna Lee MD, 5 Units at 09/19/21 1734    potassium chloride (KLOR-CON) packet for solution 20 mEq, 20 mEq, Oral, BID WITH MEALS, Chandni Cotto MD, 20 mEq at 09/19/21 1734    acetaminophen (TYLENOL) tablet 650 mg, 650 mg, Oral, Q6H PRN, 650 mg at 09/06/21 1236 **OR** acetaminophen (TYLENOL) suppository 650 mg, 650 mg, Rectal, Q6H PRN, Chandni Cotto MD    polyethylene glycol (MIRALAX) packet 17 g, 17 g, Oral, DAILY PRN, Chandni Cotto MD    promethazine (PHENERGAN) tablet 12.5 mg, 12.5 mg, Oral, Q6H PRN **OR** ondansetron (ZOFRAN) injection 4 mg, 4 mg, IntraVENous, Q6H PRN, Chandni Cotto MD    glucose chewable tablet 16 g, 4 Tablet, Oral, PRN, Kana Baker MD    dextrose (D50W) injection syrg 12.5-25 g, 25-50 mL, IntraVENous, PRN, Kana Baker MD, 25 g at 09/04/21 0409    glucagon (GLUCAGEN) injection 1 mg, 1 mg, IntraMUSCular, PRN, Kana Baker MD      Labs: I've reviewed the labs for today     This document has been prepared by the Dragon voice recognition system, typographical errors may have occurred.  Attempts have been made to correct errors, however inadvertent errors may persist.

## 2021-09-20 NOTE — PROGRESS NOTES
Hospitalist Progress Note         [facility] [place], [place]    Daily Progress Note: 9/20/2021  Hospital course: This is a 61-year-old female admitted on 9/3/2021 with a history of breast cancer, diabetes mellitus, type II, anxiety, depression who initially presented with seizure-like activity and found on the floor by family. Patient was found to be febrile and CT scan of the head head was performed with no acute process. 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 Patient recurrent seizures and was started on Keppra as well as immediately started on acyclovir. Numerous CT and MRI of the brain imaging performed with CT scans of the head initially without acute process. Repeat revealed right temporal and along the sylvian fissure without hemorrhage and 2 to 3 mm of right to left midline shift due to expanding edema. Serial CTs of the head were performed although no significant changes on 9/11/2021 at which time a CT of the head revealed findings suspicious for right middle cerebral artery thrombosis with associated ischemia in the distribution of that artery. Initial MRI on 9/3/2021 revealed abnormal high T2 signal in the right temporal and insular cortex. Findings suspicious for herpes encephalitis. There was also subtle increased T2 signal evident in the left temporal uncus. Repeats MRI of the brain revealed progression of the findings of the right cerebral hemisphere extending into the left frontal lobe. Most consistent with worsening encephalitis. MRA of the brain on 9/13/2021 revealed no major arterial occlusion or stenosis demonstrated. Infectious disease consultation, Dr. Ramila Gao.  Neurological consultation. Patient remains on Keppra and valproic acid for seizure prophylaxis. Patient also found to have an upper extremity DVT and is currently on Eliquis. Psych consult ordered for worsening depression.  Repeat CT of the Head with no change. Valproic levels are not therapeutic and increasing valproic acidosis. EEG showed generalized slowing in the background with disorganization. There were episodic focal slowing in the right temporal occipital regions. Occasional sharp waves in the right frontal region. No obvious seizure-like activity. Continuing PPN. Will need to discontinue anticoagulation prior to LP. Subjective:   Subjective   Patient seen in f/u alert and oriented 2-3 lying in bed  No complaints voiced on examination  No overnight events reported  No acute distress noted on examination    Review of Systems:   Review of Systems   Constitutional: Negative. HENT: Negative. Eyes: Negative for blurred vision and double vision. Respiratory: Negative for cough, sputum production, shortness of breath and wheezing. Cardiovascular: Negative. Gastrointestinal: Negative for abdominal pain, nausea and vomiting. Genitourinary: Negative. Musculoskeletal: Negative. Skin: Negative. Neurological: Negative for dizziness and headaches. Endo/Heme/Allergies: Negative. Psychiatric/Behavioral: Negative. Objective:   Objective      Vitals:  Patient Vitals for the past 12 hrs:   Temp Pulse Resp BP SpO2   09/20/21 0110 97.9 °F (36.6 °C) 72 18 121/70 95 %   09/19/21 2102 97.9 °F (36.6 °C) 99 18 127/68 97 %        Physical Exam:  Physical Exam  Vitals and nursing note reviewed. Constitutional:       Appearance: Normal appearance. HENT:      Head: Normocephalic. Eyes:      Conjunctiva/sclera: Conjunctivae normal.   Cardiovascular:      Rate and Rhythm: Normal rate and regular rhythm. Pulses: Normal pulses. Heart sounds: Normal heart sounds. Comments: + right chest wall power line   Pulmonary:      Effort: Pulmonary effort is normal.      Breath sounds: Normal breath sounds. Abdominal:      General: Bowel sounds are normal.      Palpations: Abdomen is soft. Musculoskeletal:      Cervical back: Normal range of motion. Comments: 3/5 power upper extremities   Skin:     General: Skin is warm and dry. Capillary Refill: Capillary refill takes less than 2 seconds. Neurological:      Mental Status: She is alert. Motor: Weakness present.           Lab Results:  Recent Results (from the past 24 hour(s))   GLUCOSE, POC    Collection Time: 09/19/21 11:15 AM   Result Value Ref Range    Glucose (POC) 241 (H) 65 - 117 mg/dL    Performed by ANDREY [id number] [place] [place], POC    Collection Time: 09/19/21  5:22 PM   Result Value Ref Range    Glucose (POC) 282 (H) 65 - 117 mg/dL    Performed by ANDREY [id number] [place], POC    Collection Time: 09/20/21  1:06 AM   Result Value Ref Range    Glucose (POC) 159 (H) 65 - 117 mg/dL    Performed by Prudence Nicholas, POC    Collection Time: 09/20/21  6:06 AM   Result Value Ref Range    Glucose (POC) 164 (H) 65 - 117 mg/dL    Performed by CHAYA PUENTE           Diagnostic Images:    CT WO 9/3  [address] [facility] WO 9/9  Right temporal and insular region edema     CT WO 9/10  Possible RMCA ischemia with possible RMCA occlusion     [facility] WO 9/11  No interval changes from scan on 9/10     [facility] WO 9/15  No interval changes or worsening of frontotemporal edema     MRI Brain [facility] COGNITIVE DISORDERS 9/3  T2/FLAIR hyperintense signal in the bilateral temporal regions more so in the right temporal and insular regions concerning for HSV encephalitis or possibly limbic encephalitis     MRI Brain [facility] COGNITIVE DISORDERS 9/8  Interval progression of T2/FLAIR hyperintense signal change in the right cerebral and left frontal region concerning for worsening encephalitis     MRA Head WO 9/13  No occlusions, dissections, significant stenosis, pseudoaneurysms or aneurysms in the intracranial arterial system     EEG 9/9  Generalized slowing of the background  Sharp discharges in the right frontal region     EEG 9/16  Mild generalized encephalopathy  Occasional right frontal sharp discharges  During photic stimulation, bilateral frontal central spike and wave discharges  No seizures     Valproic Acid Levels  44 (9/12), 31 (9/16)     CSF Studies   Positive: , , Protein 80, HSV-1 DNA  Negative/WNL: Glucose, HSV-2 DNA, Viral Cx      Labs  Positive: HSV 1 IgG Ab  Negative: HIV 1/2, RPR     Current Medications:    Current Facility-Administered Medications:     amino acid 4.25 % dextrose 5 % with electrolytes (CLINIMIX E) infusion, , IntraVENous, CONTINUOUS, Latonia Sotelo PA-C, Last Rate: 60 mL/hr at 09/19/21 2208, New Bag at 09/19/21 2208    fat emulsion 20% (LIPOSYN, INTRAlipid) infusion 250 mL, 250 mL, IntraVENous, CONTINUOUS, Latonia Sotelo PA-C, Last Rate: 25 mL/hr at 09/19/21 2208, 250 mL at 09/19/21 2208    amino acid 4.25 % dextrose 5 % with electrolytes (CLINIMIX E) infusion, , IntraVENous, CONTINUOUS, Anthony Sotelo PA-C    valproic acid (DEPAKENE) capsule 500 mg, 500 mg, Oral, TID, Mariano Klein MD, 500 mg at 09/19/21 2209    dexamethasone (DECADRON) 4 mg/mL injection 2 mg, 2 mg, IntraVENous, Q8H, Amber Restrepo MD, 2 mg at 09/20/21 0634    pantoprazole (PROTONIX) tablet 40 mg, 40 mg, Oral, ACB, Anthony Sotelo PA-C, 40 mg at 09/20/21 0634    0.45% sodium chloride infusion, 75 mL/hr, IntraVENous, CONTINUOUS, Nara Webb MD, Last Rate: 75 mL/hr at 09/19/21 0635, 75 mL/hr at 09/19/21 0635    citalopram (CELEXA) tablet 20 mg, 20 mg, Oral, DAILY, Elvis Galindo MD, 20 mg at 09/19/21 0808    nystatin (MYCOSTATIN) 100,000 unit/mL oral suspension 500,000 Units, 500,000 Units, Oral, QID, Chen Mendoza MD, 500,000 Units at 09/19/21 2210    apixaban (ELIQUIS) tablet 5 mg, 5 mg, Oral, BID, Elvis Galindo MD, 5 mg at 09/19/21 2209    acyclovir (ZOVIRAX) 900 mg in 0.9% sodium chloride 250 mL IVPB, 15 mg/kg (Ideal), IntraVENous, Q8H, Debra Serrato MD, Last Rate: 250 mL/hr at 09/20/21 0634, 900 mg at 09/20/21 0634    levETIRAcetam (KEPPRA) 2,000 mg in 0.9% sodium chloride 250 mL IVPB, 2,000 mg, IntraVENous, Q12H, Demetrio Collins MD, 2,000 mg at 09/19/21 2208    amLODIPine (NORVASC) tablet 5 mg, 5 mg, Oral, DAILY, Nicole Pascual MD, 5 mg at 09/19/21 0808    hydrALAZINE (APRESOLINE) 20 mg/mL injection 10 mg, 10 mg, IntraVENous, Q6H PRN, Nicole Pascual MD, 10 mg at 09/12/21 2202    insulin lispro (HUMALOG) injection, , SubCUTAneous, AC&HS, Alexa Wade MD, 5 Units at 09/19/21 1734    potassium chloride (KLOR-CON) packet for solution 20 mEq, 20 mEq, Oral, BID WITH MEALS, Lelo Manley MD, 20 mEq at 09/19/21 1734    acetaminophen (TYLENOL) tablet 650 mg, 650 mg, Oral, Q6H PRN, 650 mg at 09/06/21 1236 **OR** acetaminophen (TYLENOL) suppository 650 mg, 650 mg, Rectal, Q6H PRN, Lelo Manley MD    polyethylene glycol (MIRALAX) packet 17 g, 17 g, Oral, DAILY PRN, Lelo Manley MD    promethazine (PHENERGAN) tablet 12.5 mg, 12.5 mg, Oral, Q6H PRN **OR** ondansetron (ZOFRAN) injection 4 mg, 4 mg, IntraVENous, Q6H PRN, Lelo Mendoza MD    glucose chewable tablet 16 g, 4 Tablet, Oral, PRN, Kana Baker MD    dextrose (D50W) injection syrg 12.5-25 g, 25-50 mL, IntraVENous, PRN, Kana Baker MD, 25 g at 09/04/21 0409    glucagon (GLUCAGEN) injection 1 mg, 1 mg, IntraMUSCular, PRN, Kana Baker MD       ASSESSMENT:     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 Infectious disease and neurology following. continue acyclovir for a total of 21 days, day 17/21. Recommendation is to repeat LP at the end of therapy HSV 1+ CSF PCR. CT head with no change     2. INDIA: resolved with gentle IV hydration.      3.  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 There were episodic focal slowing in the right temporal occipital regions. Occasional sharp waves in the right frontal region. Valproic acid levels low and increased Depakene     4.  Generalized weakness of the right and left upper extremity: MRA of the brain revealed no major arterial occlusions. Suspect this is related to the overall edema, significant improvement noted. Continue PT/OT as ordered     5. DVT: thrombosis of the left basilic vein. Continue Eliquis     6. Moderate protein malnutrition: RD following. Continue PPN. Add 15 units of insulin for hyperglycemia     7.  Essential hypertension: bp remains at goal per JNC 8 guidelines. Continue Norvasc     8. Depression/anxiety: continue Celexa. Psych following     9. Type 2 DM: ac/hs accu check, med dose SSI. 15unit insulin w/ TPN         Full Code  Dvt Prophylaxis: eliquis  GI Prophylaxis: protonix  Disposition: Completion of treatment, improvement in overall brain function and functional status and LP      Above treatment plan reviewed and discussed with patient and nurse in detail at bedside, all questions answered. Care Plan discussed with: Interdisciplinary team    Total time spent with patient: >35 minutes.     Unique Bell NP

## 2021-09-20 NOTE — PROGRESS NOTES
CM received call from [facility] with [facility] at [phone] indicating they have accepted the patient pending insurance authorization and family's final decision. They are able to accommodate the TPN and IV acyclovir. CM attempted to reach out to the patient's daughter, [facility] at [phone] to discuss as when we last spoke on Friday they were deciding between [facility] and [facility]. Voice message left requesting returned call.

## 2021-09-20 NOTE — PROGRESS NOTES
Problem: Falls - Risk of  Goal: *Absence of Falls  Description: Document Jacqui Tim Fall Risk and appropriate interventions in the flowsheet.   Outcome: Progressing Towards Goal  Note: Fall Risk Interventions:  Mobility Interventions: Bed/chair exit alarm    Mentation Interventions: Bed/chair exit alarm    Medication Interventions: Bed/chair exit alarm    Elimination Interventions: Bed/chair exit alarm    History of Falls Interventions: Bed/chair exit alarm

## 2021-09-20 NOTE — PROGRESS NOTES
PHYSICAL THERAPY TREATMENT  Patient: Cindy Grace (56 y.o. female)  Date: 9/20/2021  Diagnosis: New onset seizure ([facility] [address] [R56.9]  Sepsis ([address] [A41.9] New onset seizure ([facility] [address]       Precautions:    Chart, physical therapy assessment, plan of care and goals were reviewed. ASSESSMENT  Patient continues with skilled PT services and is progressing towards goals. Pt semi supine in bed upon arrival, pt is lethargic and initially having difficulty keeping her eyes open and required continued vc to keep them open. Patient agreeable to session. Performed bed mobility with mod A, noted decreased command following and required repeated direction for patient to perform task. Performed STS with min A x 2, upon standing at EOB pt urinated on floor and required increased time for clean up. Patient ambulated to bathroom with RW and min A x 2, again pt noted with decreased command follow and required increased vc to perform tasks. Pt having difficulty maneuvering RW, so further ambulation done with HHA x 2. Patient was unsteady during ambulation and required constant VC to keep ambulating as pt would stop and stand until prompted to continue walking. Pt returned to recliner and left sitting up with call bell in reach and needs met, visitor in room. Recommending d/c to IRF once medically appropriate. Current Level of Function Impacting Discharge (mobility/balance): assistance required for all mobility    Other factors to consider for discharge: decreased command following, severity of deficits, decreased safety awareness, decreased insight into deficits, PLOF         PLAN :  Patient continues to benefit from skilled intervention to address the above impairments. Continue treatment per established plan of care. to address goals.     Recommendation for discharge: (in order for the patient to meet his/her long term goals)  [facility]     This discharge recommendation:  Has been made in collaboration with the attending provider and/or case management    IF patient discharges home will need the following DME: to be determined (TBD)       SUBJECTIVE:   Patient stated [place] we go to the bathroom?     OBJECTIVE DATA SUMMARY:   Critical Behavior:  Neurologic State: Alert  Orientation Level: Oriented to person, Oriented to place, Disoriented to situation, Disoriented to time  Cognition: Decreased attention/concentration, Decreased command following, Impaired decision making, Poor safety awareness  Safety/Judgement: Decreased awareness of need for safety    Functional Mobility Training:  Bed Mobility:  Supine to Sit: Moderate assistance  Scooting: Moderate assistance    Transfers:  Sit to Stand: Minimum assistance;Assist x2  Stand to Sit: Minimum assistance;Assist x2  Bed to Chair: Minimum assistance;Assist x2    Balance:  Sitting: Impaired; With support  Sitting - Static: Good (unsupported)  Sitting - Dynamic: Fair (occasional)  Standing: Impaired; With support  Standing - Static: Constant support;Good  Standing - Dynamic : Constant support; Fair    Ambulation/Gait Training:  Distance (ft): 50 Feet (ft)  Assistive Device: Gait belt;Walker, rolling; Other (comment)  Ambulation - Level of Assistance: Minimal assistance;Assist x2  Base of Support: Narrowed  Speed/Elda: Slow  Step Length: Right shortened;Left shortened    Pain Ratin/10    Activity Tolerance:   Fair and requires rest breaks  Please refer to the flowsheet for vital signs taken during this treatment. After treatment patient left in no apparent distress:   Sitting in chair, Call bell within reach, and Caregiver / family present    COMMUNICATION/COLLABORATION:   The patients plan of care was discussed with: Occupational therapy assistant and Registered nurse.      OT/PT sessions occurred together for increased patient and clinician safety    Problem: Mobility Impaired (Adult and Pediatric)  Goal: *Acute Goals and Plan of Care (Insert Text)  Description: Patient will move from supine to sit and sit to supine , scoot up and down, and roll side to side in bed with minimal assistance/contact guard assist within 7 day(s). Patient will transfer from bed to chair and chair to bed with minimal assistance/contact guard assist using the least restrictive device within 7 day(s). Patient will improve static sitting/standing balance to minimal assistance within 1 week(s). Patient will ambulate 10 feet with minimal assistance with least restrictive device within 1 weeks.        Outcome: Progressing Towards Goal       Dania Montalvo PTa   Time Calculation: 28 mins

## 2021-09-21 LAB
ALBUMIN SERPL-MCNC: 2.3 G/DL (ref 3.5–5)
ALBUMIN/GLOB SERPL: 0.7 {RATIO} (ref 1.1–2.2)
ALP SERPL-CCNC: 43 U/L (ref 45–117)
ALT SERPL-CCNC: 27 U/L (ref 12–78)
ANION GAP SERPL CALC-SCNC: 6 MMOL/L (ref 5–15)
AST SERPL W P-5'-P-CCNC: 13 U/L (ref 15–37)
BASOPHILS # BLD: 0 K/UL (ref 0–0.1)
BASOPHILS NFR BLD: 0 % (ref 0–1)
BILIRUB SERPL-MCNC: 0.2 MG/DL (ref 0.2–1)
BUN SERPL-MCNC: 20 MG/DL (ref 6–20)
BUN/CREAT SERPL: 27 (ref 12–20)
CA-I BLD-MCNC: 8 MG/DL (ref 8.5–10.1)
CHLORIDE SERPL-SCNC: 107 MMOL/L (ref 97–108)
CO2 SERPL-SCNC: 25 MMOL/L (ref 21–32)
CREAT SERPL-MCNC: 0.73 MG/DL (ref 0.55–1.02)
DIFFERENTIAL METHOD BLD: NORMAL
EOSINOPHIL # BLD: 0 K/UL (ref 0–0.4)
EOSINOPHIL NFR BLD: 0 % (ref 0–7)
ERYTHROCYTE [DISTWIDTH] IN BLOOD BY AUTOMATED COUNT: 14 % (ref 11.5–14.5)
GLOBULIN SER CALC-MCNC: 3.1 G/DL (ref 2–4)
GLUCOSE BLD STRIP.AUTO-MCNC: 259 MG/DL (ref 65–117)
GLUCOSE BLD STRIP.AUTO-MCNC: 282 MG/DL (ref 65–117)
GLUCOSE BLD STRIP.AUTO-MCNC: 284 MG/DL (ref 65–117)
GLUCOSE BLD STRIP.AUTO-MCNC: 285 MG/DL (ref 65–117)
GLUCOSE BLD STRIP.AUTO-MCNC: 299 MG/DL (ref 65–117)
GLUCOSE SERPL-MCNC: 302 MG/DL (ref 65–100)
HCT VFR BLD AUTO: 40.9 % (ref 35–47)
HGB BLD-MCNC: 13.4 G/DL (ref 11.5–16)
IMM GRANULOCYTES # BLD AUTO: 0 K/UL
IMM GRANULOCYTES NFR BLD AUTO: 0 %
LYMPHOCYTES # BLD: 1 K/UL (ref 0.8–3.5)
LYMPHOCYTES NFR BLD: 15 % (ref 12–49)
MCH RBC QN AUTO: 29.5 PG (ref 26–34)
MCHC RBC AUTO-ENTMCNC: 32.8 G/DL (ref 30–36.5)
MCV RBC AUTO: 89.9 FL (ref 80–99)
MONOCYTES # BLD: 0.8 K/UL (ref 0–1)
MONOCYTES NFR BLD: 12 % (ref 5–13)
NEUTS SEG # BLD: 5.1 K/UL (ref 1.8–8)
NEUTS SEG NFR BLD: 73 % (ref 32–75)
NRBC # BLD: 0 K/UL (ref 0–0.01)
NRBC BLD-RTO: 0 PER 100 WBC
PERFORMED BY, TECHID: ABNORMAL
PLATELET # BLD AUTO: 195 K/UL (ref 150–400)
PMV BLD AUTO: 11.1 FL (ref 8.9–12.9)
POTASSIUM SERPL-SCNC: 4.5 MMOL/L (ref 3.5–5.1)
PROT SERPL-MCNC: 5.4 G/DL (ref 6.4–8.2)
RBC # BLD AUTO: 4.55 M/UL (ref 3.8–5.2)
RBC MORPH BLD: NORMAL
SODIUM SERPL-SCNC: 138 MMOL/L (ref 136–145)
WBC # BLD AUTO: 6.9 K/UL (ref 3.6–11)

## 2021-09-21 PROCEDURE — 74011250637 HC RX REV CODE- 250/637: Performed by: INTERNAL MEDICINE

## 2021-09-21 PROCEDURE — 82962 GLUCOSE BLOOD TEST: CPT

## 2021-09-21 PROCEDURE — 74011636637 HC RX REV CODE- 636/637: Performed by: NURSE PRACTITIONER

## 2021-09-21 PROCEDURE — 65270000029 HC RM PRIVATE

## 2021-09-21 PROCEDURE — 74011250636 HC RX REV CODE- 250/636: Performed by: STUDENT IN AN ORGANIZED HEALTH CARE EDUCATION/TRAINING PROGRAM

## 2021-09-21 PROCEDURE — 74011000250 HC RX REV CODE- 250: Performed by: NURSE PRACTITIONER

## 2021-09-21 PROCEDURE — 74011250637 HC RX REV CODE- 250/637: Performed by: PHYSICIAN ASSISTANT

## 2021-09-21 PROCEDURE — 74011250636 HC RX REV CODE- 250/636: Performed by: HOSPITALIST

## 2021-09-21 PROCEDURE — 97530 THERAPEUTIC ACTIVITIES: CPT

## 2021-09-21 PROCEDURE — 74011250637 HC RX REV CODE- 250/637: Performed by: STUDENT IN AN ORGANIZED HEALTH CARE EDUCATION/TRAINING PROGRAM

## 2021-09-21 PROCEDURE — 99232 SBSQ HOSP IP/OBS MODERATE 35: CPT | Performed by: INTERNAL MEDICINE

## 2021-09-21 PROCEDURE — 85025 COMPLETE CBC W/AUTO DIFF WBC: CPT

## 2021-09-21 PROCEDURE — 92526 ORAL FUNCTION THERAPY: CPT

## 2021-09-21 PROCEDURE — 74011250636 HC RX REV CODE- 250/636: Performed by: INTERNAL MEDICINE

## 2021-09-21 PROCEDURE — 74011636637 HC RX REV CODE- 636/637: Performed by: INTERNAL MEDICINE

## 2021-09-21 PROCEDURE — 36415 COLL VENOUS BLD VENIPUNCTURE: CPT

## 2021-09-21 PROCEDURE — 80053 COMPREHEN METABOLIC PANEL: CPT

## 2021-09-21 PROCEDURE — 74011000250 HC RX REV CODE- 250: Performed by: HOSPITALIST

## 2021-09-21 RX ADMIN — INSULIN LISPRO 3 UNITS: 100 INJECTION, SOLUTION INTRAVENOUS; SUBCUTANEOUS at 21:38

## 2021-09-21 RX ADMIN — DEXAMETHASONE SODIUM PHOSPHATE 2 MG: 4 INJECTION, SOLUTION INTRA-ARTICULAR; INTRALESIONAL; INTRAMUSCULAR; INTRAVENOUS; SOFT TISSUE at 21:39

## 2021-09-21 RX ADMIN — VALPROIC ACID 500 MG: 250 CAPSULE, LIQUID FILLED ORAL at 21:39

## 2021-09-21 RX ADMIN — LEVETIRACETAM 2000 MG: 100 INJECTION, SOLUTION INTRAVENOUS at 21:40

## 2021-09-21 RX ADMIN — APIXABAN 5 MG: 5 TABLET, FILM COATED ORAL at 21:39

## 2021-09-21 RX ADMIN — SODIUM CHLORIDE 75 ML/HR: 4.5 INJECTION, SOLUTION INTRAVENOUS at 15:07

## 2021-09-21 RX ADMIN — APIXABAN 5 MG: 5 TABLET, FILM COATED ORAL at 09:09

## 2021-09-21 RX ADMIN — AMLODIPINE BESYLATE 5 MG: 5 TABLET ORAL at 09:09

## 2021-09-21 RX ADMIN — INSULIN LISPRO 5 UNITS: 100 INJECTION, SOLUTION INTRAVENOUS; SUBCUTANEOUS at 17:15

## 2021-09-21 RX ADMIN — SODIUM CHLORIDE 900 MG: 9 INJECTION, SOLUTION INTRAVENOUS at 14:57

## 2021-09-21 RX ADMIN — DEXAMETHASONE SODIUM PHOSPHATE 2 MG: 4 INJECTION, SOLUTION INTRA-ARTICULAR; INTRALESIONAL; INTRAMUSCULAR; INTRAVENOUS; SOFT TISSUE at 05:20

## 2021-09-21 RX ADMIN — NYSTATIN 500000 UNITS: 100000 SUSPENSION ORAL at 09:09

## 2021-09-21 RX ADMIN — SODIUM CHLORIDE 900 MG: 9 INJECTION, SOLUTION INTRAVENOUS at 21:40

## 2021-09-21 RX ADMIN — PANTOPRAZOLE SODIUM 40 MG: 40 TABLET, DELAYED RELEASE ORAL at 05:24

## 2021-09-21 RX ADMIN — LEVETIRACETAM 2000 MG: 100 INJECTION, SOLUTION INTRAVENOUS at 10:06

## 2021-09-21 RX ADMIN — NYSTATIN 500000 UNITS: 100000 SUSPENSION ORAL at 21:39

## 2021-09-21 RX ADMIN — INSULIN LISPRO 5 UNITS: 100 INJECTION, SOLUTION INTRAVENOUS; SUBCUTANEOUS at 09:09

## 2021-09-21 RX ADMIN — SODIUM CHLORIDE 900 MG: 9 INJECTION, SOLUTION INTRAVENOUS at 05:19

## 2021-09-21 RX ADMIN — NYSTATIN 500000 UNITS: 100000 SUSPENSION ORAL at 14:57

## 2021-09-21 RX ADMIN — VALPROIC ACID 500 MG: 250 CAPSULE, LIQUID FILLED ORAL at 14:57

## 2021-09-21 RX ADMIN — TRACE ELEMENTS INJECTION 4: 7.4; .75; 98; 151 INJECTION, SOLUTION INTRAVENOUS at 21:48

## 2021-09-21 RX ADMIN — CITALOPRAM HYDROBROMIDE 20 MG: 20 TABLET ORAL at 09:09

## 2021-09-21 RX ADMIN — INSULIN LISPRO 5 UNITS: 100 INJECTION, SOLUTION INTRAVENOUS; SUBCUTANEOUS at 12:08

## 2021-09-21 RX ADMIN — DEXAMETHASONE SODIUM PHOSPHATE 2 MG: 4 INJECTION, SOLUTION INTRA-ARTICULAR; INTRALESIONAL; INTRAMUSCULAR; INTRAVENOUS; SOFT TISSUE at 14:57

## 2021-09-21 NOTE — PROGRESS NOTES
Problem: Falls - Risk of  Goal: *Absence of Falls  Description: Document Keiry Garcia Fall Risk and appropriate interventions in the flowsheet. Outcome: Progressing Towards Goal  Note: Fall Risk Interventions:  Mobility Interventions: OT consult for ADLs, PT Consult for mobility concerns, PT Consult for assist device competence, Strengthening exercises (ROM-active/passive), Utilize walker, cane, or other assistive device, Communicate number of staff needed for ambulation/transfer, Bed/chair exit alarm    Mentation Interventions: Bed/chair exit alarm    Medication Interventions: Bed/chair exit alarm, Patient to call before getting OOB    Elimination Interventions: Bed/chair exit alarm, Call light in reach    History of Falls Interventions: Bed/chair exit alarm         Problem: Patient Education: Go to Patient Education Activity  Goal: Patient/Family Education  Outcome: Progressing Towards Goal     Problem: Risk for Spread of Infection  Goal: Prevent transmission of infectious organism to others  Description: Prevent the transmission of infectious organisms to other patients, staff members, and visitors. Outcome: Progressing Towards Goal     Problem: Patient Education:  Go to Education Activity  Goal: Patient/Family Education  Outcome: Progressing Towards Goal     Problem: Pressure Injury - Risk of  Goal: *Prevention of pressure injury  Description: Document Harish Scale and appropriate interventions in the flowsheet. Outcome: Progressing Towards Goal  Note: Pressure Injury Interventions:  Sensory Interventions: Keep linens dry and wrinkle-free, Turn and reposition approx.  every two hours (pillows and wedges if needed)    Moisture Interventions: Absorbent underpads, Apply protective barrier, creams and emollients, Internal/External urinary devices, Maintain skin hydration (lotion/cream), Minimize layers    Activity Interventions: PT/OT evaluation    Mobility Interventions: PT/OT evaluation    Nutrition Interventions: Document food/fluid/supplement intake, Discuss nutritional consult with provider, Offer support with meals,snacks and hydration    Friction and Shear Interventions: Minimize layers                Problem: Patient Education: Go to Patient Education Activity  Goal: Patient/Family Education  Outcome: Progressing Towards Goal     Problem: Dysphagia (Adult)  Goal: *Speech Goal: (INSERT TEXT)  Description: Speech Therapy Goals  Initiated 9/6/2021  -Patient will tolerate easy to chew diet with thin liquids without signs/symptoms of aspiration given no cues within 5 day(s). [X] Not met  [ ]  MET   [ ] Progressing  [ ] Discontinue  -Patient will demonstrate understanding of swallow safety precautions and aspiration precautions, diet recs with no cues within 5 day(s).         [X] Not met  [ ]  MET   [ ] Progressing  [ ] Discontinue  Outcome: Progressing Towards Goal     Problem: Patient Education: Go to Patient Education Activity  Goal: Patient/Family Education  Outcome: Progressing Towards Goal     Problem: Patient Education: Go to Patient Education Activity  Goal: Patient/Family Education  Outcome: Progressing Towards Goal     Problem: Patient Education: Go to Patient Education Activity  Goal: Patient/Family Education  Outcome: Progressing Towards Goal     Problem: Patient Education: Go to Patient Education Activity  Goal: Patient/Family Education  Outcome: Progressing Towards Goal

## 2021-09-21 NOTE — PROGRESS NOTES
Bedside and Verbal shift change report given to Nicky Lovett RN (oncoming nurse) by Ivy Primrose, RN (offgoing nurse). Report included the following information SBAR, Kardex, Intake/Output, MAR, Accordion, Recent Results, Med Rec Status and Cardiac Rhythm NSR.

## 2021-09-21 NOTE — PROGRESS NOTES
Infectious Disease Progress Note         Subjective:   Stable, denies new complaints. No acute events since last seen. Alert and following commands   Objective:   Physical Exam:     Visit Vitals  BP (!) 109/57 (BP 1 Location: Left upper arm, BP Patient Position: At rest;Lying left side)   Pulse 68   Temp 98.1 °F (36.7 °C)   Resp 18   Ht 5' 6\" (1.676 m)   Wt 180 lb 9.6 oz (81.9 kg)   SpO2 99%   BMI 29.15 kg/m²    O2 Flow Rate (L/min): 2 l/min O2 Device: None (Room air)    Temp (24hrs), Av.8 °F (36.6 °C), Min:97.5 °F (36.4 °C), Max:98.1 °F (36.7 °C)    701 - 1900  In: 8132 [P.O.:500; I.V.:4382]  Out: -    1901 -  0700  In: 4665 [P.O.:740; I.V.:3925]  Out: 1100 [Urine:1100]    General: NAD, appears lethargic but following commands appropriately   HEENT: LAQUITA, Moist mucosa  Lungs: CTA b/l, no wheeze/rhonchi   Heart: S1S2+, RRR, no murmur  Abdo: Soft, NT, ND, +BS   Exts: no new weakness, no edema   Skin: No chronic wounds or ulcers, + right chest wall power line     Data Review:       Recent Days:  Recent Labs     21  0721  0721  0517   WBC 6.9 7.8 10.8   HGB 13.4 13.1 13.5   HCT 40.9 40.4 40.2    199 228     Recent Labs     21  0705 21  0725 21  0517   BUN 20 26* 26*   CREA 0.73 0.63 0.68     Lab Results   Component Value Date/Time    C-Reactive protein 2.95 (H) 2021 07:28 AM        Microbiology     Results     ** No results found for the last 336 hours. **           Diagnostics   CXR Results  (Last 48 hours)    None         Assessment/Plan     1. 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 [place]IA: Will continue to monitor renal function while on acyclovir     3.  Leukocytosis: Normalized off steroid therapy     4. Seizure on admission: Due to # 1. On Keppra, and Valproic    5. Left sided weakness: Due to # 1. Improved, being followed by PT     6. DVT of axillary vein/Thrombosis of left basilic vein.  Anticoagulated on low dose Eliquis       Eloisa Mendiola MD    2021

## 2021-09-21 NOTE — PROGRESS NOTES
OCCUPATIONAL THERAPY TREATMENT  Patient: João Carmona (89 y.o. female)  Date: 9/21/2021  Diagnosis: New onset seizure ([facility] [address] [R56.9]  Sepsis ([address] [A41.9] New onset seizure ([facility] [address]       Precautions:    Chart, occupational therapy assessment, plan of care, and goals were reviewed. ASSESSMENT  Patient continues with skilled OT services and is progressing towards goals. Pt. Received semi-supine in bed and agreeable to therapy session    Pt noted with decreased command following throughout session and occasionally required manual cues to initiate tasks ( ADL's and functional mobility, and transfers). Flat affect noted throughout session as well. Therapist assistance pt with LB dressing total A. Pt completed sup>sit with mod A. Performed sit-> stand with min A x 2. Pt ambulated to bathroom with HHA x 2 and min A, pt ambulation is slightly unsteady. Pt able to tolerate static standing for increased time while completing oral care while standing at the sink with CGA for standing balance. Pt reporting fatigue and became more unsteady while standing so returned pt to recliner. Pt left sitting in chair with call bell in reach and needs met. Recommending d/c to IRF once medically appropriate. Current Level of Function Impacting Discharge (ADLs): assistance with all transfers and ADL's, total A LB dressing, increased confusion, decreased awareness. Other factors to consider for discharge: PLOF, time since onset         PLAN :  Patient continues to benefit from skilled intervention to address the above impairments. Continue treatment per established plan of care. to address goals.     Recommendation for discharge: (in order for the patient to meet his/her long term goals)  Therapy 3 hours per day 5-7 days per week    This discharge recommendation:  Has been made in collaboration with the attending provider and/or case management    IF patient discharges home will need the following DME: TBD       SUBJECTIVE: Patient agreeable to therapy session    OBJECTIVE DATA SUMMARY:   Cognitive/Behavioral Status:  Neurologic State: Alert  Orientation Level: Oriented to person  Cognition: Decreased attention/concentration;Decreased command following    Functional Mobility and Transfers for ADLs:  Bed Mobility:  Supine to Sit: Moderate assistance  Scooting: Moderate assistance    Transfers:  Sit to Stand: Minimum assistance;Assist x2  Functional Transfers  Bathroom Mobility: Minimum assistance  Bed to Chair: Minimum assistance;Assist x2    Balance:  Sitting: Impaired; With support  Sitting - Static: Good (unsupported)  Sitting - Dynamic: Fair (occasional)  Standing: Impaired; With support  Standing - Static: Constant support;Good  Standing - Dynamic : Constant support; Fair    ADL Intervention:       Grooming  Grooming Assistance: Set-up; Contact guard assistance  Position Performed: Standing  Brushing Teeth: Contact guard assistance      Lower Body Dressing Assistance  Pants With Button/Zipper: Total assistance (dependent)  Position Performed: Long sitting on bed    Therapeutic Exercises:   Pt. Educated to continue to perform UE therex, Pt able to perform few therex however required constant cues for initiation      Pain:  0/ 10pain reported    Activity Tolerance:   Fair  Please refer to the flowsheet for vital signs taken during this treatment. After treatment patient left in no apparent distress:   Sitting in chair, Heels elevated for pressure relief, Call bell within reach, and Caregiver / family present    COMMUNICATION/COLLABORATION:   The patients plan of care was discussed with: Physical therapy assistant. Co-tx with PTA for increased assistance with transfers and bed mobility.       Sissy Alston  Time Calculation: 25 mins    Problem: Self Care Deficits Care Plan (Adult)  Goal: *Acute Goals and Plan of Care (Insert Text)  Description: Pt will be Mod I sup <> sit in prep for EOB ADLs  Pt will be Mod I grooming standing sink side LRAD  Pt will be IND dressing sitting EOB/long sit  Pt will be Mod I sit <>  prep for toileting LRAD  Pt will be IND toileting/toilet transfer/cloth mgmt LRAD  Pt will be IND following UE HEP in prep for self care tasks   Outcome: Progressing Towards Goal

## 2021-09-21 NOTE — PROGRESS NOTES
Hospitalist Progress Note         Stepania COCO [place], [place]    Daily Progress Note: 9/21/2021  Hospital course: This is a 55-year-old female admitted on 9/3/2021 with a history of breast cancer, diabetes mellitus, type II, anxiety, depression who initially presented with seizure-like activity and found on the floor by family. Patient was found to be febrile and CT scan of the head head was performed with no acute process. 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 Patient recurrent seizures and was started on Keppra as well as immediately started on acyclovir. Numerous CT and MRI of the brain imaging performed with CT scans of the head initially without acute process. Repeat revealed right temporal and along the sylvian fissure without hemorrhage and 2 to 3 mm of right to left midline shift due to expanding edema. Serial CTs of the head were performed although no significant changes on 9/11/2021 at which time a CT of the head revealed findings suspicious for right middle cerebral artery thrombosis with associated ischemia in the distribution of that artery. Initial MRI on 9/3/2021 revealed abnormal high T2 signal in the right temporal and insular cortex. Findings suspicious for herpes encephalitis. There was also subtle increased T2 signal evident in the left temporal uncus. Repeats MRI of the brain revealed progression of the findings of the right cerebral hemisphere extending into the left frontal lobe. Most consistent with worsening encephalitis. MRA of the brain on 9/13/2021 revealed no major arterial occlusion or stenosis demonstrated. Infectious disease consultation, Dr. Jerri Nathan.  Neurological consultation. Patient remains on Keppra and valproic acid for seizure prophylaxis. Patient also found to have an upper extremity DVT and is currently on Eliquis. Psych consult ordered for worsening depression.  Repeat CT of the Head with no change. Valproic levels are not therapeutic and increasing valproic acidosis. EEG showed generalized slowing in the background with disorganization. There were episodic focal slowing in the right temporal occipital regions. Occasional sharp waves in the right frontal region. No obvious seizure-like activity. Continuing PPN. Will need to discontinue anticoagulation prior to LP. Subjective:   Subjective   Patient seen in f/u alert and oriented lying in bed  No complaints voiced on examination  No overnight events reported  No acute distress noted on examination    Review of Systems:   Review of Systems   Constitutional: Negative. HENT: Negative. Eyes: Negative for blurred vision and double vision. Respiratory: Negative for cough, sputum production, shortness of breath and wheezing. Cardiovascular: Negative. Gastrointestinal: Negative for abdominal pain, nausea and vomiting. Genitourinary: Negative. Musculoskeletal: Negative. Skin: Negative. Neurological: Negative for dizziness and headaches. Endo/Heme/Allergies: Negative. Psychiatric/Behavioral: Negative. Objective:   Objective      Vitals:  Patient Vitals for the past 12 hrs:   Temp Pulse Resp BP SpO2   09/21/21 0546 98 °F (36.7 °C) 61 16 131/67 97 %        Physical Exam:  Physical Exam  Vitals and nursing note reviewed. Constitutional:       Appearance: Normal appearance. HENT:      Head: Normocephalic. Eyes:      Conjunctiva/sclera: Conjunctivae normal.   Cardiovascular:      Rate and Rhythm: Normal rate and regular rhythm. Pulses: Normal pulses. Heart sounds: Normal heart sounds. Comments: + right chest wall power line   Pulmonary:      Effort: Pulmonary effort is normal.      Breath sounds: Normal breath sounds. Abdominal:      General: Bowel sounds are normal.      Palpations: Abdomen is soft. Musculoskeletal:      Cervical back: Normal range of motion. Comments: 3/5 power upper extremities   Skin:     General: Skin is warm and dry. Capillary Refill: Capillary refill takes less than 2 seconds. Neurological:      Mental Status: She is alert. Motor: Weakness present.           Lab Results:  Recent Results (from the past 24 hour(s))   GLUCOSE, POC    Collection Time: 09/20/21 11:15 AM   Result Value Ref Range    Glucose (POC) 228 (H) 65 - 117 mg/dL    Performed by [address], POC    Collection Time: 09/20/21  3:26 PM   Result Value Ref Range    Glucose (POC) 199 (H) 65 - 117 mg/dL    Performed by Leticia Zavala, POC    Collection Time: 09/20/21 10:11 PM   Result Value Ref Range    Glucose (POC) 236 (H) 65 - 117 mg/dL    Performed by Mahendra PENNINGTON Mitchell)           Diagnostic Images:    [facility] WO 9/3  [address]     14 [facility] WO 9/9  Right temporal and insular region edema     CT WO 9/10  Possible RMCA ischemia with possible RMCA occlusion     [facility] WO 9/11  No interval changes from scan on 9/10     [facility] WO 9/15  No interval changes or worsening of frontotemporal edema     MRI Brain [facility] COGNITIVE DISORDERS 9/3  T2/FLAIR hyperintense signal in the bilateral temporal regions more so in the right temporal and insular regions concerning for HSV encephalitis or possibly limbic encephalitis     MRI Brain [facility] COGNITIVE DISORDERS 9/8  Interval progression of T2/FLAIR hyperintense signal change in the right cerebral and left frontal region concerning for worsening encephalitis     MRA Head WO 9/13  No occlusions, dissections, significant stenosis, pseudoaneurysms or aneurysms in the intracranial arterial system     EEG 9/9  Generalized slowing of the background  Sharp discharges in the right frontal region     EEG 9/16  Mild generalized encephalopathy  Occasional right frontal sharp discharges  During photic stimulation, bilateral frontal central spike and wave discharges  No seizures     Valproic Acid Levels  44 (9/12), 31 (9/16)     CSF Studies   Positive: , , Protein 80, HSV-1 DNA  Negative/WNL: Glucose, HSV-2 DNA, Viral Cx      Labs  Positive: HSV 1 IgG Ab  Negative: HIV 1/2, RPR     Current Medications:    Current Facility-Administered Medications:     fat emulsion 20% (LIPOSYN, INTRAlipid) infusion 250 mL, 250 mL, IntraVENous, CONTINUOUS, Yoselyn Handy, ARIC, Last Rate: 20.83 mL/hr at 09/20/21 2158, 250 mL at 09/20/21 2158    TPN ADULT - CENTRAL AA 5% D20% W/ CA + ELECTROLYTES, , IntraVENous, CONTINUOUS, Beckie Bloes, NP, Last Rate: 84 mL/hr at 09/20/21 2200, New Bag at 09/20/21 2200    valproic acid (DEPAKENE) capsule 500 mg, 500 mg, Oral, TID, Kinga Smallwood MD, 500 mg at 09/20/21 2159    dexamethasone (DECADRON) 4 mg/mL injection 2 mg, 2 mg, IntraVENous, Q8H, Amber Restrepo MD, 2 mg at 09/21/21 0520    pantoprazole (PROTONIX) tablet 40 mg, 40 mg, Oral, ACB, Anthony Sotelo PA-C, 40 mg at 09/21/21 0524    0.45% sodium chloride infusion, 75 mL/hr, IntraVENous, CONTINUOUS, Piedad Granados MD, Last Rate: 75 mL/hr at 09/21/21 0521, 75 mL/hr at 09/21/21 0521    citalopram (CELEXA) tablet 20 mg, 20 mg, Oral, DAILY, Nelly PATEL MD, 20 mg at 09/20/21 1007    nystatin (MYCOSTATIN) 100,000 unit/mL oral suspension 500,000 Units, 500,000 Units, Oral, QID, Jeanne Archer MD, 500,000 Units at 09/20/21 2159    apixaban (ELIQUIS) tablet 5 mg, 5 mg, Oral, BID, Jeanne Archer MD, 5 mg at 09/20/21 2200    acyclovir (ZOVIRAX) 900 mg in 0.9% sodium chloride 250 mL IVPB, 15 mg/kg (Ideal), IntraVENous, Q8H, Debra Serrato MD, Last Rate: 250 mL/hr at 09/21/21 0519, 900 mg at 09/21/21 0519    levETIRAcetam (KEPPRA) 2,000 mg in 0.9% sodium chloride 250 mL IVPB, 2,000 mg, IntraVENous, Q12H, Rosalba Collins MD, 2,000 mg at 09/20/21 2200    amLODIPine (NORVASC) tablet 5 mg, 5 mg, Oral, DAILY, Vinny Cook MD, 5 mg at 09/20/21 1007    hydrALAZINE (APRESOLINE) 20 mg/mL injection 10 mg, 10 mg, IntraVENous, Q6H PRN, Vinny Cook MD, 10 mg at 09/12/21 2202    insulin lispro (HUMALOG) injection, , SubCUTAneous, AC&HS, Jorge Alcantara MD, 2 Units at 09/20/21 2221    acetaminophen (TYLENOL) tablet 650 mg, 650 mg, Oral, Q6H PRN, 650 mg at 09/06/21 1236 **OR** acetaminophen (TYLENOL) suppository 650 mg, 650 mg, Rectal, Q6H PRN, Per Godoy MD    polyethylene glycol (MIRALAX) packet 17 g, 17 g, Oral, DAILY PRN, Per Godoy MD    promethazine (PHENERGAN) tablet 12.5 mg, 12.5 mg, Oral, Q6H PRN **OR** ondansetron (ZOFRAN) injection 4 mg, 4 mg, IntraVENous, Q6H PRN, Per Mendoza MD    glucose chewable tablet 16 g, 4 Tablet, Oral, PRN, Kana Baker MD    dextrose (D50W) injection syrg 12.5-25 g, 25-50 mL, IntraVENous, PRN, Kana Baker MD, 25 g at 09/04/21 0409    glucagon (GLUCAGEN) injection 1 mg, 1 mg, IntraMUSCular, PRN, Kana Baker MD       ASSESSMENT:     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 Infectious disease and neurology following. continue acyclovir for a total of 21 days, day 18/21. Recommendation is to repeat LP at the end of therapy HSV 1+ CSF PCR. CT head with no change     2. INDIA: resolved with gentle IV hydration.      3.  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 There were episodic focal slowing in the right temporal occipital regions. Occasional sharp waves in the right frontal region. Valproic acid levels low and increased Depakene     4.  Generalized weakness of the right and left upper extremity: MRA of the brain revealed no major arterial occlusions. Suspect this is related to the overall edema, significant improvement noted. Continue PT/OT as ordered     5. DVT: thrombosis of the left basilic vein. Continue Eliquis     6. Moderate protein malnutrition: RD following.  Will attempt to wean PPN and obtain full calorie count going forward.     7.  Essential hypertension: bp remains at goal per JNC 8 guidelines. Continue Norvasc     8. Depression/anxiety: continue Celexa. Psych following     9. Type 2 DM: ac/hs accu check, med dose SSI. 15unit insulin w/ TPN. Full Code  Dvt Prophylaxis: eliquis  GI Prophylaxis: protonix  Disposition: Completion of treatment, improvement in overall brain function and functional status and LP. Insurance auth pending for [facility] [employer]      Above treatment plan reviewed and discussed with patient and nurse in detail at bedside, all questions answered. Attempted to reach [facility] daughter Sawyer Negron [phone]. Care Plan discussed with: Interdisciplinary team    Total time spent with patient: >35 minutes.     Vijay Lima NP

## 2021-09-21 NOTE — PROGRESS NOTES
PHYSICAL THERAPY TREATMENT  Patient: Ignacio Napier (11 y.o. female)  Date: 9/21/2021  Diagnosis: New onset seizure ([facility] [address] [R56.9]  Sepsis ([facility] [address] [A41.9] New onset seizure ([facility] [address]       Precautions:    Chart, physical therapy assessment, plan of care and goals were reviewed. ASSESSMENT  Patient continues with skilled PT services and is progressing towards goals. Pt semi supine in bed upon arrival and agreeable to session. Pt noted with decreased command following throughout session and occasionally required manual cues to initiate tasks. Flat affect noted throughout session as well. Pt completed sup>Sit with mod A. Performed STS with min A x 2. Pt ambulated to bathroom with HHA x 2 and min A, pt gait is slightly unsteady but had no overt LOB. Pt brushed teeth while standing at the sink with CGA for standing balance, see OT note for details. Pt reporting fatigue and starting to become more unsteady while standing so returned pt to recliner. Pt left sitting in chair with call bell in reach and needs met. Recommending d/c to IRF once medically appropriate. Current Level of Function Impacting Discharge (mobility/balance): assistance required for all mobility    Other factors to consider for discharge: time since onset, severity of deficits         PLAN :  Patient continues to benefit from skilled intervention to address the above impairments. Continue treatment per established plan of care. to address goals. Recommendation for discharge: (in order for the patient to meet his/her long term goals)  1 [facility]     This discharge recommendation:  Has been made in collaboration with the attending provider and/or case management    IF patient discharges home will need the following DME: to be determined (TBD)       SUBJECTIVE:   Patient stated im fine.     OBJECTIVE DATA SUMMARY:   Critical Behavior:  Neurologic State: Alert  Orientation Level: Oriented to person  Cognition: Decreased attention/concentration, Decreased command following  Safety/Judgement: Decreased awareness of need for safety  Functional Mobility Training:  Bed Mobility:  Supine to Sit: Moderate assistance  Scooting: Moderate assistance    Transfers:  Sit to Stand: Minimum assistance;Assist x2  Stand to Sit: Minimum assistance;Assist x2  Bed to Chair: Minimum assistance;Assist x2    Balance:  Sitting: Impaired; With support  Sitting - Static: Good (unsupported)  Sitting - Dynamic: Fair (occasional)  Standing: Impaired; With support  Standing - Static: Constant support;Good  Standing - Dynamic : Constant support; Fair    Ambulation/Gait Training:  Distance (ft): 25 Feet (ft)  Assistive Device: Gait belt; Other (comment) (HHA x 2)  Ambulation - Level of Assistance: Minimal assistance;Assist x2  Base of Support: Narrowed  Speed/Elda: Slow  Step Length: Right shortened;Left shortened      Pain Ratin/10    Activity Tolerance:   Fair and requires rest breaks  Please refer to the flowsheet for vital signs taken during this treatment. After treatment patient left in no apparent distress:   Sitting in chair, Call bell within reach, and Caregiver / family present    COMMUNICATION/COLLABORATION:   The patients plan of care was discussed with: Occupational therapy assistant. OT/PT sessions occurred together for increased patient and clinician safety    Problem: Mobility Impaired (Adult and Pediatric)  Goal: *Acute Goals and Plan of Care (Insert Text)  Description: Patient will move from supine to sit and sit to supine , scoot up and down, and roll side to side in bed with minimal assistance/contact guard assist within 7 day(s). Patient will transfer from bed to chair and chair to bed with minimal assistance/contact guard assist using the least restrictive device within 7 day(s). Patient will improve static sitting/standing balance to minimal assistance within 1 week(s).   Patient will ambulate 10 feet with minimal assistance with least restrictive device within 1 weeks.        Outcome: Progressing Towards Goal       Cesar Colindres, YAN   Time Calculation: 25 mins

## 2021-09-21 NOTE — PROGRESS NOTES
Comprehensive Nutrition Assessment    Type and Reason for Visit: Reassess (Interim)    Nutrition Recommendations/Plan:     Calorie Count active    Continue Soft + Bite Sized/thin diet       Texture per SLP       Honor pt preferences  Reduce Ensure Clear to daily (240kcal, 8g pro)  Continue Gelatein BID (180kcal, 40g pro)     RD rec'd EN d/t functional GI tract  Supplemental PPN to continue per MD request d/t NGT refusal  Reduce to goal of Standard PPN at 60mL/hr, continuous  Resume 250mL 20% lipids daily  989kcal (~50%), 61g pro (76%) - sufficient in addition to PO       Taper off PN as pt PO intakes improve    Consider addition of appetite stimulant  Update measured wt  Document %meal and supplement intakes on [address] form and EMR, BMs in I/Os    Nutrition Assessment:  Worsening AMS, d/c HSV encephalitis. Neuro + ID following, mentation improving with tx. Initially NPO with NG in place x3 days. TF of Jevity 1.5 initiated late 9/5 at 25mL/hr, advanced to goal rate briefly before d/c next morning (9/6). Diet then advanced to Easy to Chew (9/6). SLP f/u (9/10) recd SBS6/Mildly Thick lq then SBS6/thin (9/16) - remains ordered. PPN w/ lipids ordered (9/16-9/20). Per Attending, plans to continue PN d/t NGT refusal and ongoing poor PO. PN rate increased without lipids renewed however likely to benefit from prior PN rate while PO improving. Agreeable to trial appetite stimulant, however not yet ordered. Calorie Count now active. Spoke with x2 family members today, discussed alternate protein shakes/supplements to try from outside of hospital d/t pt adamantly refusing Ensure Enlive or compact. Drinking ~1 Ensure Clear daily - will reduce d/t build up. Consuming Gelatein well per family. Eating lunch at 2nd visit today, pt more alert/engaged, appetite improving. On previous visits, intakes variable, 0%, 25%, and 50% pending offering. Pt tried Ensure pdg, Magic cup and disliked. Labs: , POC , lytes wnl.  Meds: IVF, dexamethasone, depakene, eliquis, celexa, insulin, keppra, nystatin, PPI, miralax prn. Malnutrition Assessment:  Malnutrition Status:  Mild malnutrition    Context:  Acute illness         Estimated Daily Nutrient Needs:  Energy (kcal): 2000kcal (25kcal/kg); Weight Used for Energy Requirements: Current  Protein (g): 80g (1g/kg); Weight Used for Protein Requirements: Current  Fluid (ml/day): 2000mL; Method Used for Fluid Requirements: 1 ml/kcal      Nutrition Related Findings:  NFPE not performed, pt appears well nourished on visual assessment. No documented c/s difficulty, SLP following inpatient. No n/v or c/d. BM documented 9/20 and 9/21, soft. +BS. No edema. Wounds:    None       Current Nutrition Therapies:  ADULT ORAL NUTRITION SUPPLEMENT Breakfast, Lunch, Dinner; Clear Liquid  ADULT ORAL NUTRITION SUPPLEMENT Breakfast, Lunch; Other Supplement; Gelatein Jell-o  ADULT DIET Dysphagia - Soft & Bite Sized; SWEET POTATOES EVERY L + D  TPN ADULT - CENTRAL AA 5% D20% W/ CA + ELECTROLYTES  TPN ADULT - CENTRAL AA 5% D20% W/ CA + ELECTROLYTES    Anthropometric Measures:  · Height:  5' 6\" (167.6 cm)  · Current Body Wt:  80 kg (176 lb 5.9 oz) (9/7)   · Ideal Body Wt:  130 lbs:  135.7 %   · BMI Category: Overweight (BMI 25.0-29. 9)     Wt Readings from Last 3 Encounters:   09/07/21 80 kg (176 lb 5.9 oz)   08/31/21 83 kg (183 lb)   Limited wt hx based on stated BW; unable to assess wt changes.     Nutrition Diagnosis:   · Inadequate oral intake related to cognitive or neurological impairment, early satiety, swallowing difficulty as evidenced by intake 0-25%, swallowing study results      Nutrition Interventions:   Food and/or Nutrient Delivery: Continue current diet, Modify oral nutrition supplement, Continue parenteral nutrition  Nutrition Education and Counseling: No recommendations at this time  Coordination of Nutrition Care: Continue to monitor while inpatient    Goals:  Meet >50% EENs in 5 days, Lytes wnl, Wt maintenance +/- 0.5kg per week       Nutrition Monitoring and Evaluation:   Behavioral-Environmental Outcomes: None identified  Food/Nutrient Intake Outcomes: Food and nutrient intake, Supplement intake  Physical Signs/Symptoms Outcomes: Chewing or swallowing, Biochemical data, Meal time behavior, Weight, GI status    Discharge Planning:     Too soon to determine     Electronically signed by Ayad Rodriguez on 9/21/2021 at 2:53 PM    Contact: [phone] or via [employer]

## 2021-09-21 NOTE — PROGRESS NOTES
SPEECH LANGUAGE PATHOLOGY DYSPHAGIA TREATMENT  Patient: Iam Hogn (35 y.o. female)  Date: 9/21/2021  Diagnosis: New onset seizure ([facility] [address] [R56.9]  Sepsis ([facility] [address] [A41.9] New onset seizure ([facility] [address]       Precautions: Aspiration      ASSESSMENT:  Nsg reports patient tolerating SBS/thin liquid diet without s/sx aspiration or penetration; patient's family bringing in SBS consistencies and feeding patient all meals. Upon entering room, patient sleeping soundly, aroused with verbal cues. Patient's daughter present in room, reports no s/sx aspiration/penetration observed. Patient's daughter reports patient more willing to ingest items brought in by family than hospital food, but insists want to remain in compliance with diet recs and precautions. Patient with flat affect, agreeable to po trials. Administered trials thin, thin/SBS mixture, SBS, EC. Patient requires assistance with feeding. Overall slowness appreciated. Oral phase otherwise WFL. Timely swallow initiation; upon palpation, HLE appears adequate. No clinical indicators of asp/pen noted. PLAN:  Recommendations and Planned Interventions:  Patient appears appropriate for diet upgrade to Easy to Chew, continue with thin liquids. STRICT aspiration precautions. Patient continues to benefit from skilled intervention to address the above impairments. Continue treatment per established plan of care. Discharge Recommendations: To Be Determined     SUBJECTIVE:   Patient agreeable to tx.     OBJECTIVE:     CXR Results  (Last 48 hours)      None          CT Results  (Last 48 hours)      None           Cognitive and Communication Status:  Neurologic State: Alert, Eyes open to voice (flat affect)  Orientation Level: Oriented to person  Cognition: Appropriate for age attention/concentration, Follows commands, Appropriate safety awareness        Safety/Judgement: Decreased awareness of need for safety                     Pain:  Pain Scale 1: Numeric (0 - 10)  Pain Intensity 1: 0       After treatment:   Patient left in no apparent distress in bed, Call bell within reach, Nursing notified, and Caregiver / family present    COMMUNICATION/EDUCATION:   Patient was educated regarding her deficit(s) of dysphagia, swallow safety precautions, diet recs and POC. She demonstrated Good understanding as evidenced by engagement, acknowledgement. The patient's plan of care including recommendations, planned interventions, and recommended diet changes were discussed with: Registered nurse. Hazel Cm M.S. CCC-SLP             Problem: Dysphagia (Adult)  Goal: *Acute Goals and Plan of Care (Insert Text)  Description: Speech Therapy Goals  Initiated 9/10/2021  -Patient will participate in modified barium swallow study within 7 day(s), pending pt's progress and tolerance. [ ] Not met  [ ]  MET   [ x] Progressing  [ ] Discontinue  -Patient will tolerate SBS diet with thin liquids without signs/symptoms of aspiration given no cues within 7 day(s). [ ] Not met  [ ]  MET   [ x] Progressing  [ ] Discontinue  -Patient will demonstrate understanding of swallow safety precautions and aspiration precautions, diet recs with no cues within 7 day(s).         [ ] Not met  [ ]  MET   [ x] Progressing  [ ] Discontinue      Outcome: Progressing Towards Goal

## 2021-09-21 NOTE — PROGRESS NOTES
CM followed up with the patient's family for a meeting at 12:45. Met with patient's daughter, Gemma Xie, and the patient's sister, Simi Bruno. We discussed [facility] and they have decided they do not want the patient to go there and instead remain in the hospital until she is off PPN and completed the IV acyclovir with hopes of going to [address] [place] [address] for Inpatient Rehab. CM spoke with Gemma Palomino at [address] [place] [address] and she confirmed she has to be off the PPN and IV acyclovir and needs to have the LP prior to coming to their facility. They wanted to know the plan for the LP and when it would be done so they can have an idea when to initiate authorization. CM will relay to provider and inquire about the LP. DC plan: [facility] pending discontinuation of PPN, completion of IV acyclovir, LP performed, and insurance auth.

## 2021-09-22 LAB
GLUCOSE BLD STRIP.AUTO-MCNC: 283 MG/DL (ref 65–117)
GLUCOSE BLD STRIP.AUTO-MCNC: 291 MG/DL (ref 65–117)
GLUCOSE BLD STRIP.AUTO-MCNC: 317 MG/DL (ref 65–117)
PERFORMED BY, TECHID: ABNORMAL
SARS-COV-2, COV2: NORMAL

## 2021-09-22 PROCEDURE — 74011250637 HC RX REV CODE- 250/637: Performed by: INTERNAL MEDICINE

## 2021-09-22 PROCEDURE — 74011250636 HC RX REV CODE- 250/636: Performed by: INTERNAL MEDICINE

## 2021-09-22 PROCEDURE — 74011250637 HC RX REV CODE- 250/637: Performed by: PHYSICIAN ASSISTANT

## 2021-09-22 PROCEDURE — 97530 THERAPEUTIC ACTIVITIES: CPT

## 2021-09-22 PROCEDURE — U0005 INFEC AGEN DETEC AMPLI PROBE: HCPCS

## 2021-09-22 PROCEDURE — 99232 SBSQ HOSP IP/OBS MODERATE 35: CPT | Performed by: INTERNAL MEDICINE

## 2021-09-22 PROCEDURE — 74011250636 HC RX REV CODE- 250/636: Performed by: HOSPITALIST

## 2021-09-22 PROCEDURE — 82962 GLUCOSE BLOOD TEST: CPT

## 2021-09-22 PROCEDURE — 74011000250 HC RX REV CODE- 250: Performed by: HOSPITALIST

## 2021-09-22 PROCEDURE — 65270000029 HC RM PRIVATE

## 2021-09-22 PROCEDURE — 74011250636 HC RX REV CODE- 250/636: Performed by: STUDENT IN AN ORGANIZED HEALTH CARE EDUCATION/TRAINING PROGRAM

## 2021-09-22 PROCEDURE — 74011636637 HC RX REV CODE- 636/637: Performed by: INTERNAL MEDICINE

## 2021-09-22 PROCEDURE — 74011250637 HC RX REV CODE- 250/637: Performed by: STUDENT IN AN ORGANIZED HEALTH CARE EDUCATION/TRAINING PROGRAM

## 2021-09-22 RX ADMIN — SODIUM CHLORIDE 900 MG: 9 INJECTION, SOLUTION INTRAVENOUS at 22:27

## 2021-09-22 RX ADMIN — NYSTATIN 500000 UNITS: 100000 SUSPENSION ORAL at 18:00

## 2021-09-22 RX ADMIN — APIXABAN 5 MG: 5 TABLET, FILM COATED ORAL at 08:50

## 2021-09-22 RX ADMIN — DEXAMETHASONE SODIUM PHOSPHATE 2 MG: 4 INJECTION, SOLUTION INTRA-ARTICULAR; INTRALESIONAL; INTRAMUSCULAR; INTRAVENOUS; SOFT TISSUE at 05:15

## 2021-09-22 RX ADMIN — NYSTATIN 500000 UNITS: 100000 SUSPENSION ORAL at 22:27

## 2021-09-22 RX ADMIN — DEXAMETHASONE SODIUM PHOSPHATE 2 MG: 4 INJECTION, SOLUTION INTRA-ARTICULAR; INTRALESIONAL; INTRAMUSCULAR; INTRAVENOUS; SOFT TISSUE at 14:17

## 2021-09-22 RX ADMIN — LEVETIRACETAM 2000 MG: 100 INJECTION, SOLUTION INTRAVENOUS at 11:51

## 2021-09-22 RX ADMIN — INSULIN LISPRO 7 UNITS: 100 INJECTION, SOLUTION INTRAVENOUS; SUBCUTANEOUS at 11:51

## 2021-09-22 RX ADMIN — SODIUM CHLORIDE 900 MG: 9 INJECTION, SOLUTION INTRAVENOUS at 14:17

## 2021-09-22 RX ADMIN — NYSTATIN 500000 UNITS: 100000 SUSPENSION ORAL at 08:50

## 2021-09-22 RX ADMIN — DEXAMETHASONE SODIUM PHOSPHATE 2 MG: 4 INJECTION, SOLUTION INTRA-ARTICULAR; INTRALESIONAL; INTRAMUSCULAR; INTRAVENOUS; SOFT TISSUE at 22:27

## 2021-09-22 RX ADMIN — INSULIN LISPRO 5 UNITS: 100 INJECTION, SOLUTION INTRAVENOUS; SUBCUTANEOUS at 17:00

## 2021-09-22 RX ADMIN — AMLODIPINE BESYLATE 5 MG: 5 TABLET ORAL at 08:50

## 2021-09-22 RX ADMIN — SODIUM CHLORIDE 75 ML/HR: 4.5 INJECTION, SOLUTION INTRAVENOUS at 23:00

## 2021-09-22 RX ADMIN — INSULIN LISPRO 5 UNITS: 100 INJECTION, SOLUTION INTRAVENOUS; SUBCUTANEOUS at 08:50

## 2021-09-22 RX ADMIN — SODIUM CHLORIDE 900 MG: 9 INJECTION, SOLUTION INTRAVENOUS at 05:15

## 2021-09-22 RX ADMIN — VALPROIC ACID 500 MG: 250 CAPSULE, LIQUID FILLED ORAL at 16:59

## 2021-09-22 RX ADMIN — VALPROIC ACID 500 MG: 250 CAPSULE, LIQUID FILLED ORAL at 08:50

## 2021-09-22 RX ADMIN — NYSTATIN 500000 UNITS: 100000 SUSPENSION ORAL at 17:00

## 2021-09-22 RX ADMIN — APIXABAN 5 MG: 5 TABLET, FILM COATED ORAL at 22:35

## 2021-09-22 RX ADMIN — VALPROIC ACID 500 MG: 250 CAPSULE, LIQUID FILLED ORAL at 22:00

## 2021-09-22 RX ADMIN — LEVETIRACETAM 2000 MG: 100 INJECTION, SOLUTION INTRAVENOUS at 22:27

## 2021-09-22 RX ADMIN — CITALOPRAM HYDROBROMIDE 20 MG: 20 TABLET ORAL at 08:50

## 2021-09-22 RX ADMIN — PANTOPRAZOLE SODIUM 40 MG: 40 TABLET, DELAYED RELEASE ORAL at 05:15

## 2021-09-22 RX ADMIN — NYSTATIN 500000 UNITS: 100000 SUSPENSION ORAL at 14:17

## 2021-09-22 NOTE — PROGRESS NOTES
Problem: Falls - Risk of  Goal: *Absence of Falls  Description: Document Ricarda Chavarria Fall Risk and appropriate interventions in the flowsheet. Outcome: Progressing Towards Goal  Note: Fall Risk Interventions:  Mobility Interventions: Bed/chair exit alarm, Patient to call before getting OOB    Mentation Interventions: Bed/chair exit alarm, Increase mobility    Medication Interventions: Bed/chair exit alarm, Patient to call before getting OOB    Elimination Interventions: Bed/chair exit alarm, Call light in reach    History of Falls Interventions: Bed/chair exit alarm, Room close to nurse's station         Problem: Patient Education: Go to Patient Education Activity  Goal: Patient/Family Education  Outcome: Progressing Towards Goal     Problem: Risk for Spread of Infection  Goal: Prevent transmission of infectious organism to others  Description: Prevent the transmission of infectious organisms to other patients, staff members, and visitors. Outcome: Progressing Towards Goal     Problem: Patient Education:  Go to Education Activity  Goal: Patient/Family Education  Outcome: Progressing Towards Goal     Problem: Pressure Injury - Risk of  Goal: *Prevention of pressure injury  Description: Document Harish Scale and appropriate interventions in the flowsheet.   Outcome: Progressing Towards Goal  Note: Pressure Injury Interventions:  Sensory Interventions: Float heels, Keep linens dry and wrinkle-free    Moisture Interventions: Absorbent underpads, Apply protective barrier, creams and emollients    Activity Interventions: PT/OT evaluation    Mobility Interventions: Float heels    Nutrition Interventions: Document food/fluid/supplement intake    Friction and Shear Interventions: Apply protective barrier, creams and emollients                Problem: Patient Education: Go to Patient Education Activity  Goal: Patient/Family Education  Outcome: Progressing Towards Goal     Problem: Dysphagia (Adult)  Goal: *Speech Goal: (INSERT TEXT)  Description: Speech Therapy Goals  Initiated 9/6/2021  -Patient will tolerate easy to chew diet with thin liquids without signs/symptoms of aspiration given no cues within 5 day(s). [X] Not met  [ ]  MET   [ ] Progressing  [ ] Discontinue  -Patient will demonstrate understanding of swallow safety precautions and aspiration precautions, diet recs with no cues within 5 day(s).         [X] Not met  [ ]  MET   [ ] Progressing  [ ] Discontinue  Outcome: Progressing Towards Goal     Problem: Patient Education: Go to Patient Education Activity  Goal: Patient/Family Education  Outcome: Progressing Towards Goal     Problem: Patient Education: Go to Patient Education Activity  Goal: Patient/Family Education  Outcome: Progressing Towards Goal     Problem: Patient Education: Go to Patient Education Activity  Goal: Patient/Family Education  Outcome: Progressing Towards Goal     Problem: Patient Education: Go to Patient Education Activity  Goal: Patient/Family Education  Outcome: Progressing Towards Goal

## 2021-09-22 NOTE — PROGRESS NOTES
Hospitalist Progress Note         Stepania COCO [place], [place]    Daily Progress Note: 9/22/2021  Hospital course: This is a 80-year-old female admitted on 9/3/2021 with a history of breast cancer, diabetes mellitus, type II, anxiety, depression who initially presented with seizure-like activity and found on the floor by family. Patient was found to be febrile and CT scan of the head head was performed with no acute process. 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 Patient recurrent seizures and was started on Keppra as well as immediately started on acyclovir. Numerous CT and MRI of the brain imaging performed with CT scans of the head initially without acute process. Repeat revealed right temporal and along the sylvian fissure without hemorrhage and 2 to 3 mm of right to left midline shift due to expanding edema. Serial CTs of the head were performed although no significant changes on 9/11/2021 at which time a CT of the head revealed findings suspicious for right middle cerebral artery thrombosis with associated ischemia in the distribution of that artery. Initial MRI on 9/3/2021 revealed abnormal high T2 signal in the right temporal and insular cortex. Findings suspicious for herpes encephalitis. There was also subtle increased T2 signal evident in the left temporal uncus. Repeats MRI of the brain revealed progression of the findings of the right cerebral hemisphere extending into the left frontal lobe. Most consistent with worsening encephalitis. MRA of the brain on 9/13/2021 revealed no major arterial occlusion or stenosis demonstrated. Infectious disease consultation, Dr. Jerri Nathan.  Neurological consultation. Patient remains on Keppra and valproic acid for seizure prophylaxis. Patient also found to have an upper extremity DVT and is currently on Eliquis. Psych consult ordered for worsening depression.  Repeat CT of the Head with no change. Valproic levels are not therapeutic and increasing valproic acidosis. EEG showed generalized slowing in the background with disorganization. There were episodic focal slowing in the right temporal occipital regions. Occasional sharp waves in the right frontal region. No obvious seizure-like activity. S/P PPN, currently tolerating PO diet. Subjective:   Subjective   Patient seen in f/u alert and oriented lying in bed  No complaints voiced on examination  No overnight events reported  No acute distress noted on examination  Patient is calm and cooperative    Review of Systems:   Review of Systems   Constitutional: Negative. HENT: Negative. Eyes: Negative for blurred vision and double vision. Respiratory: Negative for cough, sputum production, shortness of breath and wheezing. Cardiovascular: Negative. Gastrointestinal: Negative for abdominal pain, nausea and vomiting. Genitourinary: Negative. Musculoskeletal: Negative. Skin: Negative. Neurological: Negative for dizziness and headaches. Endo/Heme/Allergies: Negative. Psychiatric/Behavioral: Negative. Objective:   Objective      Vitals:  Patient Vitals for the past 12 hrs:   Temp Pulse Resp BP SpO2   09/22/21 0732 97.6 °F (36.4 °C) 65 18 (!) 140/71 97 %   09/22/21 0508 98.1 °F (36.7 °C) 62 18 (!) 152/72 96 %        Physical Exam:  Physical Exam  Vitals and nursing note reviewed. Constitutional:       Appearance: Normal appearance. HENT:      Head: Normocephalic. Eyes:      Conjunctiva/sclera: Conjunctivae normal.   Cardiovascular:      Rate and Rhythm: Normal rate and regular rhythm. Pulses: Normal pulses. Heart sounds: Normal heart sounds. Comments: + right chest wall power line   Pulmonary:      Effort: Pulmonary effort is normal.      Breath sounds: Normal breath sounds. Abdominal:      General: Bowel sounds are normal.      Palpations: Abdomen is soft. Musculoskeletal:      Cervical back: Normal range of motion. Comments: 3/5 power upper extremities   Skin:     General: Skin is warm and dry. Capillary Refill: Capillary refill takes less than 2 seconds. Neurological:      Mental Status: She is alert. Motor: Weakness present.           Lab Results:  Recent Results (from the past 24 hour(s))   GLUCOSE, POC    Collection Time: 09/21/21 11:11 AM   Result Value Ref Range    Glucose (POC) 282 (H) 65 - 117 mg/dL    Performed by [email or web address]re, POC    Collection Time: 09/21/21  5:01 PM   Result Value Ref Range    Glucose (POC) 284 (H) 65 - 117 mg/dL    Performed by [id number], POC    Collection Time: 09/21/21  9:32 PM   Result Value Ref Range    Glucose (POC) 285 (H) 65 - 117 mg/dL    Performed by Shawanda Leal (Float [place])    GLUCOSE, POC    Collection Time: 09/21/21 10:26 PM   Result Value Ref Range    Glucose (POC) 299 (H) 65 - 117 mg/dL    Performed by [address] [place] [address] Drive, POC    Collection Time: 09/22/21  7:38 AM   Result Value Ref Range    Glucose (POC) 291 (H) 65 - 117 mg/dL    Performed by Zane Varghese           Diagnostic Images:    [address] [facility] WO 9/3  [address]     14 [facility] WO 9/9  Right temporal and insular region edema     CT WO 9/10  Possible RMCA ischemia with possible RMCA occlusion     CT WO 9/11  No interval changes from scan on 9/10     CT WO 9/15  No interval changes or worsening of frontotemporal edema     MRI Brain [facility] COGNITIVE DISORDERS 9/3  T2/FLAIR hyperintense signal in the bilateral temporal regions more so in the right temporal and insular regions concerning for HSV encephalitis or possibly limbic encephalitis     MRI Brain [facility] COGNITIVE DISORDERS 9/8  Interval progression of T2/FLAIR hyperintense signal change in the right cerebral and left frontal region concerning for worsening encephalitis     MRA Head WO 9/13  No occlusions, dissections, significant stenosis, pseudoaneurysms or aneurysms in the intracranial arterial system     EEG 9/9  Generalized slowing of the background  Sharp discharges in the right frontal region     EEG 9/16  Mild generalized encephalopathy  Occasional right frontal sharp discharges  During photic stimulation, bilateral frontal central spike and wave discharges  No seizures     Valproic Acid Levels  44 (9/12), 31 (9/16)     CSF Studies   Positive: , , Protein 80, HSV-1 DNA  Negative/WNL: Glucose, HSV-2 DNA, Viral Cx      Labs  Positive: HSV 1 IgG Ab  Negative: HIV 1/2, RPR     Current Medications:    Current Facility-Administered Medications:     TPN ADULT - CENTRAL AA 5% D20% W/ CA + ELECTROLYTES, , IntraVENous, CONTINUOUS, Ole, Jose Pae, NP, Last Rate: 83 mL/hr at 09/21/21 2148, New Bag at 09/21/21 2148    valproic acid (DEPAKENE) capsule 500 mg, 500 mg, Oral, TID, Glenda Whiteside MD, 500 mg at 09/22/21 0850    dexamethasone (DECADRON) 4 mg/mL injection 2 mg, 2 mg, IntraVENous, Q8H, Amber Restrepo MD, 2 mg at 09/22/21 0515    pantoprazole (PROTONIX) tablet 40 mg, 40 mg, Oral, ACB, Anthony Sotelo PA-C, 40 mg at 09/22/21 0515    0.45% sodium chloride infusion, 75 mL/hr, IntraVENous, CONTINUOUS, Alexx Alvarez MD, Last Rate: 75 mL/hr at 09/21/21 1507, 75 mL/hr at 09/21/21 1507    citalopram (CELEXA) tablet 20 mg, 20 mg, Oral, DAILY, Laura Blanco MD, 20 mg at 09/22/21 0850    nystatin (MYCOSTATIN) 100,000 unit/mL oral suspension 500,000 Units, 500,000 Units, Oral, QID, Anastacio Kincaid MD, 500,000 Units at 09/22/21 0850    apixaban (ELIQUIS) tablet 5 mg, 5 mg, Oral, BID, Laura Blanco MD, 5 mg at 09/22/21 0850    acyclovir (ZOVIRAX) 900 mg in 0.9% sodium chloride 250 mL IVPB, 15 mg/kg (Ideal), IntraVENous, Q8H, Debra Serrato MD, Last Rate: 250 mL/hr at 09/22/21 0515, 900 mg at 09/22/21 0515    levETIRAcetam (KEPPRA) 2,000 mg in 0.9% sodium chloride 250 mL IVPB, 2,000 mg, IntraVENous, Q12H, Reno Collins MD, 2,000 mg at 09/21/21 2140    amLODIPine (NORVASC) tablet 5 mg, 5 mg, Oral, Luis Angel Armstrong MD, 5 mg at 09/22/21 0850    hydrALAZINE (APRESOLINE) 20 mg/mL injection 10 mg, 10 mg, IntraVENous, Q6H PRN, Kim Garcia MD, 10 mg at 09/12/21 2202    insulin lispro (HUMALOG) injection, , SubCUTAneous, AC&HS, Hannah Forbes MD, 5 Units at 09/22/21 0850    acetaminophen (TYLENOL) tablet 650 mg, 650 mg, Oral, Q6H PRN, 650 mg at 09/06/21 1236 **OR** acetaminophen (TYLENOL) suppository 650 mg, 650 mg, Rectal, Q6H PRN, Rachele Barnhart MD    polyethylene glycol (MIRALAX) packet 17 g, 17 g, Oral, DAILY PRN, Rachele Barnhart MD    promethazine (PHENERGAN) tablet 12.5 mg, 12.5 mg, Oral, Q6H PRN **OR** ondansetron (ZOFRAN) injection 4 mg, 4 mg, IntraVENous, Q6H PRN, Rachele Barnhart MD    glucose chewable tablet 16 g, 4 Tablet, Oral, PRN, Kana Baker MD    dextrose (D50W) injection syrg 12.5-25 g, 25-50 mL, IntraVENous, PRN, Kana Baker MD, 25 g at 09/04/21 0409    glucagon (GLUCAGEN) injection 1 mg, 1 mg, IntraMUSCular, PRN, Kana Baker MD       ASSESSMENT:     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 Infectious disease and neurology following. continue acyclovir for a total of 21 days, day # 19/21. CT head with no change. Plan for long term PO management for hsv with acyclovir.     2. INDIA: resolved with gentle IV hydration.      3.  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 There were episodic focal slowing in the right temporal occipital regions. Occasional sharp waves in the right frontal region. Valproic acid levels low and increased Depakene     4.  Generalized weakness of the right and left upper extremity: MRA of the brain revealed no major arterial occlusions. Suspect this is related to the overall edema, significant improvement noted. Continue PT/OT as ordered     5. DVT: thrombosis of the left basilic vein. Continue Eliquis     6. Moderate protein malnutrition: RD following. Patient tolerating diet well, eating at least 50% of meals per staff. Continue dietary supplements     7.  Essential hypertension: bp remains at goal per JNC 8 guidelines. Continue Norvasc     8. Depression/anxiety: continue Celexa. Psych following     9. Type 2 DM: ac/hs accu check, med dose SSI. Full Code  Dvt Prophylaxis: eliquis  GI Prophylaxis: protonix  Disposition: Completion of treatment. [facility]ending [facility] acceptance after completion of IV antbx tx      Above treatment plan reviewed and discussed with patient and nurse in detail at bedside, all questions answered. ID reports updating daughter on treatment plan. Care Plan discussed with: Interdisciplinary team    Total time spent with patient: >35 minutes.     Varun Augustin NP

## 2021-09-22 NOTE — PROGRESS NOTES
OCCUPATIONAL THERAPY TREATMENT  Patient: Gisela Fonseca (84 y.o. female)  Date: 9/22/2021  Diagnosis: New onset seizure ([facility] [R56.9]  Sepsis ([facility] [A41.9] New onset seizure ([facility]       Precautions:    Chart, occupational therapy assessment, plan of care, and goals were reviewed. ASSESSMENT  Patient continues with skilled OT services and is progressing towards goals. Pt. Received semi-supine in bed and agreeable to therapy session. Pt noted with decreased command following throughout session and occasionally required manual cues to initiate tasks ( ADL's and functional mobility, and transfers). Flat affect noted throughout session as well. Pt. Performed bed mobility with Min A- pt demonstrates decreased initiation to tasks despite increased directional and verbal cues to complete bed mobility however pt not able to follow basic  commands at this time. Pt. Demonstrated good sitting balance at EOB. Sit-> stand performed with CGA, functional ambulation with Min A for safety and HHA. Pt. Able tolerate increased functional ambulation in hallway with HHA and pushing IV with assistance from therapist. Pt.returned seated in recliner and performed UE therex- pt demonstrated decreased command following requiring assistance with continuing UE therex. Pt. Left reclined in chair with needs met and call bell within reach. REC, IRF when medically appropriate for discharge. Other factors to consider for discharge: PLOF, time since on set         PLAN :  Patient continues to benefit from skilled intervention to address the above impairments. Continue treatment per established plan of care. to address goals.     Recommendation for discharge: (in order for the patient to meet his/her long term goals)  Therapy 3 hours per day 5-7 days per week    This discharge recommendation:  Has been made in collaboration with the attending provider and/or case management    IF patient discharges home will need the following DME: [facility] SUBJECTIVE:   Patient agreeable to therapy session    OBJECTIVE DATA SUMMARY:   Cognitive/Behavioral Status:  Neurologic State: Alert  Orientation Level: Disoriented to place; Disoriented to situation;Disoriented to time;Oriented to person  Cognition: Poor safety awareness;Decreased command following;Decreased attention/concentration      Functional Mobility and Transfers for ADLs:  Bed Mobility:  Supine to Sit: Minimum assistance  Scooting: Minimum assistance    Transfers:  Sit to Stand: Contact guard assistance  Functional Transfers  Bathroom Mobility: Minimum assistance       Balance:  Sitting: Intact; With support  Sitting - Static: Good (unsupported)  Sitting - Dynamic: Good (unsupported)  Standing: Impaired;Pull to stand  Standing - Static: Constant support;Good  Standing - Dynamic : Constant support; Fair      Therapeutic Exercises: lizabeth UE's  Exercise Sets Reps AROM AAROM PROM Self PROM Comments   Shoulder flex/ext 1 10 [] [x] [] []    Elbow flex/ext 1 10 [x] [] [] []    Wrist flex/ext   [] [] [] []       [] [] [] []          Pain:  0/ 10 pain reported    Activity Tolerance:   Fair  Please refer to the flowsheet for vital signs taken during this treatment. After treatment patient left in no apparent distress:   Sitting in chair, Heels elevated for pressure relief, Call bell within reach, and Caregiver / family present    COMMUNICATION/COLLABORATION:   The patients plan of care was discussed with: Physical therapy assistant. Co-tx with PTA for increased assistance with transfers and bed mobility.      Carrie Galindo  Time Calculation: 26 mins    Problem: Self Care Deficits Care Plan (Adult)  Goal: *Acute Goals and Plan of Care (Insert Text)  Description: Pt will be Mod I sup <> sit in prep for EOB ADLs  Pt will be Mod I grooming standing sink side LRAD  Pt will be IND dressing sitting EOB/long sit  Pt will be Mod I sit <>  prep for toileting LRAD  Pt will be IND toileting/toilet transfer/cloth mgmt LRAD  Pt will be IND following UE HEP in prep for self care tasks   Outcome: Progressing Towards Goal

## 2021-09-22 NOTE — PROGRESS NOTES
PHYSICAL THERAPY TREATMENT  Patient: Nissa Blanton (41 y.o. female)  Date: 9/22/2021  Diagnosis: New onset seizure ([address] [address] [address] [R56.9]  Sepsis ([facility] [address] [A41.9] New onset seizure ([facility] [address] [address]       Precautions:    Chart, physical therapy assessment, plan of care and goals were reviewed. ASSESSMENT  Patient continues with skilled PT services and is progressing towards goals. Pt semi supine in bed upon arrival and agreeable to session. Pt noted with decreased command following throughout session and occasionally required manual cues to initiate tasks ( ADL's and functional mobility, and transfers). Flat affect noted throughout session as well. Pt completed bed mobility with min A, noted decreased initiated to tasks despite increased direction and vc to completed sup>sit. Patient having difficulty following basic commands. Demonstrated good sitting balance at EOB. STS performed with CGA. Able to increase ambulation distance with HHA x 2 and min A. Pt gait was slow and slightly  unsteady but had no LOB. Pt returned to room and sat in recliner. Performed seated therex, see details below. Pt left seated in recliner with call bell in reach and needs met, visitor at bedside. Current Level of Function Impacting Discharge (mobility/balance): assistance required for task initiation and mobility    Other factors to consider for discharge: PLOf, time since onset, severity of deficits, decreased command following         PLAN :  Patient continues to benefit from skilled intervention to address the above impairments. Continue treatment per established plan of care. to address goals.     Recommendation for discharge: (in order for the patient to meet his/her long term goals)  [address] [facility],[address]     This discharge recommendation:  Has been made in collaboration with the attending provider and/or case management    IF patient discharges home will need the following DME: to be determined (TBD)       SUBJECTIVE:   Patient stated we can walk, but I don't have to go to the bathroom.     OBJECTIVE DATA SUMMARY:   Critical Behavior:  Neurologic State: Alert  Orientation Level: Disoriented to place, Disoriented to situation, Disoriented to time, Oriented to person  Cognition: Poor safety awareness, Decreased command following, Decreased attention/concentration  Safety/Judgement: Decreased awareness of need for safety    Functional Mobility Training:  Bed Mobility:  Supine to Sit: Minimum assistance  Scooting: Minimum assistance    Transfers:  Sit to Stand: Contact guard assistance  Stand to Sit: Contact guard assistance    Balance:  Sitting: Intact; With support  Sitting - Static: Good (unsupported)  Sitting - Dynamic: Good (unsupported)  Standing: Impaired;Pull to stand  Standing - Static: Constant support;Good  Standing - Dynamic : Constant support; Fair    Ambulation/Gait Training:  Distance (ft): 80 Feet (ft)  Assistive Device: Gait belt; Other (comment) (HHAx2)  Ambulation - Level of Assistance: Minimal assistance;Assist x2  Base of Support: Narrowed  Speed/Elda: Slow  Step Length: Left shortened;Right shortened    Therapeutic Exercises:   1 x 12 LAQ  Requires constant vc and tactile cues to continue movements due to decreased attention/concentration    Pain Ratin/10    Activity Tolerance:   Fair and requires rest breaks  Please refer to the flowsheet for vital signs taken during this treatment. After treatment patient left in no apparent distress:   Sitting in chair, Call bell within reach, and Caregiver / family present    COMMUNICATION/COLLABORATION:   The patients plan of care was discussed with: Occupational therapy assistant and Registered nurse.      OT/PT sessions occurred together for increased patient and clinician safety    Problem: Mobility Impaired (Adult and Pediatric)  Goal: *Acute Goals and Plan of Care (Insert Text)  Description: Patient will move from supine to sit and sit to supine , scoot up and down, and roll side to side in bed with minimal assistance/contact guard assist within 7 day(s). Patient will transfer from bed to chair and chair to bed with minimal assistance/contact guard assist using the least restrictive device within 7 day(s). Patient will improve static sitting/standing balance to minimal assistance within 1 week(s). Patient will ambulate 10 feet with minimal assistance with least restrictive device within 1 weeks.        Outcome: Progressing Towards Goal       Thong Greene PTA   Time Calculation: 25 mins

## 2021-09-22 NOTE — PROGRESS NOTES
Infectious Disease Progress Note         Subjective:   Pt seen and examined at bedside. Stable, no acute events since last seen. Appeared lethargic during my assessment   Objective:   Physical Exam:     Visit Vitals  /61 (BP 1 Location: Left upper arm, BP Patient Position: At rest)   Pulse 75   Temp 97.8 °F (36.6 °C)   Resp 18   Ht 5' 6\" (1.676 m)   Wt 180 lb 9.6 oz (81.9 kg)   SpO2 97%   BMI 29.15 kg/m²    O2 Flow Rate (L/min): 2 l/min O2 Device: None (Room air)    Temp (24hrs), Av.9 °F (36.6 °C), Min:97.6 °F (36.4 °C), Max:98.1 °F (36.7 °C)    701 - 1900  In: 550 [P.O.:550]  Out: 500 [Urine:500]   1901 -  0700  In: 4879 [P.O.:500; I.V.:4382]  Out: 1250 [Urine:1250]    General: NAD, following commands appropriately   HEENT: LAQUITA, Moist mucosa  Lungs: CTA b/l, no wheeze/rhonchi   Heart: S1S2+, RRR, no murmur  Abdo: Soft, NT, ND, +BS   Exts: no new weakness, no edema   Skin: No chronic wounds or ulcers, + right chest wall power line     Data Review:       Recent Days:  Recent Labs     21  0705 21  0725   WBC 6.9 7.8   HGB 13.4 13.1   HCT 40.9 40.4    199     Recent Labs     21  0705 21  0725   BUN 20 26*   CREA 0.73 0.63     Lab Results   Component Value Date/Time    C-Reactive protein 2.95 (H) 2021 07:28 AM        Microbiology     Results     ** No results found for the last 336 hours. **           Diagnostics   CXR Results  (Last 48 hours)    None         Assessment/Plan     1. 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  Unfortunately she remains at risk for HSV encephalitis given + serology, I plan on long term maintenance therapy w Valacyclovir after completion of IV Acyclovir        Informed daughter of plan and she wants to hear from neurology, Dr Apple Nelson informed     2. INDIA: Will continue to monitor renal function while on acyclovir     3. Leukocytosis: Normalized off steroid therapy, afebrile      Will continue to monitor off antibiotics      4. Seizure on admission: Due to # 1. On Keppra, and Valproic    5. Left sided weakness: Due to # 1. Improved, being followed by PT     6. DVT of axillary vein/Thrombosis of left basilic vein.  Anticoagulated on low dose Eliquis     Dispo: Plan for d/c to rehab later this wk       Myah Morillo MD    9/22/2021

## 2021-09-22 NOTE — PROGRESS NOTES
Bedside and Verbal shift change report given to Lucian Davies RN (oncoming nurse) by Tono Abdalla RN (offgoing nurse). Report included the following information SBAR, Kardex, Intake/Output, Recent Results, Med Rec Status and Cardiac Rhythm NSR.

## 2021-09-22 NOTE — PROGRESS NOTES
ENOC was notified that there is no indication for a repeat LP and her PPN has been stopped. She should be ready for discharge by Friday once she has completed her IV acyclovir. ENOC reached out to Fithian at [address], [phone] to request she initiate authorization. She stated she would start it now. She also will need a Covid PCR test within 7 days of admission. Attending has been notified and test has been ordered.     DC plan: [facility] Friday/Saturday pending insurance auth and negative covid test.

## 2021-09-23 LAB
GLUCOSE BLD STRIP.AUTO-MCNC: 163 MG/DL (ref 65–117)
GLUCOSE BLD STRIP.AUTO-MCNC: 164 MG/DL (ref 65–117)
GLUCOSE BLD STRIP.AUTO-MCNC: 189 MG/DL (ref 65–117)
GLUCOSE BLD STRIP.AUTO-MCNC: 197 MG/DL (ref 65–117)
GLUCOSE BLD STRIP.AUTO-MCNC: 257 MG/DL (ref 65–117)
PERFORMED BY, TECHID: ABNORMAL
SARS-COV-2, XPLCVT: NOT DETECTED
SOURCE, COVRS: NORMAL

## 2021-09-23 PROCEDURE — 74011636637 HC RX REV CODE- 636/637: Performed by: INTERNAL MEDICINE

## 2021-09-23 PROCEDURE — 82962 GLUCOSE BLOOD TEST: CPT

## 2021-09-23 PROCEDURE — 74011250636 HC RX REV CODE- 250/636: Performed by: HOSPITALIST

## 2021-09-23 PROCEDURE — 99232 SBSQ HOSP IP/OBS MODERATE 35: CPT | Performed by: INTERNAL MEDICINE

## 2021-09-23 PROCEDURE — 74011250637 HC RX REV CODE- 250/637: Performed by: INTERNAL MEDICINE

## 2021-09-23 PROCEDURE — 74011250636 HC RX REV CODE- 250/636: Performed by: STUDENT IN AN ORGANIZED HEALTH CARE EDUCATION/TRAINING PROGRAM

## 2021-09-23 PROCEDURE — 74011250637 HC RX REV CODE- 250/637: Performed by: STUDENT IN AN ORGANIZED HEALTH CARE EDUCATION/TRAINING PROGRAM

## 2021-09-23 PROCEDURE — 97530 THERAPEUTIC ACTIVITIES: CPT

## 2021-09-23 PROCEDURE — 65270000029 HC RM PRIVATE

## 2021-09-23 PROCEDURE — 74011250637 HC RX REV CODE- 250/637: Performed by: PHYSICIAN ASSISTANT

## 2021-09-23 PROCEDURE — 74011250636 HC RX REV CODE- 250/636: Performed by: INTERNAL MEDICINE

## 2021-09-23 RX ADMIN — NYSTATIN 500000 UNITS: 100000 SUSPENSION ORAL at 12:32

## 2021-09-23 RX ADMIN — NYSTATIN 500000 UNITS: 100000 SUSPENSION ORAL at 21:16

## 2021-09-23 RX ADMIN — VALPROIC ACID 500 MG: 250 CAPSULE, LIQUID FILLED ORAL at 20:00

## 2021-09-23 RX ADMIN — SODIUM CHLORIDE 900 MG: 9 INJECTION, SOLUTION INTRAVENOUS at 15:15

## 2021-09-23 RX ADMIN — LEVETIRACETAM 2000 MG: 100 INJECTION, SOLUTION INTRAVENOUS at 09:18

## 2021-09-23 RX ADMIN — APIXABAN 5 MG: 5 TABLET, FILM COATED ORAL at 21:09

## 2021-09-23 RX ADMIN — INSULIN LISPRO 2 UNITS: 100 INJECTION, SOLUTION INTRAVENOUS; SUBCUTANEOUS at 17:40

## 2021-09-23 RX ADMIN — SODIUM CHLORIDE 900 MG: 9 INJECTION, SOLUTION INTRAVENOUS at 22:54

## 2021-09-23 RX ADMIN — PANTOPRAZOLE SODIUM 40 MG: 40 TABLET, DELAYED RELEASE ORAL at 06:41

## 2021-09-23 RX ADMIN — DEXAMETHASONE SODIUM PHOSPHATE 2 MG: 4 INJECTION, SOLUTION INTRA-ARTICULAR; INTRALESIONAL; INTRAMUSCULAR; INTRAVENOUS; SOFT TISSUE at 15:15

## 2021-09-23 RX ADMIN — VALPROIC ACID 500 MG: 250 CAPSULE, LIQUID FILLED ORAL at 12:32

## 2021-09-23 RX ADMIN — AMLODIPINE BESYLATE 5 MG: 5 TABLET ORAL at 09:18

## 2021-09-23 RX ADMIN — SODIUM CHLORIDE 900 MG: 9 INJECTION, SOLUTION INTRAVENOUS at 06:41

## 2021-09-23 RX ADMIN — DEXAMETHASONE SODIUM PHOSPHATE 2 MG: 4 INJECTION, SOLUTION INTRA-ARTICULAR; INTRALESIONAL; INTRAMUSCULAR; INTRAVENOUS; SOFT TISSUE at 22:52

## 2021-09-23 RX ADMIN — NYSTATIN 500000 UNITS: 100000 SUSPENSION ORAL at 17:40

## 2021-09-23 RX ADMIN — DEXAMETHASONE SODIUM PHOSPHATE 2 MG: 4 INJECTION, SOLUTION INTRA-ARTICULAR; INTRALESIONAL; INTRAMUSCULAR; INTRAVENOUS; SOFT TISSUE at 06:41

## 2021-09-23 RX ADMIN — INSULIN LISPRO 3 UNITS: 100 INJECTION, SOLUTION INTRAVENOUS; SUBCUTANEOUS at 00:38

## 2021-09-23 RX ADMIN — INSULIN LISPRO 2 UNITS: 100 INJECTION, SOLUTION INTRAVENOUS; SUBCUTANEOUS at 09:18

## 2021-09-23 RX ADMIN — LEVETIRACETAM 2000 MG: 100 INJECTION, SOLUTION INTRAVENOUS at 21:14

## 2021-09-23 RX ADMIN — INSULIN LISPRO 2 UNITS: 100 INJECTION, SOLUTION INTRAVENOUS; SUBCUTANEOUS at 12:28

## 2021-09-23 RX ADMIN — NYSTATIN 500000 UNITS: 100000 SUSPENSION ORAL at 09:18

## 2021-09-23 RX ADMIN — APIXABAN 5 MG: 5 TABLET, FILM COATED ORAL at 09:18

## 2021-09-23 RX ADMIN — CITALOPRAM HYDROBROMIDE 20 MG: 20 TABLET ORAL at 09:18

## 2021-09-23 NOTE — PROGRESS NOTES
OCCUPATIONAL THERAPY RE-ASSESSMENT  Patient: Nissa Blanton (88 y.o. female)  Date: 9/23/2021  Diagnosis: New onset seizure ([address] [R56.9]  Sepsis ([address] [A41.9] New onset seizure ([address]       Precautions: fall risk   Chart, occupational therapy assessment, plan of care, and goals were reviewed. ASSESSMENT  Pt is a 61 y/o F with PMH of breast cancer, HL, DM2, and anxiety/depression who presented to [facility] 9/3/21 for seizure like activity while on the floor by family. Pt was found to be febrile and CT scan of the head head was performed with no acute process. 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 Pt recurrent seizures and was started on Keppra as well as immediately started on acyclovir. Numerous CT and MRI of the brain imaging performed with CT scans of the head initially without acute process. Repeat revealed right temporal and along the sylvian fissure without hemorrhage and 2 to 3 mm of right to left midline shift due to expanding edema. Serial CTs of the head were performed although no significant changes on 9/11/2021 at which time a CT of the head revealed findings suspicious for right middle cerebral artery thrombosis with associated ischemia in the distribution of that artery. Initial MRI on 9/3/2021 revealed abnormal high T2 signal in the right temporal and insular cortex. Findings suspicious for herpes encephalitis. There was also subtle increased T2 signal evident in the left temporal uncus. Repeats MRI of the brain revealed progression of the findings of the right cerebral hemisphere extending into the left frontal lobe. Most consistent with worsening encephalitis. MRA of the brain on 9/13/2021 revealed no major arterial occlusion or stenosis demonstrated. Pt also found to have an upper extremity DVT and is currently on Eliquis. Psych consult ordered for worsening depression. Repeat CT of the Head with no change.      Pt received semi-supine in bed sleeping upon arrival, pt's sister present at bedside. Pt able to be aroused with verbal/tactile cues, oriented to person, place, disoriented to time, yet agreeable to working with OT/PT at this time co-tx for increased pt/clinician safety with OOB mobility. Pt was initially evaluated and placed on OT caseload on 9/16/21, has been seen for 4 skilled OT tx sessions since that time, now due for RA 2' to LOS. 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 No DME owned at this time. Based on current observations, pt continues to present with deficits in generalized strength/AROM, dynamic sitting balance, static/dynamic standing balance, functional activity tolerance, coordination, and cognition impacting overall performance of ADLs and functional transfers/mobility. Pt currently requires min A supine>sit to EOB able to don socks setup/supervision seated EOB with prompts. STS completed with min A using RW to/from EOB, toilet, and recliner chair today with required verbal/tactile cues for hand placement, safety and navigation of RW during functional mobility. Toileting routine completed with supervision in sitting with min A and cues for use of grab bars with transfer. Hand hygiene performed standing sink side with CGA before returning to recliner chair min A at end of today's session. Overall, pt tolerates session fair today and would benefit from continued skilled OT services to address noted deficits and maximize IND/safety with ADLs and functional transfers/mobility. OT goals and POC reviewed and continue to remain appropriate at this time. Continue to progress. OT recommending IRF at discharge once medically appropriate.      Other factors to consider for discharge: family support, DME, time since onset, severity of deficits PLAN :  Recommendations and Planned Interventions: self care training, functional mobility training, therapeutic exercise, balance training, therapeutic activities, cognitive retraining, endurance activities, patient education, and family training/education    Recommend with staff: Up to chair for meals    Recommend next OT session: ADL standing sink side LRAD    Frequency/Duration: Patient will be followed by occupational therapy therapy:  3-5x/week to address goals. Recommendation for discharge: (in order for the patient to meet his/her long term goals)  [address]     This discharge recommendation:  Has been made in collaboration with the attending provider and/or case management       SUBJECTIVE:   Patient stated I could use the bathroom.     OBJECTIVE DATA SUMMARY:   Hospital course since last seen and reason for reevaluation: Pt was initially evaluated and placed on OT caseload on 9/16/21, has been seen for 4 skilled OT tx sessions since that time, now due for RA 2' to LOS. Pt making slow steady progress towards goals currently requiring min A for bed mobility, min A functional transfers to/from EOB, chair and toilet, supervision donning socks, CGA grooming standing sink side LRAD, and setup/supervision toileting. Cognitive/Behavioral Status:  Neurologic State: Alert  Orientation Level: Oriented to person;Oriented to place  Cognition: Appropriate safety awareness    Hearing:   Auditory  Auditory Impairment: None    Range of Motion:  AROM: Generally decreased, functional    Strength:  Strength: Generally decreased, functional    Coordination:  Coordination: Generally decreased, functional  Fine Motor Skills-Upper: Comment (grossly decreased, functional)      Functional Mobility and Transfers for ADLs:  Bed Mobility:  Rolling: Contact guard assistance;Minimum assistance  Supine to Sit: Contact guard assistance;Minimum assistance  Scooting: Contact guard assistance;Minimum assistance    Transfers:  Sit to Stand: Contact guard assistance  Functional Transfers  Bathroom Mobility: Minimum assistance  Toilet Transfer : Minimum assistance  Bed to Chair: Minimum assistance    Balance:  Sitting: Intact; With support  Standing: Impaired;Pull to stand; With support  Standing - Static: Occasional;Good  Standing - Dynamic : Fair;Constant support    ADL Intervention and task modifications:  Grooming  Grooming Assistance: Contact guard assistance  Position Performed: Standing  Washing Hands: Contact guard assistance    Lower Body Dressing Assistance  Socks: Set-up; Supervision  Leg Crossed Method Used: Yes  Position Performed: Seated edge of bed    Toileting  Bladder Hygiene: Set-up; Supervision (in sitting)    Therapeutic Exercises:   Pt educated on UE HEP to complete throughout the day to improve ROM and strength with good understanding verbalized and demonstrated. Functional Measure:    [address] [place] [id number] AM-PAC \"6 Clicks\"                                                       Daily Activity Inpatient Short Form  How much help from another person does the patient currently need. .. Total; A Lot A Little None   1. Putting on and taking off regular lower body clothing? []  1 []  2 [x]  3 []  4   2. Bathing (including washing, rinsing, drying)? []  1 []  2 [x]  3 []  4   3. Toileting, which includes using toilet, bedpan or urinal? [] 1 []  2 [x]  3 []  4   4. Putting on and taking off regular upper body clothing? []  1 []  2 [x]  3 []  4   5. Taking care of personal grooming such as brushing teeth? []  1 []  2 [x]  3 []  4   6. Eating meals? []  1 []  2 [x]  3 []  4   © 2007, Trustees of [address] [place] [address] [address] [id number], under license to [employer]. All rights reserved     Score: 18/24     Interpretation of Tool:  Represents clinically-significant functional categories (i.e. Activities of daily living).   Percentage of Impairment CH    0%   CI    1-19% CJ    20-39% CK    40-59% CL    60-79% CM    80-99% CN 100%   [facility]  Score 6-24 24 23 20-22 15-19 10-14 7-9 6        Pain:  0/10    Activity Tolerance:   Fair    After treatment patient left in no apparent distress:   Sitting in chair, Heels elevated for pressure relief, Call bell within reach, and Caregiver / family present    COMMUNICATION/COLLABORATION:   The patients plan of care was discussed with: Physical therapist and Registered nurse.      OT/PT sessions occurred together for increased patient and clinician safety for increased OOB mobility today    Fransico Leonard  Time Calculation: 30 mins   Problem: Self Care Deficits Care Plan (Adult)  Goal: *Acute Goals and Plan of Care (Insert Text)  Description: Pt will be Mod I sup <> sit in prep for EOB ADLs  Pt will be Mod I grooming standing sink side LRAD  Pt will be IND dressing sitting EOB/long sit  Pt will be Mod I sit <>  prep for toileting LRAD  Pt will be IND toileting/toilet transfer/cloth mgmt LRAD  Pt will be IND following UE HEP in prep for self care tasks   Outcome: Progressing Towards Goal

## 2021-09-23 NOTE — PROGRESS NOTES
Infectious Disease Progress Note         Subjective:   Pt seen and examined at bedside. Stable, no acute events since last seen. Appeared lethargic during my assessment. Appetite improved, off PPN   Objective:   Physical Exam:     Visit Vitals  /69   Pulse 64   Temp 98.1 °F (36.7 °C)   Resp 18   Ht 5' 6\" (1.676 m)   Wt 180 lb 9.6 oz (81.9 kg)   SpO2 96%   BMI 29.15 kg/m²    O2 Flow Rate (L/min): 2 l/min O2 Device: None (Room air)    Temp (24hrs), Av °F (36.7 °C), Min:97.4 °F (36.3 °C), Max:98.3 °F (36.8 °C)    701 - 1900  In: -   Out: 950 [Urine:950]   1901 -  0700  In: 2927 [P.O.:1375; I.V.:2896]  Out: 1200 [Urine:1200]    General: NAD, following commands appropriately   HEENT: LAQUITA, Moist mucosa  Lungs: CTA b/l, no wheeze/rhonchi   Heart: S1S2+, RRR, no murmur  Abdo: Soft, NT, ND, +BS   Exts: no new weakness, no edema   Skin: No chronic wounds or ulcers, + right chest wall power line     Data Review:       Recent Days:  Recent Labs     21  0705   WBC 6.9   HGB 13.4   HCT 40.9        Recent Labs     21  0705   BUN 20   CREA 0.73     Lab Results   Component Value Date/Time    C-Reactive protein 2.95 (H) 2021 07:28 AM        Microbiology     Results     ** No results found for the last 336 hours. **           Diagnostics   CXR Results  (Last 48 hours)    None         Assessment/Plan     1. 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 Adjunctive oral valaciclovir after intravenous Acyclovir does not confer additional benefit per literature review, will inform daughter        Routine labs in the morning. D/c left chest power line prior to discharge      2.  INDIA: normal kidney function on most recent labs 3. Leukocytosis: Normalized off steroid therapy, afebrile      Will continue to monitor off antibiotics      4. Seizure on admission: Due to # 1. On Keppra, and Valproic    5. Left sided weakness: Due to # 1. Significantly improved. 6. DVT of axillary vein/Thrombosis of left basilic vein.  Anticoagulated on low dose Sydniquadrienne Graff MD    9/23/2021

## 2021-09-23 NOTE — PROGRESS NOTES
NEUROLOGY  PROGRESS NOTE    Admission History/Pertinent Events  Claudia Martins is a 60y.o. year old female is seen in follow-up and she is better than 2 days ago, answering simple questions and oriented to place person and partially to time. She presented on 9/3/2021. Patient has a past medical history of Breast Cancer, HL, DM2, Anxiety/Depression who presented with seizure like activity while on the floor by family. EMS reported patient having seizure-like activity for which patient was initially treated with midazolam.  In the ED, patient had a temperature a 102° F.  Emergent CT head was negative for any acute findings. Emergent contrasted MRI of the brain concerning for bilateral temporal pathology most concerning for HSV encephalitis or limbic encephalitis. Patient was loaded with IV levetiracetam and started on antibiotics as well as acyclovir. ASSESSMENT/PLAN      Impression  Patient doing well this morning with stable examination. We will continue patient on acyclovir and dexamethasone along with AEDs per below for seizure prophylaxis.       14 [facility] WO 9/3  1205 [facility] WO 9/9  Right temporal and insular region edema    [facility] WO 9/10  Possible RMCA ischemia with possible RMCA occlusion    CT WO 9/11  No interval changes from scan on 9/10    14 [facility] WO 9/15  No interval changes or worsening of frontotemporal edema    MRI Brain [facility] COGNITIVE DISORDERS 9/3  T2/FLAIR hyperintense signal in the bilateral temporal regions more so in the right temporal and insular regions concerning for HSV encephalitis or possibly limbic encephalitis    MRI Brain [facility] COGNITIVE DISORDERS 9/8  Interval progression of T2/FLAIR hyperintense signal change in the right cerebral and left frontal region concerning for worsening encephalitis    MRA Head WO 9/13  No occlusions, dissections, significant stenosis, pseudoaneurysms or aneurysms in the intracranial arterial system    EEG 9/9  Generalized slowing of the background  Sharp discharges in the right frontal region    EEG 9/16  Mild generalized encephalopathy  Occasional right frontal sharp discharges  During photic stimulation, bilateral frontal central spike and wave discharges  No seizures    Valproic Acid Levels  44 (9/12), 31 (9/16)    CSF Studies   Positive: , , Protein 80, HSV-1 DNA  Negative/WNL: Glucose, HSV-2 DNA, Viral Cx     Labs  Positive: HSV 1 IgG Ab  Negative: HIV 1/2, RPR      Plan      Encephalopathy & Seizures d/t HSV1 Encephalitis  Cerebral Edema d/t Above  Associated: Sepsis, Basilic Vein Thrombosus, INDIA  -On Acyclovir per ID (Planned 21 day course)  --> Plan to discharge once the treatment is completed. Spoke to the daughter regarding repeating the spinal tap and was explained that with HSV 1 we do not need to repeat the LP at the completion of the treatment. In case there be any changes.  -Continue Dexamethasone 2 TID  -On Apixaban 5 BID  -Seizure Precautions  -Seizure Prophylaxis: Levetiracetam 2000 BID, Valproic Acid 500 TID  -SBP Goal < 160  -Glucose Goal < 180  -Head of Bed at 30 degrees  -PT/OT/ST as needed  -Management of metabolic/infectious derangements to referring teams    Please contact neurology with any further questions/concerns    Patient to follow-up with neurology in 2 weeks from discharge with Dr. Sheliah Meckel [facility])      SUBJECTIVE   Patient currently on acyclovir and dexamethasone. No significant changes on neurological examination. Physical/Neurological Exam  Patient awake, alert; following central and peripheral commands   No expressive or receptive aphasia;  No dysarthria   Decreased emotional drive with speech  Pupils react to light bilaterally; EOM Intact   No visual field deficits on gross exam   No facial droop   Motor: 3+/5 in the left hemibody, 4/5 in the right hemibody  No abnormal movements   Sensation to light touch intact grossly throughout       OBJECTIVE  Vital Signs  Temp:  [97.4 °F (36.3 °C)-98.3 °F (36.8 °C)]   Pulse (Heart Rate):  [place] BP: (109-165)/(57-77)   Resp Rate:  [16-18]   O2 Sat (%):  [96 %-100 %]   Weight:  [81.9 kg (180 lb 9.6 oz)]     MEDICATIONS    Current Facility-Administered Medications:     valproic acid (DEPAKENE) capsule 500 mg, 500 mg, Oral, TID, Amber Mcneal MD, 500 mg at 09/22/21 2200    dexamethasone (DECADRON) 4 mg/mL injection 2 mg, 2 mg, IntraVENous, Q8H, Amber Restrepo MD, 2 mg at 09/23/21 0641    pantoprazole (PROTONIX) tablet 40 mg, 40 mg, Oral, ACB, Anthony Sotelo PA-C, 40 mg at 09/23/21 0641    0.45% sodium chloride infusion, 75 mL/hr, IntraVENous, CONTINUOUS, Nelly Mehta MD, Last Rate: 75 mL/hr at 09/22/21 2300, 75 mL/hr at 09/22/21 2300    citalopram (CELEXA) tablet 20 mg, 20 mg, Oral, DAILY, AristeoRiddhi Francisco MD, 20 mg at 09/23/21 4925    nystatin (MYCOSTATIN) 100,000 unit/mL oral suspension 500,000 Units, 500,000 Units, Oral, QID, Courtney Hermosillo MD, 500,000 Units at 09/23/21 9977    apixaban (ELIQUIS) tablet 5 mg, 5 mg, Oral, BID, Aishwarya PATEL MD, 5 mg at 09/23/21 0918    acyclovir (ZOVIRAX) 900 mg in 0.9% sodium chloride 250 mL IVPB, 15 mg/kg (Ideal), IntraVENous, Q8H, Debra Serrato MD, Last Rate: 250 mL/hr at 09/23/21 0641, 900 mg at 09/23/21 0641    levETIRAcetam (KEPPRA) 2,000 mg in 0.9% sodium chloride 250 mL IVPB, 2,000 mg, IntraVENous, Q12H, David Collins MD, 2,000 mg at 09/23/21 0918    amLODIPine (NORVASC) tablet 5 mg, 5 mg, Oral, DAILY, Maria C Hue, MD, 5 mg at 09/23/21 2920    hydrALAZINE (APRESOLINE) 20 mg/mL injection 10 mg, 10 mg, IntraVENous, Q6H PRN, Maria C Aleman MD, 10 mg at 09/12/21 2202    insulin lispro (HUMALOG) injection, , SubCUTAneous, AC&HS, Courtney Hermosillo MD, 2 Units at 09/23/21 1465    acetaminophen (TYLENOL) tablet 650 mg, 650 mg, Oral, Q6H PRN, 650 mg at 09/06/21 1236 **OR** acetaminophen (TYLENOL) suppository 650 mg, 650 mg, Rectal, Q6H PRN, Aristeo Pollard, Riddhi Swanson MD    polyethylene glycol (MIRALAX) packet 17 g, 17 g, Oral, DAILY PRN, Tony Mathews MD    promethazine (PHENERGAN) tablet 12.5 mg, 12.5 mg, Oral, Q6H PRN **OR** ondansetron (ZOFRAN) injection 4 mg, 4 mg, IntraVENous, Q6H PRN, Tony Mathews MD    glucose chewable tablet 16 g, 4 Tablet, Oral, PRN, Kana Baker MD    dextrose (D50W) injection syrg 12.5-25 g, 25-50 mL, IntraVENous, PRN, Kana Baker MD, 25 g at 09/04/21 0409    glucagon (GLUCAGEN) injection 1 mg, 1 mg, IntraMUSCular, PRN, Kana Baker MD    This document has been prepared by the Dragon voice recognition system, typographical errors may have occurred.  Attempts have been made to correct errors, however inadvertent errors may persist.

## 2021-09-23 NOTE — PROGRESS NOTES
Problem: Mobility Impaired (Adult and Pediatric)  Goal: *Acute Goals and Plan of Care (Insert Text)  Description: Patient will move from supine to sit and sit to supine , scoot up and down, and roll side to side in bed with minimal assistance/contact guard assist within 7 day(s). Patient will transfer from bed to chair and chair to bed with minimal assistance/contact guard assist using the least restrictive device within 7 day(s). Patient will improve static sitting/standing balance to minimal assistance within 1 week(s). Patient will ambulate 10 feet with minimal assistance with least restrictive device within 1 weeks. Outcome: Progressing Towards Goal   PHYSICAL THERAPY TREATMENT  Patient: April Bingham (63 y.o. female)  Date: 9/23/2021  Diagnosis: New onset seizure ([address] [R56.9]  Sepsis ([address] [A41.9] New onset seizure ([address]       Precautions: fall   Chart, physical therapy assessment, plan of care and goals were reviewed. ASSESSMENT  Patient continues with skilled PT services and is progressing towards goals. Patient sleeping, able to be aroused with light touch. Patient required some time to be fully awake to work with therapy. Supine to sit with cg to min assist with vcs constantly. Sat at eob for sometime and performed LAQ and washed her face with warm washcloth. Patient able to perform sit to stand with cg and amb in hallways with RW and min assist. Required assist with RW maneuvering during gait and turning for safety. off balance but not LOB. amb to the bathroom and back to recliner with assist.patient cognitively better today. Answered questions appropriately. Other factors to consider for discharge: IRF       PLAN :  Patient continues to benefit from skilled intervention to address the above impairments. Continue treatment per established plan of care. to address goals.     Recommendation for discharge: (in order for the patient to meet his/her long term goals)  Inpatient Rehabilitation Facility     This discharge recommendation:  Has been made in collaboration with the attending provider and/or case management    IF patient discharges home will need the following DME: to be determined (TBD)       SUBJECTIVE:   Patient stated I am ok.     OBJECTIVE DATA SUMMARY:   Critical Behavior:  Neurologic State: Alert  Orientation Level: Oriented to person, Oriented to place  Cognition: Appropriate safety awareness  Safety/Judgement: Decreased awareness of need for safety  Functional Mobility Training:  Bed Mobility:  Rolling: Contact guard assistance;Minimum assistance  Supine to Sit: Contact guard assistance;Minimum assistance     Scooting: Contact guard assistance;Minimum assistance        Transfers:  Sit to Stand: Contact guard assistance  Stand to Sit: Contact guard assistance                             Balance:  Sitting: Intact; With support  Standing: Impaired;Pull to stand; With support  Standing - Static: Occasional;Good  Standing - Dynamic : Fair;Constant support  Ambulation/Gait Training:  Distance (ft): 100 Feet (ft)  Assistive Device: Gait belt;Walker, rolling  Ambulation - Level of Assistance: Contact guard assistance;Minimal assistance                 Base of Support: Narrowed     Speed/Elda: Slow;Fluctuations  Step Length: Left shortened;Right shortened                        Therapeutic Exercises:   LAQ x 20 reps in sitting  Pain Ratin/10    Activity Tolerance:   Good  Please refer to the flowsheet for vital signs taken during this treatment. After treatment patient left in no apparent distress:   Sitting in chair, Heels elevated for pressure relief, Call bell within reach, and Caregiver / family present    COMMUNICATION/COLLABORATION:   The patients plan of care was discussed with: Occupational therapist and Registered nurse.      Saeed Welch PT   Time Calculation: 33 mins

## 2021-09-23 NOTE — PROGRESS NOTES
Hospitalist Progress Note         [place], [place]    Daily Progress Note: 9/23/2021  Hospital course: This is a 80-year-old female admitted on 9/3/2021 with a history of breast cancer, diabetes mellitus, type II, anxiety, depression who initially presented with seizure-like activity and found on the floor by family. Patient was found to be febrile and CT scan of the head head was performed with no acute process. 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 Patient recurrent seizures and was started on Keppra as well as immediately started on acyclovir. Numerous CT and MRI of the brain imaging performed with CT scans of the head initially without acute process. Repeat revealed right temporal and along the sylvian fissure without hemorrhage and 2 to 3 mm of right to left midline shift due to expanding edema. Serial CTs of the head were performed although no significant changes on 9/11/2021 at which time a CT of the head revealed findings suspicious for right middle cerebral artery thrombosis with associated ischemia in the distribution of that artery. Initial MRI on 9/3/2021 revealed abnormal high T2 signal in the right temporal and insular cortex. Findings suspicious for herpes encephalitis. There was also subtle increased T2 signal evident in the left temporal uncus. Repeats MRI of the brain revealed progression of the findings of the right cerebral hemisphere extending into the left frontal lobe. Most consistent with worsening encephalitis. MRA of the brain on 9/13/2021 revealed no major arterial occlusion or stenosis demonstrated. Infectious disease consultation, Dr. Luis Cortes.  Neurological consultation. Patient remains on Keppra and valproic acid for seizure prophylaxis. Patient also found to have an upper extremity DVT and is currently on Eliquis. Psych consult ordered for worsening depression.  Repeat CT of the Head with no change. Valproic levels are not therapeutic and increasing valproic acidosis. EEG showed generalized slowing in the background with disorganization. There were episodic focal slowing in the right temporal occipital regions. Occasional sharp waves in the right frontal region. No obvious seizure-like activity. S/P PPN, currently tolerating PO diet. Subjective:   Subjective   Patient seen in f/u alert and oriented lying in bed  No complaints voiced on examination  No overnight events reported  No acute distress noted on examination  Patient remains calm and cooperative    Review of Systems:   Review of Systems   Constitutional: Negative. HENT: Negative. Eyes: Negative for blurred vision and double vision. Respiratory: Negative for cough, sputum production, shortness of breath and wheezing. Cardiovascular: Negative. Gastrointestinal: Negative for abdominal pain, nausea and vomiting. Genitourinary: Negative. Musculoskeletal: Negative. Skin: Negative. Neurological: Negative for dizziness and headaches. Endo/Heme/Allergies: Negative. Psychiatric/Behavioral: Negative. Objective:   Objective      Vitals:  Patient Vitals for the past 12 hrs:   Temp Pulse Resp BP SpO2   09/23/21 0807 98.1 °F (36.7 °C) 64 18 134/69 96 %   09/23/21 0315 98.3 °F (36.8 °C) 64 18 (!) 140/62 97 %        Physical Exam:  Physical Exam  Vitals and nursing note reviewed. Constitutional:       Appearance: Normal appearance. HENT:      Head: Normocephalic. Eyes:      Conjunctiva/sclera: Conjunctivae normal.   Cardiovascular:      Rate and Rhythm: Normal rate and regular rhythm. Pulses: Normal pulses. Heart sounds: Normal heart sounds. Comments: + right chest wall power line   Pulmonary:      Effort: Pulmonary effort is normal.      Breath sounds: Normal breath sounds. Abdominal:      General: Bowel sounds are normal.      Palpations: Abdomen is soft. Musculoskeletal:      Cervical back: Normal range of motion. Comments: 3/5 power upper extremities   Skin:     General: Skin is warm and dry. Capillary Refill: Capillary refill takes less than 2 seconds. Neurological:      Mental Status: She is alert. Motor: Weakness present.       Gait: Gait abnormal.          Lab Results:  Recent Results (from the past 24 hour(s))   GLUCOSE, POC    Collection Time: 09/22/21 10:58 AM   Result Value Ref Range    Glucose (POC) 317 (H) 65 - 117 mg/dL    Performed by Gretchen Moralez    SARS-COV-2    Collection Time: 09/22/21  2:16 PM   Result Value Ref Range    SARS-CoV-2 Please find results under separate order     GLUCOSE, POC    Collection Time: 09/22/21  4:06 PM   Result Value Ref Range    Glucose (POC) 283 (H) 65 - 117 mg/dL    Performed by Constance Simpson, POC    Collection Time: 09/23/21 12:28 AM   Result Value Ref Range    Glucose (POC) 257 (H) 65 - 117 mg/dL    Performed by Pato Chisholm, POC    Collection Time: 09/23/21  8:07 AM   Result Value Ref Range    Glucose (POC) 189 (H) 65 - 117 mg/dL    Performed by Cady Lara           Diagnostic Images:    CT WO 9/3  [address]     [facility] WO 9/9  Right temporal and insular region edema     CT WO 9/10  Possible RMCA ischemia with possible RMCA occlusion     [facility] WO 9/11  No interval changes from scan on 9/10     [facility] WO 9/15  No interval changes or worsening of frontotemporal edema     MRI Brain [facility] COGNITIVE DISORDERS 9/3  T2/FLAIR hyperintense signal in the bilateral temporal regions more so in the right temporal and insular regions concerning for HSV encephalitis or possibly limbic encephalitis     MRI Brain [facility] COGNITIVE DISORDERS 9/8  Interval progression of T2/FLAIR hyperintense signal change in the right cerebral and left frontal region concerning for worsening encephalitis     MRA Head WO 9/13  No occlusions, dissections, significant stenosis, pseudoaneurysms or aneurysms in the intracranial arterial system     EEG 9/9  Generalized slowing of the background  Sharp discharges in the right frontal region     EEG 9/16  Mild generalized encephalopathy  Occasional right frontal sharp discharges  During photic stimulation, bilateral frontal central spike and wave discharges  No seizures     Valproic Acid Levels  44 (9/12), 31 (9/16)     CSF Studies   Positive: , , Protein 80, HSV-1 DNA  Negative/WNL: Glucose, HSV-2 DNA, Viral Cx      Labs  Positive: HSV 1 IgG Ab  Negative: HIV 1/2, RPR     Current Medications:    Current Facility-Administered Medications:     valproic acid (DEPAKENE) capsule 500 mg, 500 mg, Oral, TID, [place] Amber Cantu MD, 500 mg at 09/22/21 2200    dexamethasone (DECADRON) 4 mg/mL injection 2 mg, 2 mg, IntraVENous, Q8H, Amber Restrepo MD, 2 mg at 09/23/21 0641    pantoprazole (PROTONIX) tablet 40 mg, 40 mg, Oral, ACB, Anthony Sotelo PA-C, 40 mg at 09/23/21 0641    0.45% sodium chloride infusion, 75 mL/hr, IntraVENous, CONTINUOUS, Geri Demarco MD, Last Rate: 75 mL/hr at 09/22/21 2300, 75 mL/hr at 09/22/21 2300    citalopram (CELEXA) tablet 20 mg, 20 mg, Oral, DAILY, Virgen Blackmon MD, 20 mg at 09/22/21 0850    nystatin (MYCOSTATIN) 100,000 unit/mL oral suspension 500,000 Units, 500,000 Units, Oral, QID, Virgen Blackmon MD, 500,000 Units at 09/22/21 2227    apixaban (ELIQUIS) tablet 5 mg, 5 mg, Oral, BID, Virgen Blackmon MD, 5 mg at 09/22/21 2235    acyclovir (ZOVIRAX) 900 mg in 0.9% sodium chloride 250 mL IVPB, 15 mg/kg (Ideal), IntraVENous, Q8H, Debra Serrato MD, Last Rate: 250 mL/hr at 09/23/21 0641, 900 mg at 09/23/21 0641    levETIRAcetam (KEPPRA) 2,000 mg in 0.9% sodium chloride 250 mL IVPB, 2,000 mg, IntraVENous, Q12H, [place], Ranee Castleman, MD, 2,000 mg at 09/22/21 2227    amLODIPine (NORVASC) tablet 5 mg, 5 mg, Oral, DAILY, Sol Nye MD, 5 mg at 09/22/21 0850    hydrALAZINE (APRESOLINE) 20 mg/mL injection 10 mg, 10 mg, IntraVENous, Q6H PRN, River Thacker MD SHANEL, 10 mg at 09/12/21 2202    insulin lispro (HUMALOG) injection, , SubCUTAneous, AC&HS, Elizabeth Vazquez MD, 3 Units at 09/23/21 0038    acetaminophen (TYLENOL) tablet 650 mg, 650 mg, Oral, Q6H PRN, 650 mg at 09/06/21 1236 **OR** acetaminophen (TYLENOL) suppository 650 mg, 650 mg, Rectal, Q6H PRN, Mike Barrett MD    polyethylene glycol (MIRALAX) packet 17 g, 17 g, Oral, DAILY PRN, Mike Barrett MD    promethazine (PHENERGAN) tablet 12.5 mg, 12.5 mg, Oral, Q6H PRN **OR** ondansetron (ZOFRAN) injection 4 mg, 4 mg, IntraVENous, Q6H PRN, Mike Mendoza MD    glucose chewable tablet 16 g, 4 Tablet, Oral, PRN, Kana Baker MD    dextrose (D50W) injection syrg 12.5-25 g, 25-50 mL, IntraVENous, PRN, Kana Baker MD, 25 g at 09/04/21 0409    glucagon (GLUCAGEN) injection 1 mg, 1 mg, IntraMUSCular, PRN, Kana Baker MD       ASSESSMENT:     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 Infectious disease and neurology following. continue acyclovir for a total of 21 days, day # 20/21. CT head with no change. Plan for long term PO management for hsv with acyclovir.     2. INDIA: resolved with gentle IV hydration.      3.  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 There were episodic focal slowing in the right temporal occipital regions. Occasional sharp waves in the right frontal region. Valproic acid levels low and increased Depakene     4.  Generalized weakness of the right and left upper extremity: MRA of the brain revealed no major arterial occlusions. Suspect this is related to the overall edema, significant improvement noted. Continue PT/OT as ordered     5. DVT: thrombosis of the left basilic vein. Continue Eliquis     6. Moderate protein malnutrition: RD following.  Patient tolerating diet well, eating at least 50% of meals per staff. Continue dietary supplements     7.  Essential hypertension: bp remains at goal per JNC 8 guidelines. Continue Norvasc     8. Depression/anxiety: continue Celexa. Psych following     9. Type 2 DM: ac/hs accu check, med dose SSI. Full Code  Dvt Prophylaxis: eliquis  GI Prophylaxis: protonix  Disposition: Completion of treatment. [facility]ending [facility] acceptance after completion of IV antiviral tx      Above treatment plan reviewed and discussed with patient and nurse in detail at bedside, all questions answered. Daughter Mariama Banegas [phone] updated. Care Plan discussed with: Interdisciplinary team    Total time spent with patient: >35 minutes.     Monalisa Lynne NP

## 2021-09-23 NOTE — PROGRESS NOTES
Problem: Falls - Risk of  Goal: *Absence of Falls  Description: Document Mona Montes Fall Risk and appropriate interventions in the flowsheet.   Outcome: Progressing Towards Goal  Note: Fall Risk Interventions:  Mobility Interventions: Bed/chair exit alarm    Mentation Interventions: Bed/chair exit alarm    Medication Interventions: Bed/chair exit alarm    Elimination Interventions: Bed/chair exit alarm    History of Falls Interventions: Bed/chair exit alarm

## 2021-09-24 LAB
ALBUMIN SERPL-MCNC: 2.4 G/DL (ref 3.5–5)
ANION GAP SERPL CALC-SCNC: 8 MMOL/L (ref 5–15)
BASOPHILS # BLD: 0 K/UL (ref 0–0.1)
BASOPHILS NFR BLD: 0 % (ref 0–1)
BUN SERPL-MCNC: 23 MG/DL (ref 6–20)
BUN/CREAT SERPL: 37 (ref 12–20)
CA-I BLD-MCNC: 8.2 MG/DL (ref 8.5–10.1)
CHLORIDE SERPL-SCNC: 105 MMOL/L (ref 97–108)
CO2 SERPL-SCNC: 28 MMOL/L (ref 21–32)
CREAT SERPL-MCNC: 0.62 MG/DL (ref 0.55–1.02)
DIFFERENTIAL METHOD BLD: ABNORMAL
EOSINOPHIL # BLD: 0 K/UL (ref 0–0.4)
EOSINOPHIL NFR BLD: 0 % (ref 0–7)
ERYTHROCYTE [DISTWIDTH] IN BLOOD BY AUTOMATED COUNT: 14 % (ref 11.5–14.5)
GLUCOSE BLD STRIP.AUTO-MCNC: 167 MG/DL (ref 65–117)
GLUCOSE BLD STRIP.AUTO-MCNC: 204 MG/DL (ref 65–117)
GLUCOSE BLD STRIP.AUTO-MCNC: 216 MG/DL (ref 65–117)
GLUCOSE SERPL-MCNC: 207 MG/DL (ref 65–100)
HCT VFR BLD AUTO: 40 % (ref 35–47)
HGB BLD-MCNC: 13.4 G/DL (ref 11.5–16)
IMM GRANULOCYTES # BLD AUTO: 0.2 K/UL (ref 0–0.04)
IMM GRANULOCYTES NFR BLD AUTO: 2 % (ref 0–0.5)
LYMPHOCYTES # BLD: 1.7 K/UL (ref 0.8–3.5)
LYMPHOCYTES NFR BLD: 22 % (ref 12–49)
MCH RBC QN AUTO: 29.8 PG (ref 26–34)
MCHC RBC AUTO-ENTMCNC: 33.5 G/DL (ref 30–36.5)
MCV RBC AUTO: 88.9 FL (ref 80–99)
MONOCYTES # BLD: 1 K/UL (ref 0–1)
MONOCYTES NFR BLD: 12 % (ref 5–13)
NEUTS SEG # BLD: 5.1 K/UL (ref 1.8–8)
NEUTS SEG NFR BLD: 64 % (ref 32–75)
NRBC # BLD: 0 K/UL (ref 0–0.01)
NRBC BLD-RTO: 0 PER 100 WBC
PERFORMED BY, TECHID: ABNORMAL
PHOSPHATE SERPL-MCNC: 3 MG/DL (ref 2.6–4.7)
PLATELET # BLD AUTO: 199 K/UL (ref 150–400)
PMV BLD AUTO: 11.1 FL (ref 8.9–12.9)
POTASSIUM SERPL-SCNC: 4.5 MMOL/L (ref 3.5–5.1)
RBC # BLD AUTO: 4.5 M/UL (ref 3.8–5.2)
SODIUM SERPL-SCNC: 141 MMOL/L (ref 136–145)
WBC # BLD AUTO: 8.1 K/UL (ref 3.6–11)

## 2021-09-24 PROCEDURE — 65270000029 HC RM PRIVATE

## 2021-09-24 PROCEDURE — 80069 RENAL FUNCTION PANEL: CPT

## 2021-09-24 PROCEDURE — 97530 THERAPEUTIC ACTIVITIES: CPT

## 2021-09-24 PROCEDURE — 36415 COLL VENOUS BLD VENIPUNCTURE: CPT

## 2021-09-24 PROCEDURE — 74011250636 HC RX REV CODE- 250/636: Performed by: HOSPITALIST

## 2021-09-24 PROCEDURE — 85025 COMPLETE CBC W/AUTO DIFF WBC: CPT

## 2021-09-24 PROCEDURE — 99232 SBSQ HOSP IP/OBS MODERATE 35: CPT | Performed by: INTERNAL MEDICINE

## 2021-09-24 PROCEDURE — 74011250637 HC RX REV CODE- 250/637: Performed by: STUDENT IN AN ORGANIZED HEALTH CARE EDUCATION/TRAINING PROGRAM

## 2021-09-24 PROCEDURE — 82962 GLUCOSE BLOOD TEST: CPT

## 2021-09-24 PROCEDURE — 74011250637 HC RX REV CODE- 250/637: Performed by: PHYSICIAN ASSISTANT

## 2021-09-24 PROCEDURE — 74011250636 HC RX REV CODE- 250/636: Performed by: INTERNAL MEDICINE

## 2021-09-24 PROCEDURE — 74011250636 HC RX REV CODE- 250/636: Performed by: STUDENT IN AN ORGANIZED HEALTH CARE EDUCATION/TRAINING PROGRAM

## 2021-09-24 PROCEDURE — 74011250637 HC RX REV CODE- 250/637: Performed by: INTERNAL MEDICINE

## 2021-09-24 PROCEDURE — 74011636637 HC RX REV CODE- 636/637: Performed by: INTERNAL MEDICINE

## 2021-09-24 PROCEDURE — 74011000250 HC RX REV CODE- 250: Performed by: HOSPITALIST

## 2021-09-24 RX ORDER — FAMOTIDINE 20 MG/1
20 TABLET, FILM COATED ORAL 2 TIMES DAILY
Qty: 20 TABLET | Refills: 0 | Status: SHIPPED | OUTPATIENT
Start: 2021-09-24 | End: 2021-10-04

## 2021-09-24 RX ORDER — VALPROIC ACID 250 MG/1
500 CAPSULE, LIQUID FILLED ORAL 3 TIMES DAILY
Qty: 180 CAPSULE | Refills: 0 | Status: SHIPPED | OUTPATIENT
Start: 2021-09-24 | End: 2021-10-24

## 2021-09-24 RX ORDER — POLYETHYLENE GLYCOL 3350 17 G/17G
17 POWDER, FOR SOLUTION ORAL DAILY
Qty: 30 PACKET | Refills: 0 | Status: SHIPPED | OUTPATIENT
Start: 2021-09-24 | End: 2021-10-24

## 2021-09-24 RX ORDER — AMLODIPINE BESYLATE 5 MG/1
5 TABLET ORAL DAILY
Qty: 30 TABLET | Refills: 0 | Status: SHIPPED | OUTPATIENT
Start: 2021-09-24 | End: 2021-10-24

## 2021-09-24 RX ADMIN — DEXAMETHASONE SODIUM PHOSPHATE 2 MG: 4 INJECTION, SOLUTION INTRA-ARTICULAR; INTRALESIONAL; INTRAMUSCULAR; INTRAVENOUS; SOFT TISSUE at 21:41

## 2021-09-24 RX ADMIN — SODIUM CHLORIDE 75 ML/HR: 4.5 INJECTION, SOLUTION INTRAVENOUS at 05:21

## 2021-09-24 RX ADMIN — INSULIN LISPRO 2 UNITS: 100 INJECTION, SOLUTION INTRAVENOUS; SUBCUTANEOUS at 13:31

## 2021-09-24 RX ADMIN — LEVETIRACETAM 2000 MG: 100 INJECTION, SOLUTION INTRAVENOUS at 10:46

## 2021-09-24 RX ADMIN — DEXAMETHASONE SODIUM PHOSPHATE 2 MG: 4 INJECTION, SOLUTION INTRA-ARTICULAR; INTRALESIONAL; INTRAMUSCULAR; INTRAVENOUS; SOFT TISSUE at 07:44

## 2021-09-24 RX ADMIN — SODIUM CHLORIDE 900 MG: 9 INJECTION, SOLUTION INTRAVENOUS at 13:32

## 2021-09-24 RX ADMIN — INSULIN LISPRO 3 UNITS: 100 INJECTION, SOLUTION INTRAVENOUS; SUBCUTANEOUS at 17:55

## 2021-09-24 RX ADMIN — PANTOPRAZOLE SODIUM 40 MG: 40 TABLET, DELAYED RELEASE ORAL at 07:43

## 2021-09-24 RX ADMIN — SODIUM CHLORIDE 900 MG: 9 INJECTION, SOLUTION INTRAVENOUS at 07:48

## 2021-09-24 RX ADMIN — VALPROIC ACID 500 MG: 250 CAPSULE, LIQUID FILLED ORAL at 15:07

## 2021-09-24 RX ADMIN — SODIUM CHLORIDE 900 MG: 9 INJECTION, SOLUTION INTRAVENOUS at 21:41

## 2021-09-24 RX ADMIN — CITALOPRAM HYDROBROMIDE 20 MG: 20 TABLET ORAL at 08:47

## 2021-09-24 RX ADMIN — NYSTATIN 500000 UNITS: 100000 SUSPENSION ORAL at 13:30

## 2021-09-24 RX ADMIN — NYSTATIN 500000 UNITS: 100000 SUSPENSION ORAL at 21:41

## 2021-09-24 RX ADMIN — INSULIN LISPRO 2 UNITS: 100 INJECTION, SOLUTION INTRAVENOUS; SUBCUTANEOUS at 08:50

## 2021-09-24 RX ADMIN — APIXABAN 5 MG: 5 TABLET, FILM COATED ORAL at 08:48

## 2021-09-24 RX ADMIN — DEXAMETHASONE SODIUM PHOSPHATE 2 MG: 4 INJECTION, SOLUTION INTRA-ARTICULAR; INTRALESIONAL; INTRAMUSCULAR; INTRAVENOUS; SOFT TISSUE at 15:07

## 2021-09-24 RX ADMIN — VALPROIC ACID 500 MG: 250 CAPSULE, LIQUID FILLED ORAL at 07:44

## 2021-09-24 RX ADMIN — INSULIN LISPRO 2 UNITS: 100 INJECTION, SOLUTION INTRAVENOUS; SUBCUTANEOUS at 21:41

## 2021-09-24 RX ADMIN — NYSTATIN 500000 UNITS: 100000 SUSPENSION ORAL at 17:55

## 2021-09-24 RX ADMIN — VALPROIC ACID 500 MG: 250 CAPSULE, LIQUID FILLED ORAL at 21:43

## 2021-09-24 RX ADMIN — NYSTATIN 500000 UNITS: 100000 SUSPENSION ORAL at 08:47

## 2021-09-24 RX ADMIN — APIXABAN 5 MG: 5 TABLET, FILM COATED ORAL at 21:41

## 2021-09-24 NOTE — PROGRESS NOTES
Bedside shift change report given to Lupe Spring RN (oncoming nurse) by Alejandro Cota (offgoing nurse).  Report included the following information SBAR, Kardex, MAR, Recent Results, Med Rec Status and Cardiac Rhythm SB-SR.

## 2021-09-24 NOTE — PROGRESS NOTES
Calorie Count Note    Type and Reason for Visit: Reassess    Nutrition Assessment: See chart     Current Diet and Supplement Order:    ADULT ORAL NUTRITION SUPPLEMENT Breakfast, Lunch; Other Supplement; Gelatein Jell-o  ADULT DIET Dysphagia - Soft & Bite Sized; SWEET POTATOES EVERY L + D  ADULT ORAL NUTRITION SUPPLEMENT Dinner; Clear Liquid    Comparative Standards (Estimated Nutrition Needs):   Estimated Daily Total Kcal: 2000kcal (25kcal/kg)  Estimated Daily Protein (g): 80g (1g/kg)    Day 1 (9/21)  Meal Calories Protein Comments   Breakfast 365 15 Collagen powder brought from home est 120kcal, 6g pro per scoop   Lunch 360 17    Dinner 367 25.5    Snacks/Supplements included included    Total 1092 57.5     55% Kcal needs met 72% Protein needs met      Day 2 (9/22)  Meal Calories Protein Comments   Breakfast 480 16 Collagen powder brought from home est 120kcal, 6g pro per scoop   Lunch 290 39    Dinner 320 18    Snacks/Supplements included included    Total 1090 73     55% Kcal needs met 91% Protein needs met      Day 3 (9/23)  Meal Calories Protein Comments   Breakfast 430 23 Collagen powder brought from home est 120kcal, 6g pro per scoop   Lunch 133 17    Dinner 405 13    Snacks/Supplements included included    Total 968 53     48% Kcal needs met 66% Protein needs met      [address] assessed utilizing Food Exchange list. Original documentation placed in physical paper chart. Calorie information on original form was NOT calculated by RD and not utilized in final assessment. Pt only accepted supplements of Ensure Clear (240kcal, 8g pro) and Gelatein (90kcal, 20g pro) while inpatient. Intervention & Recommendations:   1. Discontinue Calorie Count  2. Continue oral nutrition supplement of pt choice with adequate protein (preferably 15-20g/serving) to meet maintenance needs.    3. Increase caloric intake by at least 400kcal per day to meet ~75% EENs      Electronically signed by Enrie Blackwood on 9/24/2021 at 2:03 PM    Contact Number: [phone] or via [employer]

## 2021-09-24 NOTE — PROGRESS NOTES
Infectious Disease Progress Note         Subjective:   Doing great, walked the jha ways, appears to be in a good mood today. Likely d/c to rehab today if accepted, fmly want her to go to [address] [place] [address]. Objective:   Physical Exam:     Visit Vitals  BP (!) 105/51 (BP 1 Location: Left upper arm, BP Patient Position: At rest)   Pulse (!) 58   Temp 97.9 °F (36.6 °C)   Resp 16   Ht 5' 6\" (1.676 m)   Wt 180 lb 9.6 oz (81.9 kg)   SpO2 97%   BMI 29.15 kg/m²    O2 Flow Rate (L/min): 2 l/min O2 Device: None (Room air)    Temp (24hrs), Av °F (36.7 °C), Min:97.5 °F (36.4 °C), Max:98.8 °F (37.1 °C)    No intake/output data recorded.  1901 -  0700  In: 4784.5 [P.O.:450; I.V.:4334.5]  Out: 950 [Urine:950]    General: NAD, following commands appropriately   HEENT: LAQUITA, Moist mucosa  Lungs: CTA b/l, no wheeze/rhonchi   Heart: S1S2+, RRR, no murmur  Abdo: Soft, NT, ND, +BS   Exts: no new weakness, no edema   Skin: No chronic wounds or ulcers, + right chest wall power line     Data Review:       Recent Days:  Recent Labs     21  0637   WBC 8.1   HGB 13.4   HCT 40.0        Recent Labs     21  0637   BUN 23*   CREA 0.62     Lab Results   Component Value Date/Time    C-Reactive protein 2.95 (H) 2021 07:28 AM        Microbiology     Results     ** No results found for the last 336 hours. **           Diagnostics   CXR Results  (Last 48 hours)    None         Assessment/Plan     1. 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 [place]: Cr WNLs on todays labs, tolerating oral intake     3.  Leukocytosis: due to steroid therapy, normalized w taper     4. Seizure on admission: Due to # 1. On Keppra, and Valproic    5. Left sided weakness: Due to # 1. Improved w/o evidence of worsening       Remains on steroid therapy. Will verify w neurology if it should be continued     6. DVT of axillary vein/Thrombosis of left basilic vein.  Anticoagulated on low dose Hussain Herring MD    9/24/2021

## 2021-09-24 NOTE — PROGRESS NOTES
ENOC has reached out to zEequiel Haynes at [address] at [phone] and she indicated they have the [address] but do not have a bed available until tomorrow. She is going to call [facility] back with information regarding bed for tomorrow. ENOC has updated the patient's daughter, Sridhar Copeland, at [phone]. She will be transporting the patient upon discharge. Ezequiel Haynes will call [facility] back with Room number a definitive answer on patient having a bed and discharging to their facility tomorrow. Please notify daughter with updates.

## 2021-09-24 NOTE — PROGRESS NOTES
PHYSICAL THERAPY TREATMENT  Patient: Elvia Lindsay (28 y.o. female)  Date: 9/24/2021  Diagnosis: New onset seizure ([address] [R56.9]  Sepsis ([address] [A41.9] New onset seizure ([facility]       Precautions:    Chart, physical therapy assessment, plan of care and goals were reviewed. ASSESSMENT  Patient continues with skilled PT services and is progressing towards goals. Pt semi supine in bed upon arrival and agreeable to session. Performed bed mobility with CGA and additional time to person. Noted good sitting balance at EOB. STS required CGA and RW for balance upon standing. Pt ambulated form bed to bathroom with CGA and use of RW +1 for line management. Patient requires constant vc and manual assistance to navigate RW (bumping into door frame). Patient completed toilet transfer with CGA. Able to increase ambulation distance with CGA, however towards end of gait session pt required min A x 2 d/t increased unsteadiness and increased assistance needed for maneuvering RW. Pt at times would demonstrate decreased spatial awareness, bumping RW into objects in hallway or door frame and having RW too close to wall (required assistance to move RW into correct path and vc to recognize safety. PT required several standing rest breaks during gait training. Patient returned to room and left seated in recliner with call bell in reach and needs met. Recommending d/c to IRF once medically appropriate. Current Level of Function Impacting Discharge (mobility/balance): assistance required for safety    Other factors to consider for discharge: time since onset, severity of deficits, PLOF         PLAN :  Patient continues to benefit from skilled intervention to address the above impairments. Continue treatment per established plan of care. to address goals.     Recommendation for discharge: (in order for the patient to meet his/her long term goals)  [address]     This discharge recommendation:  Has been made in collaboration with the attending provider and/or case management    IF patient discharges home will need the following DME: to be determined (TBD)       SUBJECTIVE:   Patient stated I feel fine    OBJECTIVE DATA SUMMARY:   Critical Behavior:  Neurologic State: Alert  Orientation Level: Disoriented to time  Cognition: Decreased attention/concentration, Follows commands  Safety/Judgement: Decreased awareness of need for safety  Functional Mobility Training:  Bed Mobility:  Rolling: Contact guard assistance  Supine to Sit: Contact guard assistance  Scooting: Contact guard assistance    Transfers:  Sit to Stand: Contact guard assistance  Stand to Sit: Contact guard assistance  Bed to Chair: Contact guard assistance      Balance:  Sitting: Intact; With support  Sitting - Static: Good (unsupported)  Sitting - Dynamic: Good (unsupported)  Standing: Impaired; With support  Standing - Static: Good;Occasional  Standing - Dynamic : Constant support; Fair    Ambulation/Gait Training:  Distance (ft): 200 Feet (ft)  Assistive Device: Gait belt;Walker, rolling  Ambulation - Level of Assistance: Contact guard assistance;Minimal assistance;Assist x2  Base of Support: Narrowed  Speed/Elda: Slow      Therapeutic Exercises:   Educated on long sit theretrinidad, pt verbalized understanding    Pain Ratin/10    Activity Tolerance:   Fair and requires rest breaks  Please refer to the flowsheet for vital signs taken during this treatment. After treatment patient left in no apparent distress:   Sitting in chair and Call bell within reach    COMMUNICATION/COLLABORATION:   The patients plan of care was discussed with: Occupational therapy assistant.      OT/PT sessions occurred together for increased patient and clinician safety    Problem: Mobility Impaired (Adult and Pediatric)  Goal: *Acute Goals and Plan of Care (Insert Text)  Description: Patient will move from supine to sit and sit to supine , scoot up and down, and roll side to side in bed with minimal assistance/contact guard assist within 7 day(s). Patient will transfer from bed to chair and chair to bed with minimal assistance/contact guard assist using the least restrictive device within 7 day(s). Patient will improve static sitting/standing balance to minimal assistance within 1 week(s). Patient will ambulate 10 feet with minimal assistance with least restrictive device within 1 weeks.        Outcome: Progressing Towards Goal       Jaime Melton PTA   Time Calculation: 30 mins

## 2021-09-24 NOTE — DISCHARGE SUMMARY
Admit date: 9/3/2021   Admitting Provider: Hali Hancock MD    Discharge date: 9/24/2021  Discharging Provider: Gerald Bonner NP      * Admission Diagnoses: New onset seizure ([facility] [R56.9]  Sepsis ([facility] [A41.9]    * Discharge Diagnoses:    Hospital Problems as of 9/24/2021 Never Reviewed        Codes Class Noted - Resolved POA    Mild protein-calorie malnutrition ([facility] ICD-10-CM: E44.1  ICD-9-CM: 263.1  9/10/2021 - Present Unknown        Sepsis ([facility] ICD-10-CM: A41.9  ICD-9-CM: 038.9, 995.91  9/3/2021 - Present Unknown        * (Principal) New onset seizure ([facility] ICD-10-CM: R56.9  ICD-9-CM: 780.39  9/3/2021 - Present Unknown              * Hospital Course: depression who initially presented with seizure-like activity and found on the floor by family. Nicole Chen was found to be febrile and CT scan of the head head was performed with no acute process.  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  Patient recurrent seizures and was started on Keppra as well as immediately started on acyclovir.  Numerous CT and MRI of the brain imaging performed with CT scans of the head initially without acute process.  Repeat revealed right temporal and along the sylvian fissure without hemorrhage and 2 to 3 mm of right to left midline shift due to expanding edema.  Serial CTs of the head were performed although no significant changes on 9/11/2021 at which time a CT of the head revealed findings suspicious for right middle cerebral artery thrombosis with associated ischemia in the distribution of that artery.  Initial MRI on 9/3/2021 revealed abnormal high T2 signal in the right temporal and insular cortex.  Findings suspicious for herpes encephalitis. Beata Copper was also subtle increased T2 signal evident in the left temporal uncus.  Repeats MRI of the brain revealed progression of the findings of the right cerebral hemisphere extending into the left frontal lobe.  Most consistent with worsening encephalitis.  MRA of the brain on 9/13/2021 revealed no major arterial occlusion or stenosis demonstrated.  Infectious disease consultation, Dr. Carmen Bell.  Neurological consultation. Unique Bruno remains on Keppra and valproic acid for seizure prophylaxis.  Patient also found to have an upper extremity DVT and is currently on Eliquis.  Psych consult ordered for worsening depression. Repeat CT of the Head with no change.  Valproic levels are not therapeutic and increasing valproic acidosis.  EEG showed generalized slowing in the background with disorganization.  There were episodic focal slowing in the right temporal occipital regions.  Occasional sharp waves in the right frontal region.  No obvious seizure-like activity. S/P PPN, currently tolerating PO diet. S/p #21 day treatment IV acyclovir.     * Procedures:   * No surgery found *  Cardiology and IR Orders (From admission to next 24h)     Start     Ordered    09/09/21 1230  IR US VENOUS EXT LTD  [[id number]]  ONE TIME      09/09/21 1230    09/09/21 J[address] 23 5 YRS  [[id number]]  ONE TIME      09/09/21 1028    09/09/21 1030  IR FLUORO GUIDE PLC CVAD  [[id number]]  ONE TIME      09/09/21 1029    09/09/21 1030  IR US GUIDED VASCULAR ACCESS  [[id number]]  ONE TIME      09/09/21 1030                Consults: ID, Neurology, Rehabilitation Medicine and Psychiatry    Significant Diagnostic Studies:      [facility] WO 9/3  [address]     [facility] WO 9/9  Right temporal and insular region edema     CT WO 9/10  Possible RMCA ischemia with possible RMCA occlusion     CT WO 9/11  No interval changes from scan on 9/10     CT WO 9/15  No interval changes or worsening of frontotemporal edema     MRI Brain [email or web address] FOR COGNITIVE DISORDERS 9/3  T2/FLAIR hyperintense signal in the bilateral temporal regions more so in the right temporal and insular regions concerning for HSV encephalitis or possibly limbic encephalitis     MRI Brain [facility] [[email or web address] FOR COGNITIVE DISORDERS 9/8  Interval progression of T2/FLAIR hyperintense signal change in the right cerebral and left frontal region concerning for worsening encephalitis     MRA Head WO 9/13  No occlusions, dissections, significant stenosis, pseudoaneurysms or aneurysms in the intracranial arterial system     EEG 9/9  Generalized slowing of the background  Sharp discharges in the right frontal region     EEG 9/16  Mild generalized encephalopathy  Occasional right frontal sharp discharges  During photic stimulation, bilateral frontal central spike and wave discharges  No seizures     Valproic Acid Levels  44 (9/12), 31 (9/16)     CSF Studies   Positive: , , Protein 80, HSV-1 DNA  Negative/WNL: Glucose, HSV-2 DNA, Viral Cx      Labs  Positive: HSV 1 IgG Ab  Negative: HIV 1/2, RPR     Discharge Exam:  Physical Exam  Vitals and nursing note reviewed. HENT:      Mouth/Throat:      Pharynx: Oropharynx is clear. Eyes:      Conjunctiva/sclera: Conjunctivae normal.   Cardiovascular:      Rate and Rhythm: Regular rhythm. Pulses: Normal pulses. Heart sounds: Normal heart sounds. Comments: Right chest powerline  Pulmonary:      Effort: Pulmonary effort is normal.      Breath sounds: Normal breath sounds. Abdominal:      General: Bowel sounds are normal.      Palpations: Abdomen is soft. Musculoskeletal:         General: Normal range of motion. Cervical back: Normal range of motion. Comments: 3/5 power upper extremities   Skin:     General: Skin is warm and dry. Capillary Refill: Capillary refill takes less than 2 seconds. Neurological:      Mental Status: She is alert and oriented to person, place, and time. Motor: Weakness present.       Gait: Gait abnormal.   Psychiatric:         Mood and Affect: Mood normal.         Behavior: Behavior normal.           * Discharge Condition: stable  * Disposition: IRF    Discharge Medications:  Current Discharge Medication List      START taking these medications    Details apixaban (ELIQUIS) 5 mg tablet Take 1 Tablet by mouth two (2) times a day for 30 days. Indications: blood clot in a deep vein of the extremities  Qty: 60 Tablet, Refills: 0  Start date: 9/24/2021, End date: 10/24/2021      amLODIPine (NORVASC) 5 mg tablet Take 1 Tablet by mouth daily for 30 days. Qty: 30 Tablet, Refills: 0  Start date: 9/24/2021, End date: 10/24/2021      valproic acid (DEPAKENE) 250 mg capsule Take 2 Capsules by mouth three (3) times daily for 30 days. Qty: 180 Capsule, Refills: 0  Start date: 9/24/2021, End date: 10/24/2021      polyethylene glycol (MIRALAX) 17 gram packet Take 1 Packet by mouth daily for 30 days. Qty: 30 Packet, Refills: 0  Start date: 9/24/2021, End date: 10/24/2021         CONTINUE these medications which have CHANGED    Details   famotidine (Pepcid) 20 mg tablet Take 1 Tablet by mouth two (2) times a day for 10 days. Qty: 20 Tablet, Refills: 0  Start date: 9/24/2021, End date: 10/4/2021         CONTINUE these medications which have NOT CHANGED    Details   Multivitamins with Fluoride (MULTI-VITAMIN PO) Multi Vitamin      aspirin delayed-release 81 mg tablet aspirin 81 mg tablet,delayed release   Take 1 tablet every day by oral route for protection for 90 days. biotin 10,000 mcg cap biotin 10,000 mcg capsule   Take 1 capsule every day by oral route as directed for 30 days. calcium carb/vitamin D3/vit K1 (VIACTIV PO) Viactiv   OTC      canagliflozin (Invokana) 300 mg tablet Invokana 300 mg tablet   Take 1 tablet every day by oral route for diabetes for 90 days.       citalopram (CELEXA) 40 mg tablet       diclofenac (Voltaren) 1 % gel Voltaren Arthritis Pain 1 % topical gel   APPLY 2 GRAMS TO THE AFFECTED AREA(S) BY TOPICAL ROUTE 4 TIMES PER DAY x 90 days      Jardiance 25 mg tablet       fluticasone propionate (FLONASE) 50 mcg/actuation nasal spray fluticasone propionate 50 mcg/actuation nasal spray,suspension      glipiZIDE (GLUCOTROL) 5 mg tablet lidocaine (LIDODERM) 5 % lidocaine 5 % topical patch   APPLY 1 to 3 PATCHES BY TOPICAL ROUTE ONCE DAILY (MAY WEAR UP TO 12HOURS.)   for sciatica      loratadine (CLARITIN) 10 mg tablet loratadine 10 mg tablet   Take 1 tablet every day by oral route at bedtime for allergies for 30 days. for allergies      ondansetron (ZOFRAN ODT) 4 mg disintegrating tablet       rosuvastatin (CRESTOR) 5 mg tablet       Rybelsus 3 mg tablet       tamoxifen (NOLVADEX) 20 mg tablet tamoxifen 20 mg tablet   Take 1 tablet every day by oral route. PER ONCOLOGY FOR CANCER TREATMENT      triamcinolone acetonide (KENALOG) 0.1 % topical cream triamcinolone acetonide 0.1 % topical cream   APPLY A THIN LAYER TO THE AFFECTED AREA(S) BY TOPICAL ROUTE 2 TIMES PER DAY      coenzyme Q-10 (CO Q-10) 200 mg capsule Co Q-10 200 mg capsule   Take 1 capsule every day by oral route as directed for 30 days. STOP taking these medications       amoxicillin (AMOXIL) 500 mg capsule Comments:   Reason for Stopping:         azithromycin (ZITHROMAX) 250 mg tablet Comments:   Reason for Stopping:         naproxen (NAPROSYN) 500 mg tablet Comments:   Reason for Stopping:               * Follow-up Care/Patient Instructions:   Activity: Activity as tolerated and PT/OT Eval and Treat  Diet: Diabetic Diet  Wound Care: None needed    Patient to continue all medications as prescribed  Patient to maintain all follow up appointments  Patient educated on medication adjustment  Patient stable for discharge to IRF    Follow-up Information     Follow up With Specialties Details Why Contact Info    Other, MD Christianne    Patient can only remember the practice name and not the physician      Stephanie Douglass MD Neurology In 2 weeks  [address] [place] [address] [facility]  [phone]      Harsh Griggs MD Psychiatry In 2 weeks  [address] [place] [address]  [phone]      Michaela Shepherd MD Infectious Disease  As needed, If symptoms [address]  [phone]          Time Spent: > 35 minutes    Signed:  Bry Lazaro NP  9/24/2021  8:45 AM

## 2021-09-24 NOTE — PROGRESS NOTES
Comprehensive Nutrition Assessment    Type and Reason for Visit: Reassess (Goal)    Nutrition Recommendations/Plan:     Continue Soft + Bite Sized/thin diet       Texture per SLP       Honor pt preferences  Ensure Clear daily while inpatient only - (240kcal, 8g pro)         Do not recommend upon d/c d/t high CHO content and insufficient protein  Continue Gelatein BID (180kcal, 40g pro)     Consider addition of appetite stimulant as appropriate  Document %meal and supplement intakes in EMR, BMs in I/Os    Nutrition Assessment:  Worsening AMS, dx HSV encephalitis. Neuro + ID following, mentation improving with tx. Discharge planning to SNF in progress. Initially NPO with NG in place x3 days. TF of Jevity 1.5 initiated late 9/5 at 25mL/hr, advanced to goal rate briefly before d/c next morning (9/6). Diet then advanced to Easy to Chew (9/6). SLP f/u (9/10) recd SBS6/Mildly Thick lq then SBS6/thin (9/16) - remains ordered. PPN w/ lipids ordered (9/16-9/20) while NGT refused with poor PO. Calorie Count (9/21-9/23) documented in separate note pt met ~55%kcal, 65-90%pro needs; suboptimal yet adequate. RD visiting with pt and family often, pt continues to consume Ensure clear and gelatein jello with smoothies fortified with collagen protein powder brought by family. RD encouraged providing alternative supplement options with more kcal/pro per serving and less CHO than current supplements. Noted pt adamantly refusing Ensure Enlive or compact. Pt tried Ensure pdg, Magic cup and disliked. Appetite has improved slowly throughout admit. See prior nutrition notes for details. Labs: BUN 23, , POC , Ca 8.2. Meds: IVF, acyclovir, depakene, eliquis, celexa, insulin, keppra, nystatin, PPI, miralax prn.     Malnutrition Assessment:  Malnutrition Status:  Mild malnutrition    Context:  Acute illness     Findings of the 6 clinical characteristics of malnutrition:   Energy Intake:  1 - 75% or less of est energy req for 7 or more days  Weight Loss:  No significant weight loss     Body Fat Loss:  No significant body fat loss,     Muscle Mass Loss:  No significant muscle mass loss,    Fluid Accumulation:  No significant fluid accumulation,        Estimated Daily Nutrient Needs:  Energy (kcal): 2000kcal (25kcal/kg); Weight Used for Energy Requirements: Current  Protein (g): 80g (1g/kg); Weight Used for Protein Requirements: Current  Fluid (ml/day): 2000mL; Method Used for Fluid Requirements: 1 ml/kcal      Nutrition Related Findings:  NFPE not performed, pt appears well nourished on visual assessment. No documented c/s difficulty, SLP following inpatient. No n/v or c/d. BM documented 9/22, Soft. No edema. Wounds:    None       Current Nutrition Therapies:  ADULT ORAL NUTRITION SUPPLEMENT Breakfast, Lunch; Other Supplement; Gelatein Jell-o  ADULT ORAL NUTRITION SUPPLEMENT Dinner; Clear Liquid  ADULT DIET Dysphagia - Soft & Bite Sized; SWEET POTATOES EVERY L + D    Anthropometric Measures:  · Height:  5' 6\" (167.6 cm)  · Current Body Wt:  80 kg (176 lb 5.9 oz) (9/17)   · Ideal Body Wt:  130 lbs:  135.7 %   · BMI Category: Overweight (BMI 25.0-29. 9)       Nutrition Diagnosis:   · Inadequate oral intake related to cognitive or neurological impairment, early satiety as evidenced by intake 51-75%      Nutrition Interventions:   Food and/or Nutrient Delivery: Continue current diet, Continue oral nutrition supplement  Nutrition Education and Counseling: No recommendations at this time  Coordination of Nutrition Care: Continue to monitor while inpatient    Goals:  Meet >50% EENs in 5 days, Lytes wnl, Wt maintenance +/- 0.5kg per week       Nutrition Monitoring and Evaluation:   Behavioral-Environmental Outcomes: None identified  Food/Nutrient Intake Outcomes: Food and nutrient intake, Supplement intake  Physical Signs/Symptoms Outcomes: GI status, Weight, Biochemical data, Chewing or swallowing    Discharge Planning:    Continue oral nutrition supplement (adjust to lower CHO, higher kcal/pro supplement)     Electronically signed by Layla Montalvo on 9/24/2021 at 3:14 PM    Contact: [phone] or via [place] ([employer])

## 2021-09-24 NOTE — BH NOTES
Saw the patient chart reviewed spoke with the sister who is better and is resting by but alert polite cooperative recognize me seeing me for that on the physician some disorientation thought this was Monday thought this was July or August.  More alert better eye contact and better verbal interaction. Apparently she was up earlier than some physical therapy tired and resting eating well fair.   Patient and the sister both felt and I felt that she is making progress vital signs are reviewed unremarkable labs reviewed no new labs resulted continued inpatient level of care and on Escitalopram.  Thank you

## 2021-09-24 NOTE — PROGRESS NOTES
Patient has discharge orders for today. [facility]OC has contacted Nixon Stauffer at [address] [place] [address] and she is going to check on auth from insurance and call CM back. PCR covid test is negative and she was notified of that as well.

## 2021-09-24 NOTE — PROGRESS NOTES
OCCUPATIONAL THERAPY TREATMENT  Patient: Mari Amaral (1 y.o. female)  Date: 9/24/2021  Diagnosis: New onset seizure ([facility] [address] [R56.9]  Sepsis ([address] [A41.9] New onset seizure ([facility] [address]       Precautions:    Chart, occupational therapy assessment, plan of care, and goals were reviewed. ASSESSMENT  Patient continues with skilled OT services and is progressing towards goals. Pt. Received semi-supine in bed and agreeable to therapy session. Pt. Performed bed mobility with CGA with additional time to perform, good sitting balance noted seated at EOB, sit-> stand close CGA; upon standing pt used RW for standing balance. Pt. Performed functional ambulation from bed to bathroom with CGA with use of RW. Pt. Conintues to require constant v/cs and therapist assistance with maneuvering RW. Pt. Performed toilet transfer CGA and toilet hygiene with supervision. Pt. Completed increased functional ambulation distance with CGA and increasing to Min A d/t increased unsteady and more assistance with maneuvering RW. Pt at times would demonstrate decreased spatial  awareness by getting close to the wall with the RW and  objected in the jha way with the RW requiring assistance and cueing for pt to recognize for safety concerns. Pt. Required several standing RB while completing functional ambulation in jha way. Pt. Completed chair transfer with min A d/t decreased command following of lowering self into the chair. REC. IRF when medically appropriate for discharge. Current Level of Function Impacting Discharge (ADLs): increased confusion, decreased safety awareness, increase need for assistance for safety, fall risk d/t pt becoming unsteady during ambulation    Other factors to consider for discharge: PLOF, time since on set         PLAN :  Patient continues to benefit from skilled intervention to address the above impairments. Continue treatment per established plan of care. to address goals.     Recommendation for discharge: (in order for the patient to meet his/her long term goals)  Therapy 3 hours per day 5-7 days per week    This discharge recommendation:  Has been made in collaboration with the attending provider and/or case management    IF patient discharges home will need the following DME: TBD       SUBJECTIVE:   Patient stated  see, I cant control my bladder    OBJECTIVE DATA SUMMARY:   Cognitive/Behavioral Status:  Neurologic State: Alert     Cognition: Decreased attention/concentration; Follows commands     Functional Mobility and Transfers for ADLs:  Bed Mobility:  Rolling: Contact guard assistance  Supine to Sit: Contact guard assistance  Scooting: Contact guard assistance    Transfers:  Sit to Stand: Contact guard assistance  Functional Transfers  Bathroom Mobility: Contact guard assistance  Toilet Transfer : Contact guard assistance  Bed to Chair: Contact guard assistance    Balance:  Sitting: Intact; With support  Sitting - Static: Good (unsupported)  Sitting - Dynamic: Good (unsupported)  Standing: Impaired; With support  Standing - Static: Good;Occasional  Standing - Dynamic : Constant support; Fair    ADL Intervention:    Lower Body Dressing Assistance  Socks: Supervision  Position Performed: Seated edge of bed    Toileting  Bladder Hygiene: Supervision      Pain:  0/10     Activity Tolerance:   Fair and requires rest breaks  Please refer to the flowsheet for vital signs taken during this treatment. After treatment patient left in no apparent distress:   Sitting in chair, Heels elevated for pressure relief, and Call bell within reach    COMMUNICATION/COLLABORATION:   The patients plan of care was discussed with: Physical therapy assistant and Registered nurse. Co-tx with PTA for increased assistance with transfers and mobility.       Graham Oliva  Time Calculation: 32 mins  Problem: Self Care Deficits Care Plan (Adult)  Goal: *Acute Goals and Plan of Care (Insert Text)  Description: Pt will be Mod I sup <> sit in prep for EOB ADLs  Pt will be Mod I grooming standing sink side LRAD  Pt will be IND dressing sitting EOB/long sit  Pt will be Mod I sit <>  prep for toileting LRAD  Pt will be IND toileting/toilet transfer/cloth mgmt LRAD  Pt will be IND following UE HEP in prep for self care tasks   Outcome: Progressing Towards Goal

## 2021-09-25 VITALS
HEART RATE: 54 BPM | TEMPERATURE: 98.1 F | WEIGHT: 177.3 LBS | BODY MASS INDEX: 28.49 KG/M2 | RESPIRATION RATE: 20 BRPM | OXYGEN SATURATION: 98 % | HEIGHT: 66 IN | SYSTOLIC BLOOD PRESSURE: 160 MMHG | DIASTOLIC BLOOD PRESSURE: 69 MMHG

## 2021-09-25 LAB
GLUCOSE BLD STRIP.AUTO-MCNC: 186 MG/DL (ref 65–117)
GLUCOSE BLD STRIP.AUTO-MCNC: 220 MG/DL (ref 65–117)
PERFORMED BY, TECHID: ABNORMAL
PERFORMED BY, TECHID: ABNORMAL

## 2021-09-25 PROCEDURE — 74011250637 HC RX REV CODE- 250/637: Performed by: INTERNAL MEDICINE

## 2021-09-25 PROCEDURE — 74011250636 HC RX REV CODE- 250/636: Performed by: INTERNAL MEDICINE

## 2021-09-25 PROCEDURE — 74011250637 HC RX REV CODE- 250/637: Performed by: PHYSICIAN ASSISTANT

## 2021-09-25 PROCEDURE — 99232 SBSQ HOSP IP/OBS MODERATE 35: CPT | Performed by: INTERNAL MEDICINE

## 2021-09-25 PROCEDURE — 74011250637 HC RX REV CODE- 250/637: Performed by: STUDENT IN AN ORGANIZED HEALTH CARE EDUCATION/TRAINING PROGRAM

## 2021-09-25 PROCEDURE — 74011636637 HC RX REV CODE- 636/637: Performed by: INTERNAL MEDICINE

## 2021-09-25 PROCEDURE — 74011250636 HC RX REV CODE- 250/636: Performed by: HOSPITALIST

## 2021-09-25 PROCEDURE — 82962 GLUCOSE BLOOD TEST: CPT

## 2021-09-25 PROCEDURE — 97530 THERAPEUTIC ACTIVITIES: CPT

## 2021-09-25 RX ORDER — DEXAMETHASONE 2 MG/1
2 TABLET ORAL 2 TIMES DAILY WITH MEALS
Qty: 14 TABLET | Refills: 0 | Status: SHIPPED
Start: 2021-09-25 | End: 2021-10-02

## 2021-09-25 RX ORDER — LEVETIRACETAM 1000 MG/1
1500 TABLET ORAL 2 TIMES DAILY
Qty: 90 TABLET | Refills: 0 | Status: ON HOLD | OUTPATIENT
Start: 2021-09-25 | End: 2021-10-27

## 2021-09-25 RX ORDER — DEXAMETHASONE 4 MG/1
2 TABLET ORAL EVERY 12 HOURS
Status: DISCONTINUED | OUTPATIENT
Start: 2021-09-25 | End: 2021-09-25 | Stop reason: HOSPADM

## 2021-09-25 RX ORDER — LEVETIRACETAM 500 MG/1
1500 TABLET ORAL 2 TIMES DAILY
Status: DISCONTINUED | OUTPATIENT
Start: 2021-09-25 | End: 2021-09-25 | Stop reason: HOSPADM

## 2021-09-25 RX ORDER — DEXAMETHASONE 2 MG/1
2 TABLET ORAL
Qty: 7 TABLET | Refills: 0 | Status: SHIPPED
Start: 2021-10-03 | End: 2021-10-10

## 2021-09-25 RX ADMIN — APIXABAN 5 MG: 5 TABLET, FILM COATED ORAL at 09:08

## 2021-09-25 RX ADMIN — VALPROIC ACID 500 MG: 250 CAPSULE, LIQUID FILLED ORAL at 16:06

## 2021-09-25 RX ADMIN — VALPROIC ACID 500 MG: 250 CAPSULE, LIQUID FILLED ORAL at 05:54

## 2021-09-25 RX ADMIN — LEVETIRACETAM 1500 MG: 500 TABLET, FILM COATED ORAL at 13:05

## 2021-09-25 RX ADMIN — NYSTATIN 500000 UNITS: 100000 SUSPENSION ORAL at 09:08

## 2021-09-25 RX ADMIN — PANTOPRAZOLE SODIUM 40 MG: 40 TABLET, DELAYED RELEASE ORAL at 09:00

## 2021-09-25 RX ADMIN — INSULIN LISPRO 3 UNITS: 100 INJECTION, SOLUTION INTRAVENOUS; SUBCUTANEOUS at 09:08

## 2021-09-25 RX ADMIN — INSULIN LISPRO 2 UNITS: 100 INJECTION, SOLUTION INTRAVENOUS; SUBCUTANEOUS at 12:00

## 2021-09-25 RX ADMIN — NYSTATIN 500000 UNITS: 100000 SUSPENSION ORAL at 13:06

## 2021-09-25 RX ADMIN — CITALOPRAM HYDROBROMIDE 20 MG: 20 TABLET ORAL at 09:08

## 2021-09-25 RX ADMIN — DEXAMETHASONE 2 MG: 4 TABLET ORAL at 13:05

## 2021-09-25 RX ADMIN — AMLODIPINE BESYLATE 5 MG: 5 TABLET ORAL at 09:08

## 2021-09-25 NOTE — PROGRESS NOTES
Problem: Mobility Impaired (Adult and Pediatric)  Goal: *Acute Goals and Plan of Care (Insert Text)  Description: Patient will move from supine to sit and sit to supine , scoot up and down, and roll side to side in bed with minimal assistance/contact guard assist within 7 day(s). Patient will transfer from bed to chair and chair to bed with minimal assistance/contact guard assist using the least restrictive device within 7 day(s). Patient will improve static sitting/standing balance to minimal assistance within 1 week(s). Patient will ambulate 10 feet with minimal assistance with least restrictive device within 1 weeks. Outcome: Progressing Towards Goal   PHYSICAL THERAPY TREATMENT  Patient: Norma Silverman (9 y.o. female)  Date: 9/25/2021  Diagnosis: New onset seizure ([address] [R56.9]  Sepsis ([address] [A41.9] New onset seizure ([address]       Precautions:    Chart, physical therapy assessment, plan of care and goals were reviewed. ASSESSMENT  Patient continues with skilled PT services and is progressing towards goals. Increased time required for all mobility. Pt easily distracted t/o tx and required cues to stay on task. Decreased assistance required for bed mobility. Improved sitting and standing static balance. Used RW to complete chair transfer. Very slow movements noted. While participating with seated therex, required constant verbal, visual, and tactile cues to complete each exercise. Reviewed HEP, will require further review/education next tx. Safety awareness training provided with mobility, to include proper hand placement with transfers while using RW, and proper body positioning prior to sitting in chair. Current Level of Function Impacting Discharge (mobility/balance): Improvements noted with balance. Other factors to consider for discharge: Ability to concentrate. PLAN :  Patient continues to benefit from skilled intervention to address the above impairments.   Continue treatment per established plan of care. to address goals. Recommendation for discharge: (in order for the patient to meet his/her long term goals)  Inpatient Rehabilitation Facility            SUBJECTIVE:   Patient stated I feel fine.     OBJECTIVE DATA SUMMARY:   Critical Behavior:  Neurologic State: Alert  Orientation Level: Oriented to person, Oriented to place  Cognition: Decreased attention/concentration  Safety/Judgement: Decreased awareness of need for safety  Functional Mobility Training:  Bed Mobility:     Supine to Sit: Modified independent     Scooting: Modified independent        Transfers:  Sit to Stand: Stand-by assistance; Additional time  Stand to Sit: Stand-by assistance        Bed to Chair: Stand-by assistance; Additional time              Interventions: Verbal cues; Safety awareness training     Balance:  Sitting: Intact  Sitting - Static: Good (unsupported)  Sitting - Dynamic: Good (unsupported)  Standing: Intact  Standing - Static: Good  Standing - Dynamic : Good  Ambulation/Gait Training:  Distance (ft): 5 Feet (ft)  Assistive Device: Gait belt;Walker, rolling  Ambulation - Level of Assistance: Stand-by assistance                       Speed/Elda: Slow           Therapeutic Exercises:   AP x10- visual and verbal cues required  LAQ x10- visual and verbal cues required  Seated march x10-visual, verbal, and tactile cues required. Easily distracted with therex, difficulty following along without constant cueing. Pain Ratin/10    Activity Tolerance:   Good      After treatment patient left in no apparent distress:   Sitting in chair, Call bell within reach, and Bed / chair alarm activated    COMMUNICATION/COLLABORATION:   The patients plan of care was discussed with: Registered nurse. Pt daughter, 2 different physicians, nsg, and nsg practitioner present t/o different parts of tx.        Ross Blunt   Time Calculation: 45 mins

## 2021-09-25 NOTE — PROGRESS NOTES
Rounding on pt. Pt found on sitting on bathroom floor. Vitals taken, assessed for injuries non noted. No complaints voiced from patient. Code fall initiated. Assisted patient bag to bed. Side rails up x's 2 yellow  on, fall mats in place. MD and daughter notified. Daughter requested for patient to be seen at bedside. Spoke with Dr. Marielena Tinoco and Dr. Bob Squires. Dr. Bob Squires stated she will be here to see patient after doing her admissions. Will continue with patient plan of care.

## 2021-09-25 NOTE — PROGRESS NOTES
Order from Dr Ramila Gao to remove  Left chest powerline. To Pt bedside and powerline was removed with no resistance and no complaints from patient. No bleeding noted.   Gauze and tegaderm applied, Report given to primary nurse

## 2021-09-25 NOTE — PROGRESS NOTES
Infectious Disease Progress Note         Subjective:   Pt seen and examined at bedside. Cordell Huggins last night w/o sustaining sig injury. Plan is for d/c to rehab today   Objective:   Physical Exam:     Visit Vitals  BP (!) 160/69   Pulse (!) 54   Temp 98.1 °F (36.7 °C)   Resp 20   Ht 5' 6\" (1.676 m)   Wt 177 lb 4.8 oz (80.4 kg)   SpO2 98%   BMI 28.62 kg/m²    O2 Flow Rate (L/min): 2 l/min O2 Device: None (Room air)    Temp (24hrs), Av.8 °F (36.6 °C), Min:97.4 °F (36.3 °C), Max:98.1 °F (36.7 °C)    No intake/output data recorded.  1901 -  0700  In: 2602.5 [I.V.:2602.5]  Out: -     General: NAD, alert and following commands appropriately   HEENT: LAQUITA, Moist mucosa  Lungs: CTA b/l, no wheeze/rhonchi   Heart: S1S2+, RRR, no murmur  Abdo: Soft, NT, ND, +BS   Exts: no new weakness, no edema   Skin: No chronic wounds or ulcers, + right chest wall power line     Data Review:       Recent Days:  Recent Labs     21  0637   WBC 8.1   HGB 13.4   HCT 40.0        Recent Labs     21  0637   BUN 23*   CREA 0.62     Lab Results   Component Value Date/Time    C-Reactive protein 2.95 (H) 2021 07:28 AM      Microbiology     Results     ** No results found for the last 336 hours. **           Diagnostics   CXR Results  (Last 48 hours)    None         Assessment/Plan     1. 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 INDIA: Resolved, Cr at baseline on most recent labs     3. Leukocytosis: due to steroid therapy, normalized w taper     4. Seizure on admission: Due to # 1. On Keppra, and Valproic    5. Left sided weakness: Due to # 1.  Improved w/o evidence of worsening       Will taper steroid therapy, discussed w neurology     6. DVT of axillary vein/Thrombosis of left basilic vein.  Anticoagulated on low dose Sydniquadrienne Bose MD    9/25/2021

## 2021-09-25 NOTE — PROGRESS NOTES
NEUROLOGY  PROGRESS NOTE    Admission History/Pertinent Events  Chemo Rae is a 60y.o. year old female is seen in follow-up and she is even better today than 2 days ago, answering simple questions and oriented to place person and partially to time. She presented on 9/3/2021. Patient has a past medical history of Breast Cancer, HL, DM2, Anxiety/Depression who presented with seizure like activity while on the floor by family. EMS reported patient having seizure-like activity for which patient was initially treated with midazolam.  In the ED, patient had a temperature a 102° F.  Emergent CT head was negative for any acute findings. Emergent contrasted MRI of the brain concerning for bilateral temporal pathology most concerning for HSV encephalitis or limbic encephalitis. Patient was loaded with IV levetiracetam and started on antibiotics as well as acyclovir. ASSESSMENT/PLAN      Impression  Patient doing well this morning with stable examination. Her acyclovir was stopped yesterday and will switch dexamethasone to p.o. at 2 mg twice daily for a week then 2 mg once a day for a week and then stop. We will change Keppra to p.o. and reduce the dose to 1500 mg twice a day. She can be discharged from neurology standpoint.       14 [facility] WO 9/3  [facility] WO 9/9  Right temporal and insular region edema    [facility] WO 9/10  Possible RMCA ischemia with possible RMCA occlusion    CT WO 9/11  No interval changes from scan on 9/10    14 [facility] WO 9/15  No interval changes or worsening of frontotemporal edema    MRI Brain [facility] FOR COGNITIVE DISORDERS 9/3  T2/FLAIR hyperintense signal in the bilateral temporal regions more so in the right temporal and insular regions concerning for HSV encephalitis or possibly limbic encephalitis    MRI Brain [facility] FOR COGNITIVE DISORDERS 9/8  Interval progression of T2/FLAIR hyperintense signal change in the right cerebral and left frontal region concerning for worsening encephalitis    MRA Head WO 9/13  No occlusions, dissections, significant stenosis, pseudoaneurysms or aneurysms in the intracranial arterial system    EEG 9/9  Generalized slowing of the background  Sharp discharges in the right frontal region    EEG 9/16  Mild generalized encephalopathy  Occasional right frontal sharp discharges  During photic stimulation, bilateral frontal central spike and wave discharges  No seizures    Valproic Acid Levels  44 (9/12), 31 (9/16)    CSF Studies   Positive: , , Protein 80, HSV-1 DNA  Negative/WNL: Glucose, HSV-2 DNA, Viral Cx     Labs  Positive: HSV 1 IgG Ab  Negative: HIV 1/2, RPR      Plan      Encephalopathy & Seizures d/t HSV1 Encephalitis  Cerebral Edema d/t Above  Associated: Sepsis, Basilic Vein Thrombosus, INDIA  -On Acyclovir per ID (Planned 21 day course)  --> Plan to discharge once the treatment is completed. Spoke to the daughter regarding repeating the spinal tap and was explained that with HSV 1 we do not need to repeat the LP at the completion of the treatment. In case there be any changes.  -Continue Dexamethasone 2 TID  -On Apixaban 5 BID  -Seizure Precautions  -Seizure Prophylaxis: Levetiracetam 2000 BID, Valproic Acid 500 TID  -SBP Goal < 160  -Glucose Goal < 180  -Head of Bed at 30 degrees  -PT/OT/ST as needed  -Management of metabolic/infectious derangements to referring teams    Please contact neurology with any further questions/concerns    Patient to follow-up with neurology in 2 weeks from discharge with Dr. Owens Kos [facility])      SUBJECTIVE   Patient currently on acyclovir and dexamethasone. No significant changes on neurological examination. Physical/Neurological Exam  Patient awake, alert; following central and peripheral commands   No expressive or receptive aphasia;  No dysarthria   Decreased emotional drive with speech  Pupils react to light bilaterally; EOM Intact   No visual field deficits on gross exam   No facial droop   Motor: 3+/5 in the left hemibody, 4/5 in the right hemibody  No abnormal movements   Sensation to light touch intact grossly throughout       OBJECTIVE  Vital Signs  Temp:  [97.4 °F (36.3 °C)-98.8 °F (37.1 °C)]   Pulse (Heart Rate):  [54-68]   BP: ()/(41-74)   Resp Rate:  [14-20]   O2 Sat (%):  [96 %-100 %]   Weight:  [80.4 kg (177 lb 4.8 oz)]     MEDICATIONS    Current Facility-Administered Medications:     levETIRAcetam (KEPPRA) tablet 1,500 mg, 1,500 mg, Oral, BID, Debra Serrato MD    dexAMETHasone (DECADRON) tablet 2 mg, 2 mg, Oral, Q12H, Debra Serrato MD    valproic acid (DEPAKENE) capsule 500 mg, 500 mg, Oral, TID, Maribel Chand MD, 500 mg at 09/25/21 0554    pantoprazole (PROTONIX) tablet 40 mg, 40 mg, Oral, ACB, Anthony Sotelo PA-C, 40 mg at 09/25/21 0900    citalopram (CELEXA) tablet 20 mg, 20 mg, Oral, DAILY, Rachele Barnhart MD, 20 mg at 09/25/21 0908    nystatin (MYCOSTATIN) 100,000 unit/mL oral suspension 500,000 Units, 500,000 Units, Oral, QID, Rachele Barnhart MD, 500,000 Units at 09/25/21 0908    apixaban (ELIQUIS) tablet 5 mg, 5 mg, Oral, BID, Rachele Barnhart MD, 5 mg at 09/25/21 0908    amLODIPine (NORVASC) tablet 5 mg, 5 mg, Oral, DAILY, Kim Garcia MD, 5 mg at 09/25/21 0908    hydrALAZINE (APRESOLINE) 20 mg/mL injection 10 mg, 10 mg, IntraVENous, Q6H PRN, Kim Garcia MD, 10 mg at 09/12/21 2202    insulin lispro (HUMALOG) injection, , SubCUTAneous, AC&HS, Hannah Forbes MD, 3 Units at 09/25/21 0908    acetaminophen (TYLENOL) tablet 650 mg, 650 mg, Oral, Q6H PRN, 650 mg at 09/06/21 1236 **OR** acetaminophen (TYLENOL) suppository 650 mg, 650 mg, Rectal, Q6H PRN, Rachele Barnhart MD    polyethylene glycol (MIRALAX) packet 17 g, 17 g, Oral, DAILY PRN, Rachele Barnhart MD    promethazine (PHENERGAN) tablet 12.5 mg, 12.5 mg, Oral, Q6H PRN **OR** ondansetron (ZOFRAN) injection 4 mg, 4 mg, IntraVENous, Q6H PRN, Rachele Barnhart MD    glucose chewable tablet 16 g, 4 Tablet, Oral, PRN, Eliu Abernathy MD    dextrose (D50W) injection syrg 12.5-25 g, 25-50 mL, IntraVENous, PRN, Kana Baker MD, 25 g at 09/04/21 0409    glucagon (GLUCAGEN) injection 1 mg, 1 mg, IntraMUSCular, PRN, Kana Baker MD    This document has been prepared by the Dragon voice recognition system, typographical errors may have occurred.  Attempts have been made to correct errors, however inadvertent errors may persist.

## 2021-09-25 NOTE — PROGRESS NOTES
Pt port  Is not work. Spoke with Dr. Jose Crain . Orders received to place consult for IR. Also spoke with Daughter to give her an update that no changes in patient's condition.

## 2021-09-25 NOTE — PROGRESS NOTES
1100 am  Daughter has decided to transport her mom herself. Notified Katelyn Chase at [facility] of transport decision. Katelyn Chase stated patient would need to be there by 7pm. [facility] notified of time frame and stated they would plan to discharge around 3 pm. Notified Alexandria, patients assigned nurse today. Patient will fo to room 3120 under care of Dr. Fatimah Roa. Report may be called to [phone].     9850 1142 am  Informed daughter of up front cost for ambulance transport. She will make a couple phone calls and let me know for sure what she decides. 1025 am  Received all back from [facility] and informed patient would have to pay $691.00 up front before they will transport. CM will notify daughter and find out what she decides. 1005 am  CM asked to speak to daughter in room. CM in room to see patient and daughter. Patient ambulating with rolling walker from bathroom to bed with assistance of nurse. Daughter expressed concern if patient were to be discharged today due to patient fall last night. No injury sustained per daughter. Explained to daughter Carmelina I would contact [facility] to inquire [facility] if family were to pay out of pocket for transport. Kaley Dodge contacted. Awaiting a quote from Sarina Waters.

## 2021-09-25 NOTE — DISCHARGE SUMMARY
Admit date: 9/3/2021   Admitting Provider: Brian Estes MD    Discharge date: 9/25/2021  Discharging Provider: Yolie Hanna NP      * Admission Diagnoses: New onset seizure ([facility] [R56.9]  Sepsis ([facility] [A41.9]    * Discharge Diagnoses:    Hospital Problems as of 9/25/2021 Never Reviewed        Codes Class Noted - Resolved POA    Mild protein-calorie malnutrition ([facility] ICD-10-CM: E44.1  ICD-9-CM: 263.1  9/10/2021 - Present Unknown        Sepsis ([facility] ICD-10-CM: A41.9  ICD-9-CM: 038.9, 995.91  9/3/2021 - Present Unknown        * (Principal) New onset seizure ([facility] ICD-10-CM: R56.9  ICD-9-CM: 780.39  9/3/2021 - Present Unknown              * Hospital Course: depression who initially presented with seizure-like activity and found on the floor by family. Riley Pisano was found to be febrile and CT scan of the head head was performed with no acute process.  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  Patient recurrent seizures and was started on Keppra as well as immediately started on acyclovir.  Numerous CT and MRI of the brain imaging performed with CT scans of the head initially without acute process.  Repeat revealed right temporal and along the sylvian fissure without hemorrhage and 2 to 3 mm of right to left midline shift due to expanding edema.  Serial CTs of the head were performed although no significant changes on 9/11/2021 at which time a CT of the head revealed findings suspicious for right middle cerebral artery thrombosis with associated ischemia in the distribution of that artery.  Initial MRI on 9/3/2021 revealed abnormal high T2 signal in the right temporal and insular cortex.  Findings suspicious for herpes encephalitis. Lunette Mario was also subtle increased T2 signal evident in the left temporal uncus.  Repeats MRI of the brain revealed progression of the findings of the right cerebral hemisphere extending into the left frontal lobe.  Most consistent with worsening encephalitis.  MRA of the brain on 9/13/2021 revealed no major arterial occlusion or stenosis demonstrated.  Infectious disease consultation, Dr. Lyly Cordoba.  Neurological consultation. Agustin Farley remains on Keppra and valproic acid for seizure prophylaxis.  Patient also found to have an upper extremity DVT and is currently on Eliquis.  Psych consult ordered for worsening depression. Repeat CT of the Head with no change.  Valproic levels are not therapeutic and increasing valproic acidosis.  EEG showed generalized slowing in the background with disorganization.  There were episodic focal slowing in the right temporal occipital regions.  Occasional sharp waves in the right frontal region.  No obvious seizure-like activity. S/P PPN, currently tolerating PO diet. S/p #21 day treatment IV acyclovir.     * Procedures:   * No surgery found *  Cardiology and IR Orders (From admission to next 24h)     Start     Ordered    09/09/21 1230  IR US VENOUS EXT LTD  [[id number]]  ONE TIME      09/09/21 1230    09/09/21 [address] 5 YRS  [[id number]]  ONE TIME      09/09/21 1028    09/09/21 1030  IR FLUORO GUIDE PLC CVAD  [[id number]]  ONE TIME      09/09/21 1029    09/09/21 1030  IR US GUIDED VASCULAR ACCESS  [[id number]]  ONE TIME      09/09/21 1030                Consults: ID, Neurology, Rehabilitation Medicine and Psychiatry    Significant Diagnostic Studies:      [facility] WO 9/3  [address]     14 [facility] WO 9/9  Right temporal and insular region edema     CT WO 9/10  Possible RMCA ischemia with possible RMCA occlusion     CT WO 9/11  No interval changes from scan on 9/10     CT WO 9/15  No interval changes or worsening of frontotemporal edema     MRI Brain [facility] FOR COGNITIVE DISORDERS 9/3  T2/FLAIR hyperintense signal in the bilateral temporal regions more so in the right temporal and insular regions concerning for HSV encephalitis or possibly limbic encephalitis     MRI Brain [facility] FOR COGNITIVE DISORDERS 9/8  Interval progression of T2/FLAIR hyperintense signal change in the right cerebral and left frontal region concerning for worsening encephalitis     MRA Head WO 9/13  No occlusions, dissections, significant stenosis, pseudoaneurysms or aneurysms in the intracranial arterial system     EEG 9/9  Generalized slowing of the background  Sharp discharges in the right frontal region     EEG 9/16  Mild generalized encephalopathy  Occasional right frontal sharp discharges  During photic stimulation, bilateral frontal central spike and wave discharges  No seizures     Valproic Acid Levels  44 (9/12), 31 (9/16)     CSF Studies   Positive: , , Protein 80, HSV-1 DNA  Negative/WNL: Glucose, HSV-2 DNA, Viral Cx      Labs  Positive: HSV 1 IgG Ab  Negative: HIV 1/2, RPR     Discharge Exam:  Physical Exam  Vitals and nursing note reviewed. HENT:      Mouth/Throat:      Pharynx: Oropharynx is clear. Eyes:      Conjunctiva/sclera: Conjunctivae normal.   Cardiovascular:      Rate and Rhythm: Regular rhythm. Pulses: Normal pulses. Heart sounds: Normal heart sounds. Comments: Right chest powerline  Pulmonary:      Effort: Pulmonary effort is normal.      Breath sounds: Normal breath sounds. Abdominal:      General: Bowel sounds are normal.      Palpations: Abdomen is soft. Musculoskeletal:         General: Normal range of motion. Cervical back: Normal range of motion. Comments: 3/5 power upper extremities   Skin:     General: Skin is warm and dry. Capillary Refill: Capillary refill takes less than 2 seconds. Neurological:      Mental Status: She is alert and oriented to person, place, and time. Motor: Weakness present.       Gait: Gait abnormal.   Psychiatric:         Mood and Affect: Mood normal.         Behavior: Behavior normal.           * Discharge Condition: stable  * Disposition: IRF    Discharge Medications:    Current Discharge Medication List      START taking these medications    Details levETIRAcetam (Keppra) 1,000 mg tablet Take 1.5 Tablets by mouth two (2) times a day for 30 days. Qty: 90 Tablet, Refills: 0  Start date: 9/25/2021, End date: 10/25/2021      !! dexAMETHasone (DECADRON) 2 mg tablet Take 1 Tablet by mouth two (2) times daily (with meals) for 7 days. Qty: 14 Tablet, Refills: 0  Start date: 9/25/2021, End date: 10/2/2021      !! dexAMETHasone (DECADRON) 2 mg tablet Take 1 Tablet by mouth Daily (before breakfast) for 7 days. Qty: 7 Tablet, Refills: 0  Start date: 10/3/2021, End date: 10/10/2021      apixaban (ELIQUIS) 5 mg tablet Take 1 Tablet by mouth two (2) times a day for 30 days. Indications: blood clot in a deep vein of the extremities  Qty: 60 Tablet, Refills: 0  Start date: 9/24/2021, End date: 10/24/2021      amLODIPine (NORVASC) 5 mg tablet Take 1 Tablet by mouth daily for 30 days. Qty: 30 Tablet, Refills: 0  Start date: 9/24/2021, End date: 10/24/2021      valproic acid (DEPAKENE) 250 mg capsule Take 2 Capsules by mouth three (3) times daily for 30 days. Qty: 180 Capsule, Refills: 0  Start date: 9/24/2021, End date: 10/24/2021      polyethylene glycol (MIRALAX) 17 gram packet Take 1 Packet by mouth daily for 30 days. Qty: 30 Packet, Refills: 0  Start date: 9/24/2021, End date: 10/24/2021       !! - Potential duplicate medications found. Please discuss with provider. CONTINUE these medications which have CHANGED    Details   famotidine (Pepcid) 20 mg tablet Take 1 Tablet by mouth two (2) times a day for 10 days. Qty: 20 Tablet, Refills: 0  Start date: 9/24/2021, End date: 10/4/2021         CONTINUE these medications which have NOT CHANGED    Details   Multivitamins with Fluoride (MULTI-VITAMIN PO) Multi Vitamin      aspirin delayed-release 81 mg tablet aspirin 81 mg tablet,delayed release   Take 1 tablet every day by oral route for protection for 90 days.       biotin 10,000 mcg cap biotin 10,000 mcg capsule   Take 1 capsule every day by oral route as directed for 30 days. calcium carb/vitamin D3/vit K1 (VIACTIV PO) Viactiv   OTC      canagliflozin (Invokana) 300 mg tablet Invokana 300 mg tablet   Take 1 tablet every day by oral route for diabetes for 90 days. citalopram (CELEXA) 40 mg tablet       diclofenac (Voltaren) 1 % gel Voltaren Arthritis Pain 1 % topical gel   APPLY 2 GRAMS TO THE AFFECTED AREA(S) BY TOPICAL ROUTE 4 TIMES PER DAY x 90 days      Jardiance 25 mg tablet       fluticasone propionate (FLONASE) 50 mcg/actuation nasal spray fluticasone propionate 50 mcg/actuation nasal spray,suspension      glipiZIDE (GLUCOTROL) 5 mg tablet       lidocaine (LIDODERM) 5 % lidocaine 5 % topical patch   APPLY 1 to 3 PATCHES BY TOPICAL ROUTE ONCE DAILY (MAY WEAR UP TO 12HOURS.)   for sciatica      loratadine (CLARITIN) 10 mg tablet loratadine 10 mg tablet   Take 1 tablet every day by oral route at bedtime for allergies for 30 days. for allergies      ondansetron (ZOFRAN ODT) 4 mg disintegrating tablet       rosuvastatin (CRESTOR) 5 mg tablet       Rybelsus 3 mg tablet       tamoxifen (NOLVADEX) 20 mg tablet tamoxifen 20 mg tablet   Take 1 tablet every day by oral route. PER ONCOLOGY FOR CANCER TREATMENT      triamcinolone acetonide (KENALOG) 0.1 % topical cream triamcinolone acetonide 0.1 % topical cream   APPLY A THIN LAYER TO THE AFFECTED AREA(S) BY TOPICAL ROUTE 2 TIMES PER DAY      coenzyme Q-10 (CO Q-10) 200 mg capsule Co Q-10 200 mg capsule   Take 1 capsule every day by oral route as directed for 30 days. STOP taking these medications       amoxicillin (AMOXIL) 500 mg capsule Comments:   Reason for Stopping:         azithromycin (ZITHROMAX) 250 mg tablet Comments:   Reason for Stopping:         naproxen (NAPROSYN) 500 mg tablet Comments:   Reason for Stopping:               * Follow-up Care/Patient Instructions:   Activity: Activity as tolerated and PT/OT Eval and Treat  Diet: Diabetic Diet  Wound Care: None needed    Patient to continue all medications as prescribed  Patient to maintain all follow up appointments  Patient educated on medication adjustment  Patient stable for discharge to IRF    Follow-up Information     Follow up With Specialties Details Why Contact Info    Other, MD Christianne    Patient can only remember the practice name and not the physician      Zbigniew Schrader MD Neurology In 2 weeks  [address] [facility]  [address] [facility][1]      Dario Marin MD Psychiatry In 2 weeks  [address] [facility] [address] [place] [address]      Bridgette Merritt MD Infectious Disease  As needed, If symptoms worsen [address] [place] [address]  [phone]      Lupe Bone  In 1 week  [phone]    Delia Clifton MD Hematology and Oncology In 1 month DVT [address] [address] [phone]            Time Spent: > 35 minutes    Signed:  Efra Cuevas NP  9/25/2021  8:45 AM

## 2021-09-25 NOTE — PROGRESS NOTES
Problem: Falls - Risk of  Goal: *Absence of Falls  Description: Document Luz Boles Fall Risk and appropriate interventions in the flowsheet.   Outcome: Not Met  Note: Fall Risk Interventions:  Mobility Interventions: Bed/chair exit alarm, Patient to call before getting OOB, Utilize walker, cane, or other assistive device    Mentation Interventions: Bed/chair exit alarm, Door open when patient unattended, More frequent rounding, Reorient patient, Room close to nurse's station    Medication Interventions: Bed/chair exit alarm, Patient to call before getting OOB, Teach patient to arise slowly, Utilize gait belt for transfers/ambulation    Elimination Interventions: Bed/chair exit alarm, Call light in reach, Toileting schedule/hourly rounds    History of Falls Interventions: Bed/chair exit alarm, Door open when patient unattended, Room close to nurse's station, Utilize gait belt for transfer/ambulation

## 2021-10-26 ENCOUNTER — HOSPITAL ENCOUNTER (INPATIENT)
Age: 66
LOS: 1 days | Discharge: HOME | DRG: 442 | End: 2021-10-27
Attending: STUDENT IN AN ORGANIZED HEALTH CARE EDUCATION/TRAINING PROGRAM | Admitting: INTERNAL MEDICINE
Payer: COMMERCIAL

## 2021-10-26 ENCOUNTER — APPOINTMENT (OUTPATIENT)
Dept: ULTRASOUND IMAGING | Age: 66
DRG: 442 | End: 2021-10-26
Attending: STUDENT IN AN ORGANIZED HEALTH CARE EDUCATION/TRAINING PROGRAM
Payer: COMMERCIAL

## 2021-10-26 DIAGNOSIS — K75.9 HEPATITIS: Primary | ICD-10-CM

## 2021-10-26 PROBLEM — R74.01 TRANSAMINITIS: Status: ACTIVE | Noted: 2021-10-26

## 2021-10-26 LAB
ALBUMIN SERPL-MCNC: 2.4 G/DL (ref 3.5–5)
ALBUMIN/GLOB SERPL: 0.6 {RATIO} (ref 1.1–2.2)
ALP SERPL-CCNC: 447 U/L (ref 45–117)
ALT SERPL-CCNC: 1477 U/L (ref 12–78)
AMMONIA PLAS-SCNC: 20 UMOL/L
ANION GAP SERPL CALC-SCNC: 5 MMOL/L (ref 5–15)
APAP SERPL-MCNC: <10 UG/ML (ref 10–30)
AST SERPL W P-5'-P-CCNC: 1388 U/L (ref 15–37)
BASOPHILS # BLD: 0 K/UL (ref 0–0.1)
BASOPHILS NFR BLD: 1 % (ref 0–1)
BILIRUB SERPL-MCNC: 2.7 MG/DL (ref 0.2–1)
BUN SERPL-MCNC: 11 MG/DL (ref 6–20)
BUN/CREAT SERPL: 17 (ref 12–20)
CA-I BLD-MCNC: 8.9 MG/DL (ref 8.5–10.1)
CHLORIDE SERPL-SCNC: 105 MMOL/L (ref 97–108)
CO2 SERPL-SCNC: 30 MMOL/L (ref 21–32)
CREAT SERPL-MCNC: 0.66 MG/DL (ref 0.55–1.02)
DATE LAST DOSE: ABNORMAL
DIFFERENTIAL METHOD BLD: ABNORMAL
EOSINOPHIL # BLD: 0 K/UL (ref 0–0.4)
EOSINOPHIL NFR BLD: 0 % (ref 0–7)
ERYTHROCYTE [DISTWIDTH] IN BLOOD BY AUTOMATED COUNT: 20.2 % (ref 11.5–14.5)
GLOBULIN SER CALC-MCNC: 3.7 G/DL (ref 2–4)
GLUCOSE BLD STRIP.AUTO-MCNC: 124 MG/DL (ref 65–117)
GLUCOSE BLD STRIP.AUTO-MCNC: 55 MG/DL (ref 65–117)
GLUCOSE BLD STRIP.AUTO-MCNC: 91 MG/DL (ref 65–117)
GLUCOSE BLD STRIP.AUTO-MCNC: 96 MG/DL (ref 65–117)
GLUCOSE SERPL-MCNC: 67 MG/DL (ref 65–100)
HAV IGM SER QL: NONREACTIVE
HBV CORE IGM SER QL: NONREACTIVE
HBV SURFACE AG SER QL: 0.23 INDEX
HBV SURFACE AG SER QL: NEGATIVE
HCT VFR BLD AUTO: 39.1 % (ref 35–47)
HCV AB SER IA-ACNC: 0.1 INDEX
HCV AB SERPL QL IA: NONREACTIVE
HGB BLD-MCNC: 12.5 G/DL (ref 11.5–16)
IMM GRANULOCYTES # BLD AUTO: 0 K/UL
IMM GRANULOCYTES NFR BLD AUTO: 0 %
INR PPP: 1.2 (ref 0.9–1.1)
LYMPHOCYTES # BLD: 1.8 K/UL (ref 0.8–3.5)
LYMPHOCYTES NFR BLD: 41 % (ref 12–49)
MCH RBC QN AUTO: 29.9 PG (ref 26–34)
MCHC RBC AUTO-ENTMCNC: 32 G/DL (ref 30–36.5)
MCV RBC AUTO: 93.5 FL (ref 80–99)
MONOCYTES # BLD: 1.4 K/UL (ref 0–1)
MONOCYTES NFR BLD: 31 % (ref 5–13)
MRSA DNA SPEC QL NAA+PROBE: NOT DETECTED
NEUTS SEG # BLD: 1.2 K/UL (ref 1.8–8)
NEUTS SEG NFR BLD: 27 % (ref 32–75)
NRBC # BLD: 0 K/UL (ref 0–0.01)
NRBC BLD-RTO: 0 PER 100 WBC
PERFORMED BY, TECHID: ABNORMAL
PERFORMED BY, TECHID: ABNORMAL
PERFORMED BY, TECHID: NORMAL
PERFORMED BY, TECHID: NORMAL
PLATELET # BLD AUTO: 130 K/UL (ref 150–400)
PMV BLD AUTO: 10 FL (ref 8.9–12.9)
POTASSIUM SERPL-SCNC: 4.1 MMOL/L (ref 3.5–5.1)
PROT SERPL-MCNC: 6.1 G/DL (ref 6.4–8.2)
PROTHROMBIN TIME: 14.7 SEC (ref 11.9–14.7)
RBC # BLD AUTO: 4.18 M/UL (ref 3.8–5.2)
RBC MORPH BLD: ABNORMAL
REPORTED DOSE,DOSE: ABNORMAL UNITS
SODIUM SERPL-SCNC: 140 MMOL/L (ref 136–145)
SP1: NORMAL
SP2: NORMAL
SP3: NORMAL
TSH SERPL DL<=0.05 MIU/L-ACNC: 2.66 UIU/ML (ref 0.36–3.74)
VALPROATE SERPL-MCNC: 99 UG/ML (ref 50–100)
WBC # BLD AUTO: 4.4 K/UL (ref 3.6–11)

## 2021-10-26 PROCEDURE — 85610 PROTHROMBIN TIME: CPT

## 2021-10-26 PROCEDURE — 36415 COLL VENOUS BLD VENIPUNCTURE: CPT

## 2021-10-26 PROCEDURE — 87641 MR-STAPH DNA AMP PROBE: CPT

## 2021-10-26 PROCEDURE — 82962 GLUCOSE BLOOD TEST: CPT

## 2021-10-26 PROCEDURE — 80143 DRUG ASSAY ACETAMINOPHEN: CPT

## 2021-10-26 PROCEDURE — 74011250637 HC RX REV CODE- 250/637: Performed by: INTERNAL MEDICINE

## 2021-10-26 PROCEDURE — 82140 ASSAY OF AMMONIA: CPT

## 2021-10-26 PROCEDURE — 80074 ACUTE HEPATITIS PANEL: CPT

## 2021-10-26 PROCEDURE — 85025 COMPLETE CBC W/AUTO DIFF WBC: CPT

## 2021-10-26 PROCEDURE — 80164 ASSAY DIPROPYLACETIC ACD TOT: CPT

## 2021-10-26 PROCEDURE — 65270000029 HC RM PRIVATE

## 2021-10-26 PROCEDURE — 74011250636 HC RX REV CODE- 250/636: Performed by: INTERNAL MEDICINE

## 2021-10-26 PROCEDURE — 76705 ECHO EXAM OF ABDOMEN: CPT

## 2021-10-26 PROCEDURE — 84443 ASSAY THYROID STIM HORMONE: CPT

## 2021-10-26 PROCEDURE — 99284 EMERGENCY DEPT VISIT MOD MDM: CPT

## 2021-10-26 PROCEDURE — 80053 COMPREHEN METABOLIC PANEL: CPT

## 2021-10-26 PROCEDURE — 83036 HEMOGLOBIN GLYCOSYLATED A1C: CPT

## 2021-10-26 RX ORDER — TRIAMCINOLONE ACETONIDE 1 MG/G
CREAM TOPICAL 4 TIMES DAILY
Status: DISCONTINUED | OUTPATIENT
Start: 2021-10-26 | End: 2021-10-26

## 2021-10-26 RX ORDER — LIDOCAINE 4 G/100G
1 PATCH TOPICAL DAILY
Status: DISCONTINUED | OUTPATIENT
Start: 2021-10-26 | End: 2021-10-26

## 2021-10-26 RX ORDER — VALPROIC ACID 250 MG/1
1 CAPSULE, LIQUID FILLED ORAL DAILY
Status: ON HOLD | COMMUNITY
End: 2021-10-26

## 2021-10-26 RX ORDER — LEVETIRACETAM 500 MG/1
1500 TABLET ORAL 2 TIMES DAILY
Status: DISCONTINUED | OUTPATIENT
Start: 2021-10-26 | End: 2021-10-27

## 2021-10-26 RX ORDER — INSULIN LISPRO 100 [IU]/ML
INJECTION, SOLUTION INTRAVENOUS; SUBCUTANEOUS
Status: DISCONTINUED | OUTPATIENT
Start: 2021-10-26 | End: 2021-10-27 | Stop reason: HOSPADM

## 2021-10-26 RX ORDER — ASPIRIN 81 MG/1
81 TABLET ORAL DAILY
Status: DISCONTINUED | OUTPATIENT
Start: 2021-10-26 | End: 2021-10-27 | Stop reason: HOSPADM

## 2021-10-26 RX ORDER — POLYETHYLENE GLYCOL 3350 17 G/17G
17 POWDER, FOR SOLUTION ORAL DAILY PRN
Status: DISCONTINUED | OUTPATIENT
Start: 2021-10-26 | End: 2021-10-27 | Stop reason: HOSPADM

## 2021-10-26 RX ORDER — AMLODIPINE BESYLATE 5 MG/1
1 TABLET ORAL DAILY
Status: ON HOLD | COMMUNITY
End: 2021-10-26

## 2021-10-26 RX ORDER — DEXTROSE 50 % IN WATER (D50W) INTRAVENOUS SYRINGE
12.5 ONCE
Status: ACTIVE | OUTPATIENT
Start: 2021-10-26 | End: 2021-10-27

## 2021-10-26 RX ORDER — SODIUM CHLORIDE 9 MG/ML
100 INJECTION, SOLUTION INTRAVENOUS CONTINUOUS
Status: DISCONTINUED | OUTPATIENT
Start: 2021-10-26 | End: 2021-10-27

## 2021-10-26 RX ORDER — DEXTROSE 50 % IN WATER (D50W) INTRAVENOUS SYRINGE
25-50 AS NEEDED
Status: DISCONTINUED | OUTPATIENT
Start: 2021-10-26 | End: 2021-10-27 | Stop reason: HOSPADM

## 2021-10-26 RX ORDER — SODIUM CHLORIDE 0.9 % (FLUSH) 0.9 %
5-40 SYRINGE (ML) INJECTION AS NEEDED
Status: DISCONTINUED | OUTPATIENT
Start: 2021-10-26 | End: 2021-10-27 | Stop reason: HOSPADM

## 2021-10-26 RX ORDER — ACETAMINOPHEN 650 MG/1
650 SUPPOSITORY RECTAL
Status: DISCONTINUED | OUTPATIENT
Start: 2021-10-26 | End: 2021-10-27 | Stop reason: HOSPADM

## 2021-10-26 RX ORDER — ROSUVASTATIN CALCIUM 5 MG/1
5 TABLET, COATED ORAL
Status: DISCONTINUED | OUTPATIENT
Start: 2021-10-26 | End: 2021-10-27

## 2021-10-26 RX ORDER — VALPROIC ACID 250 MG/1
750 CAPSULE, LIQUID FILLED ORAL 2 TIMES DAILY
COMMUNITY
End: 2021-10-27

## 2021-10-26 RX ORDER — FLUTICASONE PROPIONATE 50 MCG
2 SPRAY, SUSPENSION (ML) NASAL DAILY
COMMUNITY
End: 2021-10-27

## 2021-10-26 RX ORDER — SENNOSIDES 8.6 MG/1
2 TABLET ORAL
Status: DISCONTINUED | OUTPATIENT
Start: 2021-10-26 | End: 2021-10-27 | Stop reason: HOSPADM

## 2021-10-26 RX ORDER — GLIPIZIDE 5 MG/1
5 TABLET ORAL
Status: DISCONTINUED | OUTPATIENT
Start: 2021-10-27 | End: 2021-10-26

## 2021-10-26 RX ORDER — ONDANSETRON 4 MG/1
4 TABLET, ORALLY DISINTEGRATING ORAL
Status: DISCONTINUED | OUTPATIENT
Start: 2021-10-26 | End: 2021-10-27 | Stop reason: HOSPADM

## 2021-10-26 RX ORDER — MULTIVITAMIN
1 TABLET ORAL DAILY
Status: DISCONTINUED | OUTPATIENT
Start: 2021-10-27 | End: 2021-10-27 | Stop reason: HOSPADM

## 2021-10-26 RX ORDER — CITALOPRAM 20 MG/1
40 TABLET, FILM COATED ORAL EVERY EVENING
Status: DISCONTINUED | OUTPATIENT
Start: 2021-10-26 | End: 2021-10-27 | Stop reason: HOSPADM

## 2021-10-26 RX ORDER — SIMVASTATIN 5 MG/1
1 TABLET, FILM COATED ORAL DAILY
Status: ON HOLD | COMMUNITY
End: 2021-10-26

## 2021-10-26 RX ORDER — SODIUM CHLORIDE 0.9 % (FLUSH) 0.9 %
5-40 SYRINGE (ML) INJECTION EVERY 8 HOURS
Status: DISCONTINUED | OUTPATIENT
Start: 2021-10-26 | End: 2021-10-27 | Stop reason: HOSPADM

## 2021-10-26 RX ORDER — ACETAMINOPHEN 325 MG/1
650 TABLET ORAL
Status: DISCONTINUED | OUTPATIENT
Start: 2021-10-26 | End: 2021-10-27 | Stop reason: HOSPADM

## 2021-10-26 RX ORDER — DICLOFENAC SODIUM 10 MG/G
2 GEL TOPICAL 4 TIMES DAILY
Status: DISCONTINUED | OUTPATIENT
Start: 2021-10-26 | End: 2021-10-26

## 2021-10-26 RX ORDER — FLUTICASONE PROPIONATE 50 MCG
2 SPRAY, SUSPENSION (ML) NASAL DAILY
Status: DISCONTINUED | OUTPATIENT
Start: 2021-10-26 | End: 2021-10-27 | Stop reason: HOSPADM

## 2021-10-26 RX ORDER — SENNOSIDES 8.6 MG/1
2 TABLET ORAL
COMMUNITY

## 2021-10-26 RX ORDER — MAGNESIUM SULFATE 100 %
4 CRYSTALS MISCELLANEOUS AS NEEDED
Status: DISCONTINUED | OUTPATIENT
Start: 2021-10-26 | End: 2021-10-27 | Stop reason: HOSPADM

## 2021-10-26 RX ORDER — AMLODIPINE BESYLATE 5 MG/1
5 TABLET ORAL DAILY
Status: DISCONTINUED | OUTPATIENT
Start: 2021-10-26 | End: 2021-10-26

## 2021-10-26 RX ORDER — VALPROIC ACID 250 MG/1
750 CAPSULE, LIQUID FILLED ORAL 2 TIMES DAILY
Status: DISCONTINUED | OUTPATIENT
Start: 2021-10-26 | End: 2021-10-27

## 2021-10-26 RX ORDER — CITALOPRAM 20 MG/1
40 TABLET, FILM COATED ORAL DAILY
Status: DISCONTINUED | OUTPATIENT
Start: 2021-10-26 | End: 2021-10-26

## 2021-10-26 RX ORDER — ONDANSETRON 2 MG/ML
4 INJECTION INTRAMUSCULAR; INTRAVENOUS
Status: DISCONTINUED | OUTPATIENT
Start: 2021-10-26 | End: 2021-10-27 | Stop reason: HOSPADM

## 2021-10-26 RX ORDER — VALPROIC ACID 250 MG/1
250 CAPSULE, LIQUID FILLED ORAL DAILY
Status: DISCONTINUED | OUTPATIENT
Start: 2021-10-26 | End: 2021-10-26

## 2021-10-26 RX ORDER — TAMOXIFEN CITRATE 10 MG/1
20 TABLET, FILM COATED ORAL DAILY
Status: DISCONTINUED | OUTPATIENT
Start: 2021-10-26 | End: 2021-10-26

## 2021-10-26 RX ADMIN — Medication 10 ML: at 12:01

## 2021-10-26 RX ADMIN — Medication 10 ML: at 21:15

## 2021-10-26 RX ADMIN — SENNOSIDES 17.2 MG: 8.6 TABLET, COATED ORAL at 21:14

## 2021-10-26 RX ADMIN — SODIUM CHLORIDE 100 ML/HR: 9 INJECTION, SOLUTION INTRAVENOUS at 10:41

## 2021-10-26 RX ADMIN — SODIUM CHLORIDE 100 ML/HR: 9 INJECTION, SOLUTION INTRAVENOUS at 21:59

## 2021-10-26 RX ADMIN — APIXABAN 5 MG: 5 TABLET, FILM COATED ORAL at 20:05

## 2021-10-26 RX ADMIN — ASPIRIN 81 MG: 81 TABLET, COATED ORAL at 12:00

## 2021-10-26 RX ADMIN — Medication 10 ML: at 13:11

## 2021-10-26 RX ADMIN — CITALOPRAM HYDROBROMIDE 40 MG: 20 TABLET ORAL at 12:00

## 2021-10-26 RX ADMIN — LEVETIRACETAM 1500 MG: 500 TABLET, FILM COATED ORAL at 12:00

## 2021-10-26 RX ADMIN — FLUTICASONE PROPIONATE 2 SPRAY: 50 SPRAY, METERED NASAL at 13:10

## 2021-10-26 NOTE — ED PROVIDER NOTES
EMERGENCY DEPARTMENT HISTORY AND PHYSICAL EXAM      Date: 10/26/2021  Patient Name: Illona Duane    History of Presenting Illness     Chief Complaint   Patient presents with    Abnormal Lab Results       HPI: Illona Duane, 60 y.o. female with a past medical history  of diabetes and breast cancer with a recent admission between 9/3-9/25 for HSV encephalitis with cerebral edema as well as new onset seizures and found to have a DVT and placed on Eliquis. She is presenting today for abnormal liver function tests. She was reported to have elevated AST in the thousands on follow-up. She reports that she has had fatigue and feeling cold for the past few weeks. She denies any abdominal pain, fevers, chills, nausea, or vomiting. The daughter reports that her confusion has been persistent since discharge but not getting worse. She denies any chest pain, shortness breath, dysuria, hematuria. No significant Tylenol use. No new medications. She is currently on Keppra, Eliquis, valproic acid, and she finished her dexamethasone. PCP: Other, MD Christianne    Current Outpatient Medications   Medication Sig Dispense Refill    Multivitamins with Fluoride (MULTI-VITAMIN PO) Multi Vitamin      aspirin delayed-release 81 mg tablet aspirin 81 mg tablet,delayed release   Take 1 tablet every day by oral route for protection for 90 days.  biotin 10,000 mcg cap biotin 10,000 mcg capsule   Take 1 capsule every day by oral route as directed for 30 days.  calcium carb/vitamin D3/vit K1 (VIACTIV PO) Viactiv   OTC      canagliflozin (Invokana) 300 mg tablet Invokana 300 mg tablet   Take 1 tablet every day by oral route for diabetes for 90 days.       citalopram (CELEXA) 40 mg tablet       diclofenac (Voltaren) 1 % gel Voltaren Arthritis Pain 1 % topical gel   APPLY 2 GRAMS TO THE AFFECTED AREA(S) BY TOPICAL ROUTE 4 TIMES PER DAY x 90 days      Jardiance 25 mg tablet       fluticasone propionate (FLONASE) 50 mcg/actuation nasal spray fluticasone propionate 50 mcg/actuation nasal spray,suspension      glipiZIDE (GLUCOTROL) 5 mg tablet       lidocaine (LIDODERM) 5 % lidocaine 5 % topical patch   APPLY 1 to 3 PATCHES BY TOPICAL ROUTE ONCE DAILY (MAY WEAR UP TO 12HOURS.)   for sciatica      loratadine (CLARITIN) 10 mg tablet loratadine 10 mg tablet   Take 1 tablet every day by oral route at bedtime for allergies for 30 days. for allergies      ondansetron (ZOFRAN ODT) 4 mg disintegrating tablet       rosuvastatin (CRESTOR) 5 mg tablet       Rybelsus 3 mg tablet       tamoxifen (NOLVADEX) 20 mg tablet tamoxifen 20 mg tablet   Take 1 tablet every day by oral route. PER ONCOLOGY FOR CANCER TREATMENT      triamcinolone acetonide (KENALOG) 0.1 % topical cream triamcinolone acetonide 0.1 % topical cream   APPLY A THIN LAYER TO THE AFFECTED AREA(S) BY TOPICAL ROUTE 2 TIMES PER DAY      coenzyme Q-10 (CO Q-10) 200 mg capsule Co Q-10 200 mg capsule   Take 1 capsule every day by oral route as directed for 30 days. Medical History   I reviewed the medical, surgical, family, and social history, as well as allergies:    Past Medical History:  Past Medical History:   Diagnosis Date    [facility]     Depression     Diabetes ([facility]     High cholesterol     HSV-1 (herpes simplex virus 1) infection     Seizures ([facility]        Past Surgical History:  Past Surgical History:   Procedure Laterality Date    HX MASTECTOMY      IR FLUORO GUIDE PLC CVAD  9/9/2021       Family History:  History reviewed. No pertinent family history. Social History:  Social History     Tobacco Use    Smoking status: Never Smoker    Smokeless tobacco: Never Used   Vaping Use    Vaping Use: Never used   Substance Use Topics    Alcohol use: Not Currently    Drug use: Never       Allergies:  No Known Allergies    Review of Systems     Review of Systems   Constitutional: Positive for fatigue. Negative for chills and fever.    HENT: Negative for congestion, rhinorrhea and sore throat. Eyes: Negative. Respiratory: Negative for cough and shortness of breath. Cardiovascular: Negative for chest pain and leg swelling. Gastrointestinal: Negative for abdominal pain and vomiting. Endocrine: Negative. Genitourinary: Negative for dysuria and hematuria. Musculoskeletal: Negative for back pain and myalgias. Skin: Negative for rash and wound. Allergic/Immunologic: Negative. Neurological: Negative for light-headedness and numbness. Hematological: Negative. Psychiatric/Behavioral: Negative for agitation and confusion. Physical Exam and Vital Signs   Vital Signs - Reviewed the patient's vital signs. Patient Vitals for the past 12 hrs:   Temp Pulse Resp BP SpO2   10/26/21 0816 98.1 °F (36.7 °C) 70 16 105/60 98 %       Physical Exam:    GENERAL: awake, alert, cooperative, not in distress  HEENT:  * Pupils equal, EOMI  * Head atraumatic  CV:  * regular rhythm  * warm and perfused extremities bilaterally  PULMONARY: Good air movement, no wheezes or crackles  ABDOMEN: soft, not distended, no guarding, not tenderness to palpation  : No suprapubic tenderness  EXTREMITIES/BACK: warm and perfused, no tenderness, no edema  SKIN: no rashes or signs of trauma  NEURO:  * Speech clear  * Moves U&LE to command      Medical Decision Making and ED Course   - I am the first and primary provider for this patient and am the primary provider of record. - I reviewed the vital signs, available nursing notes, past medical history, past surgical history, family history and social history. - Initial assessment performed. The patients presenting problems have been discussed, and the staff are in agreement with the care plan formulated and outlined with them. I have encouraged them to ask questions as they arise throughout their visit.   - Available medical records, nursing notes, old EKGs, and EMS run sheets (if patient was EMS transported) were reviewed    MDM:   Patient is a 60 y.o. female presenting for hepatocellular injury. Vitals reveal no abnormalities and physical exam reveals no abnormalities. Based on the history, physical exam, risk factors, and vitals signs, differential includes: Hepatitis, cholecystitis, cholelithiasis, hepatitis from Acacian side effect, Tylenol overdose, viral infection/hepatitis, hypothyroidism, liver failure. Results     Labs:  Recent Results (from the past 12 hour(s))   CBC WITH AUTOMATED DIFF    Collection Time: 10/26/21  8:30 AM   Result Value Ref Range    WBC 4.4 3.6 - 11.0 K/uL    RBC 4.18 3.80 - 5.20 M/uL    HGB 12.5 11.5 - 16.0 g/dL    HCT 39.1 35.0 - 47.0 %    MCV 93.5 80.0 - 99.0 FL    MCH 29.9 26.0 - 34.0 PG    MCHC 32.0 30.0 - 36.5 g/dL    RDW 20.2 (H) 11.5 - 14.5 %    PLATELET 669 (L) 118 - 400 K/uL    MPV 10.0 8.9 - 12.9 FL    NRBC 0.0 0.0  WBC    ABSOLUTE NRBC 0.00 0.00 - 0.01 K/uL    NEUTROPHILS PENDING %    LYMPHOCYTES PENDING %    MONOCYTES PENDING %    EOSINOPHILS PENDING %    BASOPHILS PENDING %    IMMATURE GRANULOCYTES PENDING %    ABS. NEUTROPHILS PENDING K/UL    ABS. LYMPHOCYTES PENDING K/UL    ABS. MONOCYTES PENDING K/UL    ABS. EOSINOPHILS PENDING K/UL    ABS. BASOPHILS PENDING K/UL    ABS. IMM. GRANS. PENDING K/UL    DF PENDING    METABOLIC PANEL, COMPREHENSIVE    Collection Time: 10/26/21  8:30 AM   Result Value Ref Range    Sodium 140 136 - 145 mmol/L    Potassium 4.1 3.5 - 5.1 mmol/L    Chloride 105 97 - 108 mmol/L    CO2 30 21 - 32 mmol/L    Anion gap 5 5 - 15 mmol/L    Glucose 67 65 - 100 mg/dL    BUN 11 6 - 20 mg/dL    Creatinine 0.66 0.55 - 1.02 mg/dL    BUN/Creatinine ratio 17 12 - 20      GFR est AA >60 >60 ml/min/1.73m2    GFR est non-AA >60 >60 ml/min/1.73m2    Calcium 8.9 8.5 - 10.1 mg/dL    Bilirubin, total 2.7 (H) 0.2 - 1.0 mg/dL    AST (SGOT) 1,388 (H) 15 - 37 U/L    ALT (SGPT) 1,477 (H) 12 - 78 U/L    Alk.  phosphatase 447 (H) 45 - 117 U/L    Protein, total 6.1 (L) 6.4 - 8.2 g/dL    Albumin 2.4 (L) 3.5 - 5.0 g/dL    Globulin 3.7 2.0 - 4.0 g/dL    A-G Ratio 0.6 (L) 1.1 - 2.2     AMMONIA    Collection Time: 10/26/21  8:30 AM   Result Value Ref Range    Ammonia 20 <32 umol/L   PROTHROMBIN TIME + INR    Collection Time: 10/26/21  8:30 AM   Result Value Ref Range    Prothrombin time 14.7 11.9 - 14.7 sec    INR 1.2 (H) 0.9 - 1.1     TSH 3RD GENERATION    Collection Time: 10/26/21  8:30 AM   Result Value Ref Range    TSH 2.66 0.36 - 3.74 uIU/mL   ACETAMINOPHEN    Collection Time: 10/26/21  8:30 AM   Result Value Ref Range    Acetaminophen level <10 (L) 10 - 30 ug/mL    Reported dose date Not provided      Reported dose: Not provided Units   VALPROIC ACID    Collection Time: 10/26/21  8:30 AM   Result Value Ref Range    Valproic acid 99 50 - 100 ug/mL       Radiologic Studies:  CT Results  (Last 48 hours)    None        CXR Results  (Last 48 hours)    None          Medications ordered:  Medications - No data to display     ED Course     ED Course:     ED Course as of Oct 26 0945   Tue Oct 26, 2021   [address] CBC does not show any evidence of acute process. Leukocytosis not present to suggest infection. Hemoglobin at baseline without evidence of acute anemia. Platelet count is low (130)    [SS]   0904 Normal valproic acid level. Acetaminophen level negative. No concern for overdose.      [SS]   0904 Ammonia is normal, no concern for hyperammonemia. [SS]   0911 TSH is within normal limits. No concern for hypothyroidism or hyperthyroidism. Electrolytes are within range. Creatinine is not elevated more than baseline range making [place] unlikely. Significant transaminitis noted. Bili elevated. [SS]      ED Course User Index  [SS] Marvin Ayon MD       Reassessment / Disposition / Discussion:    INR is normal.  Right upper quadrant ultrasound did not show any evidence of obstruction. Will admit for further work-up.     Final Disposition     Disposition: Condition stable  Admitted to Floor Medical Floor the case was discussed with the admitting physician     ADMISSION: After completion of ED workup and discussion of results and diagnoses with the patient, patient was admitted to the hospital. All the patient's questions were answered. Case was discussed with the receiving team.      Diagnosis     Clinical Impression:   1. Hepatitis        Attestations:    Malachi Quintanilla MD    Please note that this dictation was completed with [employer], the computer voice recognition software. Quite often unanticipated grammatical, syntax, homophones, and other interpretive errors are inadvertently transcribed by the computer software. Please disregard these errors. Please excuse any errors that have escaped final proofreading. Thank you.

## 2021-10-26 NOTE — H&P
GENERAL GENERIC H&P/CONSULT  Presenting complaint:Generalized weakness    Subjective: 23-year-old female with past medical history multiple comorbidities who presents to [facility] with generalized weakness. Of note patient was recently discharged after being treated for herpes encephalitis, has been having residual generalized weakness, patient went to primary care provider who did LFTs, found to be significantly elevated was referred to the ED. Patient currently denies any fevers chills nausea vomiting lightheadedness dizziness dyspnea orthopnea paroxysmal nocturnal dyspnea chest pain palpitations headache focal weakness loss of sensation auditory or visual symptoms abdominal stool or urine complaints or any other associated symptoms. Patient endorses no recent sick contacts or travel activity    Past Medical History:   Diagnosis Date    Breast cancer ([facility] [address]     Depression     Diabetes ([facility] [address]     High cholesterol     HSV-1 (herpes simplex virus 1) infection     Seizures (HCC)       Past Surgical History:   Procedure Laterality Date    HX MASTECTOMY      IR FLUORO GUIDE PLC CVAD  9/9/2021      Prior to Admission medications    Medication Sig Start Date End Date Taking? Authorizing Provider   apixaban (Eliquis) 5 mg tablet Take 1 Tablet by mouth two (2) times a day. Yes Provider, Historical   valproic acid (DEPAKENE) 250 mg capsule Take 750 mg by mouth two (2) times a day. Yes Provider, Historical   senna (Senna) 8.6 mg tablet Take 2 Tablets by mouth nightly. Yes Provider, Historical   fluticasone propionate (FLONASE) 50 mcg/actuation nasal spray 2 Sprays by Both Nostrils route daily. Yes Provider, Historical   Multivitamins with Fluoride (MULTI-VITAMIN PO) Multi Vitamin   Yes Other, MD Christianne   aspirin delayed-release 81 mg tablet aspirin 81 mg tablet,delayed release   Take 1 tablet every day by oral route for protection for 90 days.    Yes Other, MD Christianne   biotin 10,000 mcg cap biotin 10,000 mcg capsule   Take 1 capsule every day by oral route as directed for 30 days. Yes Other, MD Christianne   calcium carb/vitamin D3/vit K1 (VIACTIV PO) Viactiv   OTC   Yes Other, MD Christianne   citalopram (CELEXA) 40 mg tablet 40 mg every evening. 8/19/21  Yes Other, MD Christianne   Jardiance 25 mg tablet  7/1/21  Yes Other, MD Christianne   glipiZIDE (GLUCOTROL) 5 mg tablet  6/22/21  Yes Other, MD Christianne   ondansetron (ZOFRAN ODT) 4 mg disintegrating tablet  8/31/21  Yes Other, MD Christianne   rosuvastatin (CRESTOR) 5 mg tablet 5 mg nightly. 8/19/21  Yes Other, MD Christianne   Rybelsus 3 mg tablet  8/19/21  Yes Other, MD Christianne   coenzyme Q-10 (CO Q-10) 200 mg capsule Co Q-10 200 mg capsule   Take 1 capsule every day by oral route as directed for 30 days. Yes Other, MD Christianne   levETIRAcetam (Keppra) 1,000 mg tablet Take 1.5 Tablets by mouth two (2) times a day for 30 days. 9/25/21 10/25/21  Jess Francis NP     No Known Allergies   Social History     Tobacco Use    Smoking status: Never Smoker    Smokeless tobacco: Never Used   Substance Use Topics    Alcohol use: Not Currently      History reviewed. No pertinent family history. Review of Systems   Constitutional: Positive for activity change, appetite change and fatigue. Negative for chills, diaphoresis, fever and unexpected weight change. HENT: Negative for congestion, dental problem, drooling, ear discharge, ear pain, facial swelling, hearing loss, mouth sores, nosebleeds, postnasal drip, rhinorrhea, sinus pressure, sinus pain, sneezing, sore throat, tinnitus, trouble swallowing and voice change. Eyes: Negative for photophobia, pain, discharge, redness, itching and visual disturbance. Respiratory: Negative for apnea, cough, choking, chest tightness, shortness of breath, wheezing and stridor. Cardiovascular: Negative for chest pain, palpitations and leg swelling.    Gastrointestinal: Negative for abdominal distention, abdominal pain, anal bleeding, blood in stool, constipation, diarrhea, nausea, rectal pain and vomiting. Endocrine: Negative for cold intolerance, heat intolerance, polydipsia, polyphagia and polyuria. Genitourinary: Negative for decreased urine volume, difficulty urinating, dyspareunia, dysuria, enuresis, flank pain, frequency, genital sores, hematuria, menstrual problem, pelvic pain, urgency, vaginal bleeding, vaginal discharge and vaginal pain. Musculoskeletal: Negative for arthralgias, back pain, gait problem, joint swelling, myalgias, neck pain and neck stiffness. Skin: Negative for color change, pallor, rash and wound. Allergic/Immunologic: Negative for environmental allergies, food allergies and immunocompromised state. Neurological: Positive for weakness. Negative for dizziness, tremors, seizures, syncope, facial asymmetry, speech difficulty, light-headedness, numbness and headaches. Hematological: Negative for adenopathy. Does not bruise/bleed easily. Psychiatric/Behavioral: Negative for agitation, behavioral problems, confusion, decreased concentration, dysphoric mood, hallucinations, self-injury, sleep disturbance and suicidal ideas. The patient is not nervous/anxious and is not hyperactive. Objective:    No intake/output data recorded. No intake/output data recorded. Patient Vitals for the past 8 hrs:   BP Temp Pulse Resp SpO2 Height Weight   10/26/21 1144       76 kg (167 lb 8 oz)   10/26/21 1115 (!) 89/49         10/26/21 1043 116/63  (!) 58 22 96 %     10/26/21 0817       76 kg (167 lb 8 oz)   10/26/21 0816 105/60 98.1 °F (36.7 °C) 70 16 98 % 5' 3\" (1.6 m) 79.8 kg (176 lb)     Physical Exam  Vitals reviewed. Constitutional:       General: She is not in acute distress. Appearance: She is normal weight. She is ill-appearing. She is not toxic-appearing or diaphoretic. HENT:      Head: Normocephalic and atraumatic. Nose: Nose normal. No congestion or rhinorrhea.       Mouth/Throat: Mouth: Mucous membranes are moist.      Pharynx: Oropharynx is clear. No oropharyngeal exudate or posterior oropharyngeal erythema. Eyes:      General: No scleral icterus. Right eye: No discharge. Left eye: No discharge. Extraocular Movements: Extraocular movements intact. Conjunctiva/sclera: Conjunctivae normal.      Pupils: Pupils are equal, round, and reactive to light. Neck:      Vascular: No carotid bruit. Cardiovascular:      Rate and Rhythm: Normal rate and regular rhythm. Pulses: Normal pulses. Heart sounds: Normal heart sounds. No murmur heard. No friction rub. No gallop. Pulmonary:      Effort: Pulmonary effort is normal. No respiratory distress. Breath sounds: Normal breath sounds. No stridor. No wheezing, rhonchi or rales. Chest:      Chest wall: No tenderness. Abdominal:      General: Abdomen is flat. Bowel sounds are normal. There is no distension. Palpations: Abdomen is soft. There is no mass. Tenderness: There is no abdominal tenderness. There is no right CVA tenderness, left CVA tenderness, guarding or rebound. Hernia: No hernia is present. Musculoskeletal:         General: No swelling, tenderness, deformity or signs of injury. Normal range of motion. Cervical back: Normal range of motion and neck supple. No rigidity or tenderness. Right lower leg: No edema. Left lower leg: No edema. Lymphadenopathy:      Cervical: No cervical adenopathy. Skin:     General: Skin is warm. Capillary Refill: Capillary refill takes less than 2 seconds. Coloration: Skin is not jaundiced or pale. Findings: No bruising, erythema, lesion or rash. Neurological:      General: No focal deficit present. Mental Status: She is alert and oriented to person, place, and time. Mental status is at baseline. Cranial Nerves: No cranial nerve deficit. Sensory: No sensory deficit. Motor: No weakness. Coordination: Coordination normal.      Gait: Gait normal.      Deep Tendon Reflexes: Reflexes normal.   Psychiatric:         Mood and Affect: Mood normal.         Behavior: Behavior normal.         Thought Content: Thought content normal.         Judgment: Judgment normal.          Labs:    Recent Results (from the past 24 hour(s))   CBC WITH AUTOMATED DIFF    Collection Time: 10/26/21  8:30 AM   Result Value Ref Range    WBC 4.4 3.6 - 11.0 K/uL    RBC 4.18 3.80 - 5.20 M/uL    HGB 12.5 11.5 - 16.0 g/dL    HCT 39.1 35.0 - 47.0 %    MCV 93.5 80.0 - 99.0 FL    MCH 29.9 26.0 - 34.0 PG    MCHC 32.0 30.0 - 36.5 g/dL    RDW 20.2 (H) 11.5 - 14.5 %    PLATELET 569 (L) 330 - 400 K/uL    MPV 10.0 8.9 - 12.9 FL    NRBC 0.0 0.0  WBC    ABSOLUTE NRBC 0.00 0.00 - 0.01 K/uL    NEUTROPHILS 27 (L) 32 - 75 %    LYMPHOCYTES 41 12 - 49 %    MONOCYTES 31 (H) 5 - 13 %    EOSINOPHILS 0 0 - 7 %    BASOPHILS 1 0 - 1 %    IMMATURE GRANULOCYTES 0 %    ABS. NEUTROPHILS 1.2 (L) 1.8 - 8.0 K/UL    ABS. LYMPHOCYTES 1.8 0.8 - 3.5 K/UL    ABS. MONOCYTES 1.4 (H) 0.0 - 1.0 K/UL    ABS. EOSINOPHILS 0.0 0.0 - 0.4 K/UL    ABS. BASOPHILS 0.0 0.0 - 0.1 K/UL    ABS. IMM. GRANS. 0.0 K/UL    DF Manual      RBC COMMENTS Normocytic, Normochromic     METABOLIC PANEL, COMPREHENSIVE    Collection Time: 10/26/21  8:30 AM   Result Value Ref Range    Sodium 140 136 - 145 mmol/L    Potassium 4.1 3.5 - 5.1 mmol/L    Chloride 105 97 - 108 mmol/L    CO2 30 21 - 32 mmol/L    Anion gap 5 5 - 15 mmol/L    Glucose 67 65 - 100 mg/dL    BUN 11 6 - 20 mg/dL    Creatinine 0.66 0.55 - 1.02 mg/dL    BUN/Creatinine ratio 17 12 - 20      GFR est AA >60 >60 ml/min/1.73m2    GFR est non-AA >60 >60 ml/min/1.73m2    Calcium 8.9 8.5 - 10.1 mg/dL    Bilirubin, total 2.7 (H) 0.2 - 1.0 mg/dL    AST (SGOT) 1,388 (H) 15 - 37 U/L    ALT (SGPT) 1,477 (H) 12 - 78 U/L    Alk.  phosphatase 447 (H) 45 - 117 U/L    Protein, total 6.1 (L) 6.4 - 8.2 g/dL    Albumin 2.4 (L) 3.5 - 5.0 g/dL Globulin 3.7 2.0 - 4.0 g/dL    A-G Ratio 0.6 (L) 1.1 - 2.2     AMMONIA    Collection Time: 10/26/21  8:30 AM   Result Value Ref Range    Ammonia 20 <32 umol/L   PROTHROMBIN TIME + INR    Collection Time: 10/26/21  8:30 AM   Result Value Ref Range    Prothrombin time 14.7 11.9 - 14.7 sec    INR 1.2 (H) 0.9 - 1.1     TSH 3RD GENERATION    Collection Time: 10/26/21  8:30 AM   Result Value Ref Range    TSH 2.66 0.36 - 3.74 uIU/mL   ACETAMINOPHEN    Collection Time: 10/26/21  8:30 AM   Result Value Ref Range    Acetaminophen level <10 (L) 10 - 30 ug/mL    Reported dose date Not provided      Reported dose: Not provided Units   VALPROIC ACID    Collection Time: 10/26/21  8:30 AM   Result Value Ref Range    Valproic acid 99 50 - 100 ug/mL   GLUCOSE, POC    Collection Time: 10/26/21 11:14 AM   Result Value Ref Range    Glucose (POC) 55 (L) 65 - 117 mg/dL    Performed by Radha Vega    MRSA SCREEN - PCR (NASAL)    Collection Time: 10/26/21 11:30 AM   Result Value Ref Range    MRSA by PCR, Nasal Not Detected Not Detected     GLUCOSE, POC    Collection Time: 10/26/21 11:54 AM   Result Value Ref Range    Glucose (POC) 96 65 - 117 mg/dL    Performed by Daphnie Vaz        ECG: nonspecific ST and T waves changes   US RUQ:IMPRESSION  Abnormal gallbladder, without stones    Assessment:  Active Problems:    Hepatitis (10/26/2021)      Transaminitis (10/26/2021)        Plan:    Transaminitispatient presents with above and symptomatology found to have significant transaminitis due to unclear etiology, could be infectious versus inflammatory etiologies versus medication side effect, ultrasound right upper quadrant reviewed  Avoid hepatotoxic medications  Follow-up hepatitis serology  Maintenance IV fluids  Trend LFTs  Gastroenterology consult    Herpes encephalitisstatus post complete course of treatment with acyclovir, currently doing well  Patient currently remains on Keppra as well as valproic acid  Follow-up neurology recommendations as we will need to avoid hepatotoxic medications    Hyperlipidemiahold statin in the setting of transaminitis    Hypoglycemiacurrently resolved, likely secondary to poor p.o. intake  Continue hypoglycemia protocol    Prophylaxiscontinue home anticoagulation    FENcardiac diet, maintenance IV fluids, replete potassium and magnesium  Full code, patient surrogate decision-maker is her daughter, updated at bedside, admitted for further evaluation as well as management    Signed:   Natalie Lock MD 10/26/2021

## 2021-10-26 NOTE — ROUTINE PROCESS
Bedside and Verbal shift change report given to Freddie Pickard (oncoming nurse) by Janice Hernández and Ramana Patino (offgoing nurse). Report included the following information SBAR.

## 2021-10-26 NOTE — CONSULTS
Gastroenterology Consult     Referring Physician: Christianne Reardon MD     Consult Date: 10/26/2021     Subjective:     Chief Complaint: elevated liver enzymes    History of Present Illness: Luiza Collazo is a 60 y.o. female who is seen in consultation for transaminitis    Patient was admitted to the hospital after PCP advised them to go the ED due to elevated liver enzymes - AST 1168. Patient was admitted to the hospital in September due to new onset seizure and HSV encephalitis. Patient also found to have an upper extremity DVT and  started on Eliquis. She was discharged on Keppra and Valproic acid. Daughter reports that patient's memory is not all there with periods of confusion.     - labs in ED shows: elevated total bilirubin 2.7 (0.2 on 9/21), ALT 1,477 (27 on 9/21), AST 1,388 (13 on 9/21), alkaphos 447 (43 on 9/21).  -10/26 US RUQ - Gallbladder is nondistended without stones but small amount of sludge. Echogenic foci with artifact present from the wall suggesting adenomyomatosis. Wall measures 4 mm, There is no biliary dilatation Cody's sign. On exam patient is awake and alert. Daughter provided most of the history. Patient is a poor historian but answers questions appropriately. Denies abdominal pain, denies nausea or vomiting. Other medical history noted below. Past Medical History:   Diagnosis Date    Breast cancer ([facility] [address]     Depression     Diabetes ([facility] [address]     High cholesterol     HSV-1 (herpes simplex virus 1) infection     Seizures (HCC)      Past Surgical History:   Procedure Laterality Date    HX MASTECTOMY      IR FLUORO GUIDE PLC CVAD  9/9/2021      History reviewed. No pertinent family history.   Social History     Tobacco Use    Smoking status: Never Smoker    Smokeless tobacco: Never Used   Substance Use Topics    Alcohol use: Not Currently      No Known Allergies  Current Facility-Administered Medications   Medication Dose Route Frequency    apixaban (ELIQUIS) tablet 5 mg  5 mg Oral BID    aspirin delayed-release tablet 81 mg  81 mg Oral DAILY    fluticasone propionate (FLONASE) 50 mcg/actuation nasal spray 2 Spray  2 Spray Both Nostrils DAILY    . PHARMACY TO SUBSTITUTE PER PROTOCOL (Reordered from: Jardiance 25 mg tablet)    Per Protocol    [Held by provider] levETIRAcetam (KEPPRA) tablet 1,500 mg  1,500 mg Oral BID    [START ON 10/27/2021] multivitamin with folic acid (ONE DAILY WITH FOLIC ACID) tablet 1 Tablet  1 Tablet Oral DAILY    . PHARMACY TO SUBSTITUTE PER PROTOCOL (Reordered from: Rybelsus 3 mg tablet)    Per Protocol    0.9% sodium chloride infusion  100 mL/hr IntraVENous CONTINUOUS    sodium chloride (NS) flush 5-40 mL  5-40 mL IntraVENous Q8H    sodium chloride (NS) flush 5-40 mL  5-40 mL IntraVENous PRN    acetaminophen (TYLENOL) tablet 650 mg  650 mg Oral Q6H PRN    Or    acetaminophen (TYLENOL) suppository 650 mg  650 mg Rectal Q6H PRN    polyethylene glycol (MIRALAX) packet 17 g  17 g Oral DAILY PRN    ondansetron (ZOFRAN ODT) tablet 4 mg  4 mg Oral Q8H PRN    Or    ondansetron (ZOFRAN) injection 4 mg  4 mg IntraVENous Q6H PRN    dextrose (D50W) injection syrg 12.5 g  12.5 g IntraVENous ONCE    glucose chewable tablet 16 g  4 Tablet Oral PRN    dextrose (D50W) injection syrg 12.5-25 g  25-50 mL IntraVENous PRN    glucagon (GLUCAGEN) injection 1 mg  1 mg IntraMUSCular PRN    insulin lispro (HUMALOG) injection   SubCUTAneous AC&HS    [Held by provider] valproic acid (DEPAKENE) capsule 750 mg  750 mg Oral BID    [Held by provider] rosuvastatin (CRESTOR) tablet 5 mg  5 mg Oral QHS    citalopram (CELEXA) tablet 40 mg  40 mg Oral QPM    senna (SENOKOT) tablet 17.2 mg  2 Tablet Oral QHS        Review of Systems:  A detailed 10 organ review of systems is obtained with pertinent positives as listed in the History of Present Illness and Past Medical History. All others are negative.     Objective:     Physical Exam:  Visit Vitals  /64 (BP 1 Location: Left lower arm, BP Patient Position: At rest)   Pulse 60   Temp 98.2 °F (36.8 °C)   Resp 16   Ht 5' 3\" (1.6 m)   Wt 76 kg (167 lb 8 oz)   SpO2 96%   BMI 29.67 kg/m²        Skin:  Extremities and face reveal no rashes. No medrano erythema. No telangiectasias on the chest wall. HEENT: Sclerae anicteric. Extra-occular muscles are intact. No oral ulcers. No abnormal pigmentation of the lips. The neck is supple. Cardiovascular: Regular rate and rhythm. No murmurs, gallops, or rubs. PMI nondisplaced. Carotids without bruits. Respiratory:  Comfortable breathing with no accessory muscle use. Clear breath sounds with no wheezes, rales, or rhonchi. GI:  Abdomen nondistended, soft, and nontender. Normal active bowel sounds. No enlargement of the liver or spleen. No masses palpable. Rectal:  Deferred  Musculoskeletal:  No pitting edema of the lower legs. Extremities have good range of motion. No costovertebral tenderness. Neurological:  Gross memory appears intact. Patient is alert and oriented. Psychiatric:  Mood appears appropriate with judgement intact. Lymphatic:  No cervical or supraclavicular adenopathy. Lab/Data Review:  Recent Results (from the past 24 hour(s))   CBC WITH AUTOMATED DIFF    Collection Time: 10/26/21  8:30 AM   Result Value Ref Range    WBC 4.4 3.6 - 11.0 K/uL    RBC 4.18 3.80 - 5.20 M/uL    HGB 12.5 11.5 - 16.0 g/dL    HCT 39.1 35.0 - 47.0 %    MCV 93.5 80.0 - 99.0 FL    MCH 29.9 26.0 - 34.0 PG    MCHC 32.0 30.0 - 36.5 g/dL    RDW 20.2 (H) 11.5 - 14.5 %    PLATELET 061 (L) 213 - 400 K/uL    MPV 10.0 8.9 - 12.9 FL    NRBC 0.0 0.0  WBC    ABSOLUTE NRBC 0.00 0.00 - 0.01 K/uL    NEUTROPHILS 27 (L) 32 - 75 %    LYMPHOCYTES 41 12 - 49 %    MONOCYTES 31 (H) 5 - 13 %    EOSINOPHILS 0 0 - 7 %    BASOPHILS 1 0 - 1 %    IMMATURE GRANULOCYTES 0 %    ABS. NEUTROPHILS 1.2 (L) 1.8 - 8.0 K/UL    ABS. LYMPHOCYTES 1.8 0.8 - 3.5 K/UL    ABS. MONOCYTES 1.4 (H) 0.0 - 1.0 K/UL    ABS.  EOSINOPHILS 0.0 0.0 - 0.4 K/UL    ABS. BASOPHILS 0.0 0.0 - 0.1 K/UL    ABS. IMM. GRANS. 0.0 K/UL    DF Manual      RBC COMMENTS Normocytic, Normochromic     METABOLIC PANEL, COMPREHENSIVE    Collection Time: 10/26/21  8:30 AM   Result Value Ref Range    Sodium 140 136 - 145 mmol/L    Potassium 4.1 3.5 - 5.1 mmol/L    Chloride 105 97 - 108 mmol/L    CO2 30 21 - 32 mmol/L    Anion gap 5 5 - 15 mmol/L    Glucose 67 65 - 100 mg/dL    BUN 11 6 - 20 mg/dL    Creatinine 0.66 0.55 - 1.02 mg/dL    BUN/Creatinine ratio 17 12 - 20      GFR est AA >60 >60 ml/min/1.73m2    GFR est non-AA >60 >60 ml/min/1.73m2    Calcium 8.9 8.5 - 10.1 mg/dL    Bilirubin, total 2.7 (H) 0.2 - 1.0 mg/dL    AST (SGOT) 1,388 (H) 15 - 37 U/L    ALT (SGPT) 1,477 (H) 12 - 78 U/L    Alk. phosphatase 447 (H) 45 - 117 U/L    Protein, total 6.1 (L) 6.4 - 8.2 g/dL    Albumin 2.4 (L) 3.5 - 5.0 g/dL    Globulin 3.7 2.0 - 4.0 g/dL    A-G Ratio 0.6 (L) 1.1 - 2.2     AMMONIA    Collection Time: 10/26/21  8:30 AM   Result Value Ref Range    Ammonia 20 <32 umol/L   PROTHROMBIN TIME + INR    Collection Time: 10/26/21  8:30 AM   Result Value Ref Range    Prothrombin time 14.7 11.9 - 14.7 sec    INR 1.2 (H) 0.9 - 1.1     HEPATITIS PANEL, ACUTE    Collection Time: 10/26/21  8:30 AM   Result Value Ref Range    Hepatitis A, IgM PENDING      __        Hepatitis B surface Ag PENDING Index    Hep B surface Ag Interp. PENDING     __        Hepatitis B core, IgM PENDING     __        Hepatitis C virus Ab PENDING Index    Hep C virus Ab Interp.  PENDING    TSH 3RD GENERATION    Collection Time: 10/26/21  8:30 AM   Result Value Ref Range    TSH 2.66 0.36 - 3.74 uIU/mL   ACETAMINOPHEN    Collection Time: 10/26/21  8:30 AM   Result Value Ref Range    Acetaminophen level <10 (L) 10 - 30 ug/mL    Reported dose date Not provided      Reported dose: Not provided Units   VALPROIC ACID    Collection Time: 10/26/21  8:30 AM   Result Value Ref Range    Valproic acid 99 50 - 100 ug/mL   GLUCOSE, POC    Collection Time: 10/26/21 11:14 AM   Result Value Ref Range    Glucose (POC) 55 (L) 65 - 117 mg/dL    Performed by "[employer] Sep    MRSA SCREEN - PCR (NASAL)    Collection Time: 10/26/21 11:30 AM   Result Value Ref Range    MRSA by PCR, Nasal Not Detected Not Detected     GLUCOSE, POC    Collection Time: 10/26/21 11:54 AM   Result Value Ref Range    Glucose (POC) 96 65 - 117 mg/dL    Performed by Todd Morales., POC    Collection Time: 10/26/21  3:53 PM   Result Value Ref Range    Glucose (POC) 91 65 - 117 mg/dL    Performed by Amrik Cordova RUCHIOMA   Final Result   Abnormal gallbladder, without stones             Assessment/Plan:   1. Elevated liver Enzymes - could be related to medication side effects      -ALT 1,477, AST 1,388      -Keppra and Valproic acid currently on hold      -US abd -  Gallbladder is nondistended without stones but small amount of sludge. Echogenic foci with artifact present from the wall suggesting adenomyomatosis. -continue IVF      -avoid hepatotoxic meds       -Negative hepatitis panel       -Monitor LFTs, repeat cmp in am.   2. HSV encephalitis      -completed treatment      -neurology has been consulted. 3. Diabetes Mellitus II      -Monitor sugar, continue lispro      -Managed by Primary team     Active Problems:    Hepatitis (10/26/2021)      Transaminitis (10/26/2021)         IP CONSULT TO GASTROENTEROLOGY  IP CONSULT TO NEUROLOGY  IP CONSULT TO ONCOLOGY    Patient discussed with Dr Sheba Murcia and agrees to above impression and plan.   Thank you for allowing me to participate in this patients care    José Miguel VALENZUELA  Cc Referring Physician   Other, MD Christianne

## 2021-10-26 NOTE — ROUTINE PROCESS
TRANSFER - OUT REPORT:    Verbal report given to Ramana Patino RN(name) on Christi  being transferred to 228(unit) for routine progression of care       Report consisted of patients Situation, Background, Assessment and   Recommendations(SBAR). Information from the following report(s) SBAR, ED Summary, [facility] and Recent Results was reviewed with the receiving nurse. Lines:   Peripheral IV 10/26/21 Left Antecubital (Active)        Opportunity for questions and clarification was provided.       Patient transported with:   Artsy

## 2021-10-26 NOTE — ED TRIAGE NOTES
[facility] 15 pt stated that her PCP called after she went into for lab work and her liver enzymes are AST 1168; pt daughter stated that pt was just admitted and discharged on 9/25 for HSV 1 encephalitis; pt did have 2 seizures on 9/3 and admitted

## 2021-10-26 NOTE — CONSULTS
Hematology and Oncology Inpatient Consult Note     Patient: Sangeeta Xavier MRN: [id number]  SSN: [id number]    YOB: 1955  Age: 60 y.o. Sex: female    Chief Complaint: Patient was admitted with high liver function test    Reason for consult: Evaluation management of patient with breast cancer    Subjective:      Sangeeta Xavier is a 60 y.o. -American female who was diagnosed with herpes encephalitis and now admitted with increased AST and ALT. Patient is somewhat drowsy. She could not give me much specific information but later on her daughter joined her and she gave me most of the information. Patient actually lives in [address] and was visiting here while she got sick few months ago. -Patient is feeling tired weak and having not much energy. She has no lumps in her breast.    Past Medical History:   Diagnosis Date    [facility]     Depression     [facility]     High cholesterol     HSV-1 (herpes simplex virus 1) infection     Seizures (HCC)      Past Surgical History:   Procedure Laterality Date    HX MASTECTOMY      IR FLUORO GUIDE PLC CVAD  9/9/2021      History reviewed. No pertinent family history. Social History     Tobacco Use    Smoking status: Never Smoker    Smokeless tobacco: Never Used   Substance Use Topics    Alcohol use: Not Currently      Current Facility-Administered Medications   Medication Dose Route Frequency Provider Last Rate Last Admin    apixaban (ELIQUIS) tablet 5 mg  5 mg Oral BID Brigid June MD        aspirin delayed-release tablet 81 mg  81 mg Oral DAILY Brigid June MD   81 mg at 10/26/21 1200    fluticasone propionate (FLONASE) 50 mcg/actuation nasal spray 2 Spray  2 Spray Both Nostrils DAILY Brigid June MD   2 [place] at 10/26/21 1310    . PHARMACY TO SUBSTITUTE PER PROTOCOL (Reordered from: Jardiance 25 mg tablet)    Per Protocol Kimberly Coon MD  [Held by provider] levETIRAcetam (KEPPRA) tablet 1,500 mg  1,500 mg Oral BID Jose Payton Knight MD   1,500 mg at 10/26/21 1200    [START ON 10/27/2021] multivitamin with folic acid (ONE DAILY WITH FOLIC ACID) tablet 1 Tablet  1 Tablet Oral DAILY Rebecca Martinez MD        . PHARMACY TO SUBSTITUTE PER PROTOCOL (Reordered from: Rybelsus 3 mg tablet)    Per Protocol Rebecca Martinez MD        0.9% sodium chloride infusion  100 mL/hr IntraVENous CONTINUOUS Payton Davies  mL/hr at 10/26/21 1041 100 mL/hr at 10/26/21 1041    sodium chloride (NS) flush 5-40 mL  5-40 mL IntraVENous Juliette Phillips MD   10 mL at 10/26/21 1311    sodium chloride (NS) flush 5-40 mL  5-40 mL IntraVENous PRN Payton Davies MD        acetaminophen (TYLENOL) tablet 650 mg  650 mg Oral Q6H PRN Rebecca Martinez MD        Or    acetaminophen (TYLENOL) suppository 650 mg  650 mg Rectal Q6H PRN Rebecca Martinez MD        polyethylene glycol [facility]) packet 17 g  17 g Oral DAILY PRN Rebecca Martinez MD        ondansetron (ZOFRAN ODT) tablet 4 mg  4 mg Oral Q8H PRN Rebecca Martinez MD        Or    ondansetron [facility]) injection 4 mg  4 mg IntraVENous Q6H PRN Rebecca Martinez MD        dextrose (D50W) injection syrg 12.5 g  12.5 g IntraVENous Newton Payton Diaz MD        glucose chewable tablet 16 g  4 Tablet Oral PRN Rebecca Martinez MD        dextrose (D50W) injection syrg 12.5-25 g  25-50 mL IntraVENous PRN Rebecca Martinez MD        glucagon [facility]) injection 1 mg  1 mg IntraMUSCular PRN Rebecca Martinez MD        insulin lispro (HUMALOG) injection   SubCUTAneous AC&HS Rebecca Martinez MD        [Held by provider] valproic acid (DEPAKENE) capsule 750 mg  750 mg Oral BID Rebecca Martinez MD        [facility] by provider] rosuvastatin (CRESTOR) tablet 5 mg  5 mg Oral QHS Juliette Davies MD        citalopram (CELEXA) tablet 40 mg  40 mg Oral QPM Rianna Valentine MD        [facility]) tablet 17.2 mg  2 Tablet Oral QHS Rianna Valentine MD            No Known Allergies    Review of Systems:  Detailed review of system cannot be obtained from the patient but according to her daughter patient is feeling tired and weak and has decreased appetite. She is not much active. She has no mucosal bleeding. She has some aches and pains. Objective:     Vitals:    10/26/21 1043 10/26/21 1115 10/26/21 1144 10/26/21 1530   BP: 116/63 (!) 89/49  124/64   Pulse: (!) 58   60   Resp: 22   16   Temp:    98.2 °F (36.8 °C)   SpO2: 96%   96%   Weight:   76 kg (167 lb 8 oz)    Height:            Physical Exam:  Constitutional: Elderly female somewhat drowsy but not in any acute distress or pain. Eyes: Sclerae is icteric. Conjunctivae no pallor. ENMT: Oral mucosa is moist, no thrush, mucositis, or petechiae. Neck: No adenopathy. Hematologic/Lymphatic: Bilateral axillary regions showed no adenopathy. Respiratory: Lungs are clear bilaterally. Cardiovascular: Normal sinus rhythm; no gallop or murmur; peripheral pulses are palpable. Abdomen: Soft, nontender, no hepatosplenomegaly. No guarding or rigidity. Bowel sounds present. Breast examination was not done as per patient's preference. Extremities: No edema, cyanosis or clubbing. Skin: No petechiae; no skin rash.   Neurologic: Drowsy but able to follow move all 4 extremities    Recent Results (from the past 24 hour(s))   CBC WITH AUTOMATED DIFF    Collection Time: 10/26/21  8:30 AM   Result Value Ref Range    WBC 4.4 3.6 - 11.0 K/uL    RBC 4.18 3.80 - 5.20 M/uL    HGB 12.5 11.5 - 16.0 g/dL    HCT 39.1 35.0 - 47.0 %    MCV 93.5 80.0 - 99.0 FL    MCH 29.9 26.0 - 34.0 PG    MCHC 32.0 30.0 - 36.5 g/dL    RDW 20.2 (H) 11.5 - 14.5 %    PLATELET 951 (L) 863 - 400 K/uL MPV 10.0 8.9 - 12.9 FL    NRBC 0.0 0.0  WBC    ABSOLUTE NRBC 0.00 0.00 - 0.01 K/uL    NEUTROPHILS 27 (L) 32 - 75 %    LYMPHOCYTES 41 12 - 49 %    MONOCYTES 31 (H) 5 - 13 %    EOSINOPHILS 0 0 - 7 %    BASOPHILS 1 0 - 1 %    IMMATURE GRANULOCYTES 0 %    ABS. NEUTROPHILS 1.2 (L) 1.8 - 8.0 K/UL    ABS. LYMPHOCYTES 1.8 0.8 - 3.5 K/UL    ABS. MONOCYTES 1.4 (H) 0.0 - 1.0 K/UL    ABS. EOSINOPHILS 0.0 0.0 - 0.4 K/UL    ABS. BASOPHILS 0.0 0.0 - 0.1 K/UL    ABS. IMM. GRANS. 0.0 K/UL    DF Manual      RBC COMMENTS Normocytic, Normochromic     METABOLIC PANEL, COMPREHENSIVE    Collection Time: 10/26/21  8:30 AM   Result Value Ref Range    Sodium 140 136 - 145 mmol/L    Potassium 4.1 3.5 - 5.1 mmol/L    Chloride 105 97 - 108 mmol/L    CO2 30 21 - 32 mmol/L    Anion gap 5 5 - 15 mmol/L    Glucose 67 65 - 100 mg/dL    BUN 11 6 - 20 mg/dL    Creatinine 0.66 0.55 - 1.02 mg/dL    BUN/Creatinine ratio 17 12 - 20      GFR est AA >60 >60 ml/min/1.73m2    GFR est non-AA >60 >60 ml/min/1.73m2    Calcium 8.9 8.5 - 10.1 mg/dL    Bilirubin, total 2.7 (H) 0.2 - 1.0 mg/dL    AST (SGOT) 1,388 (H) 15 - 37 U/L    ALT (SGPT) 1,477 (H) 12 - 78 U/L    Alk.  phosphatase 447 (H) 45 - 117 U/L    Protein, total 6.1 (L) 6.4 - 8.2 g/dL    Albumin 2.4 (L) 3.5 - 5.0 g/dL    Globulin 3.7 2.0 - 4.0 g/dL    A-G Ratio 0.6 (L) 1.1 - 2.2     AMMONIA    Collection Time: 10/26/21  8:30 AM   Result Value Ref Range    Ammonia 20 <32 umol/L   PROTHROMBIN TIME + INR    Collection Time: 10/26/21  8:30 AM   Result Value Ref Range    Prothrombin time 14.7 11.9 - 14.7 sec    INR 1.2 (H) 0.9 - 1.1     TSH 3RD GENERATION    Collection Time: 10/26/21  8:30 AM   Result Value Ref Range    TSH 2.66 0.36 - 3.74 uIU/mL   ACETAMINOPHEN    Collection Time: 10/26/21  8:30 AM   Result Value Ref Range    Acetaminophen level <10 (L) 10 - 30 ug/mL    Reported dose date Not provided      Reported dose: Not provided Units   VALPROIC ACID    Collection Time: 10/26/21  8:30 AM   Result Value Ref Range    Valproic acid 99 50 - 100 ug/mL   GLUCOSE, POC    Collection Time: 10/26/21 11:14 AM   Result Value Ref Range    Glucose (POC) 55 (L) 65 - 117 mg/dL    Performed by Fredo Juarez    MRSA SCREEN - PCR (NASAL)    Collection Time: 10/26/21 11:30 AM   Result Value Ref Range    MRSA by PCR, Nasal Not Detected Not Detected     GLUCOSE, POC    Collection Time: 10/26/21 11:54 AM   Result Value Ref Range    Glucose (POC) 96 65 - 117 mg/dL    Performed by Jose D Shaikh6   Final Result   Abnormal gallbladder, without stones             Assessment:     Hospital Problems  Never Reviewed        Codes Class Noted POA    Hepatitis ICD-10-CM: K75.9  ICD-9-CM: 573.3  10/26/2021 Unknown        Transaminitis ICD-10-CM: R74.01  ICD-9-CM: 790.4  10/26/2021 Unknown              Assessment & Plan:     52-year-old -American female who was recently diagnosed with herpes encephalitis and now admitted with abnormal liver function test and undergoing further work-up. I was asked to see her for evaluation management of breast cancer.  -Patient was [facility] for her breast cancer and I do not have all the records available to me. According to her daughter she had breast cancer in 2008 and was [facility] in [facility]. She actually has anyone seen her oncologist lately. Patient is getting annual mammogram which I would recommend to continue.  -If patient agrees I would like to at least go ahead and do breast exam to make sure she does not have a local recurrence.  -It looks like she may have early stage breast cancer and I would not recommend any further work-up.  -Once patient is back to the feet will then she can decide to go ahead and have her follow-up with her primary oncologist.    This dictation was done by dragon, computer voice recognition software. Often unanticipated grammatical, syntax, phones and other interpretive errors are inadvertently transcribed. Please excuse errors that have escaped final proofreading.      Signed By: Keenan Barcenas MD     October 26, 2021

## 2021-10-26 NOTE — PROGRESS NOTES
Reason for Admission:  Hepatitis                      RUR Score:  18%              Plan for utilizing home health: None/uses no DME/Outpt rehab via [facility] ( PT/OT) . PCP: First and Last name: Cyndi Portillo in [place]. Name of Practice:    Are you a current patient: Yes/No: Yes   Approximate date of last visit: Seen 10/25/21. Can you participate in a virtual visit with your PCP: Yes/Call                    Current Advanced Directive/Advance Care Plan: Full Code      Healthcare Decision Maker:                Primary Decision Maker: Steven Ledesma - Daughter - [phone]                  Transition of Care Plan:   D/C Plan is home with daughter & daughter will transport home upon discharge.

## 2021-10-26 NOTE — ACP (ADVANCE CARE PLANNING)
Advance Care Planning   Healthcare Decision Maker:       Primary Decision Maker: Valentín Cruz - Daughter - [phone]
95

## 2021-10-26 NOTE — PROGRESS NOTES
10/26/21. PCP is Dr. Jessica Kirkpatrick. - from [place] & pt saw 10/25/21. D/C Plan is home with  Daughter Teddy Greene @ [phone]) & daughter will transport pt home upon discharge. Pt from [place], but living here with daughter until better. Pt uses no DME & has outpt rehab ( PT/OT) via [facility] X 3 weekly.

## 2021-10-27 VITALS
SYSTOLIC BLOOD PRESSURE: 147 MMHG | HEART RATE: 54 BPM | WEIGHT: 167.5 LBS | TEMPERATURE: 98.2 F | OXYGEN SATURATION: 95 % | BODY MASS INDEX: 29.68 KG/M2 | HEIGHT: 63 IN | RESPIRATION RATE: 18 BRPM | DIASTOLIC BLOOD PRESSURE: 66 MMHG

## 2021-10-27 LAB
ALBUMIN SERPL-MCNC: 2.2 G/DL (ref 3.5–5)
ALBUMIN/GLOB SERPL: 0.7 {RATIO} (ref 1.1–2.2)
ALP SERPL-CCNC: 407 U/L (ref 45–117)
ALT SERPL-CCNC: 1254 U/L (ref 12–78)
ANION GAP SERPL CALC-SCNC: 7 MMOL/L (ref 5–15)
AST SERPL W P-5'-P-CCNC: 1207 U/L (ref 15–37)
BASOPHILS # BLD: 0 K/UL (ref 0–0.1)
BASOPHILS NFR BLD: 0 % (ref 0–1)
BILIRUB SERPL-MCNC: 3.3 MG/DL (ref 0.2–1)
BUN SERPL-MCNC: 10 MG/DL (ref 6–20)
BUN/CREAT SERPL: 15 (ref 12–20)
CA-I BLD-MCNC: 7.8 MG/DL (ref 8.5–10.1)
CHLORIDE SERPL-SCNC: 110 MMOL/L (ref 97–108)
CO2 SERPL-SCNC: 25 MMOL/L (ref 21–32)
CREAT SERPL-MCNC: 0.67 MG/DL (ref 0.55–1.02)
DIFFERENTIAL METHOD BLD: ABNORMAL
EOSINOPHIL # BLD: 0 K/UL (ref 0–0.4)
EOSINOPHIL NFR BLD: 0 % (ref 0–7)
ERYTHROCYTE [DISTWIDTH] IN BLOOD BY AUTOMATED COUNT: 20 % (ref 11.5–14.5)
EST. AVERAGE GLUCOSE BLD GHB EST-MCNC: 131 MG/DL
GLOBULIN SER CALC-MCNC: 3.2 G/DL (ref 2–4)
GLUCOSE BLD STRIP.AUTO-MCNC: 103 MG/DL (ref 65–117)
GLUCOSE BLD STRIP.AUTO-MCNC: 115 MG/DL (ref 65–117)
GLUCOSE BLD STRIP.AUTO-MCNC: 71 MG/DL (ref 65–117)
GLUCOSE SERPL-MCNC: 104 MG/DL (ref 65–100)
HBA1C MFR BLD: 6.2 % (ref 4–5.6)
HCT VFR BLD AUTO: 35.5 % (ref 35–47)
HGB BLD-MCNC: 11.8 G/DL (ref 11.5–16)
IMM GRANULOCYTES # BLD AUTO: 0 K/UL
IMM GRANULOCYTES NFR BLD AUTO: 0 %
LYMPHOCYTES # BLD: 1.7 K/UL (ref 0.8–3.5)
LYMPHOCYTES NFR BLD: 42 % (ref 12–49)
MCH RBC QN AUTO: 30.3 PG (ref 26–34)
MCHC RBC AUTO-ENTMCNC: 33.2 G/DL (ref 30–36.5)
MCV RBC AUTO: 91.3 FL (ref 80–99)
MONOCYTES # BLD: 1 K/UL (ref 0–1)
MONOCYTES NFR BLD: 24 % (ref 5–13)
MYELOCYTES NFR BLD MANUAL: 1 %
NEUTS BAND NFR BLD MANUAL: 4 % (ref 0–6)
NEUTS SEG # BLD: 1.4 K/UL (ref 1.8–8)
NEUTS SEG NFR BLD: 29 % (ref 32–75)
NRBC # BLD: 0 K/UL (ref 0–0.01)
NRBC BLD-RTO: 0 PER 100 WBC
PERFORMED BY, TECHID: NORMAL
PLATELET # BLD AUTO: 101 K/UL (ref 150–400)
PMV BLD AUTO: 9.7 FL (ref 8.9–12.9)
POTASSIUM SERPL-SCNC: 4 MMOL/L (ref 3.5–5.1)
PROT SERPL-MCNC: 5.4 G/DL (ref 6.4–8.2)
RBC # BLD AUTO: 3.89 M/UL (ref 3.8–5.2)
RBC MORPH BLD: ABNORMAL
SODIUM SERPL-SCNC: 142 MMOL/L (ref 136–145)
WBC # BLD AUTO: 4.1 K/UL (ref 3.6–11)

## 2021-10-27 PROCEDURE — 85025 COMPLETE CBC W/AUTO DIFF WBC: CPT

## 2021-10-27 PROCEDURE — 97161 PT EVAL LOW COMPLEX 20 MIN: CPT

## 2021-10-27 PROCEDURE — 74011250637 HC RX REV CODE- 250/637: Performed by: INTERNAL MEDICINE

## 2021-10-27 PROCEDURE — 82962 GLUCOSE BLOOD TEST: CPT

## 2021-10-27 PROCEDURE — 80053 COMPREHEN METABOLIC PANEL: CPT

## 2021-10-27 PROCEDURE — 97110 THERAPEUTIC EXERCISES: CPT

## 2021-10-27 PROCEDURE — 74011250637 HC RX REV CODE- 250/637: Performed by: PSYCHIATRY & NEUROLOGY

## 2021-10-27 PROCEDURE — 36415 COLL VENOUS BLD VENIPUNCTURE: CPT

## 2021-10-27 RX ORDER — ACETAMINOPHEN 325 MG/1
650 TABLET ORAL
Qty: 180 TABLET | Refills: 0 | Status: SHIPPED
Start: 2021-10-27 | End: 2021-11-26

## 2021-10-27 RX ORDER — LEVETIRACETAM 1000 MG/1
1500 TABLET ORAL 2 TIMES DAILY
Qty: 90 TABLET | Refills: 0 | Status: SHIPPED | OUTPATIENT
Start: 2021-10-27 | End: 2021-11-26

## 2021-10-27 RX ORDER — LEVETIRACETAM 500 MG/1
1000 TABLET ORAL 2 TIMES DAILY
Status: DISCONTINUED | OUTPATIENT
Start: 2021-10-27 | End: 2021-10-27

## 2021-10-27 RX ORDER — LEVETIRACETAM 500 MG/1
1500 TABLET ORAL 2 TIMES DAILY
Status: DISCONTINUED | OUTPATIENT
Start: 2021-10-27 | End: 2021-10-27 | Stop reason: HOSPADM

## 2021-10-27 RX ADMIN — FLUTICASONE PROPIONATE 2 SPRAY: 50 SPRAY, METERED NASAL at 09:30

## 2021-10-27 RX ADMIN — ASPIRIN 81 MG: 81 TABLET, COATED ORAL at 09:28

## 2021-10-27 RX ADMIN — Medication 10 ML: at 13:31

## 2021-10-27 RX ADMIN — APIXABAN 5 MG: 5 TABLET, FILM COATED ORAL at 09:28

## 2021-10-27 RX ADMIN — LEVETIRACETAM 1000 MG: 500 TABLET, FILM COATED ORAL at 10:46

## 2021-10-27 RX ADMIN — MULTIVITAMIN TABLET 1 TABLET: TABLET at 09:28

## 2021-10-27 RX ADMIN — Medication 10 ML: at 05:18

## 2021-10-27 NOTE — PROGRESS NOTES
OT eval order received and acknowledged. Screen completed as pt is currently at close to baseline MI/supervision functional status for self care and functional transfers/mobility. Pt with no acute OT needs at this time thus will D/C from skilled OT services after screen. Recommend discharge to home and continued outpatient therapy when medically appropriate Please re-consult if pt status changes. Thank you.

## 2021-10-27 NOTE — PROGRESS NOTES
Progress Note    Patient: Eduardo Sprain MRN: [id number]  SSN: [id number]    YOB: 1955  Age: 60 y.o. Sex: female      Admit Date: 10/26/2021    LOS: 1 day     Subjective:   GI is following for elevated liver enzymes. Patient seen in room awake and alert. Feeling much better. Denies nausea or vomiting. Denies abdominal pain. No report of seizure.   -10/27 LFTs are trending down: ALT 1,254 (down from 1,477), AST 1,207 (down from 1,388), alkaphos 407 (from 447). Total bilirubin increased to 3.3.     10/26 GI consult note:  Patient was admitted to the hospital after PCP advised them to go the ED due to elevated liver enzymes - AST 1168. Patient was admitted to the hospital in September due to new onset seizure and HSV encephalitis. Patient also found to have an upper extremity DVT and  started on Eliquis. She was discharged on Keppra and Valproic acid. Daughter reports that patient's memory is not all there with periods of confusion.      - labs in ED shows: elevated total bilirubin 2.7 (0.2 on 9/21), ALT 1,477 (27 on 9/21), AST 1,388 (13 on 9/21), alkaphos 447 (43 on 9/21).  -10/26 US RUQ - Gallbladder is nondistended without stones but small amount of sludge. Echogenic foci with artifact present from the wall suggesting adenomyomatosis. Wall measures 4 mm, There is no biliary dilatation Cody's sign. Objective:     Vitals:    10/26/21 2005 10/27/21 0307 10/27/21 0758 10/27/21 1454   BP: (!) 130/57 119/61 130/71 (!) 147/66   Pulse: (!) 59 61 62 (!) 54   Resp: 18 18 18 18   Temp: 97.5 °F (36.4 °C) 98 °F (36.7 °C) 98.1 °F (36.7 °C) 98.2 °F (36.8 °C)   SpO2: 99% 96% 96% 95%   Weight:       Height:            Intake and Output:  Current Shift: No intake/output data recorded. Last three shifts: 10/25 1901 - 10/27 0700  In: 781.7 [I.V.:781.7]  Out: 0     Physical Exam:   Skin:  Extremities and face reveal no rashes. No medrano erythema. HEENT: Sclerae anicteric.  Extra-occular muscles are intact. No abnormal pigmentation of the lips. The neck is supple. Cardiovascular: Regular rate and rhythm. Respiratory:  Comfortable breathing with no accessory muscle use. GI:  Abdomen nondistended, soft, and nontender. No enlargement of the liver or spleen. No masses palpable. Rectal:  Deferred  Musculoskeletal: Extremities have good range of motion. Neurological:  Gross memory appears intact. Patient is alert and oriented. Psychiatric:  Mood appears appropriate with judgement intact. Lymphatic:  No visible adenopathy      Lab/Data Review:  Recent Results (from the past 24 hour(s))   GLUCOSE, POC    Collection Time: 10/26/21  3:53 PM   Result Value Ref Range    Glucose (POC) 91 65 - 117 mg/dL    Performed by Joni Park    GLUCOSE, POC    Collection Time: 10/26/21  9:19 PM   Result Value Ref Range    Glucose (POC) 124 (H) 65 - 117 mg/dL    Performed by Elmer South    GLUCOSE, POC    Collection Time: 10/27/21  7:53 AM   Result Value Ref Range    Glucose (POC) 71 65 - 117 mg/dL    Performed by Victorino [facility]    METABOLIC PANEL, COMPREHENSIVE    Collection Time: 10/27/21 10:25 AM   Result Value Ref Range    Sodium 142 136 - 145 mmol/L    Potassium 4.0 3.5 - 5.1 mmol/L    Chloride 110 (H) 97 - 108 mmol/L    CO2 25 21 - 32 mmol/L    Anion gap 7 5 - 15 mmol/L    Glucose 104 (H) 65 - 100 mg/dL    BUN 10 6 - 20 mg/dL    Creatinine 0.67 0.55 - 1.02 mg/dL    BUN/Creatinine ratio 15 12 - 20      GFR est AA >60 >60 ml/min/1.73m2    GFR est non-AA >60 >60 ml/min/1.73m2    Calcium 7.8 (L) 8.5 - 10.1 mg/dL    Bilirubin, total 3.3 (H) 0.2 - 1.0 mg/dL    AST (SGOT) 1,207 (H) 15 - 37 U/L    ALT (SGPT) 1,254 (H) 12 - 78 U/L    Alk.  phosphatase 407 (H) 45 - 117 U/L    Protein, total 5.4 (L) 6.4 - 8.2 g/dL    Albumin 2.2 (L) 3.5 - 5.0 g/dL    Globulin 3.2 2.0 - 4.0 g/dL    A-G Ratio 0.7 (L) 1.1 - 2.2     CBC WITH AUTOMATED DIFF    Collection Time: 10/27/21 10:25 AM   Result Value Ref Range    WBC 4.1 3.6 - 11.0 K/uL RBC 3.89 3.80 - 5.20 M/uL    HGB 11.8 11.5 - 16.0 g/dL    HCT 35.5 35.0 - 47.0 %    MCV 91.3 80.0 - 99.0 FL    MCH 30.3 26.0 - 34.0 PG    MCHC 33.2 30.0 - 36.5 g/dL    RDW 20.0 (H) 11.5 - 14.5 %    PLATELET 077 (L) 384 - 400 K/uL    MPV 9.7 8.9 - 12.9 FL    NRBC 0.0 0.0  WBC    ABSOLUTE NRBC 0.00 0.00 - 0.01 K/uL    NEUTROPHILS 29 (L) 32 - 75 %    BAND NEUTROPHILS 4 0 - 6 %    LYMPHOCYTES 42 12 - 49 %    MONOCYTES 24 (H) 5 - 13 %    EOSINOPHILS 0 0 - 7 %    BASOPHILS 0 0 - 1 %    MYELOCYTES 1 (H) 0 %    IMMATURE GRANULOCYTES 0 %    ABS. NEUTROPHILS 1.4 (L) 1.8 - 8.0 K/UL    ABS. LYMPHOCYTES 1.7 0.8 - 3.5 K/UL    ABS. MONOCYTES 1.0 0.0 - 1.0 K/UL    ABS. EOSINOPHILS 0.0 0.0 - 0.4 K/UL    ABS. BASOPHILS 0.0 0.0 - 0.1 K/UL    ABS. IMM. GRANS. 0.0 K/UL    DF Manual      RBC COMMENTS Anisocytosis  1+       GLUCOSE, POC    Collection Time: 10/27/21 11:09 AM   Result Value Ref Range    Glucose (POC) 103 65 - 117 mg/dL    Performed by Pamella MOSER   Final Result   Abnormal gallbladder, without stones          Assessment:     Active Problems:    Hepatitis (10/26/2021)      Transaminitis (10/26/2021)        Plan:   1. Elevated liver Enzymes - could be related to medication side effects      -LFTs trending down, though still elevated. -Depakote on hold, neuro w/ order to re-start Keppra 1000mg BID      -US abd -  Gallbladder is nondistended without stones but small amount of sludge. Echogenic foci with artifact present from the wall suggesting adenomyomatosis. -continue IVF      -avoid hepatotoxic meds       -Negative hepatitis panel       -Monitor LFTs, repeat cmp in am.   2. HSV encephalitis      -completed treatment      -neurology has been consulted - hold Depakote, re-start Keppra 1000mg BID. 3. Diabetes Mellitus II      -Monitor sugar, continue lispro      -Managed by Primary team     If planning for discharge, patient is clear to go per GI.  Clinic follow up 1 week after discharge. We will continue to follow while patient is admitted. Patient discussed with Dr Tolu Motta and agrees to above impression and plan. Thank you for allowing me to participate in this patients care    Signed By: Rashad Lewis.  ARIC Whelan     October 27, 2021

## 2021-10-27 NOTE — PROGRESS NOTES
Discharge plan of care/case management plan validated with provider discharge order. Pt discharged to home with self care. Pt IV removed. Pt provided with Discharge information. Pt education provided. Pt provided written and verbal understanding. Copy of discharge given to pt. Pt discharge summary completed and signed. Pt left via wheelchair to personal vehicle. Pt to call office to schedule appt. With MD Maximo Yuan or another provider pertaining to the follow up appt.

## 2021-10-27 NOTE — PROGRESS NOTES
Daughter called and wanted to make sure her mom was moved closer to the nurses station, told her she was in room 226 now. Daughter also wanted to make sure her bed alarm was set and I told her I would set it. Went into the patients room and set the bed alarm.

## 2021-10-27 NOTE — PROGRESS NOTES
PHYSICAL THERAPY EVALUATION  Patient: Mehnaz Smith (89 y.o. female)  Date: 10/27/2021  Primary Diagnosis: Hepatitis [K75.9]  Transaminitis [R74.01]        Precautions:      ASSESSMENT  Based on the objective data described below, the patient presents with mobility that appears to be at functional baseline. Current Level of Function Impacting Discharge (mobility/balance): 61 yo female adm with hepatitis and transaminitis. Patient has good overall functional mobility and does not require the use of an AD. After patient left hospital previously, she went to IRF at [address] [place] [address] followed by OP therapy with [facility]. The patient is currently continuing OP therapy per daughter. No therapy needs identified at this time. Patient plans on continuing her OP therapy once she is discharged from the hospital.    Functional Outcome Measure: The patient scored 22 on the [facility] outcome measure which is indicative of mild impairment. Other factors to consider for discharge: none     Patient will benefit from skilled therapy intervention to address the above noted impairments. PLAN :  Recommendations and Planned Interventions: none    Frequency/Duration: Patient will be followed by physical therapy:  D/C from therapy    Recommendation for discharge: (in order for the patient to meet his/her long term goals)  Outpatient Therapy     This discharge recommendation:  Has not yet been discussed the attending provider and/or case management    IF patient discharges home will need the following DME: none         SUBJECTIVE:   Patient stated I am not in any pain right now.     OBJECTIVE DATA SUMMARY:   HISTORY:    Past Medical History:   Diagnosis Date    Breast cancer ([facility]     Depression     Diabetes ([facility]     High cholesterol     HSV-1 (herpes simplex virus 1) infection     [facility]      Past Surgical History:   Procedure Laterality Date    HX MASTECTOMY      IR FLUORO GUIDE PLC CVAD  9/9/2021       Personal factors and/or comorbidities impacting plan of care: none    Home Situation  Home Environment: Private residence  # Steps to Enter: 4  Rails to Enter: Yes  Hand Rails : Left  One/Two Story Residence: Two story  Living Alone: No (currently living with dtr)  Support Systems: Child(lynn)  Patient Expects to be Discharged to[de-identified] House  Current DME Used/Available at Home: None    EXAMINATION/PRESENTATION/DECISION MAKING:   Critical Behavior:     Orientation Level: Oriented X4        Hearing: Auditory  Auditory Impairment: None  Skin:    Edema:   Range Of Motion:  AROM: Within functional limits                       Strength:    Strength: Within functional limits                    Tone & Sensation:                                  Coordination:     Vision:      Functional Mobility:  Bed Mobility:     Supine to Sit: Independent  Sit to Supine: Independent     Transfers:  Sit to Stand: Independent  Stand to Sit: Independent                       Balance:   Sitting: Intact  Standing: Intact  Ambulation/Gait Training:  Distance (ft): 200 Feet (ft)  Assistive Device: Gait belt; Other (comment) (none)  Ambulation - Level of Assistance: Contact guard assistance;Supervision                                               Stairs: Therapeutic Exercises:   Sit to stands without use of arms, LAQ    Functional Measure:  [facility] Mobility Inpatient Short Form  How much difficulty does the patient currently have. .. Unable A Lot A Little None   1. Turning over in bed (including adjusting bedclothes, sheets and blankets)? [] 1   [] 2   [] 3   [x] 4   2. Sitting down on and standing up from a chair with arms ( e.g., wheelchair, bedside commode, etc.)   [] 1   [] 2   [] 3   [x] 4   3. Moving from lying on back to sitting on the side of the bed? [] 1   [] 2   [] 3   [x] 4          How much help from another person does the patient currently need. .. Total A Lot A Little None   4. Moving to and from a bed to a chair (including a wheelchair)? [] 1   [] 2   [] 3   [x] 4   5. Need to walk in hospital room? [] 1   [] 2   [x] 3   [] 4   6. Climbing 3-5 steps with a railing? [] 1   [] 2   [x] 3   [] 4   © , Trustees of [address] [place] [address], under license to [employer]. All rights reserved     Score:  Initial: 22/CJ Most Recent: X (Date: -- )   Interpretation of Tool:  Represents activities that are increasingly more difficult (i.e. Bed mobility, Transfers, Gait). Score 24 23 22-20 19-15 14-10 9-7 6   Modifier CH CI CJ CK CL CM CN           Physical Therapy Evaluation Charge Determination   History Examination Presentation Decision-Making   LOW Complexity : Zero comorbidities / personal factors that will impact the outcome / POC LOW Complexity : 1-2 Standardized tests and measures addressing body structure, function, activity limitation and / or participation in recreation  LOW Complexity : Stable, uncomplicated  Other outcome measures [facility]  LOW       Based on the above components, the patient evaluation is determined to be of the following complexity level: LOW     Pain Ratin/10      Activity Tolerance:   Good  Please refer to the flowsheet for vital signs taken during this treatment. After treatment patient left in no apparent distress:   Supine in bed    COMMUNICATION/EDUCATION:   The patients plan of care was discussed with: Occupational therapist.     Patient/family agree to work toward stated goals and plan of care.     Thank you for this referral.  Leo Holland, PT   Time Calculation: 25 mins

## 2021-10-27 NOTE — PROGRESS NOTES
Hematology and Oncology Progress Note    Patient: Joana Salas MRN: [id number]  SSN: [id number]    YOB: 1955  Age: 60 y.o. Sex: female      Admit Date: 10/26/2021    LOS: 1 day     Chief Complaint: Was admitted with high liver function test    Subjective:     Feeling better. More interactive today. Objective:     Vitals:    10/26/21 2005 10/27/21 0307 10/27/21 0758 10/27/21 1454   BP: (!) 130/57 119/61 130/71 (!) 147/66   Pulse: (!) 59 61 62 (!) 54   Resp: 18 18 18 18   Temp: 97.5 °F (36.4 °C) 98 °F (36.7 °C) 98.1 °F (36.7 °C) 98.2 °F (36.8 °C)   SpO2: 99% 96% 96% 95%   Weight:       Height:          Physical Exam:  Constitutional:  Elderly -American female not in any acute distress or pain. Eyes: Sclerae is icteric. Conjunctivae no pallor. ENMT: Oral mucosa is moist, no thrush, mucositis, or petechiae. Neck: No adenopathy. Respiratory: Not in distress. Cardiovascular: Normal sinus rhythm. Breast examination was not done as per patient's preference.   Extremities: No edema  Neurologic:  More interactive today and able to move all 4 extremities  Lab/Data Review:    Recent Results (from the past 24 hour(s))   GLUCOSE, POC    Collection Time: 10/26/21  3:53 PM   Result Value Ref Range    Glucose (POC) 91 65 - 117 mg/dL    Performed by Norberto Garner, POC    Collection Time: 10/26/21  9:19 PM   Result Value Ref Range    Glucose (POC) 124 (H) 65 - 117 mg/dL    Performed by Constantine Carrasco    GLUCOSE, POC    Collection Time: 10/27/21  7:53 AM   Result Value Ref Range    Glucose (POC) 71 65 - 117 mg/dL    Performed by [facility]    METABOLIC PANEL, COMPREHENSIVE    Collection Time: 10/27/21 10:25 AM   Result Value Ref Range    Sodium 142 136 - 145 mmol/L    Potassium 4.0 3.5 - 5.1 mmol/L    Chloride 110 (H) 97 - 108 mmol/L    CO2 25 21 - 32 mmol/L    Anion gap 7 5 - 15 mmol/L    Glucose 104 (H) 65 - 100 mg/dL    BUN 10 6 - 20 mg/dL    Creatinine 0.67 0.55 - 1.02 mg/dL BUN/Creatinine ratio 15 12 - 20      GFR est AA >60 >60 ml/min/1.73m2    GFR est non-AA >60 >60 ml/min/1.73m2    Calcium 7.8 (L) 8.5 - 10.1 mg/dL    Bilirubin, total 3.3 (H) 0.2 - 1.0 mg/dL    AST (SGOT) 1,207 (H) 15 - 37 U/L    ALT (SGPT) 1,254 (H) 12 - 78 U/L    Alk. phosphatase 407 (H) 45 - 117 U/L    Protein, total 5.4 (L) 6.4 - 8.2 g/dL    Albumin 2.2 (L) 3.5 - 5.0 g/dL    Globulin 3.2 2.0 - 4.0 g/dL    A-G Ratio 0.7 (L) 1.1 - 2.2     CBC WITH AUTOMATED DIFF    Collection Time: 10/27/21 10:25 AM   Result Value Ref Range    WBC 4.1 3.6 - 11.0 K/uL    RBC 3.89 3.80 - 5.20 M/uL    HGB 11.8 11.5 - 16.0 g/dL    HCT 35.5 35.0 - 47.0 %    MCV 91.3 80.0 - 99.0 FL    MCH 30.3 26.0 - 34.0 PG    MCHC 33.2 30.0 - 36.5 g/dL    RDW 20.0 (H) 11.5 - 14.5 %    PLATELET 699 (L) 159 - 400 K/uL    MPV 9.7 8.9 - 12.9 FL    NRBC 0.0 0.0  WBC    ABSOLUTE NRBC 0.00 0.00 - 0.01 K/uL    NEUTROPHILS 29 (L) 32 - 75 %    BAND NEUTROPHILS 4 0 - 6 %    LYMPHOCYTES 42 12 - 49 %    MONOCYTES 24 (H) 5 - 13 %    EOSINOPHILS 0 0 - 7 %    BASOPHILS 0 0 - 1 %    MYELOCYTES 1 (H) 0 %    IMMATURE GRANULOCYTES 0 %    ABS. NEUTROPHILS 1.4 (L) 1.8 - 8.0 K/UL    ABS. LYMPHOCYTES 1.7 0.8 - 3.5 K/UL    ABS. MONOCYTES 1.0 0.0 - 1.0 K/UL    ABS. EOSINOPHILS 0.0 0.0 - 0.4 K/UL    ABS. BASOPHILS 0.0 0.0 - 0.1 K/UL    ABS. IMM. GRANS. 0.0 K/UL    DF Manual      RBC COMMENTS Anisocytosis  1+       GLUCOSE, POC    Collection Time: 10/27/21 11:09 AM   Result Value Ref Range    Glucose (POC) 103 65 - 117 mg/dL    Performed by Clayton Bell and plan:     61-year-old -American female who was recently diagnosed with herpes encephalitis and now admitted with abnormal liver function test and undergoing further work-up. I was asked to see her for evaluation management of breast cancer.  -Patient was treated at [facility] for her breast cancer and I do not have all the records available to me.   According to her daughter she had breast cancer in 2008 and was [facility]. She actually has anyone seen her oncologist lately. Patient is getting annual mammogram which I would recommend to continue.  -It looks like she may have early stage breast cancer and I would not recommend any further work-up.  -I came back today and asked patient again whether she wanted me to do her breast exam but she declined and she would rather have her breast exam done by her primary oncologist.  At this point I will sign off the case and give me a call if you have any questions or concerns. This dictation was done by dragon, computer voice recognition software. Often unanticipated grammatical, syntax, phones and other interpretive errors are inadvertently transcribed. Please excuse errors that have escaped final proofreading.        Signed By: Arlen Graves MD     October 27, 2021

## 2021-10-27 NOTE — DISCHARGE SUMMARY
Hospitalist Discharge Summary     Patient ID:    ySed Bravo  [id number]  37 y.o.  1955    Admit date: 10/26/2021    Discharge date : 10/27/2021    Chronic Diagnoses:    Problem List as of 10/27/2021 Never Reviewed        Codes Class Noted - Resolved    Hepatitis ICD-10-CM: K75.9  ICD-9-CM: 573.3  10/26/2021 - Present        Transaminitis ICD-10-CM: R74.01  ICD-9-CM: 790.4  10/26/2021 - Present        Mild protein-calorie malnutrition ([facility] ICD-10-CM: E44.1  ICD-9-CM: 263.1  9/10/2021 - Present        Sepsis ([facility] ICD-10-CM: A41.9  ICD-9-CM: 038.9, 995.91  9/3/2021 - Present        New onset seizure ([facility] ICD-10-CM: R56.9  ICD-9-CM: 780.39  9/3/2021 - Present          22    Final Diagnoses: Active Problems:    Hepatitis (10/26/2021)      Transaminitis (10/26/2021)        Reason for Hospitalization:  Generalized weakness, elevated LFTs    Hospital Course:   Pt admitted for weakness, found to have elevated liver enzymes on admission which could be secondary to taking statins and anti seizure medication depakote. Crestor and valproic acid were discontinued and liver enzymes today are trending down. GI recommending follow up to recheck LFTs in 1-2 weeks, should f/u with PCP in 1-2 weeks, she sees provider in [address] [place] [address]. Pt to stop taking valproic acid and continue keppra 1500 mg bid . She has an appointment in 1/22 with her neurologist. Pt is stable for discharge today and is cleared by GI and neurology. Discharge Medications:   Current Discharge Medication List      START taking these medications    Details   acetaminophen (TYLENOL) 325 mg tablet Take 2 Tablets by mouth every four (4) hours as needed for Pain or Fever for up to 30 days. Qty: 180 Tablet, Refills: 0  Start date: 10/27/2021, End date: 11/26/2021         CONTINUE these medications which have CHANGED    Details   levETIRAcetam (Keppra) 1,000 mg tablet Take 1.5 Tablets by mouth two (2) times a day for 30 days.   Qty: 90 Tablet, Refills: 0  Start date: 10/27/2021, End date: 11/26/2021         CONTINUE these medications which have NOT CHANGED    Details   apixaban (Eliquis) 5 mg tablet Take 1 Tablet by mouth two (2) times a day. senna (Senna) 8.6 mg tablet Take 2 Tablets by mouth nightly. Multivitamins with Fluoride (MULTI-VITAMIN PO) Multi Vitamin      aspirin delayed-release 81 mg tablet aspirin 81 mg tablet,delayed release   Take 1 tablet every day by oral route for protection for 90 days. biotin 10,000 mcg cap biotin 10,000 mcg capsule   Take 1 capsule every day by oral route as directed for 30 days. calcium carb/vitamin D3/vit K1 (VIACTIV PO) Viactiv   OTC      citalopram (CELEXA) 40 mg tablet 40 mg every evening. Jardiance 25 mg tablet       glipiZIDE (GLUCOTROL) 5 mg tablet       ondansetron (ZOFRAN ODT) 4 mg disintegrating tablet       Rybelsus 3 mg tablet       coenzyme Q-10 (CO Q-10) 200 mg capsule Co Q-10 200 mg capsule   Take 1 capsule every day by oral route as directed for 30 days. STOP taking these medications       valproic acid (DEPAKENE) 250 mg capsule Comments:   Reason for Stopping:         fluticasone propionate (FLONASE) 50 mcg/actuation nasal spray Comments:   Reason for Stopping:         rosuvastatin (CRESTOR) 5 mg tablet Comments:   Reason for Stopping:         fluticasone propionate (FLONASE) 50 mcg/actuation nasal spray Comments:   Reason for Stopping: Follow up Care:    1. Christianne Reardon MD in 1-2 weeks. Follow-up Information     Follow up With Specialties Details Why Contact Info    Christianne Reardon MD   Follow up with PCP in [place], Dr Fabián Marquez in 2-3 weeks or as scheduled Patient can only remember the practice name and not the physician      Sd Arriaza MD Gastroenterology In 1 week follow up appointment for recheck liver function tests [address] [address] [phone]               Follow-up Care/Patient Instructions:   Activity: Activity as tolerated  Diet: Diabetic Diet  Wound Care: None needed      Condition at Discharge:  Stable  __________________________________________________________________    Disposition  Home or Self Care  ____________________________________________________________________    Code Status:  Full Code  ___________________________________________________________________    Discharge Exam:  Patient seen and examined by me on discharge day. Physical Exam  Constitutional:       General: She is not in acute distress. Cardiovascular:      Rate and Rhythm: Normal rate and regular rhythm. Pulses: Normal pulses. Heart sounds: Normal heart sounds. Pulmonary:      Effort: Pulmonary effort is normal.      Breath sounds: Normal breath sounds. Abdominal:      General: Bowel sounds are normal.      Palpations: Abdomen is soft. Skin:     General: Skin is warm and dry. Neurological:      Mental Status: She is oriented to person, place, and time.    Psychiatric:         Mood and Affect: Mood normal.         Behavior: Behavior normal.          CONSULTATIONS: GI, Neurology    Significant Diagnostic Studies:   Recent Results (from the past 24 hour(s))   GLUCOSE, POC    Collection Time: 10/26/21  9:19 PM   Result Value Ref Range    Glucose (POC) 124 (H) 65 - 117 mg/dL    Performed by Ravi Thomas    GLUCOSE, POC    Collection Time: 10/27/21  7:53 AM   Result Value Ref Range    Glucose (POC) 71 65 - 117 mg/dL    Performed by Jan Euceda    METABOLIC PANEL, COMPREHENSIVE    Collection Time: 10/27/21 10:25 AM   Result Value Ref Range    Sodium 142 136 - 145 mmol/L    Potassium 4.0 3.5 - 5.1 mmol/L    Chloride 110 (H) 97 - 108 mmol/L    CO2 25 21 - 32 mmol/L    Anion gap 7 5 - 15 mmol/L    Glucose 104 (H) 65 - 100 mg/dL    BUN 10 6 - 20 mg/dL    Creatinine 0.67 0.55 - 1.02 mg/dL    BUN/Creatinine ratio 15 12 - 20      GFR est AA >60 >60 ml/min/1.73m2    GFR est non-AA >60 >60 ml/min/1.73m2    Calcium 7.8 (L) 8.5 - 10.1 mg/dL    Bilirubin, total 3.3 (H) 0.2 - 1.0 mg/dL    AST (SGOT) 1,207 (H) 15 - 37 U/L    ALT (SGPT) 1,254 (H) 12 - 78 U/L    Alk. phosphatase 407 (H) 45 - 117 U/L    Protein, total 5.4 (L) 6.4 - 8.2 g/dL    Albumin 2.2 (L) 3.5 - 5.0 g/dL    Globulin 3.2 2.0 - 4.0 g/dL    A-G Ratio 0.7 (L) 1.1 - 2.2     CBC WITH AUTOMATED DIFF    Collection Time: 10/27/21 10:25 AM   Result Value Ref Range    WBC 4.1 3.6 - 11.0 K/uL    RBC 3.89 3.80 - 5.20 M/uL    HGB 11.8 11.5 - 16.0 g/dL    HCT 35.5 35.0 - 47.0 %    MCV 91.3 80.0 - 99.0 FL    MCH 30.3 26.0 - 34.0 PG    MCHC 33.2 30.0 - 36.5 g/dL    RDW 20.0 (H) 11.5 - 14.5 %    PLATELET 244 (L) 369 - 400 K/uL    MPV 9.7 8.9 - 12.9 FL    NRBC 0.0 0.0  WBC    ABSOLUTE NRBC 0.00 0.00 - 0.01 K/uL    NEUTROPHILS 29 (L) 32 - 75 %    BAND NEUTROPHILS 4 0 - 6 %    LYMPHOCYTES 42 12 - 49 %    MONOCYTES 24 (H) 5 - 13 %    EOSINOPHILS 0 0 - 7 %    BASOPHILS 0 0 - 1 %    MYELOCYTES 1 (H) 0 %    IMMATURE GRANULOCYTES 0 %    ABS. NEUTROPHILS 1.4 (L) 1.8 - 8.0 K/UL    ABS. LYMPHOCYTES 1.7 0.8 - 3.5 K/UL    ABS. MONOCYTES 1.0 0.0 - 1.0 K/UL    ABS. EOSINOPHILS 0.0 0.0 - 0.4 K/UL    ABS. BASOPHILS 0.0 0.0 - 0.1 K/UL    ABS. IMM. GRANS. 0.0 K/UL    DF Manual      RBC COMMENTS Anisocytosis  1+       GLUCOSE, POC    Collection Time: 10/27/21 11:09 AM   Result Value Ref Range    Glucose (POC) 103 65 - 117 mg/dL    Performed by Radha Vega    GLUCOSE, POC    Collection Time: 10/27/21  4:01 PM   Result Value Ref Range    Glucose (POC) 115 65 - 117 mg/dL    Performed by Angie Bhat   Final Result   Abnormal gallbladder, without stones          Discharge: time spent 35 minutes in discharge  Education and counseling.      Signed:  Deepa Lee NP  10/27/2021  4:30 PM

## 2021-10-27 NOTE — CONSULTS
NEUROLOGY CONSULT    Name Illona Duane Age 60 y.o. MRN [id number]  1955     Referring Physician: Mateusz Schulz MD      Chief Complaint: History of herpes encephalitis admitted with elevated liver enzymes     This is a 60 y.o. right handed  female resident of [place] [address]. who was admitted through ED yesterday on 10/26/2021 because of elevated liver enzymes. The patient was previously admitted for herpes encephalitis and treated with acyclovir. She had been slowly recovering from there and was getting physical therapy when she started feeling weak and exhausted over the past few days, so she was checked by her PCP in [place] [address].. She did the labs which revealed highly elevated liver enzymes. She recommended the patient to go to the ER and she decided to come here since she has all her record here. She was reported to be drowsy yesterday but at the time of my evaluation this morning she is very alert and informed me that she is admitted this time because of elevated liver enzymes checked by her family doctor. She also most of the questions appropriately. She denied any headaches, dizziness or any focal weakness or numbness or any changes in the speech. Assessment and Plan:  1. History of herpes encephalitis treated with full course of acyclovir and current history and examination findings are not suggestive of any recurrence of the infection. No additional work-up is indicated and will observe her clinically while she is being treated for elevated liver enzymes. 2.  Elevated liver enzymes: Etiology unclear, but agree with the primary team of holding any medicine which can contribute to the liver damage. We can continue holding the Depakote but I will restart the Keppra at 1000 mg twice a day. .  3.  History of seizures: We will resume Keppra 1000 mg twice a day and if needed will increase the dose  4. Hyperlipidemia. 5.  Diabetes mellitus.   6. Depression. 7.  History of breast cancer: Diagnosed in 2008. No Known Allergies     Current Facility-Administered Medications   Medication Dose Route Frequency    apixaban (ELIQUIS) tablet 5 mg  5 mg Oral BID    aspirin delayed-release tablet 81 mg  81 mg Oral DAILY    fluticasone propionate (FLONASE) 50 mcg/actuation nasal spray 2 Spray  2 Spray Both Nostrils DAILY    [Held by provider] levETIRAcetam (KEPPRA) tablet 1,500 mg  1,500 mg Oral BID    multivitamin with folic acid (ONE DAILY WITH FOLIC ACID) tablet 1 Tablet  1 Tablet Oral DAILY    . PHARMACY TO SUBSTITUTE PER PROTOCOL (Reordered from: Rybelsus 3 mg tablet)    Per Protocol    0.9% sodium chloride infusion  100 mL/hr IntraVENous CONTINUOUS    sodium chloride (NS) flush 5-40 mL  5-40 mL IntraVENous Q8H    sodium chloride (NS) flush 5-40 mL  5-40 mL IntraVENous PRN    acetaminophen (TYLENOL) tablet 650 mg  650 mg Oral Q6H PRN    Or    acetaminophen (TYLENOL) suppository 650 mg  650 mg Rectal Q6H PRN    polyethylene glycol (MIRALAX) packet 17 g  17 g Oral DAILY PRN    ondansetron (ZOFRAN ODT) tablet 4 mg  4 mg Oral Q8H PRN    Or    ondansetron (ZOFRAN) injection 4 mg  4 mg IntraVENous Q6H PRN    glucose chewable tablet 16 g  4 Tablet Oral PRN    dextrose (D50W) injection syrg 12.5-25 g  25-50 mL IntraVENous PRN    glucagon (GLUCAGEN) injection 1 mg  1 mg IntraMUSCular PRN    insulin lispro (HUMALOG) injection   SubCUTAneous AC&HS    [Held by provider] valproic acid (DEPAKENE) capsule 750 mg  750 mg Oral BID    [Held by provider] rosuvastatin (CRESTOR) tablet 5 mg  5 mg Oral QHS    citalopram (CELEXA) tablet 40 mg  40 mg Oral QPM    senna (SENOKOT) tablet 17.2 mg  2 Tablet Oral QHS     Past Medical History:   Diagnosis Date    [facility]     Depression     Diabetes [facility]     High cholesterol     HSV-1 (herpes simplex virus 1) infection     Seizures (HCC)      Social History     Tobacco Use    Smoking status: Never Smoker    Smokeless tobacco: Never Used   Vaping Use    Vaping Use: Never used   Substance Use Topics    Alcohol use: Not Currently    Drug use: Never     Exam  Visit Vitals  /61 (BP 1 Location: Left lower arm, BP Patient Position: At rest;Lying)   Pulse 61   Temp 98 °F (36.7 °C)   Resp 18   Ht 5' 3\" (1.6 m)   Wt 76 kg (167 lb 8 oz)   SpO2 96%   BMI 29.67 kg/m²     General: Well developed, well nourished. Patient in no distress   Head: Normocephalic, atraumatic, anicteric sclera   Neck Normal ROM, No thyromegally   Lungs:  Clear to auscultation    Cardiac: Regular rate and rhythm with no murmurs. Abd: Bowel sounds were audible   Ext: No pedal edema   Skin: Supple no rash     NeurologicExam:  Mental Status: Alert and oriented to person place and partially to time, she knew the month, year approximate date but thought it was Monday instead of Wednesday   Speech: Fluent no aphasia or dysarthria. Cranial Nerves:   Pupils are equal round and reactive to light and accommodation, extraocular movements are intact and full, visual fields are intact by confrontation, no nystagmus noted, face is symmetric, sensation on face is intact and symmetric, hearing is intact and symmetric, tongue and uvula are in midline with normal movements, palate is elevating equally, shoulder shrug is intact and symmetric. Motor:  Full and symmetric strength of upper and lower ext . Normal bulk and tone. Reflexes:   Deep tendon reflexes 2+/4 and symmetric. Sensory:   Symmetric and intact    Gait:  Gait is deferred   Tremor:   No tremor noted. Cerebellar:  Coordination intact for finger-nose-finger. Neurovascular: No carotid bruits.  No JVD   Lab Review  Lab Results   Component Value Date/Time    WBC 4.4 10/26/2021 08:30 AM    HCT 39.1 10/26/2021 08:30 AM    HGB 12.5 10/26/2021 08:30 AM    PLATELET 547 (L) 82/90/8637 08:30 AM     Lab Results   Component Value Date/Time    Sodium 140 10/26/2021 08:30 AM    Potassium 4.1 10/26/2021 08:30 AM    Chloride 105 10/26/2021 08:30 AM    CO2 30 10/26/2021 08:30 AM    Glucose 67 10/26/2021 08:30 AM    BUN 11 10/26/2021 08:30 AM    Creatinine 0.66 10/26/2021 08:30 AM    Calcium 8.9 10/26/2021 08:30 AM     Lab Results   Component Value Date/Time    Vitamin B12 >2,000 (H) 09/16/2021 07:28 AM    Folate 17.0 09/16/2021 07:28 AM     No results found for: LDL, LDLC, DLDLP  Lab Results   Component Value Date/Time    Hemoglobin A1c 6.2 (H) 10/26/2021 12:15 PM     No components found for: TROPQUANT  No results found for: HATTIE

## 2021-10-27 NOTE — PROGRESS NOTES
Patient goes to [facility] for outpatient rehab with daughter. Patient declining home health for now. DC plan home with daughter with out patient physical/occupational therapy at [facility] 3 days a week.

## 2021-10-27 NOTE — DISCHARGE INSTRUCTIONS
Patient Education        Liver Function Tests: About These Tests  What is it? Liver function tests check to see how well your liver is working. Some tests measure the amount of certain enzymes in your blood. This can show if the liver is damaged or inflamed. Other tests measure how well the liver can make certain proteins. Or they show how well the liver removes waste products from the body. Your doctor will use the test results to help look for conditions such as hepatitis, cirrhosis, or gallbladder problems. Test results that are not normal don't always mean there is a problem with your liver. Your doctor can find out if there is a problem based on your results. The tests may include:  Alkaline phosphatase (ALP). This test measures the amount of the enzyme ALP in your blood. Total protein and albumin. A total serum protein test measures the amounts of two major groups of proteins in your blood (albumin and globulin). It also shows the total amount of protein. An albumin test measures albumin only. Bilirubin. This test measures the amount of bilirubin in your blood. When levels are high, the skin and whites of the eyes may look yellow (jaundice). This may be caused by liver disease. Aspartate aminotransferase (AST) and alanine aminotransferase (ALT). These tests measure the amount of the enzymes AST and ALT in your blood. Why is this test done? Liver function tests check how well your liver is working. Some tests help show if your liver is damaged or inflamed. Your doctor may order liver function tests if you have symptoms of liver disease. These tests also may be done to see how well a treatment for liver disease is working. How do you prepare for the test?  In general, there's nothing you have to do before this test, unless your doctor tells you to. How is the test done? A health professional uses a needle to take a blood sample, usually from the arm.   What happens after the test?  · You will probably be able to go home right away. · You can go back to your usual activities right away. Follow-up care is a key part of your treatment and safety. Be sure to make and go to all appointments, and call your doctor if you are having problems. It's also a good idea to keep a list of the medicines you take. Ask your doctor when you can expect to have your test results. Where can you learn more? Go to http://www.gray.com/  Enter M077 in the search box to learn more about \"Liver Function Tests: About These Tests. \"  Current as of: 2021               Content Version: 13.0  © 0310-4518 [employer]. Care instructions adapted under license by [employer] (which disclaims liability or warranty for this information). If you have questions about a medical condition or this instruction, always ask your healthcare professional. [place] [address] any warranty or liability for your use of this information. HOSPITALIST DISCHARGE INSTRUCTIONS    NAME: Ariel Mena   :  1955   MRN:  [id number]     Date/Time:  10/27/2021 4:26 PM    ADMIT DATE: 10/26/2021   DISCHARGE DATE: 10/27/2021     Attending Physician: Chloé Ashraf NP    DISCHARGE DIAGNOSIS:  Elevated liver function tests      Medications: Per above medication reconciliation. Pain Management: per above medications    Recommended diet: diabetic    Recommended activity: {discharge activity:88467}    Wound care: none    Indwelling devices:  none    Supplemental Oxygen: none    Required Lab work: repeat liver function tests in one week at     Glucose management:  Check blood glucose at home     Code status: Full        Outside physician follow up:     Follow-up Information     Follow up With Specialties Details Why Contact Info    Caron, MD Christianne   Follow up with PCP in [place], Dr Jana Richardson in 2-3 weeks or as scheduled Patient can only remember the practice name and not the physician      Tara Shannon MD Gastroenterology In 1 week follow up appointment for recheck liver function tests [address]                  Home self/care/ SNF MD responsible for above on discharge. Information obtained by :  I understand that if any problems occur once I am at home I am to contact my physician. I understand and acknowledge receipt of the instructions indicated above.                                                                                                                                            Physician's or R.N.'s Signature                                                                  Date/Time                                                                                                                                              Patient or Representative

## 2021-11-11 ENCOUNTER — OFFICE VISIT (OUTPATIENT)
Dept: INFECTIOUS DISEASES | Age: 66
End: 2021-11-11
Payer: COMMERCIAL

## 2021-11-11 VITALS
HEART RATE: 66 BPM | BODY MASS INDEX: 28.84 KG/M2 | DIASTOLIC BLOOD PRESSURE: 69 MMHG | SYSTOLIC BLOOD PRESSURE: 105 MMHG | RESPIRATION RATE: 16 BRPM | WEIGHT: 162.8 LBS | OXYGEN SATURATION: 97 % | TEMPERATURE: 98.1 F | HEIGHT: 63 IN

## 2021-11-11 DIAGNOSIS — H54.7 VISUAL IMPAIRMENT: ICD-10-CM

## 2021-11-11 DIAGNOSIS — R41.3 MILD MEMORY DISTURBANCE: ICD-10-CM

## 2021-11-11 DIAGNOSIS — B00.4 ENCEPHALITIS DUE TO HUMAN HERPES SIMPLEX VIRUS (HSV): Primary | ICD-10-CM

## 2021-11-11 PROCEDURE — 99213 OFFICE O/P EST LOW 20 MIN: CPT | Performed by: INTERNAL MEDICINE

## 2021-11-11 NOTE — PROGRESS NOTES
Haider Carver    HPI  60 y.o BF presenting for a routine f/u after a recent admission to [facility] in 09/2021 w HSV encephalitis. 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 She received 21 days of IV acyclovir, (initially at 10 mg/kg, changed to 15mg/kg). 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 Pt did well against all odds and was discharged to SNF. Except for mild memory impairment which is improving, she denies sensory or motor deficits. She is being followed by out pt OT and speech therapists. Pt was recently admitted to the hospital with elevated LFTs deemed to be from her antiepileptic medication, Depakote, which has been discontinued. She reports some visual difficulty today (unable to specify), denies double vision. Her glasses were replaced in 08/2021. Her appetite is good and she denies acute complaints on assessment today. ROS  Review of Systems   Constitutional: Negative. Eyes: Negative. Respiratory: Negative. Cardiovascular: Negative. Gastrointestinal: Negative. Genitourinary: Negative. Musculoskeletal: Negative. Neurological: Negative. Psychiatric/Behavioral:        Mild memory difficulty        Past Medical History:   Diagnosis Date    [facility] cancer ([facility]     Depression     Diabetes ([facility]     High cholesterol     HSV-1 (herpes simplex virus 1) infection     Seizures (HCC)         Past Surgical History:   Procedure Laterality Date    HX MASTECTOMY      IR FLUORO GUIDE [employer] CVAD  9/9/2021        Social History     Tobacco Use    Smoking status: Never Smoker    Smokeless tobacco: Never Used   Vaping Use    Vaping Use: Never used   Substance Use Topics    Alcohol use: Not Currently    Drug use: Never        History reviewed. No pertinent family history. No Known Allergies     Objective  Physical Exam  Cardiovascular:      Rate and Rhythm: Normal rate and regular rhythm. Heart sounds: Normal heart sounds.    Pulmonary:      Effort: Pulmonary effort is normal. Breath sounds: Normal breath sounds. Abdominal:      General: Abdomen is flat. Palpations: Abdomen is soft. Musculoskeletal:         General: Normal range of motion. Skin:     General: Skin is warm and dry. Neurological:      General: No focal deficit present. Mental Status: She is alert and oriented to person, place, and time. Assessment & Plan  Diagnoses and all orders for this visit:    1. 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 Mild memory disturbance: Continued improved since d/c     3. Seizure: due to # 1. `    4.  Visual impairment: Doubt related to # 1      Advised to follow up w Ophthalmology soon

## 2021-11-11 NOTE — PROGRESS NOTES
Chief Complaint   Patient presents with    Follow-up     HSV encephalitis               Visit Vitals  /69 (BP 1 Location: Left upper arm, BP Patient Position: Sitting)   Pulse 66   Temp 98.1 °F (36.7 °C) (Temporal)   Resp 16   Ht 5' 3\" (1.6 m)   Wt 162 lb 12.8 oz (73.8 kg)   SpO2 97%   BMI 28.84 kg/m²             1. Have you been to the ER, urgent care clinic since your last visit? [facility]    2. Have you seen or consulted any other health care providers outside of the [address] [place] [address] since your last visit? Include any pap smears or colon screening.  No

## 2021-12-23 NOTE — PROGRESS NOTES
Problem: Dysphagia (Adult)  Goal: *Acute Goals and Plan of Care (Insert Text)  Description: Speech Therapy Goals  Initiated 9/10/2021  -Patient will participate in modified barium swallow study within 7 day(s), pending pt's progress and tolerance. [ ] Not met  [ ]  MET   [ x] Progressing  [ ] Discontinue  -Patient will tolerate SBS diet with nectar/mildly thick liquids without signs/symptoms of aspiration given no cues within 7 day(s). [ ] Not met  [ ]  MET   [ x] Progressing  [ ] Discontinue  -Patient will demonstrate understanding of swallow safety precautions and aspiration precautions, diet recs with no cues within 7 day(s). [ ] Not met  [ ]  MET   [ x] Progressing  [ ] Discontinue  Outcome: Progressing Towards Goal   SPEECH [address] TREATMENT  Patient: Cristiane Randall (17 y.o. female)  Date: 9/13/2021  Diagnosis: New onset seizure ([address] [R56.9]  Sepsis ([address] [A41.9] <principal problem not specified>       Precautions: aspiration      ASSESSMENT:  Pt seen for follow-up, alert, limited verbalizations. MRA head pending. Pt's daughter is present and reports limited PO Intake over the weekend, reduced interactiveness. Pt accepts trials of thin via straw, mildly thick via straw and pudding. Oral phase with mild global sluggishness and min left facial weakness at rest, resolves with movement. Pharyngeal phase with  Min delay, appears largely WNL. Pt with min cough x 1 after sequential sips thin via straw. PLAN:  Recommendations and Planned Interventions: At this time, diet upgrade is not recommended due to aspiration concerns and AMS, risk of decompensation. Discussed pt's preferences with pt, daughter and RD. RD placed recs for PO supplements. Discussed with pt and daughter re: possible need for NG placement if/as indicated pending pt's PO intake and if pt is able to meet nutritional needs.    Patient continues to benefit from skilled intervention to address the above impairments. Continue treatment per established plan of care. Frequency/Duration: Patient will be followed by speech-language pathology 5 times a week to address goals. Discharge Recommendations:  Pending progress, cont SLP intervention     SUBJECTIVE:   Patient alert, agreeable, limited verbalizations. Daughter Malinda is present with pt's consent. OBJECTIVE:     CT Results  (Last 48 hours)                 09/11/21 2342  CT HEAD WO CONT Final result    Impression:      Stable exam               Narrative:      Technique: axial noncontrast images were obtained from the skull base through   the vertex. All CT scans at this facility are performed using dose reduction optimization   techniques as appropriate to a performed exam including the following: Automated   exposure control, adjustments to the MA and/or KV according to patient size or   use of iterative reconstruction technique       Comparison: Follow-up right temporal edema       Findings: There is no acute intracranial hemorrhage. There remains hyperdensity within the   right middle cerebral artery. Hypodensity within the right insula and temporal   lobes appear stable. There is loss of cortical gray-white differentiation. There   is mild mass effect on the temporal horn of the right lateral ventricle       No midline shift, mass effect or herniation. No new area of loss of cortical   gray-white differentiation       The paranasal sinuses and mastoid air cells are clear. The osseous structures   are intact. The orbits are unremarkable.                   Cognitive and Communication Status:  Neurologic State: Alert  Orientation Level: Oriented to person  Cognition: Decreased command following, Poor safety awareness    Pain:  Pain Scale 1: Numeric (0 - 10)  Pain Intensity 1: 0       After treatment:   Patient left in no apparent distress in bed, Call bell within reach, Nursing notified and Caregiver / family present    COMMUNICATION/EDUCATION:   Patient was educated regarding her deficit(s) of dysphagia, swallow safety precautions, diet recs and POC. She demonstrated Guarded understanding as evidenced by AMS, daughter expresses understanding. The patient's plan of care including recommendations, planned interventions, and recommended diet changes were discussed with: Registered nurse and Physician.      Patience Jaramillo M.S. CCC-SLP  Time Calculation: 19 mins Applied

## 2022-03-18 PROBLEM — R56.9 NEW ONSET SEIZURE (HCC): Status: ACTIVE | Noted: 2021-09-03

## 2022-03-18 PROBLEM — A41.9 SEPSIS (HCC): Status: ACTIVE | Noted: 2021-09-03

## 2022-03-19 PROBLEM — R74.01 TRANSAMINITIS: Status: ACTIVE | Noted: 2021-10-26

## 2022-03-19 PROBLEM — E44.1 MILD PROTEIN-CALORIE MALNUTRITION (HCC): Status: ACTIVE | Noted: 2021-09-10

## 2022-03-19 PROBLEM — K75.9 HEPATITIS: Status: ACTIVE | Noted: 2021-10-26

## 2024-01-13 NOTE — PROGRESS NOTES
OT treatment attempted at 1154 am, however patient reported too tired declining participation in therapy at this time. Patient educated on importance of working with therapy. Will continue to follow patient and attempt tx at a later time. Thank you! Pt mother completed trach care & suction    Resp.therapist present & observing    Pt mother did well with coaching      % on TC @ 30%